# Patient Record
Sex: FEMALE | Race: WHITE | NOT HISPANIC OR LATINO | Employment: OTHER | URBAN - METROPOLITAN AREA
[De-identification: names, ages, dates, MRNs, and addresses within clinical notes are randomized per-mention and may not be internally consistent; named-entity substitution may affect disease eponyms.]

---

## 2017-01-21 ENCOUNTER — COMMUNICATION - HEALTHEAST (OUTPATIENT)
Dept: FAMILY MEDICINE | Facility: CLINIC | Age: 82
End: 2017-01-21

## 2017-01-21 DIAGNOSIS — M10.9 GOUT: ICD-10-CM

## 2017-07-01 ENCOUNTER — COMMUNICATION - HEALTHEAST (OUTPATIENT)
Dept: FAMILY MEDICINE | Facility: CLINIC | Age: 82
End: 2017-07-01

## 2017-07-01 DIAGNOSIS — R56.9 CONVULSIONS (H): ICD-10-CM

## 2017-09-06 ENCOUNTER — COMMUNICATION - HEALTHEAST (OUTPATIENT)
Dept: FAMILY MEDICINE | Facility: CLINIC | Age: 82
End: 2017-09-06

## 2017-09-06 DIAGNOSIS — I10 ESSENTIAL HYPERTENSION: ICD-10-CM

## 2017-10-01 ENCOUNTER — COMMUNICATION - HEALTHEAST (OUTPATIENT)
Dept: FAMILY MEDICINE | Facility: CLINIC | Age: 82
End: 2017-10-01

## 2017-10-01 DIAGNOSIS — R56.9 CONVULSIONS (H): ICD-10-CM

## 2017-10-16 ENCOUNTER — COMMUNICATION - HEALTHEAST (OUTPATIENT)
Dept: FAMILY MEDICINE | Facility: CLINIC | Age: 82
End: 2017-10-16

## 2017-10-16 DIAGNOSIS — M10.9 GOUT: ICD-10-CM

## 2017-10-24 ENCOUNTER — HOSPITAL ENCOUNTER (OUTPATIENT)
Dept: LAB | Age: 82
Setting detail: SPECIMEN
Discharge: HOME | End: 2017-10-24

## 2017-10-24 ENCOUNTER — OFFICE VISIT - HEALTHEAST (OUTPATIENT)
Dept: FAMILY MEDICINE | Facility: CLINIC | Age: 82
End: 2017-10-24

## 2017-10-24 DIAGNOSIS — D47.2 MONOCLONAL PARAPROTEINEMIA: ICD-10-CM

## 2017-10-24 DIAGNOSIS — M81.0 OSTEOPOROSIS: ICD-10-CM

## 2017-10-24 DIAGNOSIS — I10 ESSENTIAL HYPERTENSION: ICD-10-CM

## 2017-10-24 DIAGNOSIS — Z00.00 HEALTH CARE MAINTENANCE: ICD-10-CM

## 2017-10-24 DIAGNOSIS — D64.9 ANEMIA: ICD-10-CM

## 2017-10-24 DIAGNOSIS — E55.9 VITAMIN D DEFICIENCY: ICD-10-CM

## 2017-10-24 DIAGNOSIS — R56.9 CONVULSIONS (H): ICD-10-CM

## 2017-10-26 LAB
ALBUMIN PERCENT: 65.1 % (ref 51–67)
ALBUMIN SERPL ELPH-MCNC: 4.4 G/DL (ref 3.2–4.7)
ALPHA 1 PERCENT: 3 % (ref 2–4)
ALPHA 2 PERCENT: 11.9 % (ref 5–13)
ALPHA1 GLOB SERPL ELPH-MCNC: 0.2 G/DL (ref 0.1–0.3)
ALPHA2 GLOB SERPL ELPH-MCNC: 0.8 G/DL (ref 0.4–0.9)
B-GLOBULIN SERPL ELPH-MCNC: 0.6 G/DL (ref 0.7–1.2)
BETA PERCENT: 9.5 % (ref 10–17)
GAMMA GLOB SERPL ELPH-MCNC: 0.7 G/DL (ref 0.6–1.4)
GAMMA GLOBULIN PERCENT: 10.5 % (ref 9–20)
PATH ICD:: ABNORMAL
PROT PATTERN SERPL ELPH-IMP: ABNORMAL
PROT SERPL-MCNC: 6.7 G/DL (ref 6–8)
REVIEWING PATHOLOGIST: ABNORMAL

## 2017-10-27 ENCOUNTER — COMMUNICATION - HEALTHEAST (OUTPATIENT)
Dept: FAMILY MEDICINE | Facility: CLINIC | Age: 82
End: 2017-10-27

## 2017-12-13 ENCOUNTER — COMMUNICATION - HEALTHEAST (OUTPATIENT)
Dept: FAMILY MEDICINE | Facility: CLINIC | Age: 82
End: 2017-12-13

## 2017-12-13 DIAGNOSIS — I10 HTN (HYPERTENSION): ICD-10-CM

## 2017-12-29 ENCOUNTER — COMMUNICATION - HEALTHEAST (OUTPATIENT)
Dept: FAMILY MEDICINE | Facility: CLINIC | Age: 82
End: 2017-12-29

## 2017-12-29 DIAGNOSIS — R56.9 CONVULSIONS (H): ICD-10-CM

## 2018-01-14 ENCOUNTER — COMMUNICATION - HEALTHEAST (OUTPATIENT)
Dept: FAMILY MEDICINE | Facility: CLINIC | Age: 83
End: 2018-01-14

## 2018-01-14 DIAGNOSIS — M10.9 GOUT: ICD-10-CM

## 2018-03-09 ENCOUNTER — COMMUNICATION - HEALTHEAST (OUTPATIENT)
Dept: FAMILY MEDICINE | Facility: CLINIC | Age: 83
End: 2018-03-09

## 2018-04-15 ENCOUNTER — COMMUNICATION - HEALTHEAST (OUTPATIENT)
Dept: FAMILY MEDICINE | Facility: CLINIC | Age: 83
End: 2018-04-15

## 2018-04-15 DIAGNOSIS — M10.9 GOUT: ICD-10-CM

## 2018-06-02 ENCOUNTER — COMMUNICATION - HEALTHEAST (OUTPATIENT)
Dept: FAMILY MEDICINE | Facility: CLINIC | Age: 83
End: 2018-06-02

## 2018-06-02 DIAGNOSIS — I10 ESSENTIAL HYPERTENSION: ICD-10-CM

## 2018-06-26 ENCOUNTER — COMMUNICATION - HEALTHEAST (OUTPATIENT)
Dept: FAMILY MEDICINE | Facility: CLINIC | Age: 83
End: 2018-06-26

## 2018-06-26 DIAGNOSIS — R56.9 CONVULSIONS (H): ICD-10-CM

## 2018-07-13 ENCOUNTER — COMMUNICATION - HEALTHEAST (OUTPATIENT)
Dept: FAMILY MEDICINE | Facility: CLINIC | Age: 83
End: 2018-07-13

## 2018-07-13 DIAGNOSIS — M10.9 GOUT: ICD-10-CM

## 2018-09-07 ENCOUNTER — COMMUNICATION - HEALTHEAST (OUTPATIENT)
Dept: FAMILY MEDICINE | Facility: CLINIC | Age: 83
End: 2018-09-07

## 2018-09-07 DIAGNOSIS — I10 ESSENTIAL HYPERTENSION: ICD-10-CM

## 2018-09-25 ENCOUNTER — COMMUNICATION - HEALTHEAST (OUTPATIENT)
Dept: FAMILY MEDICINE | Facility: CLINIC | Age: 83
End: 2018-09-25

## 2018-09-25 DIAGNOSIS — R56.9 CONVULSIONS (H): ICD-10-CM

## 2018-10-10 ENCOUNTER — COMMUNICATION - HEALTHEAST (OUTPATIENT)
Dept: FAMILY MEDICINE | Facility: CLINIC | Age: 83
End: 2018-10-10

## 2018-10-10 DIAGNOSIS — M10.9 GOUT: ICD-10-CM

## 2018-10-29 ENCOUNTER — OFFICE VISIT - HEALTHEAST (OUTPATIENT)
Dept: FAMILY MEDICINE | Facility: CLINIC | Age: 83
End: 2018-10-29

## 2018-10-29 DIAGNOSIS — I10 ESSENTIAL HYPERTENSION: ICD-10-CM

## 2018-10-29 DIAGNOSIS — R56.9 CONVULSIONS (H): ICD-10-CM

## 2018-10-29 DIAGNOSIS — D64.9 ANEMIA: ICD-10-CM

## 2018-10-29 DIAGNOSIS — E66.9 OBESITY, UNSPECIFIED: ICD-10-CM

## 2018-10-29 DIAGNOSIS — D47.2 MONOCLONAL PARAPROTEINEMIA: ICD-10-CM

## 2018-10-29 DIAGNOSIS — G60.9 HEREDITARY AND IDIOPATHIC PERIPHERAL NEUROPATHY: ICD-10-CM

## 2018-10-29 DIAGNOSIS — Z00.00 HEALTH CARE MAINTENANCE: ICD-10-CM

## 2018-10-29 DIAGNOSIS — M10.9 GOUT: ICD-10-CM

## 2018-10-29 DIAGNOSIS — E55.9 VITAMIN D DEFICIENCY: ICD-10-CM

## 2018-10-29 LAB
ALBUMIN SERPL-MCNC: 4.2 G/DL (ref 3.5–5)
ALP SERPL-CCNC: 90 U/L (ref 45–120)
ALT SERPL W P-5'-P-CCNC: 18 U/L (ref 0–45)
ANION GAP SERPL CALCULATED.3IONS-SCNC: 11 MMOL/L (ref 5–18)
AST SERPL W P-5'-P-CCNC: 30 U/L (ref 0–40)
BILIRUB SERPL-MCNC: 0.3 MG/DL (ref 0–1)
BUN SERPL-MCNC: 12 MG/DL (ref 8–28)
CALCIUM SERPL-MCNC: 9.8 MG/DL (ref 8.5–10.5)
CHLORIDE BLD-SCNC: 106 MMOL/L (ref 98–107)
CHOLEST SERPL-MCNC: 216 MG/DL
CO2 SERPL-SCNC: 24 MMOL/L (ref 22–31)
CREAT SERPL-MCNC: 0.65 MG/DL (ref 0.6–1.1)
FASTING STATUS PATIENT QL REPORTED: NO
GFR SERPL CREATININE-BSD FRML MDRD: >60 ML/MIN/1.73M2
GLUCOSE BLD-MCNC: 94 MG/DL (ref 70–125)
HDLC SERPL-MCNC: 78 MG/DL
LDLC SERPL CALC-MCNC: 115 MG/DL
PHENYTOIN SERPL-MCNC: 8.1 UG/ML (ref 10–20)
POTASSIUM BLD-SCNC: 4.6 MMOL/L (ref 3.5–5)
PROT SERPL-MCNC: 6.6 G/DL (ref 6–8)
SODIUM SERPL-SCNC: 141 MMOL/L (ref 136–145)
TRIGL SERPL-MCNC: 113 MG/DL

## 2018-10-29 ASSESSMENT — MIFFLIN-ST. JEOR: SCORE: 1111.72

## 2018-10-30 LAB — 25(OH)D3 SERPL-MCNC: 34.8 NG/ML (ref 30–80)

## 2018-10-31 ENCOUNTER — COMMUNICATION - HEALTHEAST (OUTPATIENT)
Dept: FAMILY MEDICINE | Facility: CLINIC | Age: 83
End: 2018-10-31

## 2018-11-02 ENCOUNTER — COMMUNICATION - HEALTHEAST (OUTPATIENT)
Dept: FAMILY MEDICINE | Facility: CLINIC | Age: 83
End: 2018-11-02

## 2018-12-08 ENCOUNTER — COMMUNICATION - HEALTHEAST (OUTPATIENT)
Dept: FAMILY MEDICINE | Facility: CLINIC | Age: 83
End: 2018-12-08

## 2018-12-08 DIAGNOSIS — I10 ESSENTIAL HYPERTENSION: ICD-10-CM

## 2018-12-08 DIAGNOSIS — I10 HTN (HYPERTENSION): ICD-10-CM

## 2018-12-24 ENCOUNTER — COMMUNICATION - HEALTHEAST (OUTPATIENT)
Dept: FAMILY MEDICINE | Facility: CLINIC | Age: 83
End: 2018-12-24

## 2018-12-24 DIAGNOSIS — R56.9 CONVULSIONS (H): ICD-10-CM

## 2019-01-07 ENCOUNTER — COMMUNICATION - HEALTHEAST (OUTPATIENT)
Dept: FAMILY MEDICINE | Facility: CLINIC | Age: 84
End: 2019-01-07

## 2019-01-07 DIAGNOSIS — M10.9 GOUT: ICD-10-CM

## 2019-01-23 ENCOUNTER — COMMUNICATION - HEALTHEAST (OUTPATIENT)
Dept: FAMILY MEDICINE | Facility: CLINIC | Age: 84
End: 2019-01-23

## 2019-01-23 DIAGNOSIS — R56.9 CONVULSIONS (H): ICD-10-CM

## 2019-05-25 ENCOUNTER — COMMUNICATION - HEALTHEAST (OUTPATIENT)
Dept: FAMILY MEDICINE | Facility: CLINIC | Age: 84
End: 2019-05-25

## 2019-05-25 DIAGNOSIS — R56.9 CONVULSIONS (H): ICD-10-CM

## 2019-06-03 ENCOUNTER — COMMUNICATION - HEALTHEAST (OUTPATIENT)
Dept: FAMILY MEDICINE | Facility: CLINIC | Age: 84
End: 2019-06-03

## 2019-06-03 DIAGNOSIS — R56.9 CONVULSIONS (H): ICD-10-CM

## 2019-10-07 ENCOUNTER — COMMUNICATION - HEALTHEAST (OUTPATIENT)
Dept: FAMILY MEDICINE | Facility: CLINIC | Age: 84
End: 2019-10-07

## 2019-10-07 DIAGNOSIS — M10.9 GOUT: ICD-10-CM

## 2019-11-20 ENCOUNTER — OFFICE VISIT - HEALTHEAST (OUTPATIENT)
Dept: FAMILY MEDICINE | Facility: CLINIC | Age: 84
End: 2019-11-20

## 2019-11-20 DIAGNOSIS — G40.909 NONINTRACTABLE EPILEPSY WITHOUT STATUS EPILEPTICUS, UNSPECIFIED EPILEPSY TYPE (H): ICD-10-CM

## 2019-11-20 DIAGNOSIS — R27.0 ATAXIA: ICD-10-CM

## 2019-11-20 DIAGNOSIS — I10 ESSENTIAL HYPERTENSION: ICD-10-CM

## 2019-11-20 DIAGNOSIS — D47.2 MONOCLONAL PARAPROTEINEMIA: ICD-10-CM

## 2019-11-20 DIAGNOSIS — Z23 FLU VACCINE NEED: ICD-10-CM

## 2019-11-20 DIAGNOSIS — G60.9 HEREDITARY AND IDIOPATHIC PERIPHERAL NEUROPATHY: ICD-10-CM

## 2019-11-20 DIAGNOSIS — Z00.00 HEALTH CARE MAINTENANCE: ICD-10-CM

## 2019-11-20 DIAGNOSIS — Z23 NEED FOR PNEUMOCOCCAL VACCINE: ICD-10-CM

## 2019-11-20 DIAGNOSIS — M10.9 GOUT, UNSPECIFIED CAUSE, UNSPECIFIED CHRONICITY, UNSPECIFIED SITE: ICD-10-CM

## 2019-11-20 LAB
ALBUMIN SERPL-MCNC: 4.1 G/DL (ref 3.5–5)
ALP SERPL-CCNC: 81 U/L (ref 45–120)
ALT SERPL W P-5'-P-CCNC: 15 U/L (ref 0–45)
ANION GAP SERPL CALCULATED.3IONS-SCNC: 13 MMOL/L (ref 5–18)
AST SERPL W P-5'-P-CCNC: 24 U/L (ref 0–40)
BILIRUB SERPL-MCNC: 0.4 MG/DL (ref 0–1)
BUN SERPL-MCNC: 9 MG/DL (ref 8–28)
CALCIUM SERPL-MCNC: 9.7 MG/DL (ref 8.5–10.5)
CHLORIDE BLD-SCNC: 105 MMOL/L (ref 98–107)
CHOLEST SERPL-MCNC: 222 MG/DL
CO2 SERPL-SCNC: 22 MMOL/L (ref 22–31)
CREAT SERPL-MCNC: 0.54 MG/DL (ref 0.6–1.1)
FASTING STATUS PATIENT QL REPORTED: YES
GFR SERPL CREATININE-BSD FRML MDRD: >60 ML/MIN/1.73M2
GLUCOSE BLD-MCNC: 96 MG/DL (ref 70–125)
HBA1C MFR BLD: 4.9 % (ref 3.5–6)
HDLC SERPL-MCNC: 80 MG/DL
LDLC SERPL CALC-MCNC: 121 MG/DL
PHENYTOIN SERPL-MCNC: 7.3 UG/ML (ref 10–20)
POTASSIUM BLD-SCNC: 4.3 MMOL/L (ref 3.5–5)
PROT SERPL-MCNC: 6.5 G/DL (ref 6–8)
SODIUM SERPL-SCNC: 140 MMOL/L (ref 136–145)
TRIGL SERPL-MCNC: 106 MG/DL
URATE SERPL-MCNC: 4.6 MG/DL (ref 2–7.5)

## 2019-11-20 ASSESSMENT — ANXIETY QUESTIONNAIRES
1. FEELING NERVOUS, ANXIOUS, OR ON EDGE: NOT AT ALL
2. NOT BEING ABLE TO STOP OR CONTROL WORRYING: NOT AT ALL

## 2019-11-20 ASSESSMENT — MIFFLIN-ST. JEOR: SCORE: 1018.16

## 2019-11-21 LAB
ALBUMIN PERCENT: 63.2 % (ref 51–67)
ALBUMIN SERPL ELPH-MCNC: 4.2 G/DL (ref 3.2–4.7)
ALPHA 1 PERCENT: 2.9 % (ref 2–4)
ALPHA 2 PERCENT: 13.4 % (ref 5–13)
ALPHA1 GLOB SERPL ELPH-MCNC: 0.2 G/DL (ref 0.1–0.3)
ALPHA2 GLOB SERPL ELPH-MCNC: 0.9 G/DL (ref 0.4–0.9)
B-GLOBULIN SERPL ELPH-MCNC: 0.7 G/DL (ref 0.7–1.2)
BETA PERCENT: 10.2 % (ref 10–17)
GAMMA GLOB SERPL ELPH-MCNC: 0.7 G/DL (ref 0.6–1.4)
GAMMA GLOBULIN PERCENT: 10.3 % (ref 9–20)
PATH ICD:: ABNORMAL
PATH ICD:: NORMAL
PROT PATTERN SERPL ELPH-IMP: ABNORMAL
PROT PATTERN SERPL IFE-IMP: NORMAL
PROT SERPL-MCNC: 6.6 G/DL (ref 6–8)
REVIEWING PATHOLOGIST: ABNORMAL
REVIEWING PATHOLOGIST: NORMAL

## 2019-11-22 LAB
KAPPA LC FREE SER-MCNC: 1.59 MG/DL (ref 0.33–1.94)
KAPPA LC FREE/LAMBDA FREE SER NEPH: 1.03 {RATIO} (ref 0.26–1.65)
LAMBDA LC FREE SERPL-MCNC: 1.54 MG/DL (ref 0.57–2.63)

## 2019-11-24 ENCOUNTER — COMMUNICATION - HEALTHEAST (OUTPATIENT)
Dept: FAMILY MEDICINE | Facility: CLINIC | Age: 84
End: 2019-11-24

## 2019-11-25 ENCOUNTER — COMMUNICATION - HEALTHEAST (OUTPATIENT)
Dept: FAMILY MEDICINE | Facility: CLINIC | Age: 84
End: 2019-11-25

## 2019-11-30 ENCOUNTER — COMMUNICATION - HEALTHEAST (OUTPATIENT)
Dept: FAMILY MEDICINE | Facility: CLINIC | Age: 84
End: 2019-11-30

## 2019-11-30 DIAGNOSIS — I10 HTN (HYPERTENSION): ICD-10-CM

## 2019-11-30 DIAGNOSIS — R56.9 CONVULSIONS (H): ICD-10-CM

## 2019-12-02 ENCOUNTER — COMMUNICATION - HEALTHEAST (OUTPATIENT)
Dept: FAMILY MEDICINE | Facility: CLINIC | Age: 84
End: 2019-12-02

## 2019-12-02 DIAGNOSIS — I10 ESSENTIAL HYPERTENSION: ICD-10-CM

## 2019-12-04 ENCOUNTER — COMMUNICATION - HEALTHEAST (OUTPATIENT)
Dept: FAMILY MEDICINE | Facility: CLINIC | Age: 84
End: 2019-12-04

## 2019-12-04 ENCOUNTER — HOSPITAL ENCOUNTER (OUTPATIENT)
Dept: CT IMAGING | Facility: HOSPITAL | Age: 84
Discharge: HOME | End: 2019-12-04
Attending: FAMILY MEDICINE

## 2019-12-04 DIAGNOSIS — R27.0 ATAXIA: ICD-10-CM

## 2020-01-02 ENCOUNTER — COMMUNICATION - HEALTHEAST (OUTPATIENT)
Dept: FAMILY MEDICINE | Facility: CLINIC | Age: 85
End: 2020-01-02

## 2020-01-02 DIAGNOSIS — M10.9 GOUT: ICD-10-CM

## 2020-11-27 ENCOUNTER — COMMUNICATION - HEALTHEAST (OUTPATIENT)
Dept: FAMILY MEDICINE | Facility: CLINIC | Age: 85
End: 2020-11-27

## 2020-11-27 DIAGNOSIS — R56.9 CONVULSIONS (H): ICD-10-CM

## 2020-11-27 DIAGNOSIS — I10 ESSENTIAL HYPERTENSION: ICD-10-CM

## 2020-11-27 DIAGNOSIS — I10 HTN (HYPERTENSION): ICD-10-CM

## 2020-11-27 DIAGNOSIS — M10.9 GOUT: ICD-10-CM

## 2020-12-14 ENCOUNTER — OFFICE VISIT - HEALTHEAST (OUTPATIENT)
Dept: FAMILY MEDICINE | Facility: CLINIC | Age: 85
End: 2020-12-14

## 2020-12-14 DIAGNOSIS — R56.9 CONVULSIONS (H): ICD-10-CM

## 2020-12-14 RX ORDER — PHENYTOIN SODIUM 100 MG/1
100 CAPSULE, EXTENDED RELEASE ORAL 3 TIMES DAILY
Qty: 270 CAPSULE | Refills: 3 | Status: SHIPPED | OUTPATIENT
Start: 2020-12-14 | End: 2021-12-22

## 2021-02-24 ENCOUNTER — COMMUNICATION - HEALTHEAST (OUTPATIENT)
Dept: FAMILY MEDICINE | Facility: CLINIC | Age: 86
End: 2021-02-24

## 2021-02-24 DIAGNOSIS — I10 HTN (HYPERTENSION): ICD-10-CM

## 2021-02-24 DIAGNOSIS — I10 ESSENTIAL HYPERTENSION: ICD-10-CM

## 2021-03-17 ENCOUNTER — COMMUNICATION - HEALTHEAST (OUTPATIENT)
Dept: FAMILY MEDICINE | Facility: CLINIC | Age: 86
End: 2021-03-17

## 2021-03-17 DIAGNOSIS — M10.9 GOUT: ICD-10-CM

## 2021-03-18 RX ORDER — ALLOPURINOL 100 MG/1
TABLET ORAL
Qty: 90 TABLET | Refills: 0 | Status: SHIPPED | OUTPATIENT
Start: 2021-03-18 | End: 2021-09-24

## 2021-04-27 ENCOUNTER — OFFICE VISIT - HEALTHEAST (OUTPATIENT)
Dept: FAMILY MEDICINE | Facility: CLINIC | Age: 86
End: 2021-04-27

## 2021-04-27 DIAGNOSIS — I10 ESSENTIAL HYPERTENSION: ICD-10-CM

## 2021-04-27 DIAGNOSIS — G40.909 NONINTRACTABLE EPILEPSY WITHOUT STATUS EPILEPTICUS, UNSPECIFIED EPILEPSY TYPE (H): ICD-10-CM

## 2021-05-26 ENCOUNTER — RECORDS - HEALTHEAST (OUTPATIENT)
Dept: ADMINISTRATIVE | Facility: OTHER | Age: 86
End: 2021-05-26

## 2021-05-26 DIAGNOSIS — I10 HTN (HYPERTENSION): ICD-10-CM

## 2021-05-26 RX ORDER — LOSARTAN POTASSIUM 50 MG/1
TABLET ORAL
Qty: 30 TABLET | Refills: 0 | Status: SHIPPED | OUTPATIENT
Start: 2021-05-26 | End: 2021-09-24

## 2021-05-28 ENCOUNTER — RECORDS - HEALTHEAST (OUTPATIENT)
Dept: ADMINISTRATIVE | Facility: OTHER | Age: 86
End: 2021-05-28

## 2021-05-28 DIAGNOSIS — I10 ESSENTIAL HYPERTENSION: ICD-10-CM

## 2021-05-28 RX ORDER — CARVEDILOL 6.25 MG/1
TABLET ORAL
Qty: 180 TABLET | Refills: 0 | Status: SHIPPED | OUTPATIENT
Start: 2021-05-28 | End: 2021-09-24

## 2021-05-29 NOTE — TELEPHONE ENCOUNTER
RN cannot approve Refill Request    RN can NOT refill this medication PCP messaged that patient is overdue for Labs. Last office visit: 10/24/2017 Monroe Brito MD Last Physical: 10/29/2018 Last MTM visit: Visit date not found Last visit same specialty: 10/24/2017 Monroe Brito MD.  Next visit within 3 mo: Visit date not found  Next physical within 3 mo: Visit date not found      Oc Mendoza, [facility] Triage/Med Refill 5/25/2019    Requested Prescriptions   Pending Prescriptions Disp Refills     phenytoin (DILANTIN) 100 MG ER capsule [Pharmacy Med Name: Phenytoin Sodium Extended Oral Capsule 100 MG] 90 capsule 2     Sig: TAKE ONE CAPSULE BY MOUTH THREE TIMES DAILY.       Phenytoin Refill Protocol Failed - 5/25/2019 10:13 AM        Failed - CBC w/o diff (Hemogram 2) in last year      WBC   Date Value Ref Range Status   10/24/2017 5.5 4.0 - 11.0 thou/uL Final     RBC   Date Value Ref Range Status   10/24/2017 3.98 3.80 - 5.40 mill/uL Final     Hemoglobin   Date Value Ref Range Status   10/24/2017 13.6 12.0 - 16.0 g/dL Final     Hematocrit   Date Value Ref Range Status   10/24/2017 40.0 35.0 - 47.0 % Final     MCV   Date Value Ref Range Status   10/24/2017 101 (H) 80 - 100 fL Final     MCH   Date Value Ref Range Status   10/24/2017 34.1 (H) 27.0 - 34.0 pg Final     MCHC   Date Value Ref Range Status   10/24/2017 33.9 32.0 - 36.0 g/dL Final     RDW   Date Value Ref Range Status   10/24/2017 11.3 11.0 - 14.5 % Final     Platelets   Date Value Ref Range Status   10/24/2017 215 140 - 440 thou/uL Final     MPV   Date Value Ref Range Status   10/24/2017 7.7 7.0 - 10.0 fL Final                Failed - Free phenytoin level in last 12 months     No results found for: PHENYTOIFREE          Failed - Folate level in last 12 months     No results found for: FOLATE          Passed - LFT or AST or ALT in last 12 months     Albumin   Date Value Ref Range Status   10/29/2018 4.2 3.5 - 5.0 g/dL Final      Bilirubin, Total   Date Value Ref Range Status   10/29/2018 0.3 0.0 - 1.0 mg/dL Final     Alkaline Phosphatase   Date Value Ref Range Status   10/29/2018 90 45 - 120 U/L Final     AST   Date Value Ref Range Status   10/29/2018 30 0 - 40 U/L Final     ALT   Date Value Ref Range Status   10/29/2018 18 0 - 45 U/L Final     Protein, Total   Date Value Ref Range Status   10/29/2018 6.6 6.0 - 8.0 g/dL Final                Passed - PCP or prescribing provider visit in past 12 months       Last office visit with prescriber/PCP: 10/24/2017 Monroe Brito MD OR same dept: Visit date not found OR same specialty: 10/24/2017 Monroe Brito MD  Last physical: 10/29/2018 Last MTM visit: Visit date not found   Next visit within 3 mo: Visit date not found  Next physical within 3 mo: Visit date not found  Prescriber OR PCP: Monroe Brito MD  Last diagnosis associated with med order: 1. Convulsions (H)  - phenytoin (DILANTIN) 100 MG ER capsule [Pharmacy Med Name: Phenytoin Sodium Extended Oral Capsule 100 MG]; TAKE ONE CAPSULE BY MOUTH THREE TIMES DAILY.  Dispense: 90 capsule; Refill: 2    If protocol passes may refill for 12 months if within 3 months of last provider visit (or a total of 15 months).

## 2021-05-30 ENCOUNTER — RECORDS - HEALTHEAST (OUTPATIENT)
Dept: ADMINISTRATIVE | Facility: CLINIC | Age: 86
End: 2021-05-30

## 2021-05-31 ENCOUNTER — RECORDS - HEALTHEAST (OUTPATIENT)
Dept: ADMINISTRATIVE | Facility: CLINIC | Age: 86
End: 2021-05-31

## 2021-05-31 VITALS — WEIGHT: 168.5 LBS

## 2021-06-01 ENCOUNTER — RECORDS - HEALTHEAST (OUTPATIENT)
Dept: ADMINISTRATIVE | Facility: CLINIC | Age: 86
End: 2021-06-01

## 2021-06-02 VITALS — WEIGHT: 165.2 LBS | BODY MASS INDEX: 31.19 KG/M2 | HEIGHT: 61 IN

## 2021-06-02 NOTE — TELEPHONE ENCOUNTER
Refill Approved    Rx renewed per Medication Renewal Policy. Medication was last renewed on 1/9/2019         Jose Watts, [facility] Triage/Med Refill 10/8/2019     Requested Prescriptions   Pending Prescriptions Disp Refills     allopurinol (ZYLOPRIM) 100 MG tablet [Pharmacy Med Name: Allopurinol Oral Tablet 100 MG] 90 tablet 1     Sig: TAKE ONE TABLET BY MOUTH ONE TIME DAILY.       Allopurinol/Febuxostat Refill Protocol  Passed - 10/7/2019  9:07 AM        Passed - LFT or AST or ALT in last 12 months     Albumin   Date Value Ref Range Status   10/29/2018 4.2 3.5 - 5.0 g/dL Final     Bilirubin, Total   Date Value Ref Range Status   10/29/2018 0.3 0.0 - 1.0 mg/dL Final     Alkaline Phosphatase   Date Value Ref Range Status   10/29/2018 90 45 - 120 U/L Final     AST   Date Value Ref Range Status   10/29/2018 30 0 - 40 U/L Final     ALT   Date Value Ref Range Status   10/29/2018 18 0 - 45 U/L Final     Protein, Total   Date Value Ref Range Status   10/29/2018 6.6 6.0 - 8.0 g/dL Final                Passed - Visit with PCP or prescribing provider visit in past 12 months     Last office visit with prescriber/PCP: 10/24/2017 Monroe Brito MD OR same dept: Visit date not found OR same specialty: 10/24/2017 Monroe Brito MD  Last physical: 10/29/2018 Last MTM visit: Visit date not found   Next visit within 3 mo: Visit date not found  Next physical within 3 mo: Visit date not found  Prescriber OR PCP: Monroe Brito MD  Last diagnosis associated with med order: 1. Gout  - allopurinol (ZYLOPRIM) 100 MG tablet [Pharmacy Med Name: Allopurinol Oral Tablet 100 MG]; TAKE ONE TABLET BY MOUTH ONE TIME DAILY   Dispense: 90 tablet; Refill: 1    If protocol passes may refill for 12 months if within 3 months of last provider visit (or a total of 15 months).

## 2021-06-03 VITALS
RESPIRATION RATE: 20 BRPM | HEART RATE: 59 BPM | SYSTOLIC BLOOD PRESSURE: 130 MMHG | HEIGHT: 60 IN | DIASTOLIC BLOOD PRESSURE: 80 MMHG | BODY MASS INDEX: 28.9 KG/M2 | OXYGEN SATURATION: 99 % | TEMPERATURE: 97.7 F | WEIGHT: 147.2 LBS

## 2021-06-03 NOTE — PATIENT INSTRUCTIONS - HE
Fasting labs    Non-contrast CT of the head and refer to PT for eval    High dose flu vaccine    Pneumovax 23    Living will

## 2021-06-03 NOTE — PROGRESS NOTES
Assessment and Plan:       1. Ataxia  Unsteady gait requiring use of a walker at home  - CT Head Without Contrast; Future  - Ambulatory referral to Adult PT- External    2. Flu vaccine need    - Influenza High Dose, Seasonal 65+ yrs    3. Need for pneumococcal vaccine    - Pneumococcal polysaccharide vaccine 23-valent 1 yo or older, subq/IM    4. Hypertension                         The diagnosis was confirmed.  The condition is stable/controlled on LOSARTAN and no side effects  have been noted.  The appropriate follow up labs  ( CMP )have been ordered  (OR ARE UP TO DATE) and medication refills ordered if requested.    - Comprehensive Metabolic Panel    5. Gout, unspecified cause, unspecified chronicity, unspecified site                         The diagnosis was confirmed.  The condition is stable/controlled (NO RECURRENT GOUT ATTACKS)  on ALLOPURINOL and no side effects  have been noted.  The appropriate follow up labs  ( CMP, URIC ACID )have been ordered  (OR ARE UP TO DATE) and medication refills ordered if requested.    - Uric Acid    6. Nonintractable epilepsy without status epilepticus, unspecified epilepsy type (H)  STABLE ON DILANTIN  - Phenytoin (Dilantin )    7. Peripheral Neuropathy  Making ambulation more difficult.    8. Health care maintenance    - Lipid Cascade FASTING  - Glycosylated Hemoglobin A1c    9. Monoclonal Gammopathy Of Undetermined Significance    - Electrophoresis, Protein, Serum  - Immunoglobulin Free Light Chains, Serum  - Immunofixation Electrophoresis, Serum    PLAN:   Fasting labs    Non-contrast CT of the head and refer to PT for eval    High dose flu vaccine    Pneumovax 23    Living will recommend    The patient's current medical problems were reviewed.      The following health maintenance schedule was reviewed with the patient and provided in printed form in the after visit summary:   Health Maintenance   Topic Date Due     ADVANCE CARE PLANNING  11/15/1949     DXA SCAN   11/15/1996     ZOSTER VACCINES (2 of 3) 09/24/2009     INFLUENZA VACCINE RULE BASED (1) 08/01/2019     TD 18+ HE  09/15/2019     FALL RISK ASSESSMENT  10/29/2019     MEDICARE ANNUAL WELLNESS VISIT  10/29/2019     PNEUMOCOCCAL IMMUNIZATION 65+ LOW/MEDIUM RISK  Completed        Subjective:   Chief Complaint: Peggy De Anda is an 88 y.o. female here for an Annual Wellness visit.   HPI:   Has neuropathy and poor feeling in the feet.   Feels wobbly on her feet.   Not lightheaded or spinning.     Review of Systems:   Please see above.  The rest of the review of systems are negative for all systems.    Patient Care Team:  Monroe Brito MD as PCP - General (Family Medicine)  Monroe Brito MD as Assigned PCP     Patient Active Problem List   Diagnosis     Epilepsy And Recurrent Seizures     Nonrenal Secondary Hyperparathyroidism     Vitamin D Deficiency     Monoclonal Gammopathy Of Undetermined Significance     Anemia     Joint Pain, Localized In The Hip     Peripheral Neuropathy     Hypertension     Osteoporosis     Fatigue     Obesity     Convulsive Disorder     Ataxia     Dermatitis     Gout     Past Medical History:   Diagnosis Date     Gout 10/31/2015    Provider note on 7/21/2014 4. History of gout, stable on allopurinol.       Past Surgical History:   Procedure Laterality Date     APPENDECTOMY      AGE 13     CATARACT EXTRACTION, BILATERAL       CHOLECYSTECTOMY       NEUROPLASTY / TRANSPOSITION MEDIAN NERVE AT CARPAL TUNNEL BILATERAL       TONSILLECTOMY        No family history on file.   Social History     Socioeconomic History     Marital status:      Spouse name: Not on file     Number of children: Not on file     Years of education: Not on file     Highest education level: Not on file   Occupational History     Not on file   Social Needs     Financial resource strain: Not on file     Food insecurity:     Worry: Not on file     Inability: Not on file     Transportation needs:      Medical: Not on file     Non-medical: Not on file   Tobacco Use     Smoking status: Never Smoker     Smokeless tobacco: Never Used   Substance and Sexual Activity     Alcohol use: Not on file     Drug use: Not on file     Sexual activity: Not on file   Lifestyle     Physical activity:     Days per week: Not on file     Minutes per session: Not on file     Stress: Not on file   Relationships     Social connections:     Talks on phone: Not on file     Gets together: Not on file     Attends Sabianism service: Not on file     Active member of club or organization: Not on file     Attends meetings of clubs or organizations: Not on file     Relationship status: Not on file     Intimate partner violence:     Fear of current or ex partner: Not on file     Emotionally abused: Not on file     Physically abused: Not on file     Forced sexual activity: Not on file   Other Topics Concern     Not on file   Social History Narrative     Not on file      Current Outpatient Medications   Medication Sig Dispense Refill     allopurinol (ZYLOPRIM) 100 MG tablet TAKE ONE TABLET BY MOUTH ONE TIME DAILY. 90 tablet 0     aspirin 81 MG EC tablet Take 81 mg by mouth daily.       calcium carbonate (OS-OBED) 600 mg calcium (1,500 mg) tablet Take 600 mg by mouth.       carvedilol (COREG) 6.25 MG tablet TAKE ONE TABLET BY MOUTH TWICE DAILY 180 tablet 3     cholecalciferol, vitamin D3, 2,000 unit Tab Take by mouth.       losartan (COZAAR) 50 MG tablet TAKE 1 TABLET (50 MG) BY MOUTH ONCE DAILY. 90 tablet 3     MULTIVITAMIN ORAL Take by mouth.       phenytoin (DILANTIN) 100 MG ER capsule Take 1 capsule (100 mg total) by mouth 3 (three) times a day. 270 capsule 1     No current facility-administered medications for this visit.         12 system review:  Wobbly on feet, has to hang onto things or use a walker      Objective:   Vital Signs:   Visit Vitals  /80 (Patient Site: Left Arm, Patient Position: Sitting, Cuff Size: Adult Regular)   Pulse  "(!) 59   Temp 97.7  F (36.5  C) (Oral)   Resp 20   Ht 5' 0.25\" (1.53 m)   Wt 147 lb 3.2 oz (66.8 kg)   SpO2 99%   BMI 28.51 kg/m         VisionScreening:  No exam data present     PHYSICAL EXAM    GEN: FEMALE, ALERT, ORIENTED TIMES THREE, NAD  HEENT:  TM'S NL                  THROAT CL                  PERRL  NECK: SUPPLE, WITHOUT ADENOPATHY, THYROMEGALY, OR CAROTID BRUIT  LUNGS: CTA  COR: RRR WITHOUT MURMUR  BREASTS: not examined  ABD: SOFT, POSITIVE BOWEL SOUNDS, NONTX, WITHOUT MASS OR HSM  EXT:  NO CYANOSIS, CLUBBING OR EDEMA  SKIN:  WITHOUT ATYPICAL APPEARING LESIONS.  GAIT: IS TENTATIVE AND UNSTEADY WITH FREQUENT REACHING FOR SUPPORT.    UNABLE TO DO HEEL/TOE WALK OR ROMBERG.  FOOT EXAM:  DIMINISHED MONOFILAMENT SENSATION    Assessment Results 11/20/2019   Activities of Daily Living 1 - Full function   Instrumental Activities of Daily Living 1 - Full function   Mini Cog Total Score 5   Some recent data might be hidden   FALLS:  none  FALL RISK:   Has poor balance  LIVING WILL:  Does not have    A Mini-Cog score of 0-2 suggests the possibility of dementia, score of 3-5 suggests no dementia    Identified Health Risks:     "

## 2021-06-03 NOTE — TELEPHONE ENCOUNTER
RN cannot approve Refill Request    RN can NOT refill this medication Protocol failed and NO refill given. Last office visit: 10/24/2017 Monroe Brito MD Last Physical: 11/20/2019 Last MTM visit: Visit date not found Last visit same specialty: 10/24/2017 Monroe Brito MD.  Next visit within 3 mo: Visit date not found  Next physical within 3 mo: Visit date not found      Bre Zhang, [facility] Triage/Med Refill 11/30/2019    Requested Prescriptions   Pending Prescriptions Disp Refills     phenytoin (DILANTIN) 100 MG ER capsule [Pharmacy Med Name: Phenytoin Sodium Extended Oral Capsule 100 MG] 270 capsule 0     Sig: Take 1 capsule (100 mg total) by mouth 3 (three) times a day.       Phenytoin Refill Protocol Failed - 11/30/2019  4:09 PM        Failed - CBC w/o diff (Hemogram 2) in last year      WBC   Date Value Ref Range Status   10/24/2017 5.5 4.0 - 11.0 thou/uL Final     RBC   Date Value Ref Range Status   10/24/2017 3.98 3.80 - 5.40 mill/uL Final     Hemoglobin   Date Value Ref Range Status   10/24/2017 13.6 12.0 - 16.0 g/dL Final     Hematocrit   Date Value Ref Range Status   10/24/2017 40.0 35.0 - 47.0 % Final     MCV   Date Value Ref Range Status   10/24/2017 101 (H) 80 - 100 fL Final     MCH   Date Value Ref Range Status   10/24/2017 34.1 (H) 27.0 - 34.0 pg Final     MCHC   Date Value Ref Range Status   10/24/2017 33.9 32.0 - 36.0 g/dL Final     RDW   Date Value Ref Range Status   10/24/2017 11.3 11.0 - 14.5 % Final     Platelets   Date Value Ref Range Status   10/24/2017 215 140 - 440 thou/uL Final     MPV   Date Value Ref Range Status   10/24/2017 7.7 7.0 - 10.0 fL Final                Failed - Free phenytoin level in last 12 months     No results found for: PHENYTOIFREE          Failed - Folate level in last 12 months     No results found for: FOLATE          Passed - LFT or AST or ALT in last 12 months     Albumin   Date Value Ref Range Status   11/20/2019 4.1 3.5 - 5.0 g/dL  Final     Bilirubin, Total   Date Value Ref Range Status   11/20/2019 0.4 0.0 - 1.0 mg/dL Final     Alkaline Phosphatase   Date Value Ref Range Status   11/20/2019 81 45 - 120 U/L Final     AST   Date Value Ref Range Status   11/20/2019 24 0 - 40 U/L Final     ALT   Date Value Ref Range Status   11/20/2019 15 0 - 45 U/L Final     Protein, Total   Date Value Ref Range Status   11/20/2019 6.5 6.0 - 8.0 g/dL Final   11/20/2019 6.6 6.0 - 8.0 g/dL Final                Passed - PCP or prescribing provider visit in past 12 months       Last office visit with prescriber/PCP: 10/24/2017 Monroe Brito MD OR same dept: Visit date not found OR same specialty: 10/24/2017 Monroe Brito MD  Last physical: 11/20/2019 Last MTM visit: Visit date not found   Next visit within 3 mo: Visit date not found  Next physical within 3 mo: Visit date not found  Prescriber OR PCP: Monroe Brito MD  Last diagnosis associated with med order: 1. Convulsions (H)  - phenytoin (DILANTIN) 100 MG ER capsule [Pharmacy Med Name: Phenytoin Sodium Extended Oral Capsule 100 MG]; Take 1 capsule (100 mg total) by mouth 3 (three) times a day.  Dispense: 270 capsule; Refill: 0    2. HTN (hypertension)  - losartan (COZAAR) 50 MG tablet [Pharmacy Med Name: Losartan Potassium Oral Tablet 50 MG]; TAKE 1 TABLET (50 MG) BY MOUTH ONCE DAILY.  Dispense: 90 tablet; Refill: 2    If protocol passes may refill for 12 months if within 3 months of last provider visit (or a total of 15 months).             losartan (COZAAR) 50 MG tablet [Pharmacy Med Name: Losartan Potassium Oral Tablet 50 MG] 90 tablet 2     Sig: TAKE 1 TABLET (50 MG) BY MOUTH ONCE DAILY.       Angiotensin Receptor Blocker Protocol Passed - 11/30/2019  4:09 PM        Passed - PCP or prescribing provider visit in past 12 months       Last office visit with prescriber/PCP: 10/24/2017 Monroe Brito MD OR same dept: Visit date not found OR same specialty: 10/24/2017  Monroe Brito MD  Last physical: 11/20/2019 Last MTM visit: Visit date not found   Next visit within 3 mo: Visit date not found  Next physical within 3 mo: Visit date not found  Prescriber OR PCP: Monroe Brito MD  Last diagnosis associated with med order: 1. Convulsions (H)  - phenytoin (DILANTIN) 100 MG ER capsule [Pharmacy Med Name: Phenytoin Sodium Extended Oral Capsule 100 MG]; Take 1 capsule (100 mg total) by mouth 3 (three) times a day.  Dispense: 270 capsule; Refill: 0    2. HTN (hypertension)  - losartan (COZAAR) 50 MG tablet [Pharmacy Med Name: Losartan Potassium Oral Tablet 50 MG]; TAKE 1 TABLET (50 MG) BY MOUTH ONCE DAILY.  Dispense: 90 tablet; Refill: 2    If protocol passes may refill for 12 months if within 3 months of last provider visit (or a total of 15 months).             Passed - Serum potassium within the past 12 months     Lab Results   Component Value Date    Potassium 4.3 11/20/2019             Passed - Blood pressure filed in past 12 months     BP Readings from Last 1 Encounters:   11/20/19 130/80             Passed - Serum creatinine within the past 12 months     Creatinine   Date Value Ref Range Status   11/20/2019 0.54 (L) 0.60 - 1.10 mg/dL Final

## 2021-06-03 NOTE — TELEPHONE ENCOUNTER
----- Message from Monroe Brito MD sent at 11/24/2019  9:13 AM CST -----  Silvestre Woods:  Your phenytoin level (Dilantin) is just slightly low but similar to in the past.  The remaining labs are NORMAL.

## 2021-06-03 NOTE — TELEPHONE ENCOUNTER
Refill Approved    Rx renewed per Medication Renewal Policy. Medication was last renewed on 12/9/18.    Mary Dave, [facility] Connection Triage/Med Refill 12/2/2019     Requested Prescriptions   Pending Prescriptions Disp Refills     carvedilol (COREG) 6.25 MG tablet [Pharmacy Med Name: Carvedilol Oral Tablet 6.25 MG] 180 tablet 2     Sig: TAKE ONE TABLET BY MOUTH TWICE DAILY       Beta-Blockers Refill Protocol Passed - 12/2/2019 10:11 AM        Passed - PCP or prescribing provider visit in past 12 months or next 3 months     Last office visit with prescriber/PCP: 10/24/2017 Monroe Brito MD OR same dept: Visit date not found OR same specialty: 10/24/2017 Monroe Brito MD  Last physical: 11/20/2019 Last MTM visit: Visit date not found   Next visit within 3 mo: Visit date not found  Next physical within 3 mo: Visit date not found  Prescriber OR PCP: Monroe Brito MD  Last diagnosis associated with med order: 1. Essential hypertension  - carvedilol (COREG) 6.25 MG tablet [Pharmacy Med Name: Carvedilol Oral Tablet 6.25 MG]; TAKE ONE TABLET BY MOUTH TWICE DAILY  Dispense: 180 tablet; Refill: 2    If protocol passes may refill for 12 months if within 3 months of last provider visit (or a total of 15 months).             Passed - Blood pressure filed in past 12 months     BP Readings from Last 1 Encounters:   11/20/19 130/80

## 2021-06-04 NOTE — TELEPHONE ENCOUNTER
Refill Approved    Rx renewed per Medication Renewal Policy. Medication was last renewed on 10/8/19.    Mary Dave, [facility] Connection Triage/Med Refill 1/3/2020     Requested Prescriptions   Pending Prescriptions Disp Refills     allopurinol (ZYLOPRIM) 100 MG tablet [Pharmacy Med Name: Allopurinol Oral Tablet 100 MG] 90 tablet 0     Sig: TAKE ONE TABLET BY MOUTH ONE TIME DAILY.       Allopurinol/Febuxostat Refill Protocol  Passed - 1/2/2020  5:25 PM        Passed - LFT or AST or ALT in last 12 months     Albumin   Date Value Ref Range Status   11/20/2019 4.1 3.5 - 5.0 g/dL Final     Bilirubin, Total   Date Value Ref Range Status   11/20/2019 0.4 0.0 - 1.0 mg/dL Final     Alkaline Phosphatase   Date Value Ref Range Status   11/20/2019 81 45 - 120 U/L Final     AST   Date Value Ref Range Status   11/20/2019 24 0 - 40 U/L Final     ALT   Date Value Ref Range Status   11/20/2019 15 0 - 45 U/L Final     Protein, Total   Date Value Ref Range Status   11/20/2019 6.5 6.0 - 8.0 g/dL Final   11/20/2019 6.6 6.0 - 8.0 g/dL Final                Passed - Visit with PCP or prescribing provider visit in past 12 months     Last office visit with prescriber/PCP: 10/24/2017 Monroe Brito MD OR same dept: Visit date not found OR same specialty: 10/24/2017 Monroe Brito MD  Last physical: 11/20/2019 Last MTM visit: Visit date not found   Next visit within 3 mo: Visit date not found  Next physical within 3 mo: Visit date not found  Prescriber OR PCP: Monroe Brito MD  Last diagnosis associated with med order: 1. Gout  - allopurinol (ZYLOPRIM) 100 MG tablet [Pharmacy Med Name: Allopurinol Oral Tablet 100 MG]; TAKE ONE TABLET BY MOUTH ONE TIME DAILY.  Dispense: 90 tablet; Refill: 0    If protocol passes may refill for 12 months if within 3 months of last provider visit (or a total of 15 months).

## 2021-06-04 NOTE — TELEPHONE ENCOUNTER
----- Message from Monroe Brito MD sent at 12/4/2019  3:20 PM CST -----  Silvestre Woods:  Your CT Scan of the head is NORMAL.  FOLLOW UP with PHYSICAL THERAPY.

## 2021-06-09 ENCOUNTER — RECORDS - HEALTHEAST (OUTPATIENT)
Dept: ADMINISTRATIVE | Facility: CLINIC | Age: 86
End: 2021-06-09

## 2021-06-13 NOTE — PROGRESS NOTES
DIAGNOSIS:  1. Anemia  HM1(CBC and Differential)    HM1 (CBC with Diff)   2. Convulsions  Phenytoin (Dilantin )   3. Monoclonal Gammopathy Of Undetermined Significance  Electrophoresis, Protein, Serum, Cascade   4. Vitamin D deficiency     5. Osteoporosis  Vitamin D, Total (25-Hydroxy)   6. Hypertension  Comprehensive Metabolic Panel   7. Health care maintenance  Influenza High Dose, Seasonal 65+ yrs       PLAN:  Patient Instructions   OSTEOPOROSIS / OSTEOPENIA (LOW BONE MASS)  INSTRUCTIONS:     1.  CALCIUM REQUIREMENT IS 2859-4698 MG DAILY  2. THIS CAN BE ATTAINED THROUGH 4 DAIRY SERVINGS OR AN OTC SUPPLEMENT    3.  VITAMIN D  SHOULD BE TAKEN AT 2000 UNITS DAILY  4.  WEIGHT BEARING EXERCISE LIKE WALKING SHOULD BE DONE DAILY    5.  IF YOU SMOKE, THEN QUIT    YOU MAY WANT TO CONSIDER TAKING A MEDICATION SUCH AS ALENDRONATE (FOSAMAX) TO HELP BUILD UP YOUR BONE STRENGTH.  IF YOU WOULD LIKE TO DISCUSS THIS PLEASE SCHEDULE A FOLLOW UP APPOINTMENT.    Non- Fasting labs    High dose flu vaccine      HPI: medcheck .  No seizures except while in the hospital 10 years ago.       Current Outpatient Prescriptions on File Prior to Visit   Medication Sig Dispense Refill     allopurinol (ZYLOPRIM) 100 MG tablet TAKE ONE TABLET BY MOUTH ONE TIME DAILY  90 tablet 0     aspirin 81 MG EC tablet Take 81 mg by mouth daily.       carvedilol (COREG) 6.25 MG tablet TAKE ONE TABLET BY MOUTH TWICE DAILY 180 tablet 2     cholecalciferol, vitamin D3, 2,000 unit Tab Take by mouth.       losartan (COZAAR) 50 MG tablet TAKE 1 TABLET (50 MG) BY MOUTH ONCE DAILY 90 tablet 3     MULTIVITAMIN ORAL Take by mouth.       phenytoin (DILANTIN) 100 MG ER capsule TAKE 1 CAPSULE BY MOUTH 3 TIMES EACH DAY. 270 capsule 0     No current facility-administered medications on file prior to visit.        Pmh: reviewed  Psh: reviewed  Allergy:  reviewed      EXAM:    /72 (Patient Site: Right Arm, Patient Position: Sitting, Cuff Size: Adult Large)  Pulse (!)  58  Resp 14  Wt 168 lb 8 oz (76.4 kg)  GEN:   ALERT, NAD, ORIENTED TIMES THREE  LUNGS: CTA  COR: RRR WITHOUT MURMUR  EXT: WITHOUT EDEMA OR SWELLING    No results found for this or any previous visit (from the past 168 hour(s)).

## 2021-06-13 NOTE — PATIENT INSTRUCTIONS - HE
Fasting labs when she comes in for her physical.    Schedule a physical with fasting labs after the 1st of the year.

## 2021-06-13 NOTE — PROGRESS NOTES
"Peggy De Anda is a 89 y.o. female who is being evaluated via a billable telephone visit.      The patient has been notified of following:     \"This telephone visit will be conducted via a call between you and your physician/provider. We have found that certain health care needs can be provided without the need for a physical exam.  This service lets us provide the care you need with a short phone conversation.  If a prescription is necessary we can send it directly to your pharmacy.  If lab work is needed we can place an order for that and you can then stop by our lab to have the test done at a later time.    Telephone visits are billed at different rates depending on your insurance coverage. During this emergency period, for some insurers they may be billed the same as an in-person visit.  Please reach out to your insurance provider with any questions.    If during the course of the call the physician/provider feels a telephone visit is not appropriate, you will not be charged for this service.\"    Patient has given verbal consent to a Telephone visit? Yes    What phone number would you like to be contacted at? [phone]    Patient would like to receive their AVS by AVS Preference: Mail a copy.    Additional provider notes: HPI:   occas gets dizzy with standing.      Her only problem is the need for a refill.    Assessment/Plan:  1. Convulsions (H)    - phenytoin (DILANTIN) 100 MG ER capsule; Take 1 capsule (100 mg total) by mouth 3 (three) times a day.  Dispense: 270 capsule; Refill: 3    PLAN:   Fasting labs when she comes in for her physical.    Schedule a physical with fasting labs after the 1st of the year.          Phone call duration:  7 minutes    "

## 2021-06-13 NOTE — TELEPHONE ENCOUNTER
RN cannot approve Refill Request    RN can NOT refill this medication PCP messaged that patient is overdue for Labs. Last office visit: 10/24/2017 Monroe Brito MD Last Physical: 11/20/2019 Last MTM visit: Visit date not found Last visit same specialty: 10/24/2017 Monroe Brito MD.  Next visit within 3 mo: Visit date not found  Next physical within 3 mo: Visit date not found      Nyla Garcia, [facility] Triage/Med Refill 11/28/2020    Requested Prescriptions   Pending Prescriptions Disp Refills     phenytoin (DILANTIN) 100 MG ER capsule [Pharmacy Med Name: Phenytoin Sodium Extended Oral Capsule 100 MG] 270 capsule 0     Sig: TAKE ONE CAPSULE BY MOUTH THREE TIMES DAILY       Phenytoin Refill Protocol Failed - 11/27/2020  2:25 PM        Failed - LFT or AST or ALT in last 12 months     Albumin   Date Value Ref Range Status   11/20/2019 4.1 3.5 - 5.0 g/dL Final     Bilirubin, Total   Date Value Ref Range Status   11/20/2019 0.4 0.0 - 1.0 mg/dL Final     Alkaline Phosphatase   Date Value Ref Range Status   11/20/2019 81 45 - 120 U/L Final     AST   Date Value Ref Range Status   11/20/2019 24 0 - 40 U/L Final     ALT   Date Value Ref Range Status   11/20/2019 15 0 - 45 U/L Final     Protein, Total   Date Value Ref Range Status   11/20/2019 6.5 6.0 - 8.0 g/dL Final   11/20/2019 6.6 6.0 - 8.0 g/dL Final                Failed - CBC w/o diff (Hemogram 2) in last year      WBC   Date Value Ref Range Status   10/24/2017 5.5 4.0 - 11.0 thou/uL Final     RBC   Date Value Ref Range Status   10/24/2017 3.98 3.80 - 5.40 mill/uL Final     Hemoglobin   Date Value Ref Range Status   10/24/2017 13.6 12.0 - 16.0 g/dL Final     Hematocrit   Date Value Ref Range Status   10/24/2017 40.0 35.0 - 47.0 % Final     MCV   Date Value Ref Range Status   10/24/2017 101 (H) 80 - 100 fL Final     MCH   Date Value Ref Range Status   10/24/2017 34.1 (H) 27.0 - 34.0 pg Final     MCHC   Date Value Ref Range Status    10/24/2017 33.9 32.0 - 36.0 g/dL Final     RDW   Date Value Ref Range Status   10/24/2017 11.3 11.0 - 14.5 % Final     Platelets   Date Value Ref Range Status   10/24/2017 215 140 - 440 thou/uL Final     MPV   Date Value Ref Range Status   10/24/2017 7.7 7.0 - 10.0 fL Final                Failed - Free phenytoin level in last 12 months     No results found for: PHENYTOIFREE          Failed - Folate level in last 12 months     No results found for: FOLATE          Failed - PCP or prescribing provider visit in past 12 months       Last office visit with prescriber/PCP: 10/24/2017 Monroe Brito MD OR same dept: Visit date not found OR same specialty: 10/24/2017 oMnroe Brito MD  Last physical: 11/20/2019 Last MTM visit: Visit date not found   Next visit within 3 mo: Visit date not found  Next physical within 3 mo: Visit date not found  Prescriber OR PCP: Monroe Brito MD  Last diagnosis associated with med order: 1. Convulsions (H)  - phenytoin (DILANTIN) 100 MG ER capsule [Pharmacy Med Name: Phenytoin Sodium Extended Oral Capsule 100 MG]; TAKE ONE CAPSULE BY MOUTH THREE TIMES DAILY   Dispense: 270 capsule; Refill: 0    2. HTN (hypertension)  - losartan (COZAAR) 50 MG tablet [Pharmacy Med Name: Losartan Potassium Oral Tablet 50 MG]; TAKE ONE TABLET BY MOUTH ONE TIME DAILY   Dispense: 90 tablet; Refill: 0    3. Essential hypertension  - carvediloL (COREG) 6.25 MG tablet [Pharmacy Med Name: Carvedilol Oral Tablet 6.25 MG]; Take 1 tablet (6.25 mg total) by mouth 2 (two) times a day.  Dispense: 180 tablet; Refill: 0    4. Gout  - allopurinoL (ZYLOPRIM) 100 MG tablet [Pharmacy Med Name: Allopurinol Oral Tablet 100 MG]; Take 1 tablet (100 mg total) by mouth daily.  Dispense: 90 tablet; Refill: 0    If protocol passes may refill for 12 months if within 3 months of last provider visit (or a total of 15 months).                losartan (COZAAR) 50 MG tablet [Pharmacy Med Name: Losartan Potassium  Oral Tablet 50 MG] 90 tablet 0     Sig: TAKE ONE TABLET BY MOUTH ONE TIME DAILY       Angiotensin Receptor Blocker Protocol Failed - 11/27/2020  2:25 PM        Failed - PCP or prescribing provider visit in past 12 months       Last office visit with prescriber/PCP: 10/24/2017 Monroe Brito MD OR same dept: Visit date not found OR same specialty: 10/24/2017 Monroe Brito MD  Last physical: 11/20/2019 Last MTM visit: Visit date not found   Next visit within 3 mo: Visit date not found  Next physical within 3 mo: Visit date not found  Prescriber OR PCP: Monroe Brito MD  Last diagnosis associated with med order: 1. Convulsions (H)  - phenytoin (DILANTIN) 100 MG ER capsule [Pharmacy Med Name: Phenytoin Sodium Extended Oral Capsule 100 MG]; TAKE ONE CAPSULE BY MOUTH THREE TIMES DAILY   Dispense: 270 capsule; Refill: 0    2. HTN (hypertension)  - losartan (COZAAR) 50 MG tablet [Pharmacy Med Name: Losartan Potassium Oral Tablet 50 MG]; TAKE ONE TABLET BY MOUTH ONE TIME DAILY   Dispense: 90 tablet; Refill: 0    3. Essential hypertension  - carvediloL (COREG) 6.25 MG tablet [Pharmacy Med Name: Carvedilol Oral Tablet 6.25 MG]; Take 1 tablet (6.25 mg total) by mouth 2 (two) times a day.  Dispense: 180 tablet; Refill: 0    4. Gout  - allopurinoL (ZYLOPRIM) 100 MG tablet [Pharmacy Med Name: Allopurinol Oral Tablet 100 MG]; Take 1 tablet (100 mg total) by mouth daily.  Dispense: 90 tablet; Refill: 0    If protocol passes may refill for 12 months if within 3 months of last provider visit (or a total of 15 months).             Failed - Serum potassium within the past 12 months     No results found for: LN-POTASSIUM          Failed - Blood pressure filed in past 12 months     BP Readings from Last 1 Encounters:   11/20/19 130/80             Failed - Serum creatinine within the past 12 months     Creatinine   Date Value Ref Range Status   11/20/2019 0.54 (L) 0.60 - 1.10 mg/dL Final                 carvediloL (COREG) 6.25 MG tablet [Pharmacy Med Name: Carvedilol Oral Tablet 6.25 MG] 180 tablet 0     Sig: Take 1 tablet (6.25 mg total) by mouth 2 (two) times a day.       Beta-Blockers Refill Protocol Failed - 11/27/2020  2:25 PM        Failed - PCP or prescribing provider visit in past 12 months or next 3 months     Last office visit with prescriber/PCP: 10/24/2017 Monroe Brito MD OR same dept: Visit date not found OR same specialty: 10/24/2017 Monroe Brito MD  Last physical: 11/20/2019 Last MTM visit: Visit date not found   Next visit within 3 mo: Visit date not found  Next physical within 3 mo: Visit date not found  Prescriber OR PCP: Monroe Brito MD  Last diagnosis associated with med order: 1. Convulsions (H)  - phenytoin (DILANTIN) 100 MG ER capsule [Pharmacy Med Name: Phenytoin Sodium Extended Oral Capsule 100 MG]; TAKE ONE CAPSULE BY MOUTH THREE TIMES DAILY   Dispense: 270 capsule; Refill: 0    2. HTN (hypertension)  - losartan (COZAAR) 50 MG tablet [Pharmacy Med Name: Losartan Potassium Oral Tablet 50 MG]; TAKE ONE TABLET BY MOUTH ONE TIME DAILY   Dispense: 90 tablet; Refill: 0    3. Essential hypertension  - carvediloL (COREG) 6.25 MG tablet [Pharmacy Med Name: Carvedilol Oral Tablet 6.25 MG]; Take 1 tablet (6.25 mg total) by mouth 2 (two) times a day.  Dispense: 180 tablet; Refill: 0    4. Gout  - allopurinoL (ZYLOPRIM) 100 MG tablet [Pharmacy Med Name: Allopurinol Oral Tablet 100 MG]; Take 1 tablet (100 mg total) by mouth daily.  Dispense: 90 tablet; Refill: 0    If protocol passes may refill for 12 months if within 3 months of last provider visit (or a total of 15 months).             Failed - Blood pressure filed in past 12 months     BP Readings from Last 1 Encounters:   11/20/19 130/80                allopurinoL (ZYLOPRIM) 100 MG tablet [Pharmacy Med Name: Allopurinol Oral Tablet 100 MG] 90 tablet 0     Sig: Take 1 tablet (100 mg total) by mouth daily.        Allopurinol/Febuxostat Refill Protocol  Failed - 11/27/2020  2:25 PM        Failed - LFT or AST or ALT in last 12 months     Albumin   Date Value Ref Range Status   11/20/2019 4.1 3.5 - 5.0 g/dL Final     Bilirubin, Total   Date Value Ref Range Status   11/20/2019 0.4 0.0 - 1.0 mg/dL Final     Alkaline Phosphatase   Date Value Ref Range Status   11/20/2019 81 45 - 120 U/L Final     AST   Date Value Ref Range Status   11/20/2019 24 0 - 40 U/L Final     ALT   Date Value Ref Range Status   11/20/2019 15 0 - 45 U/L Final     Protein, Total   Date Value Ref Range Status   11/20/2019 6.5 6.0 - 8.0 g/dL Final   11/20/2019 6.6 6.0 - 8.0 g/dL Final                Failed - Visit with PCP or prescribing provider visit in past 12 months     Last office visit with prescriber/PCP: 10/24/2017 Monroe Brito MD OR same dept: Visit date not found OR same specialty: 10/24/2017 Monroe Brito MD  Last physical: 11/20/2019 Last MTM visit: Visit date not found   Next visit within 3 mo: Visit date not found  Next physical within 3 mo: Visit date not found  Prescriber OR PCP: Monroe Brito MD  Last diagnosis associated with med order: 1. Convulsions (H)  - phenytoin (DILANTIN) 100 MG ER capsule [Pharmacy Med Name: Phenytoin Sodium Extended Oral Capsule 100 MG]; TAKE ONE CAPSULE BY MOUTH THREE TIMES DAILY   Dispense: 270 capsule; Refill: 0    2. HTN (hypertension)  - losartan (COZAAR) 50 MG tablet [Pharmacy Med Name: Losartan Potassium Oral Tablet 50 MG]; TAKE ONE TABLET BY MOUTH ONE TIME DAILY   Dispense: 90 tablet; Refill: 0    3. Essential hypertension  - carvediloL (COREG) 6.25 MG tablet [Pharmacy Med Name: Carvedilol Oral Tablet 6.25 MG]; Take 1 tablet (6.25 mg total) by mouth 2 (two) times a day.  Dispense: 180 tablet; Refill: 0    4. Gout  - allopurinoL (ZYLOPRIM) 100 MG tablet [Pharmacy Med Name: Allopurinol Oral Tablet 100 MG]; Take 1 tablet (100 mg total) by mouth daily.  Dispense: 90 tablet; Refill:  0    If protocol passes may refill for 12 months if within 3 months of last provider visit (or a total of 15 months).

## 2021-06-13 NOTE — TELEPHONE ENCOUNTER
Who is calling:  Patient   Reason for Call:  Wants provider to know she can't make an appointment right now due to not being very mobile and for concerns regarding covid  Date of last appointment with primary care:   Okay to leave a detailed message: No    Patient is concerned that she won't get refills on her medication.     Please call and advise.

## 2021-06-13 NOTE — TELEPHONE ENCOUNTER
A medcheck with fasting labs is due before further refill.  Please schedule an appt within the next month.

## 2021-06-15 NOTE — TELEPHONE ENCOUNTER
First Attempt: I spoke with patient, she is aware she needs to come in for an appt but states she cannot come in right now as she says her legs do not work very well and she needs to use a walker and just cannot do it with the snow and cold right now. She would like to wait until springtime for this and would like a call back sometime closer to spring to schedule this appt.

## 2021-06-15 NOTE — TELEPHONE ENCOUNTER
RN cannot approve Refill Request    RN can NOT refill this medication Protocol failed and NO refill given. Last office visit: 10/24/2017 Monroe Brito MD Last Physical: 11/20/2019 Last MTM visit: Visit date not found Last visit same specialty: 10/24/2017 Monroe Brito MD.  Next visit within 3 mo: Visit date not found  Next physical within 3 mo: Visit date not found      Monroe Whelan, [facility] Triage/Med Refill 2/24/2021    Requested Prescriptions   Pending Prescriptions Disp Refills     losartan (COZAAR) 50 MG tablet [Pharmacy Med Name: Losartan Potassium Oral Tablet 50 MG] 90 tablet 0     Sig: TAKE ONE TABLET BY MOUTH ONE TIME DAILY       Angiotensin Receptor Blocker Protocol Failed - 2/24/2021  9:18 AM        Failed - Serum potassium within the past 12 months     No results found for: LN-POTASSIUM          Failed - Blood pressure filed in past 12 months     BP Readings from Last 1 Encounters:   11/20/19 130/80             Failed - Serum creatinine within the past 12 months     Creatinine   Date Value Ref Range Status   11/20/2019 0.54 (L) 0.60 - 1.10 mg/dL Final             Passed - PCP or prescribing provider visit in past 12 months       Last office visit with prescriber/PCP: 10/24/2017 Monroe Brito MD OR same dept: Visit date not found OR same specialty: 10/24/2017 Monroe Brito MD  Last physical: 11/20/2019 Last MTM visit: Visit date not found   Next visit within 3 mo: Visit date not found  Next physical within 3 mo: Visit date not found  Prescriber OR PCP: Monroe Brito MD  Last diagnosis associated with med order: 1. HTN (hypertension)  - losartan (COZAAR) 50 MG tablet [Pharmacy Med Name: Losartan Potassium Oral Tablet 50 MG]; TAKE ONE TABLET BY MOUTH ONE TIME DAILY   Dispense: 90 tablet; Refill: 0    2. Essential hypertension  - carvediloL (COREG) 6.25 MG tablet [Pharmacy Med Name: Carvedilol Oral Tablet 6.25 MG]; TAKE ONE TABLET BY MOUTH TWICE DAILY    Dispense: 180 tablet; Refill: 0    If protocol passes may refill for 12 months if within 3 months of last provider visit (or a total of 15 months).                carvediloL (COREG) 6.25 MG tablet [Pharmacy Med Name: Carvedilol Oral Tablet 6.25 MG] 180 tablet 0     Sig: TAKE ONE TABLET BY MOUTH TWICE DAILY       Beta-Blockers Refill Protocol Failed - 2/24/2021  9:18 AM        Failed - Blood pressure filed in past 12 months     BP Readings from Last 1 Encounters:   11/20/19 130/80             Passed - PCP or prescribing provider visit in past 12 months or next 3 months     Last office visit with prescriber/PCP: 10/24/2017 Monroe Brito MD OR same dept: Visit date not found OR same specialty: 10/24/2017 Monroe Brito MD  Last physical: 11/20/2019 Last MTM visit: Visit date not found   Next visit within 3 mo: Visit date not found  Next physical within 3 mo: Visit date not found  Prescriber OR PCP: Monroe Brito MD  Last diagnosis associated with med order: 1. HTN (hypertension)  - losartan (COZAAR) 50 MG tablet [Pharmacy Med Name: Losartan Potassium Oral Tablet 50 MG]; TAKE ONE TABLET BY MOUTH ONE TIME DAILY   Dispense: 90 tablet; Refill: 0    2. Essential hypertension  - carvediloL (COREG) 6.25 MG tablet [Pharmacy Med Name: Carvedilol Oral Tablet 6.25 MG]; TAKE ONE TABLET BY MOUTH TWICE DAILY   Dispense: 180 tablet; Refill: 0    If protocol passes may refill for 12 months if within 3 months of last provider visit (or a total of 15 months).

## 2021-06-16 NOTE — TELEPHONE ENCOUNTER
Second Attempt: I spoke with patient, she still states there is no way she can make it into the clinic as she is having trouble walking.    Dr Brito, would this be OK for a virtual visit?

## 2021-06-16 NOTE — TELEPHONE ENCOUNTER
I spoke with Peggy and she did not want to schedule at this time.  Peggy said that she is using a walker and would like to wait till he legs get stronger

## 2021-06-16 NOTE — TELEPHONE ENCOUNTER
RN cannot approve Refill Request    RN can NOT refill this medication Protocol failed and NO refill given. Last office visit: 10/24/2017 Monroe Brito MD Last Physical: 11/20/2019 Last MTM visit: Visit date not found Last visit same specialty: 10/24/2017 Monroe Brito MD.  Next visit within 3 mo: Visit date not found  Next physical within 3 mo: Visit date not found      Monroe Whelan, [facility] Triage/Med Refill 3/18/2021    Requested Prescriptions   Pending Prescriptions Disp Refills     allopurinoL (ZYLOPRIM) 100 MG tablet [Pharmacy Med Name: Allopurinol Oral Tablet 100 MG] 90 tablet 0     Sig: TAKE ONE TABLET BY MOUTH ONE TIME DAILY       Allopurinol/Febuxostat Refill Protocol  Failed - 3/17/2021 12:07 PM        Failed - LFT or AST or ALT in last 12 months     Albumin   Date Value Ref Range Status   11/20/2019 4.1 3.5 - 5.0 g/dL Final     Bilirubin, Total   Date Value Ref Range Status   11/20/2019 0.4 0.0 - 1.0 mg/dL Final     Alkaline Phosphatase   Date Value Ref Range Status   11/20/2019 81 45 - 120 U/L Final     AST   Date Value Ref Range Status   11/20/2019 24 0 - 40 U/L Final     ALT   Date Value Ref Range Status   11/20/2019 15 0 - 45 U/L Final     Protein, Total   Date Value Ref Range Status   11/20/2019 6.5 6.0 - 8.0 g/dL Final   11/20/2019 6.6 6.0 - 8.0 g/dL Final                Passed - Visit with PCP or prescribing provider visit in past 12 months     Last office visit with prescriber/PCP: 10/24/2017 Monroe Brito MD OR same dept: Visit date not found OR same specialty: 10/24/2017 Monroe Brito MD  Last physical: 11/20/2019 Last MTM visit: Visit date not found   Next visit within 3 mo: Visit date not found  Next physical within 3 mo: Visit date not found  Prescriber OR PCP: Monroe Brito MD  Last diagnosis associated with med order: 1. Gout  - allopurinoL (ZYLOPRIM) 100 MG tablet [Pharmacy Med Name: Allopurinol Oral Tablet 100 MG]; TAKE ONE TABLET  BY MOUTH ONE TIME DAILY   Dispense: 90 tablet; Refill: 0    If protocol passes may refill for 12 months if within 3 months of last provider visit (or a total of 15 months).

## 2021-06-17 NOTE — PROGRESS NOTES
Peggy De Anda is a 89 y.o. female who is being evaluated via a billable telephone visit.      What phone number would you like to be contacted at? [phone]  How would you like to obtain your AVS? AVS Preference: Mail a copy.  Encounter Diagnoses   Name Primary?     Hypertension Yes     Nonintractable epilepsy without status epilepticus, unspecified epilepsy type (H)      PLAN   Needs fasting labs       Subjective   Peggy De Anda is 89 y.o. and presents today for the following health issues   HPI   Calling for a [employer]  Pt continues to live alone at home and uses a walker within the home.  Can get dizzy with her walker.  Doesn't go outside the house.  Has two children who check on her.  Eating ok.  No cough.  Bowel and bladder working ok.   Otherwise feels well.        Objective       Vitals:  No vitals were obtained today due to virtual visit.    Physical Exam  Fluid speech with normal thought processes and judgement.  Normal rate.           Phone call duration: 11  minutes

## 2021-06-17 NOTE — TELEPHONE ENCOUNTER
Telephone Encounter by Karla Hill LPN at 12/7/2020  8:26 AM     Author: Karla Hill LPN Service: -- Author Type: Licensed Nurse    Filed: 12/7/2020  8:31 AM Encounter Date: 11/27/2020 Status: Signed    : Karla Hill LPN (Licensed Nurse)       Patient Returning Call  Reason for call:  Patient returning call  Information relayed to patient:  Monroe Brito MD         4:56 PM  Note     A medcheck with fasting labs is due before further refill.  Please schedule an appt within the next month.      Patient has additional questions:  Yes  If YES, what are your questions/concerns:  Patient states she is not able to come for office visit at this movement . Patient stated that she is barely walking with walker at home . Patient is requesting to refill phenytoin for three months . She will schedule appointment when she gets better. For questions please reach out patient .   Okay to leave a detailed message?: No

## 2021-06-19 NOTE — LETTER
Letter by Monroe Brito MD at      Author: Monroe Brito MD Service: -- Author Type: --    Filed:  Encounter Date: 11/24/2019 Status: Signed         Peggy De Anda  [address] [facility] [address]             November 24, 2019         Dear Ms. Bairdkamini,    Below are the results from your recent visit:    Resulted Orders   Lipid [place] FASTING   Result Value Ref Range    Cholesterol 222 (H) <=199 mg/dL    Triglycerides 106 <=149 mg/dL    HDL Cholesterol 80 >=50 mg/dL    LDL Calculated 121 <=129 mg/dL    Patient Fasting > 8hrs? Yes    Comprehensive Metabolic Panel   Result Value Ref Range    Sodium 140 136 - 145 mmol/L    Potassium 4.3 3.5 - 5.0 mmol/L    Chloride 105 98 - 107 mmol/L    CO2 22 22 - 31 mmol/L    Anion Gap, Calculation 13 5 - 18 mmol/L    Glucose 96 70 - 125 mg/dL    BUN 9 8 - 28 mg/dL    Creatinine 0.54 (L) 0.60 - 1.10 mg/dL    GFR MDRD Af Amer >60 >60 mL/min/1.73m2    GFR MDRD Non Af Amer >60 >60 mL/min/1.73m2    Bilirubin, Total 0.4 0.0 - 1.0 mg/dL    Calcium 9.7 8.5 - 10.5 mg/dL    Protein, Total 6.5 6.0 - 8.0 g/dL    Albumin 4.1 3.5 - 5.0 g/dL    Alkaline Phosphatase 81 45 - 120 U/L    AST 24 0 - 40 U/L    ALT 15 0 - 45 U/L    Narrative    Fasting Glucose reference range is 70-99 mg/dL per  American Diabetes Association (ADA) guidelines.   Glycosylated Hemoglobin A1c   Result Value Ref Range    Hemoglobin A1c 4.9 3.5 - 6.0 %   Uric Acid   Result Value Ref Range    Uric Acid 4.6 2.0 - 7.5 mg/dL   Phenytoin (Dilantin )   Result Value Ref Range    Phenytoin 7.3 (L) 10.0 - 20.0 ug/mL   Electrophoresis, Protein, Serum   Result Value Ref Range    Albumin % 63.2 51.0 - 67.0 %    Albumin  4.2 3.2 - 4.7 g/dL    Alpha 1 % 2.9 2.0 - 4.0 %    Alpha 1 0.2 0.1 - 0.3 g/dL    Alpha 2 % 13.4 (H) 5.0 - 13.0 %    Alpha 2 0.9 0.4 - 0.9 g/dL    % Beta 10.2 10.0 - 17.0 %    Beta 0.7 0.7 - 1.2 g/dL    Gamma Globulin % 10.3 9.0 - 20.0 %    Gamma Globulin 0.7 0.6 - 1.4 g/dL    ELP Comment  Unremarkable protein electrophoresis.       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  The remaining labs are NORMAL.    Please call with questions or contact us using [employer].    Sincerely,        Electronically signed by Monroe Brito MD

## 2021-06-19 NOTE — LETTER
Letter by Monroe Brito MD at      Author: Monroe Brito MD Service: -- Author Type: --    Filed:  Encounter Date: 12/4/2019 Status: Signed         Peggy De Anda  [id number] [facility] [id number]             December 4, 2019         Dear Ms. Adilson,    Below are the results from your recent visit:    Resulted Orders   CT Head Without Contrast    Narrative    EXAM: CT HEAD WO CONTRAST  LOCATION: [facility]  DATE/TIME: 12/4/2019 9:36 AM    INDICATION: Ataxia, stroke suspected.  COMPARISON: 2/12/2008 brain MRI  TECHNIQUE: Routine without IV contrast. Multiplanar reformats. Dose reduction techniques were used.    FINDINGS:  INTRACRANIAL CONTENTS: No intracranial hemorrhage, extraaxial collection, or mass effect. No CT evidence of acute infarct. Mild presumed chronic small vessel ischemic changes. Moderate generalized volume loss. Moderate mesial temporal atrophy. No   hydrocephalus.     VISUALIZED ORBITS/SINUSES/MASTOIDS: Prior bilateral cataract surgery. Visualized portions of the orbits are otherwise unremarkable. No paranasal sinus mucosal disease. No middle ear or mastoid effusion.    BONES/SOFT TISSUES: No acute abnormality.      Impression    1.  No CT evidence for acute intracranial process.  2.  Brain atrophy and presumed chronic microvascular ischemic changes as above.       Hi Peggy:  Your CT Scan of the head is NORMAL.  FOLLOW UP with PHYSICAL THERAPY.    Please call with questions or contact us using [employer].    Sincerely,        Electronically signed by Monroe Brito MD

## 2021-06-21 NOTE — PROGRESS NOTES
Assessment:      Annual Wellness Visit    Encounter Diagnoses   Name Primary?     Anemia Yes     Convulsions (H)      Gout      Hypertension      Monoclonal Gammopathy Of Undetermined Significance      Obesity, unspecified      Vitamin D deficiency      Peripheral Neuropathy      Health care maintenance          Plan:      Patient Instructions   Non-fasting labs    High dose flu vaccine    GET RID OF THROW RIGS    WEAR A SEATBELT IN THE CAR    Info given for Care Directives    Get a nurse BP check in one week making sure that you have taken losartan and carvedilol about 2 hours earlier.       Subjective:      Peggy De Anda is a 86 y.o. female who presents for a Subsequent Annual Wellness Visit.    Pt did not take her carvedilol this morning.    Healthy Habits:   Regular Exercise: walks around the house  Sunscreen Use: No  Healthy Diet: Yes  Dental Visits Regularly: no  Seat Belt: No  Sexually active: N/A  Hemoccults: N/A  Flex Sig: N/A  Colonoscopy: N/A  Lipid Profile: Yes  Glucose Screen: Yes  Prevention of Osteoporosis: Yes  Last Dexa: declined  Guns at Home:  No      Immunization History   Administered Date(s) Administered     Influenza high dose, seasonal 09/28/2012, 10/19/2015, 10/20/2016, 10/24/2017     Influenza, inj, historic,unspecified 11/17/2008, 10/20/2010     Influenza, seasonal,quad inj 6-35 mos 09/15/2009, 10/18/2011, 09/19/2013     Pneumo Conj 13-V (2010&after) 10/19/2015     Pneumo Polysac 23-V 07/30/2009     Td,adult,historic,unspecified 09/15/2009     ZOSTER, LIVE 07/30/2009     Immunization status: up to date and documented.    Current Outpatient Prescriptions   Medication Sig Dispense Refill     allopurinol (ZYLOPRIM) 100 MG tablet Take 1 tablet (100 mg total) by mouth daily. 90 tablet 0     aspirin 81 MG EC tablet Take 81 mg by mouth daily.       calcium carbonate (OS-OBED) 600 mg calcium (1,500 mg) tablet Take 600 mg by mouth.       carvedilol (COREG) 6.25 MG tablet TAKE ONE TABLET BY MOUTH  TWICE DAILY  180 tablet 0     cholecalciferol, vitamin D3, 2,000 unit Tab Take by mouth.       losartan (COZAAR) 50 MG tablet TAKE 1 TABLET (50 MG) BY MOUTH ONCE DAILY. 90 tablet 3     MULTIVITAMIN ORAL Take by mouth.       phenytoin (DILANTIN) 100 MG ER capsule TAKE ONE CAPSULE BY MOUTH THREE TIMES DAILY  270 capsule 0     No current facility-administered medications for this visit.      Past Medical History:   Diagnosis Date     Gout 10/31/2015    Provider note on 7/21/2014 4. History of gout, stable on allopurinol.      Past Surgical History:   Procedure Laterality Date     APPENDECTOMY      AGE 13     CATARACT EXTRACTION, BILATERAL       CHOLECYSTECTOMY       NEUROPLASTY / TRANSPOSITION MEDIAN NERVE AT CARPAL TUNNEL BILATERAL       TONSILLECTOMY       Levofloxacin; Oxycodone; and Quinolones  No family history on file.  Social History     Social History     Marital status:      Spouse name: N/A     Number of children: N/A     Years of education: N/A     Occupational History     Not on file.     Social History Main Topics     Smoking status: Never Smoker     Smokeless tobacco: Never Used     Alcohol use Not on file     Drug use: Not on file     Sexual activity: Not on file     Other Topics Concern     Not on file     Social History Narrative       Current Diet:  well balanced diet  Amount Consumed Per Day  na    Exercise Type: walking  Exercise Frequency: limited excercise in the house    Mood Disorder and Cognitive Impairment Screenings  Anxiety Screening Tool:  GAD2       Anxiety Screening Tool Score:  0  Depression Screening Tool:  PHQ2    Depression Screening Tool Score:  0  Cognitive Impairment and Additional Screening:  No difficulty. MINI COG 4/5    Functional Ability/Level of Safety  Fall Risk Factors:  antihypertensive use  Home Safety Risk Factors: loose rugs and no grab bars, bathroom    Advanced Directive:  The patient does not have an Advanced Directive.    Care Team:  Dr Brito, JUAN  "    Review of Systems  A 12 point comprehensive review of systems was negative except as noted.          Objective:     Vitals:    10/29/18 1009 10/29/18 1032   BP: 177/87 165/77   Pulse: 64    Temp: (!) 96.1  F (35.6  C)    TempSrc: Oral    Weight: 165 lb 3.2 oz (74.9 kg)    Height: 5' 1\" (1.549 m)      Body mass index is 31.21 kg/(m^2).            GEN: FEMALE, ALERT, ORIENTED TIMES THREE, NAD  HEENT:  TM'S NL                  THROAT CL                  PERRL  NECK: SUPPLE, WITHOUT ADENOPATHY, THYROMEGALY, OR CAROTID BRUIT  LUNGS: CTA  COR: RRR WITHOUT MURMUR  BREASTS: THE NIPPLES ARE OUTGOING                                     NO SKIN CHANGES, MASS, OR AXILLARY NODES  ABD: SOFT, POSITIVE BOWEL SOUNDS, NONTX, WITHOUT MASS OR HSM  EXT:  NO CYANOSIS, CLUBBING OR EDEMA  SKIN:  WITHOUT ATYPICAL APPEARING LESIONS.      Vitamin D, Total (25-Hydroxy)   Date Value Ref Range Status   10/24/2017 38.2 30.0 - 80.0 ng/mL Final      Lab Results   Component Value Date    WBC 5.5 10/24/2017    HGB 13.6 10/24/2017    HCT 40.0 10/24/2017     (H) 10/24/2017     10/24/2017     Results for orders placed or performed in visit on 10/24/17   Comprehensive Metabolic Panel   Result Value Ref Range    Sodium 140 136 - 145 mmol/L    Potassium 4.3 3.5 - 5.0 mmol/L    Chloride 106 98 - 107 mmol/L    CO2 23 22 - 31 mmol/L    Anion Gap, Calculation 11 5 - 18 mmol/L    Glucose 95 70 - 125 mg/dL    BUN 12 8 - 28 mg/dL    Creatinine 0.63 0.60 - 1.10 mg/dL    GFR MDRD Af Amer >60 >60 mL/min/1.73m2    GFR MDRD Non Af Amer >60 >60 mL/min/1.73m2    Bilirubin, Total 0.4 0.0 - 1.0 mg/dL    Calcium 9.7 8.5 - 10.5 mg/dL    Protein, Total 6.9 6.0 - 8.0 g/dL    Albumin 4.0 3.5 - 5.0 g/dL    Alkaline Phosphatase 95 45 - 120 U/L    AST 27 0 - 40 U/L    ALT 15 0 - 45 U/L                      "

## 2021-06-22 NOTE — TELEPHONE ENCOUNTER
Refill Approved    Rx renewed per Medication Renewal Policy. Medication was last renewed on 10/10/18.    Zohra Velazquez, [facility] Triage/Med Refill 1/9/2019     Requested Prescriptions   Pending Prescriptions Disp Refills     allopurinol (ZYLOPRIM) 100 MG tablet [Pharmacy Med Name: Allopurinol Oral Tablet 100 MG] 90 tablet 0     Sig: TAKE ONE TABLET BY MOUTH ONE TIME DAILY    Allopurinol/Febuxostat Refill Protocol  Passed - 1/7/2019  7:21 PM       Passed - LFT or AST or ALT in last 12 months    Albumin   Date Value Ref Range Status   10/29/2018 4.2 3.5 - 5.0 g/dL Final     Bilirubin, Total   Date Value Ref Range Status   10/29/2018 0.3 0.0 - 1.0 mg/dL Final     Alkaline Phosphatase   Date Value Ref Range Status   10/29/2018 90 45 - 120 U/L Final     AST   Date Value Ref Range Status   10/29/2018 30 0 - 40 U/L Final     ALT   Date Value Ref Range Status   10/29/2018 18 0 - 45 U/L Final     Protein, Total   Date Value Ref Range Status   10/29/2018 6.6 6.0 - 8.0 g/dL Final               Passed - Visit with PCP or prescribing provider visit in past 12 months    Last office visit with prescriber/PCP: 10/24/2017 Monroe Brito MD OR same dept: Visit date not found OR same specialty: 10/24/2017 Monroe Brito MD  Last physical: 10/29/2018 Last MTM visit: Visit date not found   Next visit within 3 mo: Visit date not found  Next physical within 3 mo: Visit date not found  Prescriber OR PCP: Monroe Brito MD  Last diagnosis associated with med order: 1. Gout  - allopurinol (ZYLOPRIM) 100 MG tablet [Pharmacy Med Name: Allopurinol Oral Tablet 100 MG]; TAKE ONE TABLET BY MOUTH ONE TIME DAILY   Dispense: 90 tablet; Refill: 0    If protocol passes may refill for 12 months if within 3 months of last provider visit (or a total of 15 months).

## 2021-06-23 ENCOUNTER — COMMUNICATION - HEALTHEAST (OUTPATIENT)
Dept: FAMILY MEDICINE | Facility: CLINIC | Age: 86
End: 2021-06-23

## 2021-06-23 DIAGNOSIS — M10.9 GOUT: ICD-10-CM

## 2021-06-23 DIAGNOSIS — I10 HTN (HYPERTENSION): ICD-10-CM

## 2021-06-23 RX ORDER — LOSARTAN POTASSIUM 50 MG/1
50 TABLET ORAL DAILY
Qty: 90 TABLET | Refills: 0 | Status: ON HOLD | OUTPATIENT
Start: 2021-06-23 | End: 2021-08-11

## 2021-06-23 NOTE — TELEPHONE ENCOUNTER
RN cannot approve Refill Request    RN can NOT refill this medication overdue for office visits and/or labs.    Simon Bloom, [facility] Triage/Med Refill 1/24/2019    Requested Prescriptions   Pending Prescriptions Disp Refills     phenytoin (DILANTIN) 100 MG ER capsule [Pharmacy Med Name: Phenytoin Sodium Extended Oral Capsule 100 MG] 90 capsule 0     Sig: TAKE ONE CAPSULE BY MOUTH THREE TIMES DAILY.    Phenytoin Refill Protocol Failed - 1/23/2019  9:05 AM       Failed - CBC w/o diff (Hemogram 2) in last year     WBC   Date Value Ref Range Status   10/24/2017 5.5 4.0 - 11.0 thou/uL Final     RBC   Date Value Ref Range Status   10/24/2017 3.98 3.80 - 5.40 mill/uL Final     Hemoglobin   Date Value Ref Range Status   10/24/2017 13.6 12.0 - 16.0 g/dL Final     Hematocrit   Date Value Ref Range Status   10/24/2017 40.0 35.0 - 47.0 % Final     MCV   Date Value Ref Range Status   10/24/2017 101 (H) 80 - 100 fL Final     MCH   Date Value Ref Range Status   10/24/2017 34.1 (H) 27.0 - 34.0 pg Final     MCHC   Date Value Ref Range Status   10/24/2017 33.9 32.0 - 36.0 g/dL Final     RDW   Date Value Ref Range Status   10/24/2017 11.3 11.0 - 14.5 % Final     Platelets   Date Value Ref Range Status   10/24/2017 215 140 - 440 thou/uL Final     MPV   Date Value Ref Range Status   10/24/2017 7.7 7.0 - 10.0 fL Final               Failed - Free phenytoin level in last 12 months    No results found for: PHENYTOIFREE         Failed - Folate level in last 12 months    No results found for: FOLATE         Passed - LFT or AST or ALT in last 12 months    Albumin   Date Value Ref Range Status   10/29/2018 4.2 3.5 - 5.0 g/dL Final     Bilirubin, Total   Date Value Ref Range Status   10/29/2018 0.3 0.0 - 1.0 mg/dL Final     Alkaline Phosphatase   Date Value Ref Range Status   10/29/2018 90 45 - 120 U/L Final     AST   Date Value Ref Range Status   10/29/2018 30 0 - 40 U/L Final     ALT   Date Value Ref Range Status   10/29/2018 18  0 - 45 U/L Final     Protein, Total   Date Value Ref Range Status   10/29/2018 6.6 6.0 - 8.0 g/dL Final               Passed - PCP or prescribing provider visit in past 12 months      Last office visit with prescriber/PCP: 10/24/2017 Monroe Brito MD OR same dept: Visit date not found OR same specialty: 10/24/2017 Monroe Brito MD  Last physical: 10/29/2018 Last MTM visit: Visit date not found   Next visit within 3 mo: Visit date not found  Next physical within 3 mo: Visit date not found  Prescriber OR PCP: Monroe Brito MD  Last diagnosis associated with med order: 1. Convulsions (H)  - phenytoin (DILANTIN) 100 MG ER capsule [Pharmacy Med Name: Phenytoin Sodium Extended Oral Capsule 100 MG]; TAKE ONE CAPSULE BY MOUTH THREE TIMES DAILY  Dispense: 90 capsule; Refill: 0    If protocol passes may refill for 12 months if within 3 months of last provider visit (or a total of 15 months).

## 2021-06-24 ENCOUNTER — AMBULATORY - HEALTHEAST (OUTPATIENT)
Dept: FAMILY MEDICINE | Facility: CLINIC | Age: 86
End: 2021-06-24

## 2021-06-24 DIAGNOSIS — M10.9 GOUT: ICD-10-CM

## 2021-06-24 RX ORDER — ALLOPURINOL 100 MG/1
100 TABLET ORAL DAILY
Qty: 90 TABLET | Refills: 0 | Status: ON HOLD | OUTPATIENT
Start: 2021-06-24 | End: 2021-08-11

## 2021-06-25 NOTE — TELEPHONE ENCOUNTER
Needs office visit. Last OV was 2019. Has only had virtual visits since then. Can schedule AWV if pt will agree

## 2021-06-25 NOTE — TELEPHONE ENCOUNTER
RN cannot approve Refill Request    RN can NOT refill this medication PCP messaged that patient is overdue for BP. Last office visit: 10/24/2017 Monroe Brito MD Last Physical: 11/20/2019 Last MTM visit: Visit date not found Last visit same specialty: 10/24/2017 Monroe Brito MD.  Next visit within 3 mo: Visit date not found  Next physical within 3 mo: Visit date not found      Lorena Zuniga, [facility] Triage/Med Refill 5/28/2021    Requested Prescriptions   Pending Prescriptions Disp Refills     carvediloL (COREG) 6.25 MG tablet [Pharmacy Med Name: Carvedilol Oral Tablet 6.25 MG] 180 tablet 0     Sig: TAKE ONE TABLET BY MOUTH TWICE DAILY       Beta-Blockers Refill Protocol Failed - 5/28/2021  9:05 AM        Failed - Blood pressure filed in past 12 months     BP Readings from Last 1 Encounters:   11/20/19 130/80             Passed - PCP or prescribing provider visit in past 12 months or next 3 months     Last office visit with prescriber/PCP: 10/24/2017 Monroe Brito MD OR same dept: Visit date not found OR same specialty: 10/24/2017 Monroe Brito MD  Last physical: 11/20/2019 Last MTM visit: Visit date not found   Next visit within 3 mo: Visit date not found  Next physical within 3 mo: Visit date not found  Prescriber OR PCP: Monroe Brito MD  Last diagnosis associated with med order: 1. Essential hypertension  - carvediloL (COREG) 6.25 MG tablet [Pharmacy Med Name: Carvedilol Oral Tablet 6.25 MG]; TAKE ONE TABLET BY MOUTH TWICE DAILY   Dispense: 180 tablet; Refill: 0    If protocol passes may refill for 12 months if within 3 months of last provider visit (or a total of 15 months).

## 2021-06-25 NOTE — TELEPHONE ENCOUNTER
RN cannot approve Refill Request    RN can NOT refill this medication Protocol failed and NO refill given. Last office visit: 10/24/2017 Monroe Brito MD Last Physical: 11/20/2019 Last MTM visit: Visit date not found Last visit same specialty: 10/24/2017 Monroe Brito MD.  Next visit within 3 mo: Visit date not found  Next physical within 3 mo: Visit date not found      Monroe Whelan, [facility] Connection Triage/Med Refill 5/26/2021    Requested Prescriptions   Pending Prescriptions Disp Refills     losartan (COZAAR) 50 MG tablet [Pharmacy Med Name: Losartan Potassium Oral Tablet 50 MG] 90 tablet 0     Sig: TAKE ONE TABLET BY MOUTH ONE TIME DAILY       Angiotensin Receptor Blocker Protocol Failed - 5/25/2021  9:03 AM        Failed - Serum potassium within the past 12 months     No results found for: LN-POTASSIUM          Failed - Blood pressure filed in past 12 months     BP Readings from Last 1 Encounters:   11/20/19 130/80             Failed - Serum creatinine within the past 12 months     Creatinine   Date Value Ref Range Status   11/20/2019 0.54 (L) 0.60 - 1.10 mg/dL Final             Passed - PCP or prescribing provider visit in past 12 months       Last office visit with prescriber/PCP: 10/24/2017 Monroe Brito MD OR same dept: Visit date not found OR same specialty: 10/24/2017 Monroe Brito MD  Last physical: 11/20/2019 Last MTM visit: Visit date not found   Next visit within 3 mo: Visit date not found  Next physical within 3 mo: Visit date not found  Prescriber OR PCP: Monroe Brito MD  Last diagnosis associated with med order: 1. HTN (hypertension)  - losartan (COZAAR) 50 MG tablet [Pharmacy Med Name: Losartan Potassium Oral Tablet 50 MG]; TAKE ONE TABLET BY MOUTH ONE TIME DAILY   Dispense: 90 tablet; Refill: 0    If protocol passes may refill for 12 months if within 3 months of last provider visit (or a total of 15 months).

## 2021-06-25 NOTE — TELEPHONE ENCOUNTER
First Attempt: I spoke with patient she states understanding and says it is very hard for her to get in here. She will work on it as it is very hard for her to walk and needs a lot of help and has to have her daughter and son in law's help. She says she will work on getting an appt sometime she hopes it will be sometime this summer. She states her and Dr Brito have discussed all of this.

## 2021-06-26 NOTE — TELEPHONE ENCOUNTER
Incoming call from patient following up on refill requests. Communicated to patient that Dr. Brito sent it yesterday. Pt asked if it was 90 days supply. It looks like the allopurinol is 30 days. I also relayed the message that she needs lab. Patient got upset and stated that the reason she called and asked for 90 days is because she cannot come into the clinic right now since she cant walk and her children can't bring her in because they have a full schedule too. Communicated to patient I will send the message to the provider's team.    Please advise. Thanks!

## 2021-06-26 NOTE — TELEPHONE ENCOUNTER
Reason for Call:  Medication refills    Do you use a [facility]?  Name of the pharmacy and phone number for the current request: [id number] [[place]], [place] - [address] [place] [address] B    Name of the medication requested:   - losartan (COZAAR) 50 MG tablet   - allopurinoL (ZYLOPRIM) 100 MG tablet    Other request: Patient called stating she is aware she is due for an office visit/AWV but she cant walk right now. She's slowly working on walking but hoping by Fall she can walk fully. Patient is wondering if provider can do a 90 days supply until this fall until she can walk again to come in. Communicated to patient I will have to send a message to the provider's team. Patient would like a call back to let her know.    Can we leave a detailed message on this number? Yes    Phone number patient can be reached at: Home number on file [phone] (home)    Best Time: Any time    Call taken on 6/23/2021 at 10:44 AM by Erich Field

## 2021-08-05 ENCOUNTER — HOSPITAL ENCOUNTER (INPATIENT)
Facility: HOSPITAL | Age: 86
LOS: 6 days | Discharge: NURSING, REHAB OR LONG-TERM CARE | DRG: 543 | End: 2021-08-11
Attending: EMERGENCY MEDICINE | Admitting: FAMILY MEDICINE
Payer: MEDICARE

## 2021-08-05 ENCOUNTER — APPOINTMENT (OUTPATIENT)
Dept: CT IMAGING | Facility: HOSPITAL | Age: 86
DRG: 543 | End: 2021-08-05
Attending: EMERGENCY MEDICINE
Payer: MEDICARE

## 2021-08-05 DIAGNOSIS — R27.9 LACK OF COORDINATION: ICD-10-CM

## 2021-08-05 DIAGNOSIS — M25.552 HIP PAIN, LEFT: Primary | ICD-10-CM

## 2021-08-05 DIAGNOSIS — I10 ESSENTIAL HYPERTENSION: ICD-10-CM

## 2021-08-05 DIAGNOSIS — G47.00 INSOMNIA, UNSPECIFIED TYPE: ICD-10-CM

## 2021-08-05 DIAGNOSIS — K59.00 CONSTIPATION, UNSPECIFIED CONSTIPATION TYPE: ICD-10-CM

## 2021-08-05 DIAGNOSIS — M62.81 MUSCLE WEAKNESS (GENERALIZED): ICD-10-CM

## 2021-08-05 DIAGNOSIS — N39.0 URINARY TRACT INFECTION WITHOUT HEMATURIA, SITE UNSPECIFIED: ICD-10-CM

## 2021-08-05 LAB
ALBUMIN SERPL-MCNC: 3.3 G/DL (ref 3.5–5)
ALBUMIN UR-MCNC: 50 MG/DL
ALP SERPL-CCNC: 138 U/L (ref 45–120)
ALT SERPL W P-5'-P-CCNC: 14 U/L (ref 0–45)
ANION GAP SERPL CALCULATED.3IONS-SCNC: 8 MMOL/L (ref 5–18)
APPEARANCE UR: CLEAR
AST SERPL W P-5'-P-CCNC: 38 U/L (ref 0–40)
ATRIAL RATE - MUSE: 61 BPM
BACTERIA #/AREA URNS HPF: ABNORMAL /HPF
BASOPHILS # BLD AUTO: 0 10E3/UL (ref 0–0.2)
BASOPHILS NFR BLD AUTO: 0 %
BILIRUB SERPL-MCNC: 0.8 MG/DL (ref 0–1)
BILIRUB UR QL STRIP: NEGATIVE
BUN SERPL-MCNC: 8 MG/DL (ref 8–28)
C REACTIVE PROTEIN LHE: 8.8 MG/DL (ref 0–0.8)
CALCIUM SERPL-MCNC: 9.2 MG/DL (ref 8.5–10.5)
CHLORIDE BLD-SCNC: 98 MMOL/L (ref 98–107)
CO2 SERPL-SCNC: 25 MMOL/L (ref 22–31)
COLOR UR AUTO: ABNORMAL
CREAT SERPL-MCNC: 0.52 MG/DL (ref 0.6–1.1)
CREAT SERPL-MCNC: 0.54 MG/DL (ref 0.6–1.1)
DIASTOLIC BLOOD PRESSURE - MUSE: 88 MMHG
EOSINOPHIL # BLD AUTO: 0 10E3/UL (ref 0–0.7)
EOSINOPHIL NFR BLD AUTO: 0 %
ERYTHROCYTE [DISTWIDTH] IN BLOOD BY AUTOMATED COUNT: 11.9 % (ref 10–15)
ERYTHROCYTE [SEDIMENTATION RATE] IN BLOOD BY WESTERGREN METHOD: 60 MM/HR (ref 0–20)
GFR SERPL CREATININE-BSD FRML MDRD: 84 ML/MIN/1.73M2
GFR SERPL CREATININE-BSD FRML MDRD: 85 ML/MIN/1.73M2
GLUCOSE BLD-MCNC: 89 MG/DL (ref 70–125)
GLUCOSE UR STRIP-MCNC: NEGATIVE MG/DL
HCT VFR BLD AUTO: 35.2 % (ref 35–47)
HGB BLD-MCNC: 11.8 G/DL (ref 11.7–15.7)
HGB UR QL STRIP: ABNORMAL
HOLD SPECIMEN: NORMAL
IMM GRANULOCYTES # BLD: 0.1 10E3/UL
IMM GRANULOCYTES NFR BLD: 1 %
INTERPRETATION ECG - MUSE: NORMAL
KETONES UR STRIP-MCNC: 10 MG/DL
LEUKOCYTE ESTERASE UR QL STRIP: ABNORMAL
LIPASE SERPL-CCNC: 17 U/L (ref 0–52)
LYMPHOCYTES # BLD AUTO: 1.3 10E3/UL (ref 0.8–5.3)
LYMPHOCYTES NFR BLD AUTO: 19 %
MAGNESIUM SERPL-MCNC: 1.3 MG/DL (ref 1.8–2.6)
MAGNESIUM SERPL-MCNC: 1.6 MG/DL (ref 1.8–2.6)
MCH RBC QN AUTO: 33 PG (ref 26.5–33)
MCHC RBC AUTO-ENTMCNC: 33.5 G/DL (ref 31.5–36.5)
MCV RBC AUTO: 98 FL (ref 78–100)
MONOCYTES # BLD AUTO: 0.7 10E3/UL (ref 0–1.3)
MONOCYTES NFR BLD AUTO: 10 %
NEUTROPHILS # BLD AUTO: 4.8 10E3/UL (ref 1.6–8.3)
NEUTROPHILS NFR BLD AUTO: 70 %
NITRATE UR QL: NEGATIVE
NRBC # BLD AUTO: 0 10E3/UL
NRBC BLD AUTO-RTO: 0 /100
P AXIS - MUSE: 35 DEGREES
PH UR STRIP: 6 [PH] (ref 5–7)
PLATELET # BLD AUTO: 269 10E3/UL (ref 150–450)
POTASSIUM BLD-SCNC: 3.2 MMOL/L (ref 3.5–5)
POTASSIUM BLD-SCNC: 3.3 MMOL/L (ref 3.5–5)
POTASSIUM BLD-SCNC: 3.3 MMOL/L (ref 3.5–5)
PR INTERVAL - MUSE: 212 MS
PROT SERPL-MCNC: 6.5 G/DL (ref 6–8)
QRS DURATION - MUSE: 126 MS
QT - MUSE: 440 MS
QTC - MUSE: 442 MS
R AXIS - MUSE: -48 DEGREES
RBC # BLD AUTO: 3.58 10E6/UL (ref 3.8–5.2)
RBC URINE: 1 /HPF
SARS-COV-2 RNA RESP QL NAA+PROBE: NEGATIVE
SODIUM SERPL-SCNC: 131 MMOL/L (ref 136–145)
SP GR UR STRIP: 1.01 (ref 1–1.03)
SYSTOLIC BLOOD PRESSURE - MUSE: 175 MMHG
T AXIS - MUSE: 111 DEGREES
TROPONIN I SERPL-MCNC: 0.02 NG/ML (ref 0–0.29)
UROBILINOGEN UR STRIP-MCNC: <2 MG/DL
VENTRICULAR RATE- MUSE: 61 BPM
WBC # BLD AUTO: 6.8 10E3/UL (ref 4–11)
WBC URINE: 4 /HPF

## 2021-08-05 PROCEDURE — 83735 ASSAY OF MAGNESIUM: CPT | Performed by: FAMILY MEDICINE

## 2021-08-05 PROCEDURE — 250N000011 HC RX IP 250 OP 636: Performed by: FAMILY MEDICINE

## 2021-08-05 PROCEDURE — 250N000013 HC RX MED GY IP 250 OP 250 PS 637: Performed by: INTERNAL MEDICINE

## 2021-08-05 PROCEDURE — 36415 COLL VENOUS BLD VENIPUNCTURE: CPT | Performed by: FAMILY MEDICINE

## 2021-08-05 PROCEDURE — C9803 HOPD COVID-19 SPEC COLLECT: HCPCS

## 2021-08-05 PROCEDURE — 120N000001 HC R&B MED SURG/OB

## 2021-08-05 PROCEDURE — 82040 ASSAY OF SERUM ALBUMIN: CPT | Performed by: EMERGENCY MEDICINE

## 2021-08-05 PROCEDURE — 83690 ASSAY OF LIPASE: CPT | Performed by: EMERGENCY MEDICINE

## 2021-08-05 PROCEDURE — 83735 ASSAY OF MAGNESIUM: CPT | Performed by: EMERGENCY MEDICINE

## 2021-08-05 PROCEDURE — 87635 SARS-COV-2 COVID-19 AMP PRB: CPT | Performed by: EMERGENCY MEDICINE

## 2021-08-05 PROCEDURE — 82565 ASSAY OF CREATININE: CPT | Performed by: FAMILY MEDICINE

## 2021-08-05 PROCEDURE — 250N000011 HC RX IP 250 OP 636: Performed by: EMERGENCY MEDICINE

## 2021-08-05 PROCEDURE — 86141 C-REACTIVE PROTEIN HS: CPT | Performed by: EMERGENCY MEDICINE

## 2021-08-05 PROCEDURE — 87086 URINE CULTURE/COLONY COUNT: CPT | Performed by: EMERGENCY MEDICINE

## 2021-08-05 PROCEDURE — 96366 THER/PROPH/DIAG IV INF ADDON: CPT

## 2021-08-05 PROCEDURE — 99285 EMERGENCY DEPT VISIT HI MDM: CPT | Mod: 25

## 2021-08-05 PROCEDURE — 84132 ASSAY OF SERUM POTASSIUM: CPT | Performed by: FAMILY MEDICINE

## 2021-08-05 PROCEDURE — 258N000003 HC RX IP 258 OP 636: Performed by: EMERGENCY MEDICINE

## 2021-08-05 PROCEDURE — 96365 THER/PROPH/DIAG IV INF INIT: CPT | Mod: 59

## 2021-08-05 PROCEDURE — 99223 1ST HOSP IP/OBS HIGH 75: CPT | Performed by: FAMILY MEDICINE

## 2021-08-05 PROCEDURE — 258N000003 HC RX IP 258 OP 636: Performed by: FAMILY MEDICINE

## 2021-08-05 PROCEDURE — 84484 ASSAY OF TROPONIN QUANT: CPT | Performed by: EMERGENCY MEDICINE

## 2021-08-05 PROCEDURE — 96361 HYDRATE IV INFUSION ADD-ON: CPT

## 2021-08-05 PROCEDURE — 250N000013 HC RX MED GY IP 250 OP 250 PS 637: Performed by: FAMILY MEDICINE

## 2021-08-05 PROCEDURE — 85004 AUTOMATED DIFF WBC COUNT: CPT | Performed by: EMERGENCY MEDICINE

## 2021-08-05 PROCEDURE — 74177 CT ABD & PELVIS W/CONTRAST: CPT

## 2021-08-05 PROCEDURE — 36415 COLL VENOUS BLD VENIPUNCTURE: CPT | Performed by: EMERGENCY MEDICINE

## 2021-08-05 PROCEDURE — 93005 ELECTROCARDIOGRAM TRACING: CPT | Performed by: EMERGENCY MEDICINE

## 2021-08-05 PROCEDURE — 85652 RBC SED RATE AUTOMATED: CPT | Performed by: EMERGENCY MEDICINE

## 2021-08-05 PROCEDURE — 81001 URINALYSIS AUTO W/SCOPE: CPT | Performed by: EMERGENCY MEDICINE

## 2021-08-05 RX ORDER — PHENYTOIN SODIUM 100 MG/1
100 CAPSULE, EXTENDED RELEASE ORAL 3 TIMES DAILY
Status: DISCONTINUED | OUTPATIENT
Start: 2021-08-05 | End: 2021-08-11 | Stop reason: HOSPADM

## 2021-08-05 RX ORDER — POLYETHYLENE GLYCOL 3350 17 G/17G
17 POWDER, FOR SOLUTION ORAL DAILY
Status: DISCONTINUED | OUTPATIENT
Start: 2021-08-06 | End: 2021-08-11 | Stop reason: HOSPADM

## 2021-08-05 RX ORDER — ALLOPURINOL 100 MG/1
100 TABLET ORAL DAILY
Status: DISCONTINUED | OUTPATIENT
Start: 2021-08-06 | End: 2021-08-11 | Stop reason: HOSPADM

## 2021-08-05 RX ORDER — SODIUM CHLORIDE 9 MG/ML
INJECTION, SOLUTION INTRAVENOUS CONTINUOUS
Status: DISCONTINUED | OUTPATIENT
Start: 2021-08-05 | End: 2021-08-05

## 2021-08-05 RX ORDER — IOPAMIDOL 755 MG/ML
100 INJECTION, SOLUTION INTRAVASCULAR ONCE
Status: COMPLETED | OUTPATIENT
Start: 2021-08-05 | End: 2021-08-05

## 2021-08-05 RX ORDER — MAGNESIUM SULFATE HEPTAHYDRATE 40 MG/ML
2 INJECTION, SOLUTION INTRAVENOUS ONCE
Status: COMPLETED | OUTPATIENT
Start: 2021-08-05 | End: 2021-08-05

## 2021-08-05 RX ORDER — BISACODYL 10 MG
10 SUPPOSITORY, RECTAL RECTAL DAILY PRN
Status: DISCONTINUED | OUTPATIENT
Start: 2021-08-05 | End: 2021-08-11 | Stop reason: HOSPADM

## 2021-08-05 RX ORDER — HYDRALAZINE HYDROCHLORIDE 20 MG/ML
5 INJECTION INTRAMUSCULAR; INTRAVENOUS EVERY 6 HOURS PRN
Status: DISCONTINUED | OUTPATIENT
Start: 2021-08-05 | End: 2021-08-11 | Stop reason: HOSPADM

## 2021-08-05 RX ORDER — AMOXICILLIN 250 MG
2 CAPSULE ORAL 2 TIMES DAILY
Status: DISCONTINUED | OUTPATIENT
Start: 2021-08-05 | End: 2021-08-07

## 2021-08-05 RX ORDER — ASPIRIN 81 MG/1
81 TABLET ORAL DAILY
Status: DISCONTINUED | OUTPATIENT
Start: 2021-08-06 | End: 2021-08-11 | Stop reason: HOSPADM

## 2021-08-05 RX ORDER — ACETAMINOPHEN 325 MG/1
975 TABLET ORAL EVERY 8 HOURS
Status: DISCONTINUED | OUTPATIENT
Start: 2021-08-05 | End: 2021-08-11 | Stop reason: HOSPADM

## 2021-08-05 RX ORDER — SODIUM CHLORIDE 9 MG/ML
INJECTION, SOLUTION INTRAVENOUS CONTINUOUS
Status: ACTIVE | OUTPATIENT
Start: 2021-08-05 | End: 2021-08-06

## 2021-08-05 RX ORDER — POTASSIUM CHLORIDE 1500 MG/1
20 TABLET, EXTENDED RELEASE ORAL ONCE
Status: COMPLETED | OUTPATIENT
Start: 2021-08-05 | End: 2021-08-05

## 2021-08-05 RX ORDER — MAGNESIUM SULFATE HEPTAHYDRATE 500 MG/ML
2 INJECTION, SOLUTION INTRAMUSCULAR; INTRAVENOUS ONCE
Status: DISCONTINUED | OUTPATIENT
Start: 2021-08-05 | End: 2021-08-05

## 2021-08-05 RX ORDER — CARVEDILOL 3.12 MG/1
6.25 TABLET ORAL 2 TIMES DAILY WITH MEALS
Status: DISCONTINUED | OUTPATIENT
Start: 2021-08-05 | End: 2021-08-11 | Stop reason: HOSPADM

## 2021-08-05 RX ORDER — MULTIVITAMIN,THERAPEUTIC
1 TABLET ORAL DAILY
Status: DISCONTINUED | OUTPATIENT
Start: 2021-08-06 | End: 2021-08-11 | Stop reason: HOSPADM

## 2021-08-05 RX ORDER — LIDOCAINE 40 MG/G
CREAM TOPICAL
Status: DISCONTINUED | OUTPATIENT
Start: 2021-08-05 | End: 2021-08-11 | Stop reason: HOSPADM

## 2021-08-05 RX ORDER — SENNOSIDES 8.6 MG
650 CAPSULE ORAL EVERY 8 HOURS PRN
Status: ON HOLD | COMMUNITY
End: 2021-08-11

## 2021-08-05 RX ORDER — LOSARTAN POTASSIUM 50 MG/1
50 TABLET ORAL DAILY
Status: DISCONTINUED | OUTPATIENT
Start: 2021-08-06 | End: 2021-08-11 | Stop reason: HOSPADM

## 2021-08-05 RX ADMIN — MAGNESIUM SULFATE HEPTAHYDRATE 2 G: 40 INJECTION, SOLUTION INTRAVENOUS at 11:14

## 2021-08-05 RX ADMIN — BISACODYL SUPPOSITORY 10 MG: 10 SUPPOSITORY RECTAL at 17:56

## 2021-08-05 RX ADMIN — SODIUM PHOSPHATE, DIBASIC AND SODIUM PHOSPHATE, MONOBASIC 1 ENEMA: 7; 19 ENEMA RECTAL at 21:08

## 2021-08-05 RX ADMIN — IOPAMIDOL 100 ML: 755 INJECTION, SOLUTION INTRAVENOUS at 10:32

## 2021-08-05 RX ADMIN — ACETAMINOPHEN 975 MG: 325 TABLET ORAL at 17:54

## 2021-08-05 RX ADMIN — SODIUM CHLORIDE: 9 INJECTION, SOLUTION INTRAVENOUS at 10:48

## 2021-08-05 RX ADMIN — CARVEDILOL 6.25 MG: 3.12 TABLET, FILM COATED ORAL at 18:59

## 2021-08-05 RX ADMIN — EXTENDED PHENYTOIN SODIUM 100 MG: 100 CAPSULE ORAL at 20:30

## 2021-08-05 RX ADMIN — MAGNESIUM HYDROXIDE 30 ML: 400 SUSPENSION ORAL at 17:55

## 2021-08-05 RX ADMIN — SODIUM CHLORIDE 500 ML: 9 INJECTION, SOLUTION INTRAVENOUS at 09:47

## 2021-08-05 RX ADMIN — ENOXAPARIN SODIUM 40 MG: 40 INJECTION SUBCUTANEOUS at 21:47

## 2021-08-05 RX ADMIN — DOCUSATE SODIUM 50 MG AND SENNOSIDES 8.6 MG 2 TABLET: 8.6; 5 TABLET, FILM COATED ORAL at 20:30

## 2021-08-05 RX ADMIN — POTASSIUM CHLORIDE 20 MEQ: 20 TABLET, EXTENDED RELEASE ORAL at 20:30

## 2021-08-05 RX ADMIN — SODIUM CHLORIDE: 9 INJECTION, SOLUTION INTRAVENOUS at 17:56

## 2021-08-05 ASSESSMENT — ENCOUNTER SYMPTOMS
WEAKNESS: 1
VOMITING: 0
CHEST TIGHTNESS: 0
COLOR CHANGE: 0
SORE THROAT: 0
DIARRHEA: 0
FEVER: 0
BLOOD IN STOOL: 0
SHORTNESS OF BREATH: 0
DIFFICULTY URINATING: 1
PALPITATIONS: 0
COUGH: 0
CHILLS: 0
EYE PAIN: 0
HEMATURIA: 0
DYSURIA: 0
CONSTIPATION: 1
ABDOMINAL PAIN: 0
SEIZURES: 0
NAUSEA: 0

## 2021-08-05 ASSESSMENT — MIFFLIN-ST. JEOR: SCORE: 965.66

## 2021-08-05 ASSESSMENT — ACTIVITIES OF DAILY LIVING (ADL)
WERE_AUXILIARY_AIDS_OFFERED?: NO
WALKING_OR_CLIMBING_STAIRS: TRANSFERRING DIFFICULTY, DEPENDENT
DEPENDENT_IADLS:: LAUNDRY;SHOPPING;TRANSPORTATION
WEAR_GLASSES_OR_BLIND: NO
PATIENT_/_FAMILY_COMMUNICATION_STYLE: SPOKEN LANGUAGE (ENGLISH OR BILINGUAL)

## 2021-08-05 NOTE — PHARMACY-ADMISSION MEDICATION HISTORY
Pharmacy Note - Admission Medication History     ______________________________________________________________________    Prior To Admission (PTA) med list completed and updated in EMR.       PTA Med List   Medication Sig Last Dose     acetaminophen (TYLENOL 8 HOUR ARTHRITIS PAIN) 650 MG CR tablet Take 650 mg by mouth every 8 hours as needed for mild pain or fever 8/4/2021     allopurinoL (ZYLOPRIM) 100 MG tablet [ALLOPURINOL (ZYLOPRIM) 100 MG TABLET] TAKE ONE TABLET BY MOUTH ONE TIME DAILY  8/5/2021 at am     aspirin 81 MG EC tablet [ASPIRIN 81 MG EC TABLET] Take 81 mg by mouth daily. 8/5/2021 at am     carvediloL (COREG) 6.25 MG tablet [CARVEDILOL (COREG) 6.25 MG TABLET] TAKE ONE TABLET BY MOUTH TWICE DAILY  8/5/2021 at am     losartan (COZAAR) 50 MG tablet [LOSARTAN (COZAAR) 50 MG TABLET] TAKE ONE TABLET BY MOUTH ONE TIME DAILY  8/5/2021 at am     Multiple Vitamins-Minerals (CENTRUM SILVER 50+WOMEN PO) Take 1 tablet by mouth daily 8/5/2021 at am     phenytoin (DILANTIN) 100 MG ER capsule [PHENYTOIN (DILANTIN) 100 MG ER CAPSULE] Take 1 capsule (100 mg total) by mouth 3 (three) times a day. 8/5/2021 at am       Information source(s): Patient, Family member and CareEverywhere/[facility]  Method of interview communication: in-person    Summary of Changes to PTA Med List  New: acetaminophen   Discontinued: cholecalciferol  Changed: none    Patient was asked about OTC/herbal products specifically.  PTA med list reflects this.    In the past week, patient estimated taking medication this percent of the time:  greater than 90%.    Allergies were reviewed, assessed, and updated with the patient.      Patient does not use any multi-dose medications prior to admission.    The information provided in this note is only as accurate as the sources available at the time of the update(s).    Thank you for the opportunity to participate in the care of this patient.    Nancy Smiley RPH  8/5/2021 11:44 AM

## 2021-08-05 NOTE — ED TRIAGE NOTES
The patient has a complaint of right hip pain. The patient denies falling or trauma. The patient states her pain started Monday of this week. The patient states her hip pain has gotten progressively worse.

## 2021-08-05 NOTE — ED NOTES
Bed: JNED-08  Expected date: 8/5/21  Expected time: 8:03 AM  Means of arrival: Ambulance  Comments:  Timur palencia pain

## 2021-08-05 NOTE — CONSULTS
Care Management Initial Consult    General Information  Assessment completed with: Patient, Children,  (patient and her daughter Ernestine)  Type of CM/SW Visit: Initial Assessment    Primary Care Provider verified and updated as needed: Yes   Readmission within the last 30 days: no previous admission in last 30 days      Reason for Consult: care coordination/care conference  Advance Care Planning:            Communication Assessment  Patient's communication style: spoken language (English or Bilingual)             Cognitive  Cognitive/Neuro/Behavioral: WDL                      Living Environment:   People in home:       Current living Arrangements:        Able to return to prior arrangements:         Family/Social Support:  Care provided by:    Provides care for:       Children          Description of Support System:           Current Resources:   Patient receiving home care services: No     Community Resources: None  Equipment currently used at home: walker, rolling  Supplies currently used at home: Incontinence Supplies    Employment/Financial:  Employment Status:          Financial Concerns:             Lifestyle & Psychosocial Needs:  Social Determinants of Health     Tobacco Use:      Smoking Tobacco Use:      Smokeless Tobacco Use:    Alcohol Use:      Frequency of Alcohol Consumption:      Average Number of Drinks:      Frequency of Binge Drinking:    Financial Resource Strain:      Difficulty of Paying Living Expenses:    Food Insecurity:      Worried About Running Out of Food in the Last Year:      Ran Out of Food in the Last Year:    Transportation Needs:      Lack of Transportation (Medical):      Lack of Transportation (Non-Medical):    Physical Activity:      Days of Exercise per Week:      Minutes of Exercise per Session:    Stress:      Feeling of Stress :    Social Connections:      Frequency of Communication with Friends and Family:      Frequency of Social Gatherings with Friends and Family:       Attends Confucianist Services:      Active Member of Clubs or Organizations:      Attends Club or Organization Meetings:      Marital Status:    Intimate Partner Violence:      Fear of Current or Ex-Partner:      Emotionally Abused:      Physically Abused:      Sexually Abused:    Depression: Not at risk     PHQ-2 Score: 0   Housing Stability:      Unable to Pay for Housing in the Last Year:      Number of Places Lived in the Last Year:      Unstable Housing in the Last Year:        Functional Status:  Prior to admission patient needed assistance:   Dependent ADLs:: Ambulation-walker  Dependent IADLs:: Laundry, Shopping, Transportation  Assesssment of Functional Status: Not at baseline with ADL Functioning    Mental Health Status:          Chemical Dependency Status:                Values/Beliefs:  Spiritual, Cultural Beliefs, Confucianist Practices, Values that affect care:                 Additional Information:  AIDET completed. Writer met with Pt and Pts daughter Ernestine: [phone].  Pt lives in a [facility]. Her daughter and son-in-law help with shopping, some cleaning and meals. Otherwise patient has been independent with her cares, meds, meals. She uses a walker. The last few days she has had increasing pain in her left hip, let side and back. She is having more difficulty walking. Prior to that she used her walker in case she got dizzy.     Discussed discharge planning; TCU vs HC depending on progression of care.Family will transport. Informed CM to follow.     Ramonita Marroquin RN, CM, BUDDY

## 2021-08-05 NOTE — H&P
"ADMISSION HISTORY & PHYSICAL        ADMIT DATE: 8/5/2021      FACILITY: [facility]      PCP: Monroe Brito, [phone]     ASSESSMENT AND PLAN:  Patient is a 89 year old female with history significant for Anemia, Ataxia, Epilepsy (H), Essential hypertension, Gout, Monoclonal gammopathy of undetermined significance, Osteoporosis, Peripheral neuropathy, and Secondary hyperparathyroidism (H) comes in for weakness in b/l legs and left hip/lower back/buttock pain.     Active Problems:    Hypertension    Muscle weakness (generalized)    Left hip/lower back/flank/buttock pain  -CT abdomen showed chronic appearing bilateral sacral insufficiency fractures and showed \"Moderate inferior endplate compression deformity of L2\"  -patient has peripheral neuropathy in both feet at baseline and there was no change in that. Patient denies any \"saddle anesthesia.\" However, the patient has not had a BM in 5 days and in ED, there is concern for new onset urinary retention. Concern for cauda Equina syndrome?   -ortho consult for bilateral sacral insufficiency fractures  -neurosurgery consult for compression deformity of L2 and concern for cauda Equina syndrome?   -pain control  -bed rest for now  -PT/OT eval after cleared by neurosurgery and ortho  -scheduled tylenol. PRN oxy-->monitor closely for sedation, AMS, and respiratory depression--->hold for now because of patient's allergy hx.       Constipation  -on physical exam, tender at LLQ on palpation. Abdomen distended.  -CT abdomen showed: \"No acute bowel inflammation or findings to suggest mechanical bowel obstruction.\"  -start miralax daily, pericolace 2 tabs BID, MOM PRN, dulcolax suppository  -monitor I/Os      Hypokalemia  -RN managed K protocol    Hypomagnesemia  -RN managed Mag protocol    Elevated CRP  -CRP non specific  -will monitor daily for now      Hyponatremia  -Na level is 131  -IV fluids  -monitor with daily BMP for now      UTI?  -UA " suspicious for possible UTI  -urine culture pending      Gout  -c/w home allopurinol    HTN  -BP elevated  -c/w home losartan and home coreg with hold parameters      Hx of epilepsy with recurrent seizures  -c/w home dilantin            FEN/GI: low salt diet   DVT proph: lovenox  Code status: No Order          Disposition:  -Anticipated Length of Stay in midnights and medical necessity (including a midnight in the Emergency Department after triage if applicable): 2-3 days  -Discharge barriers: ortho consult, neurosurgery consult, PT/OT eval after clearance to move, discharge planning, pain control       CHIEF COMPLAINT:  Chief Complaint   Patient presents with     Hip Pain        HISTORY OF PRESENTING ILLNESS:  Patient is a 89 year old female with history significant for Anemia, Ataxia, Epilepsy (H), Essential hypertension, Gout, Monoclonal gammopathy of undetermined significance, Osteoporosis, Peripheral neuropathy, and Secondary hyperparathyroidism (H) comes in for weakness in b/l legs and left hip/lower back/buttock pain.     Patient reports 1 week of weakness in b/l legs but especially her left leg where she is now unable to walker without using her walker, which is atypical for her. Also, reports 4 days of left hip pain, left lower back pain, and left buttock pain. Her left hip pain radiates down her left leg. She states she is unable to lift her left leg at all due to combination of weakness and pain. Tylenol only gives her mild relief of the pain. Patient also complains of constipation for the last 5 days which is unusual for her. Patient denies any urinary retention but daughter at bedside is concerned about urinary retention.     PMH:  Past Medical History:   Diagnosis Date     Anemia      Ataxia      Epilepsy (H)      Essential hypertension      Gout      Monoclonal gammopathy of undetermined significance      Osteoporosis      Peripheral neuropathy      Secondary hyperparathyroidism (H)     non-renal        PSH:  Past Surgical History:   Procedure Laterality Date     APPENDECTOMY      AGE 13     CATARACT EXTRACTION, BILATERAL       CHOLECYSTECTOMY       IR LUMBAR EPIDURAL STEROID INJECTION  10/5/2007     IR MISCELLANEOUS PROCEDURE  10/30/2007     NEUROPLASTY / TRANSPOSITION MEDIAN NERVE AT CARPAL TUNNEL BILATERAL       TONSILLECTOMY          ALLERGIES:  Allergies   Allergen Reactions     Levofloxacin Unknown     Oxycodone Unknown     Quinolones Rash       MEDICATIONS:  Reviewed.  Current Facility-Administered Medications   Medication     sodium chloride 0.9% infusion     Current Outpatient Medications   Medication     acetaminophen (TYLENOL 8 HOUR ARTHRITIS PAIN) 650 MG CR tablet     allopurinoL (ZYLOPRIM) 100 MG tablet     aspirin 81 MG EC tablet     carvediloL (COREG) 6.25 MG tablet     losartan (COZAAR) 50 MG tablet     Multiple Vitamins-Minerals (CENTRUM SILVER 50+WOMEN PO)     phenytoin (DILANTIN) 100 MG ER capsule     allopurinoL (ZYLOPRIM) 100 MG tablet     losartan (COZAAR) 50 MG tablet        SOCIAL HISTORY:  Social History     Socioeconomic History     Marital status:      Spouse name: Not on file     Number of children: Not on file     Years of education: Not on file     Highest education level: Not on file   Occupational History     Not on file   Tobacco Use     Smoking status: Never Smoker   Substance and Sexual Activity     Alcohol use: Not on file     Drug use: Not on file     Sexual activity: Not on file   Other Topics Concern     Not on file   Social History Narrative     Not on file     Social Determinants of Health     Financial Resource Strain:      Difficulty of Paying Living Expenses:    Food Insecurity:      Worried About Running Out of Food in the Last Year:      Ran Out of Food in the Last Year:    Transportation Needs:      Lack of Transportation (Medical):      Lack of Transportation (Non-Medical):    Physical Activity:      Days of Exercise per Week:      Minutes of Exercise per  Session:    Stress:      Feeling of Stress :    Social Connections:      Frequency of Communication with Friends and Family:      Frequency of Social Gatherings with Friends and Family:      Attends [employer] Services:      Active Member of Clubs or Organizations:      Attends Club or Organization Meetings:      Marital Status:    Intimate Partner Violence:      Fear of Current or Ex-Partner:      Emotionally Abused:      Physically Abused:      Sexually Abused:        FAMILY HISTORY:  History reviewed. No pertinent family history.    ROS:  Review of Systems   Constitutional: Negative for chills and fever.   HENT: Negative for congestion, ear pain and sore throat.    Eyes: Negative for pain and visual disturbance.   Respiratory: Negative for cough, chest tightness and shortness of breath.    Cardiovascular: Negative for chest pain and palpitations.   Gastrointestinal: Positive for constipation. Negative for abdominal pain, blood in stool, diarrhea, nausea and vomiting.   Genitourinary: Positive for difficulty urinating. Negative for dysuria and hematuria.   Musculoskeletal:        Left hip/buttock/lower back pain   Skin: Negative for color change and rash.   Neurological: Positive for weakness. Negative for seizures and syncope.   All other systems reviewed and are negative.         PHYSICAL EXAM:  Patient Vitals for the past 24 hrs:   BP Temp Temp src Pulse Resp SpO2   08/05/21 1620 -- -- -- 62 24 97 %   08/05/21 1610 -- -- -- 61 23 99 %   08/05/21 1600 (!) 167/77 -- -- 61 15 96 %   08/05/21 1550 -- -- -- 61 23 97 %   08/05/21 1325 -- -- -- 63 17 98 %   08/05/21 1320 -- -- -- 65 22 98 %   08/05/21 1315 (!) 176/80 -- -- 62 14 96 %   08/05/21 1310 -- -- -- 61 11 95 %   08/05/21 1305 -- -- -- 62 20 97 %   08/05/21 1300 -- -- -- 64 (!) 31 97 %   08/05/21 1255 -- -- -- 62 23 95 %   08/05/21 1250 -- -- -- 63 21 --   08/05/21 1245 -- -- -- 61 17 --   08/05/21 1240 -- -- -- 64 23 --   08/05/21 1235 -- -- -- 64 24 --    08/05/21 1230 -- -- -- 62 26 --   08/05/21 1225 -- -- -- 61 26 --   08/05/21 1220 -- -- -- 60 18 --   08/05/21 1215 -- -- -- 60 20 --   08/05/21 1210 -- -- -- 61 24 --   08/05/21 1205 -- -- -- 63 26 --   08/05/21 1200 -- -- -- 66 28 --   08/05/21 0816 -- 98.3  F (36.8  C) Oral -- -- 98 %   08/05/21 0810 (!) 212/90 -- -- 61 18 98 %        Intake/Output Summary (Last 24 hours) at 8/5/2021 1547  Last data filed at 8/5/2021 1123  Gross per 24 hour   Intake 1000 ml   Output --   Net 1000 ml     GENRL: Alert and oriented X 3. Not in acute distress. Satting at 97% on room air.   HEENT: NC/AT      Neck- supple      Sclera- anicteric      Mucous membrane- moist and pink  CHEST: Clear to auscultation bilaterally  HEART: S1S2 regular. No murmurs, rubs or gallops  ABDMN: Tender at LLQ on palpation. distended. No guarding or rigidity.  EXTRM:  No pedal edema.   NEURO: Strength and sensation intact in BLEs  INTGM: please see nursing assessment for full skin assessment  PULSES: 2+ and symmetric all extremities  PSYCH: normal affect, normal speech       DIAGNOSTIC DATA:  Recent Results (from the past 24 hour(s))   Extra Blue Top Tube    Collection Time: 08/05/21  8:35 AM   Result Value Ref Range    Hold Specimen JIC    Extra Red Top Tube    Collection Time: 08/05/21  8:35 AM   Result Value Ref Range    Hold Specimen JIC    Extra Green Top (Lithium Heparin) Tube    Collection Time: 08/05/21  8:35 AM   Result Value Ref Range    Hold Specimen JIC    Extra Purple Top Tube    Collection Time: 08/05/21  8:35 AM   Result Value Ref Range    Hold Specimen JIC    Comprehensive metabolic panel    Collection Time: 08/05/21  8:35 AM   Result Value Ref Range    Sodium 131 (L) 136 - 145 mmol/L    Potassium 3.3 (L) 3.5 - 5.0 mmol/L    Chloride 98 98 - 107 mmol/L    Carbon Dioxide (CO2) 25 22 - 31 mmol/L    Anion Gap 8 5 - 18 mmol/L    Urea Nitrogen 8 8 - 28 mg/dL    Creatinine 0.54 (L) 0.60 - 1.10 mg/dL    Calcium 9.2 8.5 - 10.5 mg/dL    Glucose  89 70 - 125 mg/dL    Alkaline Phosphatase 138 (H) 45 - 120 U/L    AST 38 0 - 40 U/L    ALT 14 0 - 45 U/L    Protein Total 6.5 6.0 - 8.0 g/dL    Albumin 3.3 (L) 3.5 - 5.0 g/dL    Bilirubin Total 0.8 0.0 - 1.0 mg/dL    GFR Estimate 84 >60 mL/min/1.73m2   Lipase    Collection Time: 08/05/21  8:35 AM   Result Value Ref Range    Lipase 17 0 - 52 U/L   Troponin I    Collection Time: 08/05/21  8:35 AM   Result Value Ref Range    Troponin I 0.02 0.00 - 0.29 ng/mL   CRP inflammation    Collection Time: 08/05/21  8:35 AM   Result Value Ref Range    CRP 8.8 (H) 0.0-<0.8 mg/dL   Magnesium    Collection Time: 08/05/21  8:35 AM   Result Value Ref Range    Magnesium 1.3 (L) 1.8 - 2.6 mg/dL   CBC with platelets and differential    Collection Time: 08/05/21  8:35 AM   Result Value Ref Range    WBC Count 6.8 4.0 - 11.0 10e3/uL    RBC Count 3.58 (L) 3.80 - 5.20 10e6/uL    Hemoglobin 11.8 11.7 - 15.7 g/dL    Hematocrit 35.2 35.0 - 47.0 %    MCV 98 78 - 100 fL    MCH 33.0 26.5 - 33.0 pg    MCHC 33.5 31.5 - 36.5 g/dL    RDW 11.9 10.0 - 15.0 %    Platelet Count 269 150 - 450 10e3/uL    % Neutrophils 70 %    % Lymphocytes 19 %    % Monocytes 10 %    % Eosinophils 0 %    % Basophils 0 %    % Immature Granulocytes 1 %    NRBCs per 100 WBC 0 <1 /100    Absolute Neutrophils 4.8 1.6 - 8.3 10e3/uL    Absolute Lymphocytes 1.3 0.8 - 5.3 10e3/uL    Absolute Monocytes 0.7 0.0 - 1.3 10e3/uL    Absolute Eosinophils 0.0 0.0 - 0.7 10e3/uL    Absolute Basophils 0.0 0.0 - 0.2 10e3/uL    Absolute Immature Granulocytes 0.1 (H) <=0.0 10e3/uL    Absolute NRBCs 0.0 10e3/uL   ECG 12-LEAD WITH MUSE (LHE)    Collection Time: 08/05/21  9:26 AM   Result Value Ref Range    Systolic Blood Pressure 175 mmHg    Diastolic Blood Pressure 88 mmHg    Ventricular Rate 61 BPM    Atrial Rate 61 BPM    KS Interval 212 ms    QRS Duration 126 ms     ms    QTc 442 ms    P Axis 35 degrees    R AXIS -48 degrees    T Axis 111 degrees    Interpretation ECG       Sinus rhythm  with 1st degree A-V block  Left axis deviation  Left ventricular hypertrophy with QRS widening and repolarization abnormality  Cannot rule out Septal infarct , age undetermined  Abnormal ECG  When compared with ECG of 24-DEC-2007 13:51,  Significant changes have occurred  Confirmed by SEE ED PROVIDER NOTE FOR, ECG INTERPRETATION ([id number]),  MARILUZ ALFARO ([id number]) on 8/5/2021 9:45:38 AM     Erythrocyte sedimentation rate auto    Collection Time: 08/05/21  9:38 AM   Result Value Ref Range    Erythrocyte Sedimentation Rate 60 (H) 0 - 20 mm/hr   UA with Microscopic reflex to Culture    Collection Time: 08/05/21 10:06 AM    Specimen: Urine, Clean Catch   Result Value Ref Range    Color Urine Light Yellow Colorless, Straw, Light Yellow, Yellow    Appearance Urine Clear Clear    Glucose Urine Negative Negative mg/dL    Bilirubin Urine Negative Negative    Ketones Urine 10  (A) Negative mg/dL    Specific Gravity Urine 1.009 1.001 - 1.030    Blood Urine 0.03 mg/dL (A) Negative    pH Urine 6.0 5.0 - 7.0    Protein Albumin Urine 50  (A) Negative mg/dL    Urobilinogen Urine <2.0 <2.0 mg/dL    Nitrite Urine Negative Negative    Leukocyte Esterase Urine 25 Evelyn/uL (A) Negative    Bacteria Urine Many (A) None Seen /HPF    RBC Urine 1 <=2 /HPF    WBC Urine 4 <=5 /HPF   SARS-COV2 (COVID-19) Virus RT-PCR    Collection Time: 08/05/21  1:30 PM    Specimen: Nasopharyngeal; Swab   Result Value Ref Range    SARS CoV2 PCR Negative Negative      Results for orders placed or performed during the hospital encounter of 08/05/21   CT Abdomen Pelvis w Contrast    Impression    IMPRESSION:     1.  No acute bowel inflammation or findings to suggest mechanical bowel obstruction.  2.  Bilateral adrenal gland thickening consistent with hyperplasia.  3.  Generalized demineralization of the bones with chronic appearing bilateral sacral insufficiency fractures.      All lab studies reviewed personally    Radiology Results: imaging impression  reviewed      Total time is 70 minutes with greater than 50% of time spent in chart review, coordination of care with ED provider/patient's PCP/provider from outside facility and consultants, and discussing plan of care with patient and his/her family.     Wolf Vaughn MD.   [facility]   [phone]   Pager [phone]

## 2021-08-05 NOTE — ED PROVIDER NOTES
EMERGENCY DEPARTMENT ENCOUNTER      NAME: Peggy De Anda  AGE: 89 year old female  YOB: 1931  MRN: [id number]  EVALUATION DATE & TIME: 8/5/2021  8:07 AM    PCP: Monroe Brito    ED PROVIDER: Antonio Carroll M.D.        IMPRESSION:  Lower extremity weakness  Constipation  Sacral insufficiency fracture  Electrolyte deficiencies sodium, potassium, magnesium  Elevated inflammatory markers sed rate and CRP  Accelerated hypertension      MEDICAL DECISION MAKING:  Patient brought in by medics from home.  She reported progressive weakness of the lower extremities and disability.  He also reported some left sided back and abdominal pain.  She has not had a bowel movement in 4 days.    On exam her blood pressure is elevated.  There is no fever.  She is comfortable at rest.  Cardiopulmonary exam is normal.  She has some tenderness of the left lower quadrant of the abdomen.  Her bladder felt full and a random bladder scan was performed showing 480 cc however this was not post void.  He has some chronic edema of the lower extremities.  Both lower extremities are weak.  She has arthritis and weakness of the left upper extremity as well.    An IV was established and she was given pain medication    She was placed on cardiac monitor which showed normal sinus rhythm    EKG showed sinus rhythm without evidence of ischemia or arrhythmia    Sed rate is elevated at 60 and CRP is elevated at 8.  Consider PMR    Urine does not appear infected    Magnesium is low at 1.3 and was given 2 g of IV    Chemistry shows low sodium and potassium.    CT of the abdomen did not show any acute inflammation however there appears to be chronic sacral insufficiency fractures.    Patient is too weak to return home.  She will need inpatient admission to correct her electrolyte deficiencies and address her constipation.  She may need physical therapy              =================================================================  CHIEF  COMPLAINT:  Chief Complaint   Patient presents with     Hip Pain         HPI  Peggy De Anda is a 89 year old female with a history of monoclonal gammopathy of undetermined significance, gout, joint pain, osteoporosis, fatigue, peripheral neuropathy, and epilepsy who presents to the ED via EMS for evaluation of weakness and hip pain.     Patient reports 1 week of weakness in bilateral legs, where she is now unable to walk without using her walker, which is atypical for her. Also reports 4 days of left hip pain, left flank pain, left lower back pain, and left buttock pain, noting that flank pain is worse with walking and deep inspiration. Left hip pain occasionally radiates down left leg. However, she is unable to lift her left leg at all due to the combination of weakness and pain. She took tylenol earlier with slight relief, although states that the pain is not her main concern and she presented today for evaluation of the weakness. Denies recent falls, injury, or trauma.     Also reports that she has been severely constipated for the past 4-5 days, which is also atypical for her. Otherwise denies fever, chills, cough, nausea, vomiting, diarrhea, or any other complaints at this time.      I, Clair Turner am serving as a scribe to document services personally performed by Dr. Antonio Carroll MD, based on my observation and the provider's statements to me. I, Dr. Antonio Carroll MD attest that Clair Turner is acting in a scribe capacity, has observed my performance of the services and has documented them in accordance with my direction.      REVIEW OF SYSTEMS   Constitutional: Denies fever, chills, unintentional weight loss or fatigue   Eyes: Denies visual changes or discharge    HENT: Denies sore throat, ear pain or neck pain  Respiratory: Denies cough or shortness of breath    Cardiovascular: Denies chest pain, palpitations or leg swelling  GI: Denies abdominal pain, nausea, vomiting, or dark, bloody stools. Reports  constipation.  : Denies hematuria, dysuria. Reports left flank pain.  Musculoskeletal: Reports left hip pain (occasionally radiates down leg), left lower back pain, left buttock pain, and gait difficulty (secondary to leg weakness and hip pain).  Skin: Denies rash or wound  Neurologic: Denies current headache, focal weakness, or sensory changes. Reports weakness (bilateral legs).  Lymphatic: Denies swollen glands    Psychiatric: Denies depression, suicidal ideation or homicidal ideation.    Remainder of systems reviewed, unless noted in HPI all others negative.      PAST MEDICAL HISTORY:  History reviewed. No pertinent past medical history.    PAST SURGICAL HISTORY:  Past Surgical History:   Procedure Laterality Date     APPENDECTOMY      AGE 13     CATARACT EXTRACTION, BILATERAL       CHOLECYSTECTOMY       IR LUMBAR EPIDURAL STEROID INJECTION  10/5/2007     IR MISCELLANEOUS PROCEDURE  10/30/2007     NEUROPLASTY / TRANSPOSITION MEDIAN NERVE AT CARPAL TUNNEL BILATERAL       TONSILLECTOMY           CURRENT MEDICATIONS:    allopurinoL (ZYLOPRIM) 100 MG tablet  allopurinoL (ZYLOPRIM) 100 MG tablet  aspirin 81 MG EC tablet  carvediloL (COREG) 6.25 MG tablet  cholecalciferol, vitamin D3, 2,000 unit Tab  losartan (COZAAR) 50 MG tablet  losartan (COZAAR) 50 MG tablet  MULTIVITAMIN ORAL  phenytoin (DILANTIN) 100 MG ER capsule        ALLERGIES:  Allergies   Allergen Reactions     Levofloxacin Unknown     Oxycodone Unknown     Quinolones Rash       FAMILY HISTORY:  History reviewed. No pertinent family history.    SOCIAL HISTORY:   Social History     Socioeconomic History     Marital status:      Spouse name: Not on file     Number of children: Not on file     Years of education: Not on file     Highest education level: Not on file   Occupational History     Not on file   Tobacco Use     Smoking status: Not on file   Substance and Sexual Activity     Alcohol use: Not on file     Drug use: Not on file     Sexual  activity: Not on file   Other Topics Concern     Not on file   Social History Narrative     Not on file     Social Determinants of Health     Financial Resource Strain:      Difficulty of Paying Living Expenses:    Food Insecurity:      Worried About Running Out of Food in the Last Year:      Ran Out of Food in the Last Year:    Transportation Needs:      Lack of Transportation (Medical):      Lack of Transportation (Non-Medical):    Physical Activity:      Days of Exercise per Week:      Minutes of Exercise per Session:    Stress:      Feeling of Stress :    Social Connections:      Frequency of Communication with Friends and Family:      Frequency of Social Gatherings with Friends and Family:      Attends Taoism Services:      Active Member of Clubs or Organizations:      Attends Club or Organization Meetings:      Marital Status:    Intimate Partner Violence:      Fear of Current or Ex-Partner:      Emotionally Abused:      Physically Abused:      Sexually Abused:        PHYSICAL EXAM:    BP (!) 212/90   Pulse 61   Temp 98.3  F (36.8  C) (Oral)   Resp 18   SpO2 98%     Constitutional: Alert and responsive  Head: Normocephalic, atraumatic.  ENT: Mucous membranes moist. Posterior oropharynx appears normal.  Eyes:Conjunctiva normal, no discharge  Respiratory: Respirations even, unlabored. Lungs clear to ascultation bilaterally, in no acute respiratory distress.  Cardiovascular: Regular rate and rhythm.+2 radial pulses, equal bilaterally. No peripheral edema.   GI: Abdomen is mildly distended with tenderness in left lower quadrant  : No CVA tenderness.    Musculoskeletal: Left upper extremity weakness secondary to pain.  Bilateral lower extremity weakness and discomfort with range of motion, bilateral lower extremity edema  Integument: Warm, dry. No rash. No bruising or petechiae.  Lymphatic: No cervical lymphadenopathy  Neurologic: Alert & oriented x 3. Normal speech.  Weak lower extremities  Psychiatric:  Normal mood and affect. Normal judgement.    ED COURSE:    8:56 AM I met with the patient for the initial interview and physical examination. Discussed plan for treatment and workup in the ED. PPE: Provider wore gloves and surgical mask.   11:38 AM I spoke with Dr. Vaughn, hospitalist who agrees to admit the patient.     LAB:  All pertinent labs reviewed and interpreted.  Results for orders placed or performed during the hospital encounter of 08/05/21   CT Abdomen Pelvis w Contrast    Impression    IMPRESSION:     1.  No acute bowel inflammation or findings to suggest mechanical bowel obstruction.  2.  Bilateral adrenal gland thickening consistent with hyperplasia.  3.  Generalized demineralization of the bones with chronic appearing bilateral sacral insufficiency fractures.   Extra Blue Top Tube   Result Value Ref Range    Hold Specimen JIC    Extra Red Top Tube   Result Value Ref Range    Hold Specimen JIC    Extra Green Top (Lithium Heparin) Tube   Result Value Ref Range    Hold Specimen JIC    Extra Purple Top Tube   Result Value Ref Range    Hold Specimen JIC    Comprehensive metabolic panel   Result Value Ref Range    Sodium 131 (L) 136 - 145 mmol/L    Potassium 3.3 (L) 3.5 - 5.0 mmol/L    Chloride 98 98 - 107 mmol/L    Carbon Dioxide (CO2) 25 22 - 31 mmol/L    Anion Gap 8 5 - 18 mmol/L    Urea Nitrogen 8 8 - 28 mg/dL    Creatinine 0.54 (L) 0.60 - 1.10 mg/dL    Calcium 9.2 8.5 - 10.5 mg/dL    Glucose 89 70 - 125 mg/dL    Alkaline Phosphatase 138 (H) 45 - 120 U/L    AST 38 0 - 40 U/L    ALT 14 0 - 45 U/L    Protein Total 6.5 6.0 - 8.0 g/dL    Albumin 3.3 (L) 3.5 - 5.0 g/dL    Bilirubin Total 0.8 0.0 - 1.0 mg/dL    GFR Estimate 84 >60 mL/min/1.73m2   Result Value Ref Range    Lipase 17 0 - 52 U/L   Result Value Ref Range    Troponin I 0.02 0.00 - 0.29 ng/mL   Erythrocyte sedimentation rate auto   Result Value Ref Range    Erythrocyte Sedimentation Rate 60 (H) 0 - 20 mm/hr   CRP inflammation   Result Value Ref  Range    CRP 8.8 (H) 0.0-<0.8 mg/dL   UA with Microscopic reflex to Culture    Specimen: Urine, Clean Catch   Result Value Ref Range    Color Urine Light Yellow Colorless, Straw, Light Yellow, Yellow    Appearance Urine Clear Clear    Glucose Urine Negative Negative mg/dL    Bilirubin Urine Negative Negative    Ketones Urine 10  (A) Negative mg/dL    Specific Gravity Urine 1.009 1.001 - 1.030    Blood Urine 0.03 mg/dL (A) Negative    pH Urine 6.0 5.0 - 7.0    Protein Albumin Urine 50  (A) Negative mg/dL    Urobilinogen Urine <2.0 <2.0 mg/dL    Nitrite Urine Negative Negative    Leukocyte Esterase Urine 25 Evelyn/uL (A) Negative    Bacteria Urine Many (A) None Seen /HPF    RBC Urine 1 <=2 /HPF    WBC Urine 4 <=5 /HPF   Result Value Ref Range    Magnesium 1.3 (L) 1.8 - 2.6 mg/dL   CBC with platelets and differential   Result Value Ref Range    WBC Count 6.8 4.0 - 11.0 10e3/uL    RBC Count 3.58 (L) 3.80 - 5.20 10e6/uL    Hemoglobin 11.8 11.7 - 15.7 g/dL    Hematocrit 35.2 35.0 - 47.0 %    MCV 98 78 - 100 fL    MCH 33.0 26.5 - 33.0 pg    MCHC 33.5 31.5 - 36.5 g/dL    RDW 11.9 10.0 - 15.0 %    Platelet Count 269 150 - 450 10e3/uL    % Neutrophils 70 %    % Lymphocytes 19 %    % Monocytes 10 %    % Eosinophils 0 %    % Basophils 0 %    % Immature Granulocytes 1 %    NRBCs per 100 WBC 0 <1 /100    Absolute Neutrophils 4.8 1.6 - 8.3 10e3/uL    Absolute Lymphocytes 1.3 0.8 - 5.3 10e3/uL    Absolute Monocytes 0.7 0.0 - 1.3 10e3/uL    Absolute Eosinophils 0.0 0.0 - 0.7 10e3/uL    Absolute Basophils 0.0 0.0 - 0.2 10e3/uL    Absolute Immature Granulocytes 0.1 (H) <=0.0 10e3/uL    Absolute NRBCs 0.0 10e3/uL   ECG 12-LEAD WITH MUSE (LHE)   Result Value Ref Range    Systolic Blood Pressure 175 mmHg    Diastolic Blood Pressure 88 mmHg    Ventricular Rate 61 BPM    Atrial Rate 61 BPM    ND Interval 212 ms    QRS Duration 126 ms     ms    QTc 442 ms    P Axis 35 degrees    R AXIS -48 degrees    T Axis 111 degrees    Interpretation  ECG       Sinus rhythm with 1st degree A-V block  Left axis deviation  Left ventricular hypertrophy with QRS widening and repolarization abnormality  Cannot rule out Septal infarct , age undetermined  Abnormal ECG  When compared with ECG of 24-DEC-2007 13:51,  Significant changes have occurred  Confirmed by SEE ED PROVIDER NOTE FOR, ECG INTERPRETATION ([id number]),  MARILUZ ALFARO ([id number]) on 8/5/2021 9:45:38 AM         RADIOLOGY:  Reviewed all pertinent imaging. Please see official radiology report.  CT Abdomen Pelvis w Contrast   Final Result   IMPRESSION:       1.  No acute bowel inflammation or findings to suggest mechanical bowel obstruction.   2.  Bilateral adrenal gland thickening consistent with hyperplasia.   3.  Generalized demineralization of the bones with chronic appearing bilateral sacral insufficiency fractures.           EKG:    Performed at: 0926a    Impression: sinus rhythm with 1st degree AV block, left axis deviation, left ventricular hypertrophy with QRS widening and repolarization abnormality, cannot rule out septal infarct age undetermined. Significant changes on comparison.     Rate: 61 bpm  Rhythm: sinus rhythm with 1st degree AV block  Axis: -48  HI Interval: 212  QRS Interval: 126  QTc Interval: 442  Comparison: 24-Dec-2007    I have independently reviewed and interpreted the EKG(s) documented above.      MEDICATIONS GIVEN IN THE EMERGENCY:  Medications   0.9% sodium chloride BOLUS (0 mLs Intravenous Stopped 8/5/21 1123)     Followed by   sodium chloride 0.9% infusion ( Intravenous Stopped 8/5/21 1113)   iopamidol (ISOVUE-370) solution 100 mL (100 mLs Intravenous Given 8/5/21 1032)   magnesium sulfate 2 g in water intermittent infusion (2 g Intravenous New Bag 8/5/21 1114)           NEW PRESCRIPTIONS STARTED AT TODAY'S ER VISIT:  New Prescriptions    No medications on file          FINAL DIAGNOSIS:    ICD-10-CM    1. Muscle weakness (generalized)  M62.81             At the conclusion of  the encounter I discussed the results of all of the tests and the disposition. The questions were answered. The patient or family acknowledged understanding and was agreeable with the care plan.       I, Clair Turner, am serving as a scribe to document services personally performed by Dr. Antonio Carroll based on my observation and the provider's statements to me. I, Antonio Carroll MD attest that Clair Turner is acting in a scribe capacity, has observed my performance of the services and has documented them in accordance with my direction.    Antonio Carroll M.D.  Emergency Medicine  [facility] EMERGENCY DEPARTMENT  [address] [place] [id number]  [phone]  Dept: [phone]  8/5/2021       Antonio Carroll MD  08/10/21 [id number]

## 2021-08-05 NOTE — ED NOTES
[facility] ED Handoff Report    ED Chief Complaint:  chief complaint    ED Diagnosis:  (M62.81) Muscle weakness (generalized)  Comment: none  Plan: none    (I10) Essential hypertension  Comment: none  Plan: none       PMH:    Past Medical History:   Diagnosis Date     Anemia      Ataxia      Epilepsy (H)      Essential hypertension      Gout      Monoclonal gammopathy of undetermined significance      Osteoporosis      Peripheral neuropathy      Secondary hyperparathyroidism (H)     non-renal        Code Status:  No Order     Falls Risk: Yes    Current Living Situation/Residence: home    Elimination Status:  Continent.  Difficult to get up to go to bathroom now because of weakness    Activity Level:  Usually independent.  Assistance due to weakness    Patients Preferred Language:  English     Needed: No    Infection:  [unfilled]     Vital Signs:  BP (!) 167/77   Pulse 62   Temp 98.3  F (36.8  C) (Oral)   Resp 24   SpO2 97%      Cardiac Rhythm: NSR    Pain Score: 0/10 with no movement, 10/10 with movement    Is the Patient Confused:  No    Last Food or Drink: today    Focused Assessment: left hip and abdominal pain    Tests Performed:  radiology    Abnormal Results:    Abnormal Labs Reviewed   COMPREHENSIVE METABOLIC PANEL - Abnormal; Notable for the following components:       Result Value    Sodium 131 (*)     Potassium 3.3 (*)     Creatinine 0.54 (*)     Alkaline Phosphatase 138 (*)     Albumin 3.3 (*)     All other components within normal limits   ERYTHROCYTE SEDIMENTATION RATE AUTO - Abnormal; Notable for the following components:    Erythrocyte Sedimentation Rate 60 (*)     All other components within normal limits   CRP INFLAMMATION - Abnormal; Notable for the following components:    CRP 8.8 (*)     All other components within normal limits   ROUTINE UA WITH MICROSCOPIC REFLEX TO CULTURE - Abnormal; Notable for the following components:    Ketones Urine 10  (*)     Blood Urine 0.03  mg/dL (*)     Protein Albumin Urine 50  (*)     Leukocyte Esterase Urine 25 Evelyn/uL (*)     Bacteria Urine Many (*)     All other components within normal limits    Narrative:     Urine Culture not indicated   MAGNESIUM - Abnormal; Notable for the following components:    Magnesium 1.3 (*)     All other components within normal limits   CBC WITH PLATELETS AND DIFFERENTIAL - Abnormal; Notable for the following components:    RBC Count 3.58 (*)     Absolute Immature Granulocytes 0.1 (*)     All other components within normal limits       CT Abdomen Pelvis w Contrast   Final Result   IMPRESSION:       1.  No acute bowel inflammation or findings to suggest mechanical bowel obstruction.   2.  Bilateral adrenal gland thickening consistent with hyperplasia.   3.  Generalized demineralization of the bones with chronic appearing bilateral sacral insufficiency fractures.           Treatments Provided:  medications    Family Dynamics/Concerns: no    Family Updated On Visitor Policy: Yes    Plan of Care Communicated to Family: Yes    Who Was Updated about Plan of Care: daughter    Belongings Checklist Done and Signed by Patient: Yes    covid swab completed    ED Medications:    Medications   0.9% sodium chloride BOLUS (0 mLs Intravenous Stopped 8/5/21 1123)     Followed by   sodium chloride 0.9% infusion ( Intravenous Stopped 8/5/21 1113)   iopamidol (ISOVUE-370) solution 100 mL (100 mLs Intravenous Given 8/5/21 1032)   magnesium sulfate 2 g in water intermittent infusion (0 g Intravenous Stopped 8/5/21 1633)        Additional Information: none    @ME2@ 8/5/2021 4:34 PM

## 2021-08-06 ENCOUNTER — APPOINTMENT (OUTPATIENT)
Dept: MRI IMAGING | Facility: HOSPITAL | Age: 86
DRG: 543 | End: 2021-08-06
Attending: PHYSICIAN ASSISTANT
Payer: MEDICARE

## 2021-08-06 PROBLEM — M25.552 HIP PAIN, LEFT: Status: ACTIVE | Noted: 2021-08-06

## 2021-08-06 LAB
ALBUMIN SERPL-MCNC: 2.7 G/DL (ref 3.5–5)
ALP SERPL-CCNC: 121 U/L (ref 45–120)
ALT SERPL W P-5'-P-CCNC: 16 U/L (ref 0–45)
ANION GAP SERPL CALCULATED.3IONS-SCNC: 10 MMOL/L (ref 5–18)
AST SERPL W P-5'-P-CCNC: 33 U/L (ref 0–40)
BILIRUB SERPL-MCNC: 0.4 MG/DL (ref 0–1)
BUN SERPL-MCNC: 7 MG/DL (ref 8–28)
C REACTIVE PROTEIN LHE: 6.2 MG/DL (ref 0–0.8)
CALCIUM SERPL-MCNC: 8.4 MG/DL (ref 8.5–10.5)
CHLORIDE BLD-SCNC: 106 MMOL/L (ref 98–107)
CO2 SERPL-SCNC: 22 MMOL/L (ref 22–31)
CREAT SERPL-MCNC: 0.49 MG/DL (ref 0.6–1.1)
ERYTHROCYTE [DISTWIDTH] IN BLOOD BY AUTOMATED COUNT: 12.2 % (ref 10–15)
GFR SERPL CREATININE-BSD FRML MDRD: 87 ML/MIN/1.73M2
GLUCOSE BLD-MCNC: 80 MG/DL (ref 70–125)
HCT VFR BLD AUTO: 33.7 % (ref 35–47)
HGB BLD-MCNC: 11.2 G/DL (ref 11.7–15.7)
MAGNESIUM SERPL-MCNC: 1.5 MG/DL (ref 1.8–2.6)
MCH RBC QN AUTO: 33.7 PG (ref 26.5–33)
MCHC RBC AUTO-ENTMCNC: 33.2 G/DL (ref 31.5–36.5)
MCV RBC AUTO: 102 FL (ref 78–100)
PLATELET # BLD AUTO: 267 10E3/UL (ref 150–450)
POTASSIUM BLD-SCNC: 3.7 MMOL/L (ref 3.5–5)
POTASSIUM BLD-SCNC: 3.7 MMOL/L (ref 3.5–5)
PROT SERPL-MCNC: 5.7 G/DL (ref 6–8)
RBC # BLD AUTO: 3.32 10E6/UL (ref 3.8–5.2)
SODIUM SERPL-SCNC: 138 MMOL/L (ref 136–145)
WBC # BLD AUTO: 6.6 10E3/UL (ref 4–11)

## 2021-08-06 PROCEDURE — 250N000013 HC RX MED GY IP 250 OP 250 PS 637: Performed by: FAMILY MEDICINE

## 2021-08-06 PROCEDURE — 86141 C-REACTIVE PROTEIN HS: CPT | Performed by: FAMILY MEDICINE

## 2021-08-06 PROCEDURE — 72148 MRI LUMBAR SPINE W/O DYE: CPT

## 2021-08-06 PROCEDURE — 250N000011 HC RX IP 250 OP 636: Performed by: FAMILY MEDICINE

## 2021-08-06 PROCEDURE — 36415 COLL VENOUS BLD VENIPUNCTURE: CPT | Performed by: FAMILY MEDICINE

## 2021-08-06 PROCEDURE — 83735 ASSAY OF MAGNESIUM: CPT | Performed by: INTERNAL MEDICINE

## 2021-08-06 PROCEDURE — 85027 COMPLETE CBC AUTOMATED: CPT | Performed by: FAMILY MEDICINE

## 2021-08-06 PROCEDURE — 72146 MRI CHEST SPINE W/O DYE: CPT

## 2021-08-06 PROCEDURE — 99233 SBSQ HOSP IP/OBS HIGH 50: CPT | Performed by: FAMILY MEDICINE

## 2021-08-06 PROCEDURE — 99221 1ST HOSP IP/OBS SF/LOW 40: CPT | Performed by: PHYSICIAN ASSISTANT

## 2021-08-06 PROCEDURE — 250N000013 HC RX MED GY IP 250 OP 250 PS 637: Performed by: STUDENT IN AN ORGANIZED HEALTH CARE EDUCATION/TRAINING PROGRAM

## 2021-08-06 PROCEDURE — 80053 COMPREHEN METABOLIC PANEL: CPT | Performed by: FAMILY MEDICINE

## 2021-08-06 PROCEDURE — 120N000001 HC R&B MED SURG/OB

## 2021-08-06 RX ORDER — CEFTRIAXONE 1 G/1
1 INJECTION, POWDER, FOR SOLUTION INTRAMUSCULAR; INTRAVENOUS EVERY 24 HOURS
Status: DISCONTINUED | OUTPATIENT
Start: 2021-08-06 | End: 2021-08-10

## 2021-08-06 RX ORDER — MAGNESIUM OXIDE 400 MG/1
400 TABLET ORAL 2 TIMES DAILY
Status: COMPLETED | OUTPATIENT
Start: 2021-08-06 | End: 2021-08-07

## 2021-08-06 RX ADMIN — CARVEDILOL 6.25 MG: 3.12 TABLET, FILM COATED ORAL at 08:37

## 2021-08-06 RX ADMIN — ACETAMINOPHEN 975 MG: 325 TABLET ORAL at 01:23

## 2021-08-06 RX ADMIN — ALLOPURINOL 100 MG: 100 TABLET ORAL at 08:37

## 2021-08-06 RX ADMIN — THERA TABS 1 TABLET: TAB at 08:37

## 2021-08-06 RX ADMIN — LOSARTAN POTASSIUM 50 MG: 50 TABLET, FILM COATED ORAL at 08:37

## 2021-08-06 RX ADMIN — ACETAMINOPHEN 975 MG: 325 TABLET ORAL at 17:20

## 2021-08-06 RX ADMIN — EXTENDED PHENYTOIN SODIUM 100 MG: 100 CAPSULE ORAL at 08:38

## 2021-08-06 RX ADMIN — POLYETHYLENE GLYCOL 3350 17 G: 17 POWDER, FOR SOLUTION ORAL at 08:37

## 2021-08-06 RX ADMIN — CEFTRIAXONE SODIUM 1 G: 1 INJECTION, POWDER, FOR SOLUTION INTRAMUSCULAR; INTRAVENOUS at 17:23

## 2021-08-06 RX ADMIN — CARVEDILOL 6.25 MG: 3.12 TABLET, FILM COATED ORAL at 17:21

## 2021-08-06 RX ADMIN — EXTENDED PHENYTOIN SODIUM 100 MG: 100 CAPSULE ORAL at 21:10

## 2021-08-06 RX ADMIN — ACETAMINOPHEN 975 MG: 325 TABLET ORAL at 10:34

## 2021-08-06 RX ADMIN — MAGNESIUM OXIDE TAB 400 MG (241.3 MG ELEMENTAL MG) 400 MG: 400 (241.3 MG) TAB at 21:09

## 2021-08-06 RX ADMIN — ENOXAPARIN SODIUM 40 MG: 40 INJECTION SUBCUTANEOUS at 21:09

## 2021-08-06 RX ADMIN — EXTENDED PHENYTOIN SODIUM 100 MG: 100 CAPSULE ORAL at 13:17

## 2021-08-06 RX ADMIN — MAGNESIUM HYDROXIDE 30 ML: 400 SUSPENSION ORAL at 08:37

## 2021-08-06 RX ADMIN — MAGNESIUM OXIDE TAB 400 MG (241.3 MG ELEMENTAL MG) 400 MG: 400 (241.3 MG) TAB at 08:37

## 2021-08-06 RX ADMIN — ASPIRIN 81 MG: 81 TABLET, COATED ORAL at 08:37

## 2021-08-06 RX ADMIN — DOCUSATE SODIUM 50 MG AND SENNOSIDES 8.6 MG 2 TABLET: 8.6; 5 TABLET, FILM COATED ORAL at 08:37

## 2021-08-06 RX ADMIN — DOCUSATE SODIUM 50 MG AND SENNOSIDES 8.6 MG 2 TABLET: 8.6; 5 TABLET, FILM COATED ORAL at 21:09

## 2021-08-06 ASSESSMENT — MIFFLIN-ST. JEOR: SCORE: 968.84

## 2021-08-06 NOTE — UTILIZATION REVIEW
Inpatient appropriate  Admission Status; Secondary Review Determination     Under the authority of the Utilization Management Committee, the utilization review process indicated a secondary review on the above patient. The review outcome is based on review of the medical records, discussions with staff, and applying clinical experience noted on the date of the review.     (x) Inpatient Status Appropriate - This patient's medical care is consistent with medical management for inpatient care and reasonable inpatient medical practice.     RATIONALE FOR DETERMINATION   89 years old female evaluated in the ED on August 5, 2021 for progressive right hip pain of 4 days duration without trauma.  CT scan abdomen and pelvis for abdominal distention showed no acute bowel inflammation or findings to suggest mechanical bowel obstruction, moderate inferior endplate compression deformity of L2, demineralization of the sacrum with bilateral chronic appearing insufficiency fractures.  Patient was admitted to the hospital for further evaluation.  Neurosurgery service consulted and recommended MRI.  MRI of the thoracic and lumbar spine showed stable mild to moderate inferior endplate chronic compression fracture of L2, 6 mm retrolisthesis of the inferior endplate of L2 into the spinal canal causing mild to moderate spinal canal narrowing at L2-L3.  Laboratory work-up remarkable for sodium 131, potassium 3.3, magnesium 1.3 requiring replacement.  There is also evidence of urinary retention requiring Gamez catheter placement and UA is abnormal with gram-negative bacilli infection requiring antibiotic treatment.  At the time of admission with the information available to the attending physician more than 2 nights Hospital complex care was anticipated, based on patient risk of adverse outcome if treated as outpatient and complex care required. Inpatient admission is appropriate based on the Medicare guidelines.     This document was  produced using voice recognition software     The information on this document is developed by the utilization review team in order for the business office to ensure compliance. This only denotes the appropriateness of proper admission status and does not reflect the quality of care rendered.   The definitions of Inpatient Status and Observation Status used in making the determination above are those provided in the CMS Coverage Manual, Chapter 1 and Chapter 6, section 70.4.     Sincerely,     Leon Little MD  [facility]  Utilization Review Physician Advisor  Pager: [phone]

## 2021-08-06 NOTE — CONSULTS
[facility]    Neurosurgery Consultation     Date of Admission:  8/5/2021  Date of Consult (When I saw the patient): 08/06/21    Assessment & Plan   Peggy De Anda is a 89 year old female who was admitted on 8/5/2021. I was asked to see the patient for L2 fracture and urinary retention with UTI     Principal Problem:  L2 fracture back pain   Plan:   MRI thoracic and lumbar spine  Further recommendations upon completion of imaging  Possible LSO brace indicated   Recommendations for further evaluation of her left shoulder pain   UTI treatment per hospitalist   Gamez management per hospitalist   Patient states that she does not want surgical intervention nor would she want to wear a brace. Will provide further recommendations after image review. Otherwise treatment will be pain management and will sign off     Addendum:   MRI reviewed with chronic T2, T4 and L2 fractures without high grade spinal stenosis   Patient still does not want surgical intervention for bracing thus will sign off- re-consult if indicated or new onset of weakness.     I have discussed the following assessment and plan with Dr. Guillory  who is in agreement with initial plan and will follow up with further consultation recommendations.    Rosario Orr PA-C  [facility] Neurosurgery  O: [phone]        Code Status    Full Code    Reason for Consult   Reason for consult: back pain urinary retention     Primary Care Physician    Monroe Brito    Chief Complaint   Back pain     History is obtained from the patient    History of Present Illness   Peggy De Anda is a 89 year old right handed female who presents with worsening complaint of low thoracic and upper lumbar pain. She states that her pain has been progressive over the past few months and has limited her ability to ambulate. She has been ambulating with a walker for over a year stating that she has neuropathy and feels off balance at times. She has two  falls of which she states was a 'big fall' back in November and earlier this year. She denies any more recent injury or trauma. She states that her pain is a intermittent sharp stabbing pain that begins in her left lower thoracic spine and will radiate anteriorly to her flank and into her abdomen on the left. She has intermittent low back pain that will radiate into her buttocks. She denies any radicular lower extremity pain, numbness, tingling, or weakness. She states that if she is laying still she has no pain and only notes pain with ambulation or rolling in bed/ laying on left side. She also has complaint of left shoulder pain and feels that her shoulder is bigger than it usually is. She is has limited range of motion of her left shoulder secondary to pain. She denies any urinary or bowel incontinence. She states that since being in the hospital she was told she has urinary retention but still has the urge to urinate and feels that she urinates frequently but drinks a lot of water. She denies saddle anesthesia.     Past Medical History   I have reviewed this patient's medical history and updated it with pertinent information if needed.   Past Medical History:   Diagnosis Date     Anemia      Ataxia      Epilepsy (H)      Essential hypertension      Gout      Monoclonal gammopathy of undetermined significance      Osteoporosis      Peripheral neuropathy      Secondary hyperparathyroidism (H)     non-renal       Past Surgical History   I have reviewed this patient's surgical history and updated it with pertinent information if needed.  Past Surgical History:   Procedure Laterality Date     APPENDECTOMY      AGE 13     CATARACT EXTRACTION, BILATERAL       CHOLECYSTECTOMY       IR LUMBAR EPIDURAL STEROID INJECTION  10/5/2007     IR MISCELLANEOUS PROCEDURE  10/30/2007     NEUROPLASTY / TRANSPOSITION MEDIAN NERVE AT CARPAL TUNNEL BILATERAL       TONSILLECTOMY         Prior to Admission Medications   Prior to Admission  Medications   Prescriptions Last Dose Informant Patient Reported? Taking?   Multiple Vitamins-Minerals (CENTRUM SILVER 50+WOMEN PO) 8/5/2021 at am  Yes Yes   Sig: Take 1 tablet by mouth daily   acetaminophen (TYLENOL 8 HOUR ARTHRITIS PAIN) 650 MG CR tablet 8/4/2021  Yes Yes   Sig: Take 650 mg by mouth every 8 hours as needed for mild pain or fever   allopurinoL (ZYLOPRIM) 100 MG tablet 8/5/2021 at am  No Yes   Sig: [ALLOPURINOL (ZYLOPRIM) 100 MG TABLET] TAKE ONE TABLET BY MOUTH ONE TIME DAILY    allopurinoL (ZYLOPRIM) 100 MG tablet   No No   Sig: [ALLOPURINOL (ZYLOPRIM) 100 MG TABLET] Take 1 tablet (100 mg total) by mouth daily.   aspirin 81 MG EC tablet 8/5/2021 at am  Yes Yes   Sig: [ASPIRIN 81 MG EC TABLET] Take 81 mg by mouth daily.   carvediloL (COREG) 6.25 MG tablet 8/5/2021 at am  No Yes   Sig: [CARVEDILOL (COREG) 6.25 MG TABLET] TAKE ONE TABLET BY MOUTH TWICE DAILY    losartan (COZAAR) 50 MG tablet 8/5/2021 at am  No Yes   Sig: [LOSARTAN (COZAAR) 50 MG TABLET] TAKE ONE TABLET BY MOUTH ONE TIME DAILY    losartan (COZAAR) 50 MG tablet   No No   Sig: [LOSARTAN (COZAAR) 50 MG TABLET] Take 1 tablet (50 mg total) by mouth daily.   phenytoin (DILANTIN) 100 MG ER capsule 8/5/2021 at am  No Yes   Sig: [PHENYTOIN (DILANTIN) 100 MG ER CAPSULE] Take 1 capsule (100 mg total) by mouth 3 (three) times a day.      Facility-Administered Medications: None     Allergies   Allergies   Allergen Reactions     Oxycodone Unknown     Quinolones Rash     Levofloxacin Rash       Social History   I have reviewed this patient's social history and updated it with pertinent information if needed. Peggy De Anda  reports that she has never smoked. She does not have any smokeless tobacco history on file.    Family History   I have reviewed this patient's family history and updated it with pertinent information if needed.   History reviewed. No pertinent family history.    Review of Systems    Negative for exception of HPI       Physical  "Exam   Temp: 98.2  F (36.8  C) Temp src: Oral BP: (!) 162/72 Pulse: 72   Resp: 18 SpO2: 95 % O2 Device: None (Room air)    Vital Signs with Ranges  Temp:  [97.8  F (36.6  C)-98.4  F (36.9  C)] 98.2  F (36.8  C)  Pulse:  [61-72] 72  Resp:  [15-24] 18  BP: (133-189)/(62-85) 162/72  SpO2:  [94 %-99 %] 95 %  133 lbs 0 oz     , Blood pressure (!) 162/72, pulse 72, temperature 98.2  F (36.8  C), temperature source Oral, resp. rate 18, height 1.549 m (5' 1\"), weight 60.3 kg (133 lb), SpO2 95 %.  133 lbs 0 oz  HEENT:  Normocephalic, atraumatic.  PERRLA.  EOM s intact.   Neck:  Supple, non-tender, without lymphadenopathy.  Heart:  No peripheral edema  Lungs:  No SOB  Abdomen:  Soft, non-tender, non-distended.  Normal bowel sounds.  Skin:  Warm and dry, good capillary refill.  Extremities:  Good radial and dorsalis pedis pulses bilaterally, no edema, cyanosis or clubbing.    NEUROLOGICAL EXAMINATION:   Mental status:  Alert and Oriented x 3, speech is fluent.  Cranial nerves:  II-XII intact.   Motor:  Strength is 5/5 throughout the upper and lower extremities  Deltoids:  Right: 5/5   Left:  4/5 Limited ROM of left shoulder secondary to pain   Biceps:  Right: 5/5   Left:  5/5  Triceps: Right: 5/5   Left:  5/5                       Wrist Extensors: Right: 5/5   Left:  5/5        Wrist Flexors:Right: 5/5   Left:  5/5             Hand : Right: 5/5   Left:  5/5         Hip Flexor: Right: 5/5   Left:  5/5                 Hip Adductor:Right: 5/5   Left:  5/5               Hip Abductor: Right: 5/5   Left:  5/5              Gastroc Soleus:Right: 5/5   Left:  5/5        Tib/Ant:Right: 5/5   Left:  5/5                        EHL:Right: 5/5   Left:  5/5                     Sensation:  Intact throughout - feels garcia tug   Reflexes:  Negative Babinski.  Negative Clonus.    Coordination:  Smooth finger to nose testing.   Negative pronator drift.    Gait:  NT secondary to pain     Pain to palpation of left shoulder with painful passive " and active motion   Pain to palpation of lower thoracic spine and low back with marked muscle spasms.     Data     CBC RESULTS:   Recent Labs   Lab Test 08/06/21  0304   WBC 6.6   RBC 3.32*   HGB 11.2*   HCT 33.7*   *   MCH 33.7*   MCHC 33.2   RDW 12.2        Basic Metabolic Panel:  Lab Results   Component Value Date     08/06/2021      Lab Results   Component Value Date    POTASSIUM 3.7 08/06/2021    POTASSIUM 3.7 08/06/2021     Lab Results   Component Value Date    CHLORIDE 106 08/06/2021     Lab Results   Component Value Date    OBED 8.4 08/06/2021     Lab Results   Component Value Date    CO2 22 08/06/2021     Lab Results   Component Value Date    BUN 7 08/06/2021     Lab Results   Component Value Date    CR 0.49 08/06/2021     Lab Results   Component Value Date    GLC 80 08/06/2021     INR:  No results found for: INR    Images:   Personally reviewed CT abdomen pelvis with noted L2 inferior endplate compression fracture with cement in L4 and L5 no high grade spinal stenosis noted     Rosario Orr PA-C  [facility] Neurosurgery  O: [phone]

## 2021-08-06 NOTE — PROGRESS NOTES
"[facility] PROGRESS NOTE      ADMIT DATE: 8/5/2021     FACILITY: [facility]    PCP: Monroe Brito, [phone]    ASSESSMENT AND PLAN:   Patient is a 89 year old female with history significant for Anemia, Ataxia, Epilepsy (H), Essential hypertension, Gout, Monoclonal gammopathy of undetermined significance, Osteoporosis, Peripheral neuropathy, and Secondary hyperparathyroidism (H) comes in for weakness in b/l legs and left hip/lower back/buttock pain.     Active Problems:    Hypertension    Muscle weakness (generalized)          Left hip/lower back/flank/buttock pain  -CT abdomen showed chronic appearing bilateral sacral insufficiency fractures and showed \"Moderate inferior endplate compression deformity of L2\"  -patient has peripheral neuropathy in both feet at baseline and there was no change in that. Patient denies any \"saddle anesthesia.\" However, the patient has not had a BM in 5 days and in ED, there is concern for new onset urinary retention. Concern for cauda Equina syndrome?   -neurosurgery consult for compression deformity of L2 and concern for cauda Equina syndrome? and bilateral sacral insufficiency fractures  -bed rest for now  -PT/OT eval after cleared by neurosurgery  -scheduled tylenol. avoid narcotics because of patient's allergy hx. Patient does not remember allergy to narcotics though. Pain is not improving. Order pain team consult.         Constipation  -on physical exam on admission, tender at LLQ on palpation. Abdomen distended.  -CT abdomen showed: \"No acute bowel inflammation or findings to suggest mechanical bowel obstruction.\"  -c/w miralax daily, pericolace 2 tabs BID, MOM PRN, dulcolax suppository  -monitor I/Os        Hypokalemia  -RN managed K protocol     Hypomagnesemia  -RN managed Mag protocol     Elevated CRP  -CRP non specific  -will monitor daily for now        Hyponatremia  -Na level is 131  -IV fluids  -monitor with daily BMP for now        UTI?  -UA " "suspicious for possible UTI  -urine culture pending but shows growth of >100,00 gram negative bacilli  -start IV rocephin        Gout  -c/w home allopurinol     HTN  -BP elevated  -c/w home losartan and home coreg with hold parameters  -c/w IV hydralazine PRN        Hx of epilepsy with recurrent seizures  -c/w home dilantin            FEN/GI: low salt diet  DVT proph: lovenox  Code status: Full Code      Discharge barriers:  -neurosurgery consult pending, pain control, PT/OT eval when cleared by neurosurgery  -ADOD: 2-3 days       SUBJECTIVE:    Patient states her left sided hip/lower back/buttock pain has not improved since admission even with tylenol. When asked about her oxycodone allergy, patient cannot remember her reaction when she took oxycodone. Patient states she is still constipated and still has not had a BM yet today. Patient denies any other complaints at this time.     ROS:  12 Points review of systems reviewed and is negative except for what has already been mentioned above    OBJECTIVE:  Patient Vitals for the past 24 hrs:   BP Temp Temp src Pulse Resp SpO2 Height Weight   08/06/21 0356 133/62 98.4  F (36.9  C) Oral 71 16 95 % -- --   08/05/21 2357 138/65 98.2  F (36.8  C) Oral 68 18 96 % -- --   08/05/21 2100 133/64 97.8  F (36.6  C) Oral 63 20 95 % -- --   08/05/21 1859 (!) 143/69 -- -- 71 -- -- -- --   08/05/21 1732 (!) 189/85 97.9  F (36.6  C) Oral 65 24 97 % 1.549 m (5' 1\") 60.3 kg (133 lb)   08/05/21 1620 -- -- -- 62 24 97 % -- --   08/05/21 1610 -- -- -- 61 23 99 % -- --   08/05/21 1600 (!) 167/77 -- -- 61 15 96 % -- --   08/05/21 1550 -- -- -- 61 23 97 % -- --   08/05/21 1325 -- -- -- 63 17 98 % -- --   08/05/21 1320 -- -- -- 65 22 98 % -- --   08/05/21 1315 (!) 176/80 -- -- 62 14 96 % -- --   08/05/21 1310 -- -- -- 61 11 95 % -- --   08/05/21 1305 -- -- -- 62 20 97 % -- --   08/05/21 1300 -- -- -- 64 (!) 31 97 % -- --   08/05/21 1255 -- -- -- 62 23 95 % -- --   08/05/21 1250 -- -- -- 63 21 " -- -- --   08/05/21 1245 -- -- -- 61 17 -- -- --   08/05/21 1240 -- -- -- 64 23 -- -- --   08/05/21 1235 -- -- -- 64 24 -- -- --   08/05/21 1230 -- -- -- 62 26 -- -- --   08/05/21 1225 -- -- -- 61 26 -- -- --   08/05/21 1220 -- -- -- 60 18 -- -- --   08/05/21 1215 -- -- -- 60 20 -- -- --   08/05/21 1210 -- -- -- 61 24 -- -- --   08/05/21 1205 -- -- -- 63 26 -- -- --   08/05/21 1200 -- -- -- 66 28 -- -- --   08/05/21 0816 -- 98.3  F (36.8  C) Oral -- -- 98 % -- --   08/05/21 0810 (!) 212/90 -- -- 61 18 98 % -- --          Intake/Output Summary (Last 24 hours) at 8/6/2021 0725  Last data filed at 8/6/2021 0700  Gross per 24 hour   Intake 2087 ml   Output 1200 ml   Net 887 ml     GENRL: Alert and oriented X 3. Not in acute distress. Satting at 95% on room air.   HEENT: NC/AT                 Neck- supple                 Sclera- anicteric                 Mucous membrane- moist and pink  CHEST: Clear to auscultation bilaterally  HEART: S1S2 regular. No murmurs, rubs or gallops  ABDMN:  No guarding or rigidity.  EXTRM:  No pedal edema.   NEURO: no involuntary movements   INTGM: please see nursing assessment for full skin assessment  PULSES: 2+ and symmetric all extremities  PSYCH: normal affect, normal speech     DIAGNOSTIC DATA:          Recent Results (from the past 24 hour(s))   Extra Blue Top Tube    Collection Time: 08/05/21  8:35 AM   Result Value Ref Range    Hold Specimen [facility]    Extra Red Top Tube    Collection Time: 08/05/21  8:35 AM   Result Value Ref Range    Hold Specimen [facility]    Extra Green Top (Lithium Heparin) Tube    Collection Time: 08/05/21  8:35 AM   Result Value Ref Range    Hold Specimen [facility]    Extra Purple Top Tube    Collection Time: 08/05/21  8:35 AM   Result Value Ref Range    Hold Specimen [facility]    Comprehensive metabolic panel    Collection Time: 08/05/21  8:35 AM   Result Value Ref Range    Sodium 131 (L) 136 - 145 mmol/L    Potassium 3.3 (L) 3.5 - 5.0 mmol/L    Chloride 98 98 - 107 mmol/L     Carbon Dioxide (CO2) 25 22 - 31 mmol/L    Anion Gap 8 5 - 18 mmol/L    Urea Nitrogen 8 8 - 28 mg/dL    Creatinine 0.54 (L) 0.60 - 1.10 mg/dL    Calcium 9.2 8.5 - 10.5 mg/dL    Glucose 89 70 - 125 mg/dL    Alkaline Phosphatase 138 (H) 45 - 120 U/L    AST 38 0 - 40 U/L    ALT 14 0 - 45 U/L    Protein Total 6.5 6.0 - 8.0 g/dL    Albumin 3.3 (L) 3.5 - 5.0 g/dL    Bilirubin Total 0.8 0.0 - 1.0 mg/dL    GFR Estimate 84 >60 mL/min/1.73m2   Lipase    Collection Time: 08/05/21  8:35 AM   Result Value Ref Range    Lipase 17 0 - 52 U/L   Troponin I    Collection Time: 08/05/21  8:35 AM   Result Value Ref Range    Troponin I 0.02 0.00 - 0.29 ng/mL   CRP inflammation    Collection Time: 08/05/21  8:35 AM   Result Value Ref Range    CRP 8.8 (H) 0.0-<0.8 mg/dL   Magnesium    Collection Time: 08/05/21  8:35 AM   Result Value Ref Range    Magnesium 1.3 (L) 1.8 - 2.6 mg/dL   CBC with platelets and differential    Collection Time: 08/05/21  8:35 AM   Result Value Ref Range    WBC Count 6.8 4.0 - 11.0 10e3/uL    RBC Count 3.58 (L) 3.80 - 5.20 10e6/uL    Hemoglobin 11.8 11.7 - 15.7 g/dL    Hematocrit 35.2 35.0 - 47.0 %    MCV 98 78 - 100 fL    MCH 33.0 26.5 - 33.0 pg    MCHC 33.5 31.5 - 36.5 g/dL    RDW 11.9 10.0 - 15.0 %    Platelet Count 269 150 - 450 10e3/uL    % Neutrophils 70 %    % Lymphocytes 19 %    % Monocytes 10 %    % Eosinophils 0 %    % Basophils 0 %    % Immature Granulocytes 1 %    NRBCs per 100 WBC 0 <1 /100    Absolute Neutrophils 4.8 1.6 - 8.3 10e3/uL    Absolute Lymphocytes 1.3 0.8 - 5.3 10e3/uL    Absolute Monocytes 0.7 0.0 - 1.3 10e3/uL    Absolute Eosinophils 0.0 0.0 - 0.7 10e3/uL    Absolute Basophils 0.0 0.0 - 0.2 10e3/uL    Absolute Immature Granulocytes 0.1 (H) <=0.0 10e3/uL    Absolute NRBCs 0.0 10e3/uL   ECG 12-LEAD WITH MUSE (LHE)    Collection Time: 08/05/21  9:26 AM   Result Value Ref Range    Systolic Blood Pressure 175 mmHg    Diastolic Blood Pressure 88 mmHg    Ventricular Rate 61 BPM    Atrial Rate 61  BPM    NH Interval 212 ms    QRS Duration 126 ms     ms    QTc 442 ms    P Axis 35 degrees    R AXIS -48 degrees    T Axis 111 degrees    Interpretation ECG       Sinus rhythm with 1st degree A-V block  Left axis deviation  Left ventricular hypertrophy with QRS widening and repolarization abnormality  Cannot rule out Septal infarct , age undetermined  Abnormal ECG  When compared with ECG of 24-DEC-2007 13:51,  Significant changes have occurred  Confirmed by SEE ED PROVIDER NOTE FOR, ECG INTERPRETATION ([id number]),  MARILUZ ALFARO ([id number]) on 8/5/2021 9:45:38 AM     Erythrocyte sedimentation rate auto    Collection Time: 08/05/21  9:38 AM   Result Value Ref Range    Erythrocyte Sedimentation Rate 60 (H) 0 - 20 mm/hr   UA with Microscopic reflex to Culture    Collection Time: 08/05/21 10:06 AM    Specimen: Urine, Clean Catch   Result Value Ref Range    Color Urine Light Yellow Colorless, Straw, Light Yellow, Yellow    Appearance Urine Clear Clear    Glucose Urine Negative Negative mg/dL    Bilirubin Urine Negative Negative    Ketones Urine 10  (A) Negative mg/dL    Specific Gravity Urine 1.009 1.001 - 1.030    Blood Urine 0.03 mg/dL (A) Negative    pH Urine 6.0 5.0 - 7.0    Protein Albumin Urine 50  (A) Negative mg/dL    Urobilinogen Urine <2.0 <2.0 mg/dL    Nitrite Urine Negative Negative    Leukocyte Esterase Urine 25 Evelyn/uL (A) Negative    Bacteria Urine Many (A) None Seen /HPF    RBC Urine 1 <=2 /HPF    WBC Urine 4 <=5 /HPF   SARS-COV2 (COVID-19) Virus RT-PCR    Collection Time: 08/05/21  1:30 PM    Specimen: Nasopharyngeal; Swab   Result Value Ref Range    SARS CoV2 PCR Negative Negative   Creatinine    Collection Time: 08/05/21  6:32 PM   Result Value Ref Range    Creatinine 0.52 (L) 0.60 - 1.10 mg/dL    GFR Estimate 85 >60 mL/min/1.73m2   Potassium    Collection Time: 08/05/21  6:32 PM   Result Value Ref Range    Potassium 3.2 (L) 3.5 - 5.0 mmol/L   Magnesium    Collection Time: 08/05/21  6:32 PM    Result Value Ref Range    Magnesium 1.6 (L) 1.8 - 2.6 mg/dL   Potassium    Collection Time: 08/05/21 10:36 PM   Result Value Ref Range    Potassium 3.3 (L) 3.5 - 5.0 mmol/L   Comprehensive metabolic panel    Collection Time: 08/06/21  3:04 AM   Result Value Ref Range    Sodium 138 136 - 145 mmol/L    Potassium 3.7 3.5 - 5.0 mmol/L    Chloride 106 98 - 107 mmol/L    Carbon Dioxide (CO2) 22 22 - 31 mmol/L    Anion Gap 10 5 - 18 mmol/L    Urea Nitrogen 7 (L) 8 - 28 mg/dL    Creatinine 0.49 (L) 0.60 - 1.10 mg/dL    Calcium 8.4 (L) 8.5 - 10.5 mg/dL    Glucose 80 70 - 125 mg/dL    Alkaline Phosphatase 121 (H) 45 - 120 U/L    AST 33 0 - 40 U/L    ALT 16 0 - 45 U/L    Protein Total 5.7 (L) 6.0 - 8.0 g/dL    Albumin 2.7 (L) 3.5 - 5.0 g/dL    Bilirubin Total 0.4 0.0 - 1.0 mg/dL    GFR Estimate 87 >60 mL/min/1.73m2   CBC with platelets    Collection Time: 08/06/21  3:04 AM   Result Value Ref Range    WBC Count 6.6 4.0 - 11.0 10e3/uL    RBC Count 3.32 (L) 3.80 - 5.20 10e6/uL    Hemoglobin 11.2 (L) 11.7 - 15.7 g/dL    Hematocrit 33.7 (L) 35.0 - 47.0 %     (H) 78 - 100 fL    MCH 33.7 (H) 26.5 - 33.0 pg    MCHC 33.2 31.5 - 36.5 g/dL    RDW 12.2 10.0 - 15.0 %    Platelet Count 267 150 - 450 10e3/uL   Magnesium    Collection Time: 08/06/21  3:04 AM   Result Value Ref Range    Magnesium 1.5 (L) 1.8 - 2.6 mg/dL   Potassium    Collection Time: 08/06/21  3:04 AM   Result Value Ref Range    Potassium 3.7 3.5 - 5.0 mmol/L   CRP inflammation    Collection Time: 08/06/21  3:04 AM   Result Value Ref Range    CRP 6.2 (H) 0.0-<0.8 mg/dL        Results for orders placed or performed during the hospital encounter of 08/05/21   CT Abdomen Pelvis w Contrast    Impression    IMPRESSION:     1.  No acute bowel inflammation or findings to suggest mechanical bowel obstruction.  2.  Bilateral adrenal gland thickening consistent with hyperplasia.  3.  Generalized demineralization of the bones with chronic appearing bilateral sacral  insufficiency fractures.          All recent labs reviewed personally  Radiology report reviewed.   Radiology Results: imaging impression reviewed      The total time spent in preparing this progress note is about 70 minutes, >50% time spent in care co-ordination that includes reviewing labs, images, discussing the plan of care with patient/family, consultants, and .      Wolf Vaughn MD.   [facility] Medicine Service   [phone]   Pager [phone]

## 2021-08-06 NOTE — PLAN OF CARE
Problem: Adult Inpatient Plan of Care  Goal: Absence of Hospital-Acquired Illness or Injury  Intervention: Prevent Skin Injury  Recent Flowsheet Documentation  Taken 8/6/2021 0503 by Amarilys Tavares, RN  Body Position: turned  Taken 8/6/2021 0015 by Amarilys Tavares, RN  Body Position: turned  Taken 8/5/2021 2000 by Amarilys Tavares, RN  Body Position: turned     Problem: Constipation  Goal: Effective Bowel Elimination  Outcome: No Change

## 2021-08-06 NOTE — PROGRESS NOTES
Care Management Follow Up    Length of Stay (days): 1    Expected Discharge Date: 08/08/2021     Concerns to be Addressed:  Neuro consult; Ortho consult; Medical management     Patient plan of care discussed at interdisciplinary rounds: Yes    Anticipated Discharge Disposition:  TBD      Anticipated Discharge Services:  TBD  Anticipated Discharge DME:  TBD    Patient/family educated on Medicare website which has current facility and service quality ratings:    Education Provided on the Discharge Plan:    Patient/Family in Agreement with the Plan:      Referrals Placed by CM/SW:    Private pay costs discussed: Not applicable    Additional Information:  Per notes, at baseline, the patient lives in a condo alone, and she has been getting assistance with some IADLs from her daughter and son-in-law. The initial consult indicated that the patient may need TCU vs. home cares at discharge. There are not yet PT/OT evaluations or recommendations. Please follow up for discharge recommendations. Family to transport. CM to follow.    Silvio Carpio RN

## 2021-08-06 NOTE — PLAN OF CARE
Problem: Adult Inpatient Plan of Care  Goal: Plan of Care Review  Outcome: Improving  Goal: Patient-Specific Goal (Individualized)  Outcome: Improving  Goal: Absence of Hospital-Acquired Illness or Injury  Outcome: Improving  Intervention: Identify and Manage Fall Risk  Recent Flowsheet Documentation  Taken 8/6/2021 0800 by Coretta Kitchen RN  Safety Promotion/Fall Prevention: bed alarm on  Intervention: Prevent Skin Injury  Recent Flowsheet Documentation  Taken 8/6/2021 0800 by Coretta Kitchen RN  Body Position: turned  Goal: Optimal Comfort and Wellbeing  Outcome: Improving  Goal: Readiness for Transition of Care  Outcome: Improving   Pt is alert and oriented x4. Pt is med complaint. Pt c/o back pain and received pain med. Pt had high Bp this morning and received schedule Bp med. Continue to monitor pt.

## 2021-08-07 ENCOUNTER — APPOINTMENT (OUTPATIENT)
Dept: OCCUPATIONAL THERAPY | Facility: HOSPITAL | Age: 86
DRG: 543 | End: 2021-08-07
Attending: FAMILY MEDICINE
Payer: MEDICARE

## 2021-08-07 ENCOUNTER — APPOINTMENT (OUTPATIENT)
Dept: PHYSICAL THERAPY | Facility: HOSPITAL | Age: 86
DRG: 543 | End: 2021-08-07
Attending: FAMILY MEDICINE
Payer: MEDICARE

## 2021-08-07 LAB
ALBUMIN SERPL-MCNC: 3 G/DL (ref 3.5–5)
ALP SERPL-CCNC: 160 U/L (ref 45–120)
ALT SERPL W P-5'-P-CCNC: 16 U/L (ref 0–45)
ANION GAP SERPL CALCULATED.3IONS-SCNC: 10 MMOL/L (ref 5–18)
AST SERPL W P-5'-P-CCNC: 29 U/L (ref 0–40)
ATRIAL RATE - MUSE: 66 BPM
BACTERIA UR CULT: ABNORMAL
BILIRUB SERPL-MCNC: 0.4 MG/DL (ref 0–1)
BUN SERPL-MCNC: 6 MG/DL (ref 8–28)
C REACTIVE PROTEIN LHE: 4.1 MG/DL (ref 0–0.8)
CALCIUM SERPL-MCNC: 8.9 MG/DL (ref 8.5–10.5)
CHLORIDE BLD-SCNC: 99 MMOL/L (ref 98–107)
CO2 SERPL-SCNC: 26 MMOL/L (ref 22–31)
CREAT SERPL-MCNC: 0.58 MG/DL (ref 0.6–1.1)
DIASTOLIC BLOOD PRESSURE - MUSE: NORMAL MMHG
ERYTHROCYTE [DISTWIDTH] IN BLOOD BY AUTOMATED COUNT: 12.2 % (ref 10–15)
GFR SERPL CREATININE-BSD FRML MDRD: 82 ML/MIN/1.73M2
GLUCOSE BLD-MCNC: 215 MG/DL (ref 70–125)
HCT VFR BLD AUTO: 39.4 % (ref 35–47)
HGB BLD-MCNC: 13.1 G/DL (ref 11.7–15.7)
INTERPRETATION ECG - MUSE: NORMAL
MCH RBC QN AUTO: 33.2 PG (ref 26.5–33)
MCHC RBC AUTO-ENTMCNC: 33.2 G/DL (ref 31.5–36.5)
MCV RBC AUTO: 100 FL (ref 78–100)
P AXIS - MUSE: 38 DEGREES
PLATELET # BLD AUTO: 332 10E3/UL (ref 150–450)
POTASSIUM BLD-SCNC: 3.4 MMOL/L (ref 3.5–5)
POTASSIUM BLD-SCNC: 4.6 MMOL/L (ref 3.5–5)
PR INTERVAL - MUSE: 214 MS
PROT SERPL-MCNC: 6.2 G/DL (ref 6–8)
QRS DURATION - MUSE: 110 MS
QT - MUSE: 416 MS
QTC - MUSE: 436 MS
R AXIS - MUSE: -56 DEGREES
RBC # BLD AUTO: 3.95 10E6/UL (ref 3.8–5.2)
SODIUM SERPL-SCNC: 135 MMOL/L (ref 136–145)
SYSTOLIC BLOOD PRESSURE - MUSE: NORMAL MMHG
T AXIS - MUSE: -53 DEGREES
TROPONIN I SERPL-MCNC: 0.05 NG/ML (ref 0–0.29)
VENTRICULAR RATE- MUSE: 66 BPM
WBC # BLD AUTO: 7.6 10E3/UL (ref 4–11)

## 2021-08-07 PROCEDURE — 97535 SELF CARE MNGMENT TRAINING: CPT | Mod: GO | Performed by: OCCUPATIONAL THERAPIST

## 2021-08-07 PROCEDURE — 99233 SBSQ HOSP IP/OBS HIGH 50: CPT | Performed by: FAMILY MEDICINE

## 2021-08-07 PROCEDURE — 99222 1ST HOSP IP/OBS MODERATE 55: CPT | Performed by: CLINICAL NURSE SPECIALIST

## 2021-08-07 PROCEDURE — 82565 ASSAY OF CREATININE: CPT | Performed by: FAMILY MEDICINE

## 2021-08-07 PROCEDURE — 80053 COMPREHEN METABOLIC PANEL: CPT | Performed by: FAMILY MEDICINE

## 2021-08-07 PROCEDURE — 97162 PT EVAL MOD COMPLEX 30 MIN: CPT | Mod: GP

## 2021-08-07 PROCEDURE — 97110 THERAPEUTIC EXERCISES: CPT | Mod: GP

## 2021-08-07 PROCEDURE — 250N000011 HC RX IP 250 OP 636: Performed by: FAMILY MEDICINE

## 2021-08-07 PROCEDURE — 93010 ELECTROCARDIOGRAM REPORT: CPT | Mod: HIP | Performed by: INTERNAL MEDICINE

## 2021-08-07 PROCEDURE — 250N000013 HC RX MED GY IP 250 OP 250 PS 637: Performed by: FAMILY MEDICINE

## 2021-08-07 PROCEDURE — 93005 ELECTROCARDIOGRAM TRACING: CPT

## 2021-08-07 PROCEDURE — 84484 ASSAY OF TROPONIN QUANT: CPT | Performed by: FAMILY MEDICINE

## 2021-08-07 PROCEDURE — 250N000013 HC RX MED GY IP 250 OP 250 PS 637: Performed by: STUDENT IN AN ORGANIZED HEALTH CARE EDUCATION/TRAINING PROGRAM

## 2021-08-07 PROCEDURE — 36415 COLL VENOUS BLD VENIPUNCTURE: CPT | Performed by: FAMILY MEDICINE

## 2021-08-07 PROCEDURE — 97530 THERAPEUTIC ACTIVITIES: CPT | Mod: GP

## 2021-08-07 PROCEDURE — 86141 C-REACTIVE PROTEIN HS: CPT | Performed by: FAMILY MEDICINE

## 2021-08-07 PROCEDURE — 97166 OT EVAL MOD COMPLEX 45 MIN: CPT | Mod: GO | Performed by: OCCUPATIONAL THERAPIST

## 2021-08-07 PROCEDURE — 85027 COMPLETE CBC AUTOMATED: CPT | Performed by: FAMILY MEDICINE

## 2021-08-07 PROCEDURE — 84132 ASSAY OF SERUM POTASSIUM: CPT | Performed by: FAMILY MEDICINE

## 2021-08-07 PROCEDURE — 120N000001 HC R&B MED SURG/OB

## 2021-08-07 RX ORDER — LIDOCAINE 50 MG/G
OINTMENT TOPICAL 4 TIMES DAILY
Status: DISCONTINUED | OUTPATIENT
Start: 2021-08-07 | End: 2021-08-11 | Stop reason: HOSPADM

## 2021-08-07 RX ORDER — AMLODIPINE BESYLATE 2.5 MG/1
2.5 TABLET ORAL DAILY
Status: DISCONTINUED | OUTPATIENT
Start: 2021-08-07 | End: 2021-08-08

## 2021-08-07 RX ORDER — POTASSIUM CHLORIDE 1500 MG/1
20 TABLET, EXTENDED RELEASE ORAL ONCE
Status: COMPLETED | OUTPATIENT
Start: 2021-08-07 | End: 2021-08-07

## 2021-08-07 RX ORDER — SENNOSIDES 8.6 MG
2 TABLET ORAL 2 TIMES DAILY
Status: DISCONTINUED | OUTPATIENT
Start: 2021-08-07 | End: 2021-08-11 | Stop reason: HOSPADM

## 2021-08-07 RX ORDER — POLYETHYLENE GLYCOL 3350 17 G/17G
17 POWDER, FOR SOLUTION ORAL DAILY
Status: DISCONTINUED | OUTPATIENT
Start: 2021-08-07 | End: 2021-08-07

## 2021-08-07 RX ADMIN — CEFTRIAXONE SODIUM 1 G: 1 INJECTION, POWDER, FOR SOLUTION INTRAMUSCULAR; INTRAVENOUS at 16:54

## 2021-08-07 RX ADMIN — LOSARTAN POTASSIUM 50 MG: 50 TABLET, FILM COATED ORAL at 08:05

## 2021-08-07 RX ADMIN — ACETAMINOPHEN 975 MG: 325 TABLET ORAL at 10:02

## 2021-08-07 RX ADMIN — EXTENDED PHENYTOIN SODIUM 100 MG: 100 CAPSULE ORAL at 20:10

## 2021-08-07 RX ADMIN — AMLODIPINE BESYLATE 2.5 MG: 2.5 TABLET ORAL at 18:03

## 2021-08-07 RX ADMIN — POTASSIUM CHLORIDE 20 MEQ: 1500 TABLET, EXTENDED RELEASE ORAL at 12:47

## 2021-08-07 RX ADMIN — CARVEDILOL 6.25 MG: 3.12 TABLET, FILM COATED ORAL at 08:05

## 2021-08-07 RX ADMIN — ACETAMINOPHEN 975 MG: 325 TABLET ORAL at 02:30

## 2021-08-07 RX ADMIN — HYDRALAZINE HYDROCHLORIDE 5 MG: 20 INJECTION INTRAMUSCULAR; INTRAVENOUS at 06:34

## 2021-08-07 RX ADMIN — DOCUSATE SODIUM 50 MG AND SENNOSIDES 8.6 MG 2 TABLET: 8.6; 5 TABLET, FILM COATED ORAL at 08:04

## 2021-08-07 RX ADMIN — CARVEDILOL 6.25 MG: 3.12 TABLET, FILM COATED ORAL at 17:05

## 2021-08-07 RX ADMIN — ALLOPURINOL 100 MG: 100 TABLET ORAL at 08:05

## 2021-08-07 RX ADMIN — EXTENDED PHENYTOIN SODIUM 100 MG: 100 CAPSULE ORAL at 08:06

## 2021-08-07 RX ADMIN — EXTENDED PHENYTOIN SODIUM 100 MG: 100 CAPSULE ORAL at 14:00

## 2021-08-07 RX ADMIN — ACETAMINOPHEN 975 MG: 325 TABLET ORAL at 17:05

## 2021-08-07 RX ADMIN — MAGNESIUM OXIDE TAB 400 MG (241.3 MG ELEMENTAL MG) 400 MG: 400 (241.3 MG) TAB at 20:10

## 2021-08-07 RX ADMIN — ASPIRIN 81 MG: 81 TABLET, COATED ORAL at 08:04

## 2021-08-07 RX ADMIN — ENOXAPARIN SODIUM 40 MG: 40 INJECTION SUBCUTANEOUS at 20:10

## 2021-08-07 RX ADMIN — MAGNESIUM OXIDE TAB 400 MG (241.3 MG ELEMENTAL MG) 400 MG: 400 (241.3 MG) TAB at 08:05

## 2021-08-07 RX ADMIN — THERA TABS 1 TABLET: TAB at 08:05

## 2021-08-07 RX ADMIN — POLYETHYLENE GLYCOL 3350 17 G: 17 POWDER, FOR SOLUTION ORAL at 08:04

## 2021-08-07 ASSESSMENT — ACTIVITIES OF DAILY LIVING (ADL): PREVIOUS_RESPONSIBILITIES: MEAL PREP;HOUSEKEEPING;LAUNDRY;SHOPPING;YARDWORK

## 2021-08-07 NOTE — PLAN OF CARE
Problem: Constipation  Goal: Effective Bowel Elimination  Outcome: Improving     Problem: Pain Acute  Goal: Acceptable Pain Control and Functional Ability  Outcome: Improving     Pt alert  and oriented times 4. Pt denied pain at the beginning of the shift.  She said her pain is only when she moves around but no pain while just laying in bed. Later she was grimacing while turning and during cares. Offered pain medication but declined. Pt had two large loose stool this shift. Cleaned and repositioned. Pt on potassium and mg protocol. Recheck lab in am. Pt's tele rhythm has been NSR with 1st degree AV block this shift.

## 2021-08-07 NOTE — PLAN OF CARE
Problem: Adult Inpatient Plan of Care  Goal: Plan of Care Review  8/7/2021 1836 by Leann Dunlap, RN  Outcome: Improving  8/7/2021 1434 by Leann Dunlap, RN  Outcome: Improving   Pt pain well managed. Pt up to chair for meals. Pt awaiting for LSO brace to arrived. Gamez removed at 1800, voiding trial ordered.

## 2021-08-07 NOTE — PLAN OF CARE
Problem: Pain Acute  Goal: Acceptable Pain Control and Functional Ability  Intervention: Develop Pain Management Plan  Recent Flowsheet Documentation  Taken 8/7/2021 0025 by Kiara Pickett RN  Pain Management Interventions:    emotional support    medication (see MAR)   Pt denies pain at rest. Pain only with movement. Able to shift position in bed. Lying mostly on the right side because pain is on the left side. Declining repositioning at night. Log rolled when changing her x1 otherwise stayed on the right side lying position.Wants minimal disturbance. Cares clustered. Scheduled tylenol given for pain. Pt complaining about disruptions to her sleep. Explained to patient about cares and also called charge nurse to speak to patient. Pt had to be cleaned up due to incontinence and also had elevated BP, prn hydralazine given for elevated BP.

## 2021-08-07 NOTE — PLAN OF CARE
Problem: Adult Inpatient Plan of Care  Goal: Plan of Care Review  Outcome: Improving   Pt states pain well managed with scheduled tylenol. Working with PT/OT which recommend TCU at this time. Pt fearful of ambulation. Pt did get up to commode and had Xlarge BM. Appetite fair. Pt offers no other complaints. Gamez patent and draining. Adequate UOP. Falls program in place. Will continue with current interventions.

## 2021-08-07 NOTE — PROGRESS NOTES
08/07/21 1440   Quick Adds   Type of Visit Initial PT Evaluation   Living Environment   People in home alone   Current Living Arrangements house   Home Accessibility stairs to enter home   Number of Stairs, Main Entrance 1   Transportation Anticipated family or friend will provide   Living Environment Comments one level home   Self-Care   Equipment Currently Used at Home walker, rolling   Activity/Exercise/Self-Care Comment independent ADL's; family assists with shopping, driving   Disability/Function   Hearing Difficulty or Deaf no   Concentrating, Remembering or Making Decisions Difficulty no   Difficulty Communicating no   Difficulty Eating/Swallowing no   Walking or Climbing Stairs Difficulty yes   Walking or Climbing Stairs ambulation difficulty, requires equipment   Dressing/Bathing Difficulty no   Toileting issues no   Doing Errands Independently Difficulty (such as shopping) yes   Fall history within last six months no   Number of times patient has fallen within last six months   (pt reports two falls last November)   General Information   Onset of Illness/Injury or Date of Surgery 08/05/21   Referring Physician Dr. Vaughn   Pertinent History of Current Problem (include personal factors and/or comorbidities that impact the POC) L lowback pain/hip pain worsening few days ago, B sacral insufficiency fxs, chronic T2, T4, L2 fxs   Cognition   Orientation Status (Cognition) oriented x 4   Affect/Mental Status (Cognition) WFL   Follows Commands (Cognition) WFL   Range of Motion (ROM)   ROM Quick Adds ROM WFL   Strength   Manual Muscle Testing Quick Adds   (decreased active LLE hip and knee & L shld strength)   Bed Mobility   Bed Mobility supine-sit;sit-supine   Supine-Sit [place] (Bed Mobility) moderate assist (50% patient effort);verbal cues   Sit-Supine [place] (Bed Mobility) moderate assist (50% patient effort);verbal cues   Impairments Contributing to Impaired Bed Mobility pain;decreased strength    Assistive Device (Bed Mobility) bed rails;draw sheet   Comment (Bed Mobility) cuing for log roll technique   Transfers   Transfer Safety Comments pt declined standing trial due to pain L side/low back in sitting   Clinical Impression   Criteria for Skilled Therapeutic Intervention yes, treatment indicated   PT Diagnosis (PT) impaired functional mobility   Influenced by the following impairments pain, weakness   Functional limitations due to impairments bed mobility, transfers, gait, stairs   Clinical Presentation Stable/Uncomplicated   Clinical Presentation Rationale Pt presents as medically diagnosed.   Clinical Decision Making (Complexity) moderate complexity   Therapy Frequency (PT) Daily   Predicted Duration of Therapy Intervention (days/wks) 7 days   Planned Therapy Interventions (PT) balance training;bed mobility training;gait training;stair training;strengthening;transfer training   Anticipated Equipment Needs at Discharge (PT) walker, rolling;wheelchair   Risk & Benefits of therapy have been explained evaluation/treatment results reviewed;patient   PT Discharge Planning    PT Discharge Recommendation (DC Rec) Transitional Care Facility   PT Rationale for DC Rec needs assist for mobility   Total Evaluation Time   Total Evaluation Time (Minutes) 10

## 2021-08-07 NOTE — CONSULTS
[facility] ACUTE PAIN SERVICE    ([facility], [facility], [facility])   Consult Note    Date of Admission:  8/5/2021  Date of Consult: 08/07/21    Physician requesting consult: Wolf Vaughn MD   Reason for consult: left leg/buttock pain that is not improving. Patient has unknown allergic reaction to oxycodone.     Assessment/Plan:     Peggy De Anda is a 89 year old female who was admitted on 8/5/2021.   Pain Service is asked to see the patient for left leg/buttock pain that is not improving.  Patient has unknown allergic reaction to oxycodone. Admitted for evaluation of bilateral leg and left hip/lower back/buttock pain. CT imaging showed bilateral insufficiency fractures of sacral spine and moderate inferior endplate compression deformity at L2.   No BM for 5 days, urinary retention with UTI.    MRI and Ortho Consulted:  Chronic T2, T4 and L2 fractures without high grade spinal stenosis, does not want surgery or brace.      Patient's medical history: Anemia, Ataxia, Epilepsy on dilantin, Essential hypertension, Gout, Monoclonal gammopathy of undetermined significance, Osteoporosis, Peripheral neuropathy, and Secondary hyperparathyroidism.     Patient identifies that pain is under much better control.  She has been able to rest.  She has not been out of bed so will see how well she tolerates increased activity.         PLAN:   1) Pain is consistent with acute pain associated with compression fractures of thoracic, lumbar and sacral spine.     2)Multimodal Medication Therapy  Topical: lidocaine cream qid   NSAID'S: avoid  Muscle Relaxants: none for now  Adjuvants: extra strength tylenol 1000 mg tid   Antidepressants/anxiolytics: nond  Opioids: consider low dose hydrocodone/APAP 1/2 to 1 tablet 5/325 mg dose  or tramadol 25 mg tid prn   IV Pain medication: none  3)Non-medication interventions  Pharmacy consult- appreciate recommendations   Acupuncture consult- as available Mon and  Thursday  Integrative consult - called referral to [phone]   4)Constipation Prophylaxis:  Senna 2 tablets bid and miralax daily    5) Follow up   -Opioid prescriber has been none  -Discharge Recommendations - We recommend prescribing the following at the time of discharge:tylenol, Voltaren and lidocaine cream if helpful         History of Present Illness (HPI):       Peggy De Anda is a 89 year old old female with acute lower back and left side pain.  The pain is reported to be acute with no history of chronic pain.      [place]  pulled from system on 08/07/21. No opioid prescriptions over this past year  Medical History  Patient Active Problem List    Diagnosis Date Noted     Hip pain, left 08/06/2021     Priority: Medium     Muscle weakness (generalized) 08/05/2021     Priority: Medium     Anemia      Priority: Medium     Created by [employer] Annotation: Oct 18 2007 10:STEFANY Brito, [place]: Normal   colonscopy on 10-4-07.         Epilepsy And Recurrent Seizures      Priority: Medium     Created by Conversion  Replacement Utility updated for latest IMO load         Vitamin D Deficiency      Priority: Medium     Created by Conversion  Replacement Utility updated for latest IMO load         Peripheral Neuropathy      Priority: Medium     Created by Conversion  Replacement Utility updated for latest IMO load         Hypertension      Priority: Medium     Created by Conversion  Replacement Utility updated for latest IMO load         Osteoporosis      Priority: Medium     Created by Conversion  Replacement Utility updated for latest IMO load         Convulsive Disorder      Priority: Medium     Created by Conversion  Replacement Utility updated for latest IMO load         Gout 10/31/2015     Priority: Medium     Provider note on 7/21/2014  4. History of gout, stable on allopurinol.         Monoclonal Gammopathy Of Undetermined Significance      Priority: Medium     Created by [employer]  Annotation: Oct 18 2007 11:26Taylor Negrete: Followed by   Dr. Alvarez.         Dermatitis      Priority: Medium     Created by Conversion         Nonrenal Secondary Hyperparathyroidism      Priority: Medium     Created by Conversion         Joint Pain, Localized In The Hip      Priority: Medium     Created by Conversion         Fatigue      Priority: Medium     Created by Conversion         Obesity      Priority: Medium     Created by Conversion         Ataxia      Priority: Medium     Created by Conversion            Surgical History  She  has a past surgical history that includes IR Lumbar Epidural Steroid Injection (10/5/2007); IR Miscellaneous Procedure (10/30/2007); Neuroplasty / Transposition Median Nerve At Carpal Tunnel Bilateral; Cataract Extraction, Bilateral; Tonsillectomy; appendectomy; and Cholecystectomy.     Past Surgical History:   Procedure Laterality Date     APPENDECTOMY      AGE 13     CATARACT EXTRACTION, BILATERAL       CHOLECYSTECTOMY       IR LUMBAR EPIDURAL STEROID INJECTION  10/5/2007     IR MISCELLANEOUS PROCEDURE  10/30/2007     NEUROPLASTY / TRANSPOSITION MEDIAN NERVE AT CARPAL TUNNEL BILATERAL       TONSILLECTOMY         Allergies  Allergies   Allergen Reactions     Oxycodone Unknown     Quinolones Rash     Levofloxacin Rash       Prior to Admission Medications   Medications Prior to Admission   Medication Sig Dispense Refill Last Dose     acetaminophen (TYLENOL 8 HOUR ARTHRITIS PAIN) 650 MG CR tablet Take 650 mg by mouth every 8 hours as needed for mild pain or fever   8/4/2021     allopurinoL (ZYLOPRIM) 100 MG tablet [ALLOPURINOL (ZYLOPRIM) 100 MG TABLET] TAKE ONE TABLET BY MOUTH ONE TIME DAILY  90 tablet 0 8/5/2021 at am     aspirin 81 MG EC tablet [ASPIRIN 81 MG EC TABLET] Take 81 mg by mouth daily.   8/5/2021 at am     carvediloL (COREG) 6.25 MG tablet [CARVEDILOL (COREG) 6.25 MG TABLET] TAKE ONE TABLET BY MOUTH TWICE DAILY  180 tablet 0 8/5/2021 at am     losartan (COZAAR)  "50 MG tablet [LOSARTAN (COZAAR) 50 MG TABLET] TAKE ONE TABLET BY MOUTH ONE TIME DAILY  30 tablet 0 8/5/2021 at am     Multiple Vitamins-Minerals (CENTRUM SILVER 50+WOMEN PO) Take 1 tablet by mouth daily   8/5/2021 at am     phenytoin (DILANTIN) 100 MG ER capsule [PHENYTOIN (DILANTIN) 100 MG ER CAPSULE] Take 1 capsule (100 mg total) by mouth 3 (three) times a day. 270 capsule 3 8/5/2021 at am     allopurinoL (ZYLOPRIM) 100 MG tablet [ALLOPURINOL (ZYLOPRIM) 100 MG TABLET] Take 1 tablet (100 mg total) by mouth daily. 90 tablet 0      losartan (COZAAR) 50 MG tablet [LOSARTAN (COZAAR) 50 MG TABLET] Take 1 tablet (50 mg total) by mouth daily. 90 tablet 0        Social History  Reviewed, and she  reports that she has never smoked. She does not have any smokeless tobacco history on file.  Social History     Tobacco Use     Smoking status: Never Smoker   Substance Use Topics     Alcohol use: Not on file       Family History  Reviewed, and family history is not on file.    Review of Systems  Complete ROS reviewed, unless noted, all other systems reviewed and found to be negative.        Objective:     Physical Exam:  /65 (BP Location: Right arm)   Pulse 65   Temp 97.8  F (36.6  C) (Oral)   Resp 18   Ht 1.549 m (5' 1\")   Wt 60.6 kg (133 lb 11.2 oz)   SpO2 98%   BMI 25.26 kg/m    Weight:   Weight change: 0.318 kg (11.2 oz)  Body mass index is 25.26 kg/m .      General Appearance:  Alert, pleasant rests in flattened bed   Head:  Normocephalic, without obvious abnormality, atraumatic   Eyes:  PERRL, conjunctiva/corneas clear, EOM's intact   ENT/Throat: Lips, mucosa, and tongue normal; teeth and gums normal   Lymph/Neck: Supple, symmetrical, trachea midline, no adenopathy, thyroid: not enlarged, symmetric, no carotid bruit or JVD   Lungs:   respirations unlabored   Chest Wall:  No tenderness or deformity   Cardiovascular/Heart:  Regular rate and rhythm  Edema: none   Abdomen:   Soft, non-tender   Musculoskeletal: " Spine and extremities normal, decreased range of motion   Skin: Skin color, texture, turgor normal, no rashes or lesions   Neurologic: Reflexes intact, cooridanated movement   Alert and oriented X 3       Psych: Affect is pleasant            Imaging Reviewed:   MR Lumbar Spine w/o Contrast    Result Date: 8/6/2021  EXAM: MR LUMBAR SPINE W/O CONTRAST LOCATION: [facility] DATE/TIME: 8/6/2021 2:39 PM INDICATION: Low back pain. L2 compression fracture. COMPARISON: Correlation with abdomen pelvis CT 08/05/2021, lumbar spine MRI 10/29/2007 TECHNIQUE: Routine Lumbar Spine MRI without IV contrast. FINDINGS: Nomenclature is based on 5 lumbar type vertebral bodies. The conus ends at mid L2. Mild dextrocurvature of the thoracolumbar junction and levocurvature of the lumbar spine. Mild to moderate chronic compression fracture of the inferior endplate of L2 with  associated 6 mm retropulsion of the posterior inferior endplate of L2 into the spinal canal. 4 mm retrolisthesis of L3 on L4. Mild to moderate chronic compression fracture of L4 and L5 with associated vertebroplasty changes. Bilateral sacral insufficiency fractures with associated moderate marrow edema, left greater than right. Moderate symmetric atrophy of the posterior paraspinal musculature. Small bilateral cortical renal cysts. T12-L1: Normal intervertebral disc height. Loss of the normal T2 signal within the disc. Small left paracentral disc protrusion. Mild bilateral facet arthropathy. No spinal canal narrowing. No neuroforaminal narrowing. L1-L2: Normal intervertebral disc height. Loss of the normal T2 signal within the disc. There is a broad-based disc bulge with superimposed superiorly directed right paracentral subligamentous disc extrusion, increased since the prior exam. Moderate bilateral facet arthropathy. No spinal canal narrowing. No neuroforaminal narrowing. L2-L3: Mild intervertebral disc height loss. Loss of the normal T2  signal within the disc. Circumferential disc osteophyte complex. Moderate bilateral facet arthropathy. Mild narrowing of the lateral recesses. Mild spinal canal narrowing. No right neuroforaminal narrowing. Mild to moderate left neuroforaminal narrowing. L3-L4: Moderate disc height loss. Loss of the normal T2 signal within the disc. Circumferential disc osteophyte complex. Moderate bilateral facet arthropathy. No spinal canal narrowing. Mild right neuroforaminal narrowing. No left neuroforaminal narrowing. L4-L5: Severe intervertebral disc height loss. Circumferential disc osteophyte complex. Moderate bilateral facet arthropathy. Moderate narrowing of the left lateral recess and mild narrowing of the right lateral recess. No spinal canal narrowing. Mild to  moderate right neuroforaminal narrowing. Mild to moderate left neuroforaminal narrowing. L5-S1: Normal intervertebral disc height. Loss of the normal T2 signal within the disc. There is a broad-based disc bulge. Moderate to severe bilateral facet arthropathy. No spinal canal narrowing. Mild right neuroforaminal narrowing. Severe left neuroforaminal narrowing.     IMPRESSION: 1.  Bilateral acute/subacute sacral insufficiency fractures. 2.  Mild to moderate chronic compression fracture of the inferior endplate of L2 with associated 6 mm retropulsion of the posterior inferior endplate of L2 into the spinal canal. 3.  Mild to moderate chronic compression fracture of L4 and L5 with associated vertebroplasty changes. 4.  Mild spinal canal narrowing at L2-L3. 5.  Moderate narrowing of the left lateral recess and mild narrowing of the right lateral recess at L4-L5. 6.  Severe left and mild right neuroforaminal narrowing at L5-S1. 7.  Mild to moderate bilateral neuroforaminal narrowing at L4-L5. 8.  Mild to moderate left neuroforaminal narrowing at L2-L3.    MR Thoracic Spine w/o Contrast    Result Date: 8/6/2021  EXAM: MR THORACIC SPINE W/O CONTRAST LOCATION: [facility] DATE/TIME: 8/6/2021 2:40 PM INDICATION: L2 fracture COMPARISON: Correlation with abdomen pelvis CT 08/05/2021, lumbar spine MRI 10/20 first 2007 TECHNIQUE: Routine Thoracic Spine MRI without IV contrast. FINDINGS: Mild dextrocurvature midthoracic spine. Mild chronic superior endplate compression fractures of T2 and T4. The rest of vertebral body heights of the thoracic spine are maintained. Stable mild to moderate inferior endplate chronic compression fracture of L2. 6 mm retrolisthesis of the inferior endplate of L2 into the spinal canal causing mild to moderate spinal canal narrowing at L2-L3. Nonpathologic marrow signal. Small foci of STIR hyperintensity within from T9 to T11 vertebral bodies, nonspecific. Small central disc protrusion at T1-T2. No high-grade spinal canal narrowing in the thoracic spine. Small broad-based disc bulge with superimposed central disc protrusion at C6-C7 causing mild spinal canal narrowing. Small superiorly directed central disc extrusion at L1-L2. No abnormal cord signal. No high-grade right-sided neuroforaminal narrowing. No high-grade left-sided neuroforaminal. Moderate symmetric atrophy of the posterior paraspinal soft tissues. Normal diameter of the descending aorta.     IMPRESSION: 1.  Mild chronic superior endplate compression fractures of T2 and T4. 2.  Stable mild to moderate inferior endplate chronic compression fracture of L2. 6 mm retrolisthesis of the inferior endplate of L2 into the spinal canal causing mild to moderate spinal canal narrowing at L2-L3. 3.  No MRI evidence of recent osseous or ligamentous injury. 4.  Mild spinal canal narrowing at C6-C7. 5.  No high-grade spinal canal narrowing in the thoracic spine. No high-grade neuroforaminal narrowing.    CT Abdomen Pelvis w Contrast    Result Date: 8/5/2021  EXAM: CT ABDOMEN PELVIS W CONTRAST LOCATION: [facility] DATE/TIME: 8/5/2021 10:31 AM INDICATION: Abdominal  distension COMPARISON: CT of the abdomen and pelvis 12/20/2007 TECHNIQUE: CT scan of the abdomen and pelvis was performed following injection of IV contrast. Multiplanar reformats were obtained. Dose reduction techniques were used. CONTRAST: 100 mL Isovue-370 FINDINGS: LOWER CHEST: Limited bands of subpleural atelectasis are present in the posterior costophrenic sulci. Mitral annular calcifications. No basilar pleural fluid. HEPATOBILIARY: Prior cholecystectomy. Physiologic enlargement of the common bile duct without intrahepatic ductal dilation. 5 mm hypoattenuating lesion in the left lobe anteriorly (series 4, image 88) is most likely a small cyst or hemangioma and was present in 2007, not increased. No new liver lesions. Smooth liver border. PANCREAS: Normal. SPLEEN: Normal. ADRENAL GLANDS: Mild bilateral adrenal gland thickening. KIDNEYS/BLADDER: Kidneys are normal in size. There is localized thinning of the left upper pole cortex anteriorly. No nephrolithiasis. Prominent bilateral extrarenal pelves but normal caliber ureters. Mild trabeculation of the anterior urinary bladder. BOWEL: Stomach is nondistended. Normal caliber small bowel. Gas and stool in the nondilated colon. No dilated bowel or bowel wall thickening. No inflammatory stranding in the mesentery. Normal appendix. LYMPH NODES: Normal. VASCULATURE: Moderate fairly diffuse aortoiliac atheromatous calcifications. 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 PELVIC ORGANS: Uterus is normal in size. No pelvic mass or free fluid. Phleboliths in the low pelvis. MUSCULOSKELETAL: Radiopaque cement present within L4 and L5. Advanced lower lumbar facet arthropathy. Moderate inferior endplate compression deformity of L2. No spondylolisthesis. Demineralization of the sacrum with bilateral chronic appearing insufficiency fractures. No SI joint diastases. Mild right and moderate  left hip osteoarthrosis.     IMPRESSION: 1.  No acute bowel inflammation or findings to suggest mechanical bowel obstruction. 2.  Bilateral adrenal gland thickening consistent with hyperplasia. 3.  Generalized demineralization of the bones with chronic appearing bilateral sacral insufficiency fractures.      Labs Reviewed Personally By Myself  Results for orders placed or performed during the hospital encounter of 08/05/21   CT Abdomen Pelvis w Contrast     Status: None    Narrative    EXAM: CT ABDOMEN PELVIS W CONTRAST  LOCATION: [facility]  DATE/TIME: 8/5/2021 10:31 AM    INDICATION: Abdominal distension  COMPARISON: CT of the abdomen and pelvis 12/20/2007  TECHNIQUE: CT scan of the abdomen and pelvis was performed following injection of IV contrast. Multiplanar reformats were obtained. Dose reduction techniques were used.  CONTRAST: 100 mL Isovue-370    FINDINGS:   LOWER CHEST: Limited bands of subpleural atelectasis are present in the posterior costophrenic sulci. Mitral annular calcifications. No basilar pleural fluid.    HEPATOBILIARY: Prior cholecystectomy. Physiologic enlargement of the common bile duct without intrahepatic ductal dilation. 5 mm hypoattenuating lesion in the left lobe anteriorly (series 4, image 88) is most likely a small cyst or hemangioma and was   present in 2007, not increased. No new liver lesions. Smooth liver border.    PANCREAS: Normal.    SPLEEN: Normal.    ADRENAL GLANDS: Mild bilateral adrenal gland thickening.    KIDNEYS/BLADDER: Kidneys are normal in size. There is localized thinning of the left upper pole cortex anteriorly. No nephrolithiasis. Prominent bilateral extrarenal pelves but normal caliber ureters. Mild trabeculation of the anterior urinary bladder.    BOWEL: Stomach is nondistended. Normal caliber small bowel. Gas and stool in the nondilated colon. No dilated bowel or bowel wall thickening. No inflammatory stranding in the mesentery. Normal  appendix.    LYMPH NODES: Normal.    VASCULATURE: Moderate fairly diffuse aortoiliac atheromatous calcifications. 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    PELVIC ORGANS: Uterus is normal in size. No pelvic mass or free fluid. Phleboliths in the low pelvis.    MUSCULOSKELETAL: Radiopaque cement present within L4 and L5. Advanced lower lumbar facet arthropathy. Moderate inferior endplate compression deformity of L2. No spondylolisthesis. Demineralization of the sacrum with bilateral chronic appearing   insufficiency fractures. No SI joint diastases. Mild right and moderate left hip osteoarthrosis.      Impression    IMPRESSION:     1.  No acute bowel inflammation or findings to suggest mechanical bowel obstruction.  2.  Bilateral adrenal gland thickening consistent with hyperplasia.  3.  Generalized demineralization of the bones with chronic appearing bilateral sacral insufficiency fractures.   MR Thoracic Spine w/o Contrast     Status: None    Narrative    EXAM: MR THORACIC SPINE W/O CONTRAST  LOCATION: [facility]  DATE/TIME: 8/6/2021 2:40 PM    INDICATION: L2 fracture  COMPARISON: Correlation with abdomen pelvis CT 08/05/2021, lumbar spine MRI 10/20 first 2007  TECHNIQUE: Routine Thoracic Spine MRI without IV contrast.    FINDINGS: Mild dextrocurvature midthoracic spine. Mild chronic superior endplate compression fractures of T2 and T4. The rest of vertebral body heights of the thoracic spine are maintained. Stable mild to moderate inferior endplate chronic compression   fracture of L2. 6 mm retrolisthesis of the inferior endplate of L2 into the spinal canal causing mild to moderate spinal canal narrowing at L2-L3.    Nonpathologic marrow signal. Small foci of STIR hyperintensity within from T9 to T11 vertebral bodies, nonspecific. Small central disc protrusion at T1-T2. No high-grade spinal canal  narrowing in the thoracic spine. Small broad-based disc bulge with   superimposed central disc protrusion at C6-C7 causing mild spinal canal narrowing. Small superiorly directed central disc extrusion at L1-L2. No abnormal cord signal. No high-grade right-sided neuroforaminal narrowing. No high-grade left-sided   neuroforaminal.    Moderate symmetric atrophy of the posterior paraspinal soft tissues. Normal diameter of the descending aorta.      Impression    IMPRESSION:  1.  Mild chronic superior endplate compression fractures of T2 and T4.   2.  Stable mild to moderate inferior endplate chronic compression fracture of L2. 6 mm retrolisthesis of the inferior endplate of L2 into the spinal canal causing mild to moderate spinal canal narrowing at L2-L3.  3.  No MRI evidence of recent osseous or ligamentous injury.  4.  Mild spinal canal narrowing at C6-C7.  5.  No high-grade spinal canal narrowing in the thoracic spine. No high-grade neuroforaminal narrowing.   MR Lumbar Spine w/o Contrast     Status: None    Narrative    EXAM: MR LUMBAR SPINE W/O CONTRAST  LOCATION: [facility]  DATE/TIME: 8/6/2021 2:39 PM    INDICATION: Low back pain. L2 compression fracture.  COMPARISON: Correlation with abdomen pelvis CT 08/05/2021, lumbar spine MRI 10/29/2007  TECHNIQUE: Routine Lumbar Spine MRI without IV contrast.    FINDINGS:   Nomenclature is based on 5 lumbar type vertebral bodies. The conus ends at mid L2. Mild dextrocurvature of the thoracolumbar junction and levocurvature of the lumbar spine. Mild to moderate chronic compression fracture of the inferior endplate of L2 with   associated 6 mm retropulsion of the posterior inferior endplate of L2 into the spinal canal. 4 mm retrolisthesis of L3 on L4. Mild to moderate chronic compression fracture of L4 and L5 with associated vertebroplasty changes. Bilateral sacral   insufficiency fractures with associated moderate marrow edema, left greater than  right.    Moderate symmetric atrophy of the posterior paraspinal musculature. Small bilateral cortical renal cysts.    T12-L1: Normal intervertebral disc height. Loss of the normal T2 signal within the disc. Small left paracentral disc protrusion. Mild bilateral facet arthropathy. No spinal canal narrowing. No neuroforaminal narrowing.     L1-L2: Normal intervertebral disc height. Loss of the normal T2 signal within the disc. There is a broad-based disc bulge with superimposed superiorly directed right paracentral subligamentous disc extrusion, increased since the prior exam. Moderate   bilateral facet arthropathy. No spinal canal narrowing. No neuroforaminal narrowing.    L2-L3: Mild intervertebral disc height loss. Loss of the normal T2 signal within the disc. Circumferential disc osteophyte complex. Moderate bilateral facet arthropathy. Mild narrowing of the lateral recesses. Mild spinal canal narrowing. No right   neuroforaminal narrowing. Mild to moderate left neuroforaminal narrowing.    L3-L4: Moderate disc height loss. Loss of the normal T2 signal within the disc. Circumferential disc osteophyte complex. Moderate bilateral facet arthropathy. No spinal canal narrowing. Mild right neuroforaminal narrowing. No left neuroforaminal   narrowing.    L4-L5: Severe intervertebral disc height loss. Circumferential disc osteophyte complex. Moderate bilateral facet arthropathy. Moderate narrowing of the left lateral recess and mild narrowing of the right lateral recess. No spinal canal narrowing. Mild to   moderate right neuroforaminal narrowing. Mild to moderate left neuroforaminal narrowing.    L5-S1: Normal intervertebral disc height. Loss of the normal T2 signal within the disc. There is a broad-based disc bulge. Moderate to severe bilateral facet arthropathy. No spinal canal narrowing. Mild right neuroforaminal narrowing. Severe left   neuroforaminal narrowing.      Impression    IMPRESSION:  1.  Bilateral  acute/subacute sacral insufficiency fractures.  2.  Mild to moderate chronic compression fracture of the inferior endplate of L2 with associated 6 mm retropulsion of the posterior inferior endplate of L2 into the spinal canal.   3.  Mild to moderate chronic compression fracture of L4 and L5 with associated vertebroplasty changes.   4.  Mild spinal canal narrowing at L2-L3.  5.  Moderate narrowing of the left lateral recess and mild narrowing of the right lateral recess at L4-L5.  6.  Severe left and mild right neuroforaminal narrowing at L5-S1.  7.  Mild to moderate bilateral neuroforaminal narrowing at L4-L5.  8.  Mild to moderate left neuroforaminal narrowing at L2-L3.   Extra Blue Top Tube     Status: None   Result Value Ref Range    Hold Specimen JIC    Extra Red Top Tube     Status: None   Result Value Ref Range    Hold Specimen JIC    Extra Green Top (Lithium Heparin) Tube     Status: None   Result Value Ref Range    Hold Specimen JIC    Extra Purple Top Tube     Status: None   Result Value Ref Range    Hold Specimen JIC    Comprehensive metabolic panel     Status: Abnormal   Result Value Ref Range    Sodium 131 (L) 136 - 145 mmol/L    Potassium 3.3 (L) 3.5 - 5.0 mmol/L    Chloride 98 98 - 107 mmol/L    Carbon Dioxide (CO2) 25 22 - 31 mmol/L    Anion Gap 8 5 - 18 mmol/L    Urea Nitrogen 8 8 - 28 mg/dL    Creatinine 0.54 (L) 0.60 - 1.10 mg/dL    Calcium 9.2 8.5 - 10.5 mg/dL    Glucose 89 70 - 125 mg/dL    Alkaline Phosphatase 138 (H) 45 - 120 U/L    AST 38 0 - 40 U/L    ALT 14 0 - 45 U/L    Protein Total 6.5 6.0 - 8.0 g/dL    Albumin 3.3 (L) 3.5 - 5.0 g/dL    Bilirubin Total 0.8 0.0 - 1.0 mg/dL    GFR Estimate 84 >60 mL/min/1.73m2   Lipase     Status: Normal   Result Value Ref Range    Lipase 17 0 - 52 U/L   Troponin I     Status: Normal   Result Value Ref Range    Troponin I 0.02 0.00 - 0.29 ng/mL   Erythrocyte sedimentation rate auto     Status: Abnormal   Result Value Ref Range    Erythrocyte Sedimentation  Rate 60 (H) 0 - 20 mm/hr   CRP inflammation     Status: Abnormal   Result Value Ref Range    CRP 8.8 (H) 0.0-<0.8 mg/dL   UA with Microscopic reflex to Culture     Status: Abnormal    Specimen: Urine, Clean Catch   Result Value Ref Range    Color Urine Light Yellow Colorless, Straw, Light Yellow, Yellow    Appearance Urine Clear Clear    Glucose Urine Negative Negative mg/dL    Bilirubin Urine Negative Negative    Ketones Urine 10  (A) Negative mg/dL    Specific Gravity Urine 1.009 1.001 - 1.030    Blood Urine 0.03 mg/dL (A) Negative    pH Urine 6.0 5.0 - 7.0    Protein Albumin Urine 50  (A) Negative mg/dL    Urobilinogen Urine <2.0 <2.0 mg/dL    Nitrite Urine Negative Negative    Leukocyte Esterase Urine 25 Evelyn/uL (A) Negative    Bacteria Urine Many (A) None Seen /HPF    RBC Urine 1 <=2 /HPF    WBC Urine 4 <=5 /HPF    Narrative    Urine Culture not indicated   Magnesium     Status: Abnormal   Result Value Ref Range    Magnesium 1.3 (L) 1.8 - 2.6 mg/dL   CBC with platelets and differential     Status: Abnormal   Result Value Ref Range    WBC Count 6.8 4.0 - 11.0 10e3/uL    RBC Count 3.58 (L) 3.80 - 5.20 10e6/uL    Hemoglobin 11.8 11.7 - 15.7 g/dL    Hematocrit 35.2 35.0 - 47.0 %    MCV 98 78 - 100 fL    MCH 33.0 26.5 - 33.0 pg    MCHC 33.5 31.5 - 36.5 g/dL    RDW 11.9 10.0 - 15.0 %    Platelet Count 269 150 - 450 10e3/uL    % Neutrophils 70 %    % Lymphocytes 19 %    % Monocytes 10 %    % Eosinophils 0 %    % Basophils 0 %    % Immature Granulocytes 1 %    NRBCs per 100 WBC 0 <1 /100    Absolute Neutrophils 4.8 1.6 - 8.3 10e3/uL    Absolute Lymphocytes 1.3 0.8 - 5.3 10e3/uL    Absolute Monocytes 0.7 0.0 - 1.3 10e3/uL    Absolute Eosinophils 0.0 0.0 - 0.7 10e3/uL    Absolute Basophils 0.0 0.0 - 0.2 10e3/uL    Absolute Immature Granulocytes 0.1 (H) <=0.0 10e3/uL    Absolute NRBCs 0.0 10e3/uL   SARS-COV2 (COVID-19) Virus RT-PCR     Status: Normal    Specimen: Nasopharyngeal; Swab   Result Value Ref Range    SARS CoV2  PCR Negative Negative    Narrative    Testing was performed using the brennan  SARS-CoV-2 & Influenza A/B Assay on the brennan  Daniela  System.  This test should be ordered for the detection of SARS-COV-2 in individuals who meet SARS-CoV-2 clinical and/or epidemiological criteria. Test performance is unknown in asymptomatic patients.  This test is for in vitro diagnostic use under the FDA EUA for laboratories certified under CLIA to perform moderate and/or high complexity testing. This test has not been FDA cleared or approved.  A negative test does not rule out the presence of PCR inhibitors in the specimen or target RNA in concentration below the limit of detection for the assay. The possibility of a false negative should be considered if the patient's recent exposure or clinical presentation suggests COVID-19.  [facility] are certified under the Clinical Laboratory Improvement Amendments of 1988 (CLIA-88) as qualified to perform moderate and/or high complexity laboratory testing.   Creatinine     Status: Abnormal   Result Value Ref Range    Creatinine 0.52 (L) 0.60 - 1.10 mg/dL    GFR Estimate 85 >60 mL/min/1.73m2   Potassium     Status: Abnormal   Result Value Ref Range    Potassium 3.2 (L) 3.5 - 5.0 mmol/L   Magnesium     Status: Abnormal   Result Value Ref Range    Magnesium 1.6 (L) 1.8 - 2.6 mg/dL   Potassium     Status: Abnormal   Result Value Ref Range    Potassium 3.3 (L) 3.5 - 5.0 mmol/L   Comprehensive metabolic panel     Status: Abnormal   Result Value Ref Range    Sodium 138 136 - 145 mmol/L    Potassium 3.7 3.5 - 5.0 mmol/L    Chloride 106 98 - 107 mmol/L    Carbon Dioxide (CO2) 22 22 - 31 mmol/L    Anion Gap 10 5 - 18 mmol/L    Urea Nitrogen 7 (L) 8 - 28 mg/dL    Creatinine 0.49 (L) 0.60 - 1.10 mg/dL    Calcium 8.4 (L) 8.5 - 10.5 mg/dL    Glucose 80 70 - 125 mg/dL    Alkaline Phosphatase 121 (H) 45 - 120 U/L    AST 33 0 - 40 U/L    ALT 16 0 - 45 U/L    Protein Total 5.7 (L) 6.0 - 8.0  g/dL    Albumin 2.7 (L) 3.5 - 5.0 g/dL    Bilirubin Total 0.4 0.0 - 1.0 mg/dL    GFR Estimate 87 >60 mL/min/1.73m2   CBC with platelets     Status: Abnormal   Result Value Ref Range    WBC Count 6.6 4.0 - 11.0 10e3/uL    RBC Count 3.32 (L) 3.80 - 5.20 10e6/uL    Hemoglobin 11.2 (L) 11.7 - 15.7 g/dL    Hematocrit 33.7 (L) 35.0 - 47.0 %     (H) 78 - 100 fL    MCH 33.7 (H) 26.5 - 33.0 pg    MCHC 33.2 31.5 - 36.5 g/dL    RDW 12.2 10.0 - 15.0 %    Platelet Count 267 150 - 450 10e3/uL   Magnesium     Status: Abnormal   Result Value Ref Range    Magnesium 1.5 (L) 1.8 - 2.6 mg/dL   Potassium     Status: Normal   Result Value Ref Range    Potassium 3.7 3.5 - 5.0 mmol/L   CRP inflammation     Status: Abnormal   Result Value Ref Range    CRP 6.2 (H) 0.0-<0.8 mg/dL   Comprehensive metabolic panel     Status: Abnormal   Result Value Ref Range    Sodium 135 (L) 136 - 145 mmol/L    Potassium 3.4 (L) 3.5 - 5.0 mmol/L    Chloride 99 98 - 107 mmol/L    Carbon Dioxide (CO2) 26 22 - 31 mmol/L    Anion Gap 10 5 - 18 mmol/L    Urea Nitrogen 6 (L) 8 - 28 mg/dL    Creatinine 0.58 (L) 0.60 - 1.10 mg/dL    Calcium 8.9 8.5 - 10.5 mg/dL    Glucose 215 (H) 70 - 125 mg/dL    Alkaline Phosphatase 160 (H) 45 - 120 U/L    AST 29 0 - 40 U/L    ALT 16 0 - 45 U/L    Protein Total 6.2 6.0 - 8.0 g/dL    Albumin 3.0 (L) 3.5 - 5.0 g/dL    Bilirubin Total 0.4 0.0 - 1.0 mg/dL    GFR Estimate 82 >60 mL/min/1.73m2   CBC with platelets     Status: Abnormal   Result Value Ref Range    WBC Count 7.6 4.0 - 11.0 10e3/uL    RBC Count 3.95 3.80 - 5.20 10e6/uL    Hemoglobin 13.1 11.7 - 15.7 g/dL    Hematocrit 39.4 35.0 - 47.0 %     78 - 100 fL    MCH 33.2 (H) 26.5 - 33.0 pg    MCHC 33.2 31.5 - 36.5 g/dL    RDW 12.2 10.0 - 15.0 %    Platelet Count 332 150 - 450 10e3/uL   CRP inflammation     Status: Abnormal   Result Value Ref Range    CRP 4.1 (H) 0.0-<0.8 mg/dL   ECG 12-LEAD WITH MUSE (LHE)     Status: None   Result Value Ref Range    Systolic Blood  Pressure 175 mmHg    Diastolic Blood Pressure 88 mmHg    Ventricular Rate 61 BPM    Atrial Rate 61 BPM    HI Interval 212 ms    QRS Duration 126 ms     ms    QTc 442 ms    P Axis 35 degrees    R AXIS -48 degrees    T Axis 111 degrees    Interpretation ECG       Sinus rhythm with 1st degree A-V block  Left axis deviation  Left ventricular hypertrophy with QRS widening and repolarization abnormality  Cannot rule out Septal infarct , age undetermined  Abnormal ECG  When compared with ECG of 24-DEC-2007 13:51,  Significant changes have occurred  Confirmed by SEE ED PROVIDER NOTE FOR, ECG INTERPRETATION ([id number]),  MARILUZ ALFARO ([id number]) on 8/5/2021 9:45:38 AM     ECG 12-LEAD WITH MUSE (LHE)     Status: None (Preliminary result)   Result Value Ref Range    Systolic Blood Pressure  mmHg    Diastolic Blood Pressure  mmHg    Ventricular Rate 66 BPM    Atrial Rate 66 BPM    HI Interval 214 ms    QRS Duration 110 ms     ms    QTc 436 ms    P Axis 38 degrees    R AXIS -56 degrees    T Axis -53 degrees    Interpretation ECG       Sinus rhythm with 1st degree A-V block  Incomplete right bundle branch block  Left anterior fascicular block  Minimal voltage criteria for LVH, may be normal variant  Septal infarct (cited on or before 05-AUG-2021)  Abnormal ECG  When compared with ECG of 05-AUG-2021 09:26,  Questionable change in initial forces of Septal leads  Nonspecific T wave abnormality now evident in Inferior leads     Social Work/ Care Management IP Consult     Status: None ()    Ramonita Shaffer RN     8/5/2021  1:09 PM  Care Management Initial Consult    General Information  Assessment completed with: Patient, Children,  (patient and her   daughter Ernestine)  Type of CM/SW Visit: Initial Assessment    Primary Care Provider verified and updated as needed: Yes   Readmission within the last 30 days: no previous admission in   last 30 days      Reason for Consult: care coordination/care  conference  Advance Care Planning:            Communication Assessment  Patient's communication style: spoken language (English or   Bilingual)             Cognitive  Cognitive/Neuro/Behavioral: WDL                      Living Environment:   People in home:       Current living Arrangements:        Able to return to prior arrangements:         Family/Social Support:  Care provided by:    Provides care for:       Children          Description of Support System:           Current Resources:   Patient receiving home care services: No     Community Resources: None  Equipment currently used at home: walker, rolling  Supplies currently used at home: Incontinence Supplies    Employment/Financial:  Employment Status:          Financial Concerns:             Lifestyle & Psychosocial Needs:  Social Determinants of Health     Tobacco Use:      Smoking Tobacco Use:      Smokeless Tobacco Use:    Alcohol Use:      Frequency of Alcohol Consumption:      Average Number of Drinks:      Frequency of Binge Drinking:    Financial Resource Strain:      Difficulty of Paying Living Expenses:    Food Insecurity:      Worried About Running Out of Food in the Last Year:      Ran Out of Food in the Last Year:    Transportation Needs:      Lack of Transportation (Medical):      Lack of Transportation (Non-Medical):    Physical Activity:      Days of Exercise per Week:      Minutes of Exercise per Session:    Stress:      Feeling of Stress :    Social Connections:      Frequency of Communication with Friends and Family:      Frequency of Social Gatherings with Friends and Family:      Attends Muslim Services:      Active Member of Clubs or Organizations:      Attends Club or Organization Meetings:      Marital Status:    Intimate Partner Violence:      Fear of Current or Ex-Partner:      Emotionally Abused:      Physically Abused:      Sexually Abused:    Depression: Not at risk     PHQ-2 Score: 0   Housing Stability:      Unable to Pay for  Housing in the Last Year:      Number of Places Lived in the Last Year:      Unstable Housing in the Last Year:        Functional Status:  Prior to admission patient needed assistance:   Dependent ADLs:: Ambulation-walker  Dependent IADLs:: Laundry, Shopping, Transportation  Assesssment of Functional Status: Not at baseline with ADL   Functioning    Mental Health Status:          Chemical Dependency Status:                Values/Beliefs:  Spiritual, Cultural Beliefs, Gnosticism Practices, Values that   affect care:                 Additional Information:  AIDET completed. Writer met with Pt and Pts daughter Ernestine:   [phone].  Pt lives in a condo. Her daughter and son-in-law   help with shopping, some cleaning and meals. Otherwise patient   has been independent with her cares, meds, meals. She uses a   walker. The last few days she has had increasing pain in her left   hip, let side and back. She is having more difficulty walking.   Prior to that she used her walker in case she got dizzy.     Discussed discharge planning; TCU vs HC depending on progression   of care.Family will transport. Informed CM to follow.     Ramonita Marroquin RN, CM, [facility]           Neurosurgery IP Consult: Patient to be seen: Routine within 24 hrs; compression deformity of L2 and concern for cauda Equina syndrome?; Consultant may enter orders: Yes; Requesting provider? Hospitalist (if different from attending physician)     Status: None ()    Narrative    Rosario Orr PA-C     8/6/2021  4:05 PM  [facility]    Neurosurgery Consultation     Date of Admission:  8/5/2021  Date of Consult (When I saw the patient): 08/06/21    Assessment & Plan   Peggy De Anda is a 89 year old female who was admitted on   8/5/2021. I was asked to see the patient for L2 fracture and   urinary retention with UTI     Principal Problem:  L2 fracture back pain   Plan:   MRI thoracic and lumbar spine  Further recommendations upon  completion of imaging  Possible LSO brace indicated   Recommendations for further evaluation of her left shoulder pain   UTI treatment per hospitalist   Gamez management per hospitalist   Patient states that she does not want surgical intervention nor   would she want to wear a brace. Will provide further   recommendations after image review. Otherwise treatment will be   pain management and will sign off     Addendum:   MRI reviewed with chronic T2, T4 and L2 fractures without high   grade spinal stenosis   Patient still does not want surgical intervention for bracing   thus will sign off- re-consult if indicated or new onset of   weakness.     I have discussed the following assessment and plan with Dr. Guillory  who is in agreement with initial plan and will follow up   with further consultation recommendations.    Rosario Orr PA-C  [facility] Neurosurgery  O: [phone]        Code Status    Full Code    Reason for Consult   Reason for consult: back pain urinary retention     Primary Care Physician    Monroe Brito    Chief Complaint   Back pain     History is obtained from the patient    History of Present Illness   ePggy De Anda is a 89 year old right handed female who presents   with worsening complaint of low thoracic and upper lumbar pain.   She states that her pain has been progressive over the past few   months and has limited her ability to ambulate. She has been   ambulating with a walker for over a year stating that she has   neuropathy and feels off balance at times. She has two falls of   which she states was a 'big fall' back in November and earlier   this year. She denies any more recent injury or trauma. She   states that her pain is a intermittent sharp stabbing pain that   begins in her left lower thoracic spine and will radiate   anteriorly to her flank and into her abdomen on the left. She has   intermittent low back pain that will radiate into her buttocks.   She denies any  radicular lower extremity pain, numbness,   tingling, or weakness. She states that if she is laying still she   has no pain and only notes pain with ambulation or rolling in   bed/ laying on left side. She also has complaint of left shoulder   pain and feels that her shoulder is bigger than it usually is.   She is has limited range of motion of her left shoulder secondary   to pain. She denies any urinary or bowel incontinence. She states   that since being in the hospital she was told she has urinary   retention but still has the urge to urinate and feels that she   urinates frequently but drinks a lot of water. She denies saddle   anesthesia.     Past Medical History   I have reviewed this patient's medical history and updated it   with pertinent information if needed.   Past Medical History:   Diagnosis Date     Anemia      Ataxia      Epilepsy (H)      Essential hypertension      Gout      Monoclonal gammopathy of undetermined significance      Osteoporosis      Peripheral neuropathy      Secondary hyperparathyroidism (H)     non-renal       Past Surgical History   I have reviewed this patient's surgical history and updated it   with pertinent information if needed.  Past Surgical History:   Procedure Laterality Date     APPENDECTOMY      AGE 13     CATARACT EXTRACTION, BILATERAL       CHOLECYSTECTOMY       IR LUMBAR EPIDURAL STEROID INJECTION  10/5/2007     IR MISCELLANEOUS PROCEDURE  10/30/2007     NEUROPLASTY / TRANSPOSITION MEDIAN NERVE AT CARPAL TUNNEL   BILATERAL       TONSILLECTOMY         Prior to Admission Medications   Prior to Admission Medications   Prescriptions Last Dose Informant Patient Reported? Taking?   Multiple Vitamins-Minerals (CENTRUM SILVER 50+WOMEN PO) 8/5/2021   at am  Yes Yes   Sig: Take 1 tablet by mouth daily   acetaminophen (TYLENOL 8 HOUR ARTHRITIS PAIN) 650 MG CR tablet   8/4/2021  Yes Yes   Sig: Take 650 mg by mouth every 8 hours as needed for mild pain   or fever   allopurinoL  (ZYLOPRIM) 100 MG tablet 8/5/2021 at am  No Yes   Sig: [ALLOPURINOL (ZYLOPRIM) 100 MG TABLET] TAKE ONE TABLET BY   MOUTH ONE TIME DAILY    allopurinoL (ZYLOPRIM) 100 MG tablet   No No   Sig: [ALLOPURINOL (ZYLOPRIM) 100 MG TABLET] Take 1 tablet (100 mg   total) by mouth daily.   aspirin 81 MG EC tablet 8/5/2021 at am  Yes Yes   Sig: [ASPIRIN 81 MG EC TABLET] Take 81 mg by mouth daily.   carvediloL (COREG) 6.25 MG tablet 8/5/2021 at am  No Yes   Sig: [CARVEDILOL (COREG) 6.25 MG TABLET] TAKE ONE TABLET BY MOUTH   TWICE DAILY    losartan (COZAAR) 50 MG tablet 8/5/2021 at am  No Yes   Sig: [LOSARTAN (COZAAR) 50 MG TABLET] TAKE ONE TABLET BY MOUTH   ONE TIME DAILY    losartan (COZAAR) 50 MG tablet   No No   Sig: [LOSARTAN (COZAAR) 50 MG TABLET] Take 1 tablet (50 mg total)   by mouth daily.   phenytoin (DILANTIN) 100 MG ER capsule 8/5/2021 at am  No Yes   Sig: [PHENYTOIN (DILANTIN) 100 MG ER CAPSULE] Take 1 capsule (100   mg total) by mouth 3 (three) times a day.      Facility-Administered Medications: None     Allergies   Allergies   Allergen Reactions     Oxycodone Unknown     Quinolones Rash     Levofloxacin Rash       Social History   I have reviewed this patient's social history and updated it with   pertinent information if needed. Peggy De Anda  reports that   she has never smoked. She does not have any smokeless tobacco   history on file.    Family History   I have reviewed this patient's family history and updated it with   pertinent information if needed.   History reviewed. No pertinent family history.    Review of Systems    Negative for exception of HPI       Physical Exam   Temp: 98.2  F (36.8  C) Temp src: Oral BP: (!) 162/72 Pulse: 72     Resp: 18 SpO2: 95 % O2 Device: None (Room air)    Vital Signs with Ranges  Temp:  [97.8  F (36.6  C)-98.4  F (36.9  C)] 98.2  F (36.8  C)  Pulse:  [61-72] 72  Resp:  [15-24] 18  BP: (133-189)/(62-85) 162/72  SpO2:  [94 %-99 %] 95 %  133 lbs 0 oz     , Blood pressure (!)  "162/72, pulse 72, temperature 98.2  F (36.8    C), temperature source Oral, resp. rate 18, height 1.549 m (5'   1\"), weight 60.3 kg (133 lb), SpO2 95 %.  133 lbs 0 oz  HEENT:  Normocephalic, atraumatic.  PERRLA.  EOM s intact.   Neck:  Supple, non-tender, without lymphadenopathy.  Heart:  No peripheral edema  Lungs:  No SOB  Abdomen:  Soft, non-tender, non-distended.  Normal bowel sounds.  Skin:  Warm and dry, good capillary refill.  Extremities:  Good radial and dorsalis pedis pulses bilaterally,   no edema, cyanosis or clubbing.    NEUROLOGICAL EXAMINATION:   Mental status:  Alert and Oriented x 3, speech is fluent.  Cranial nerves:  II-XII intact.   Motor:  Strength is 5/5 throughout the upper and lower   extremities  Deltoids:  Right: 5/5   Left:  4/5 Limited ROM of left shoulder   secondary to pain   Biceps:  Right: 5/5   Left:  5/5  Triceps: Right: 5/5   Left:  5/5                       Wrist Extensors: Right: 5/5   Left:  5/5        Wrist Flexors:Right: 5/5   Left:  5/5             Hand : Right: 5/5   Left:  5/5         Hip Flexor: Right: 5/5   Left:  5/5                 Hip Adductor:Right: 5/5   Left:  5/5               Hip Abductor: Right: 5/5   Left:  5/5              Gastroc Soleus:Right: 5/5   Left:  5/5        Tib/Ant:Right: 5/5   Left:  5/5                        EHL:Right: 5/5   Left:  5/5                     Sensation:  Intact throughout - feels gacria tug   Reflexes:  Negative Babinski.  Negative Clonus.    Coordination:  Smooth finger to nose testing.   Negative pronator   drift.    Gait:  NT secondary to pain     Pain to palpation of left shoulder with painful passive and   active motion   Pain to palpation of lower thoracic spine and low back with   marked muscle spasms.     Data     CBC RESULTS:   Recent Labs   Lab Test 08/06/21  0304   WBC 6.6   RBC 3.32*   HGB 11.2*   HCT 33.7*   *   MCH 33.7*   MCHC 33.2   RDW 12.2        Basic Metabolic Panel:  Lab Results   Component Value " Date     08/06/2021      Lab Results   Component Value Date    POTASSIUM 3.7 08/06/2021    POTASSIUM 3.7 08/06/2021     Lab Results   Component Value Date    CHLORIDE 106 08/06/2021     Lab Results   Component Value Date    OBED 8.4 08/06/2021     Lab Results   Component Value Date    CO2 22 08/06/2021     Lab Results   Component Value Date    BUN 7 08/06/2021     Lab Results   Component Value Date    CR 0.49 08/06/2021     Lab Results   Component Value Date    GLC 80 08/06/2021     INR:  No results found for: INR    Images:   Personally reviewed CT abdomen pelvis with noted L2 inferior   endplate compression fracture with cement in L4 and L5 no high   grade spinal stenosis noted     Rosario Orr PA-C  [facility]  O: [phone]     Urine Culture     Status: Abnormal    Specimen: Urine, Clean Catch   Result Value Ref Range    Culture >100,000 CFU/mL Escherichia coli (A)        Susceptibility    Escherichia coli - MARIJA     Ampicillin <=4 Susceptible ug/mL     Cefazolin 2 Susceptible ug/mL     Cefepime <=1 Susceptible ug/mL     Ceftriaxone <=1 Susceptible ug/mL     Ciprofloxacin <=0.25 Susceptible ug/mL     Gentamicin 4 Susceptible ug/mL     Levofloxacin <=0.5 Susceptible ug/mL     Meropenem <=0.5 Susceptible ug/mL     Tetracycline >8 Resistant ug/mL     Tobramycin <=2 Susceptible ug/mL     Nitrofurantoin <=16 Susceptible ug/mL     Trimethoprim/Sulfa <=0.5 Susceptible ug/mL   Asymptomatic COVID-19 Virus (Coronavirus) by PCR Nasopharyngeal     Status: Normal    Specimen: Nasopharyngeal; Swab    Narrative    The following orders were created for panel order Asymptomatic COVID-19 Virus (Coronavirus) by PCR Nasopharyngeal.  Procedure                               Abnormality         Status                     ---------                               -----------         ------                     SARS-COV2 (COVID-19) Vir...[[id number]]  Normal              Final result                 Please view  results for these tests on the individual orders.   Floyd Draw     Status: None    Narrative    The following orders were created for panel order Floyd Draw.  Procedure                               Abnormality         Status                     ---------                               -----------         ------                     Extra Blue Top Tube[[id number]]                              Final result               Extra Red Top Tube[[id number]]                               Final result               Extra Green Top (Lithium...[[id number]]                      Final result               Extra Purple Top Tube[[id number]]                            Final result                 Please view results for these tests on the individual orders.   CBC with platelets differential     Status: Abnormal    Narrative    The following orders were created for panel order CBC with platelets differential.  Procedure                               Abnormality         Status                     ---------                               -----------         ------                     CBC with platelets and d...[[id number]]  Abnormal            Final result                 Please view results for these tests on the individual orders.          Total time spent 54 minutes with greater than 50% in consultation, education and coordination of care.     Also discussed with RN    Thank you for this consultation.      Ethel BISHOP, CNS-BC, DNP  Acute Care Pain Management Program  [facility] ([facility], [place], [place])   With questions call [phone]  Preference if for [facility] Mar[facility] Hennessy  Click HERE to page Svetlana

## 2021-08-07 NOTE — PROGRESS NOTES
"[facility] [place] PROGRESS NOTE      ADMIT DATE: 8/5/2021     FACILITY: [facility]    PCP: Monroe Brito, [phone]    ASSESSMENT AND PLAN:   Patient is a 89 year old female with history significant for Anemia, Ataxia, Epilepsy (H), Essential hypertension, Gout, Monoclonal gammopathy of undetermined significance, Osteoporosis, Peripheral neuropathy, and Secondary hyperparathyroidism (H) comes in for weakness in b/l legs and left hip/lower back/buttock pain.     Principal Problem:    Hip pain, left  Active Problems:    Hypertension    Muscle weakness (generalized)          Left hip/lower back/flank/buttock pain  -CT abdomen showed chronic appearing bilateral sacral insufficiency fractures and showed \"Moderate inferior endplate compression deformity of L2\"  -patient has peripheral neuropathy in both feet at baseline and there was no change in that. Patient denies any \"saddle anesthesia.\" However, the patient has not had a BM in 5 days and in ED, there is concern for new onset urinary retention. Concern for cauda Equina syndrome?   -neurosurgery consulted-->not concerned for cauda equina. NS ordered MRI lumbar and thoracic spines which showed  chronic T2, T4 and L2 fractures without high grade spinal stenosis. When NS discussed LSO brace placement with patient, patient refused at that time. NS signed off.   -hospitalist today 8/7 discussed LSO brace placement with patient, patient is interested in brace placement.   -re-consulted NS for brace placement, consulted orthotics for brace  -PT/OT eval   -c/w scheduled tylenol. avoid narcotics because of patient's allergy hx. Patient does not remember allergy to narcotics though.         Constipation  -on physical exam on admission, tender at LLQ on palpation. Abdomen distended.  -CT abdomen showed: \"No acute bowel inflammation or findings to suggest mechanical bowel obstruction.\" patient was started on bowel regimen and patient had BM today 8/7  -c/w miralax " daily, pericolace 2 tabs BID, MOM PRN, dulcolax suppository  -monitor I/Os        Hypokalemia  -RN managed K protocol     Hypomagnesemia  -RN managed Mag protocol     Elevated CRP  -CRP non specific  -will monitor daily for now        Hyponatremia  -Na level is 135  -monitor with daily BMP for now        UTI?  -UA suspicious for possible UTI  -urine culture pending but shows growth of >100,000 E. Coli that is resistant to tetracyclines only   -c/w IV rocephin        Gout  -c/w home allopurinol     HTN  -BP elevated even though pain is better today   -c/w home losartan and home coreg with hold parameters  -start norvasc 2.5 mg PO every day for now  -c/w IV hydralazine PRN        Hx of epilepsy with recurrent seizures  -c/w home dilantin      Tele changes  -patient has inverted T waves on tele. Patient denies any shortness of breath, chest pain, nausea, and dizziness.   -ordered ekg      FEN/GI: low salt diet  DVT proph: lovenox  Code status: Full Code       Updated daughter Ernestine on current plan.       Discharge barriers:  -brace placement, neurosurgery re-consulted, elevated BP, discharge planning (alexandr Ernestine unsure if patient will be on board with going to TCU)   -ADOD: 2-3 days       SUBJECTIVE:    Patient states her left sided hip/lower back/buttock pain has improved today compared to yesterday. When brace placement is discussed, patient shows interest in brace placement and seeing neurosurgery again to discuss brace placement. Patient denies any other complaints at this time.     ROS:  12 Points review of systems reviewed and is negative except for what has already been mentioned above    OBJECTIVE:  Patient Vitals for the past 24 hrs:   BP Temp Temp src Pulse Resp SpO2 Weight   08/07/21 0646 (!) 164/62 -- -- 70 -- -- --   08/07/21 0634 (!) 207/81 -- -- -- -- -- --   08/07/21 0434 (!) 183/72 97.8  F (36.6  C) Oral 63 18 96 % --   08/07/21 0009 (!) 154/70 98.5  F (36.9  C) Oral 67 16 94 % --   08/06/21 2017  (!) 149/69 98.4  F (36.9  C) Oral 76 18 94 % --   08/06/21 1721 (!) 159/72 -- -- 70 -- -- 60.6 kg (133 lb 11.2 oz)   08/06/21 1609 (!) 156/70 98  F (36.7  C) Oral 66 19 95 % --   08/06/21 1536 (!) 182/73 98.4  F (36.9  C) Oral 66 20 91 % --   08/06/21 1300 (!) 162/72 98.2  F (36.8  C) Oral 72 18 95 % --   08/06/21 0833 (!) 173/80 98.2  F (36.8  C) Oral 70 17 94 % --          Intake/Output Summary (Last 24 hours) at 8/6/2021 0725  Last data filed at 8/6/2021 0700  Gross per 24 hour   Intake 2087 ml   Output 1200 ml   Net 887 ml     GENRL: Not in acute distress. Satting at 98% on room air.   HEENT: NC/AT                 Neck- supple                 Sclera- anicteric                 Mucous membrane- moist and pink  CHEST: Clear to auscultation bilaterally  HEART: S1S2 regular. No murmurs, rubs or gallops  ABDMN:  No guarding or rigidity.  EXTRM:  No pedal edema.   NEURO: no involuntary movements   INTGM: please see nursing assessment for full skin assessment  PULSES: 2+ and symmetric all extremities  PSYCH: normal affect, normal speech     DIAGNOSTIC DATA:          No results found for this or any previous visit (from the past 24 hour(s)).     Results for orders placed or performed during the hospital encounter of 08/05/21   CT Abdomen Pelvis w Contrast    Impression    IMPRESSION:     1.  No acute bowel inflammation or findings to suggest mechanical bowel obstruction.  2.  Bilateral adrenal gland thickening consistent with hyperplasia.  3.  Generalized demineralization of the bones with chronic appearing bilateral sacral insufficiency fractures.          All recent labs reviewed personally  Radiology report reviewed.   Radiology Results: imaging impression reviewed      The total time spent in preparing this progress note is about 70 minutes, >50% time spent in care co-ordination that includes reviewing labs, images, discussing the plan of care with patient/family, consultants, and .      Wolf  MD Johana.   [facility] Medicine Service   [phone]   Pager [phone]

## 2021-08-07 NOTE — PROGRESS NOTES
08/07/21 1130   Quick Adds   Type of Visit Initial Occupational Therapy Evaluation   Living Environment   People in home alone   Current Living Arrangements house   Home Accessibility stairs to enter home   Number of Stairs, Main Entrance 1   Stair Railings, Main Entrance none;other (see comments)  (Step up into doorframe)   Living Environment Comments One level townhouse, Walk in shower with shower chair, no grab bars, small bathroom with sink close to toilet, regular sized toilet   Self-Care   Usual Activity Tolerance excellent   Current Activity Tolerance poor   Equipment Currently Used at Home walker, rolling  (FWW all the time)   Activity/Exercise/Self-Care Comment Pt reports her dghtr and JIMBO are close and could help out if needed, willing to stay with them during recovery   Instrumental Activities of Daily Living (IADL)   Previous Responsibilities meal prep;housekeeping;laundry;shopping;yardwork   Disability/Function   Fall history within last six months yes   Number of times patient has fallen within last six months 2   General Information   Onset of Illness/Injury or Date of Surgery 08/05/21   Patient/Family Therapy Goal Statement (OT) Pt wants to go back home, willing to move in with dghtr and JIMBO, agreeable to TCU but still hesitant   Existing Precautions/Restrictions spinal  (Pt declining TLSO)   Left Upper Extremity (Weight-bearing Status) full weight-bearing (FWB)   Right Upper Extremity (Weight-bearing Status) full weight-bearing (FWB)   Left Lower Extremity (Weight-bearing Status) full weight-bearing (FWB)   Right Lower Extremity (Weight-bearing Status) full weight-bearing (FWB)   Cognitive Status Examination   Orientation Status orientation to person, place and time   Affect/Mental Status (Cognitive) WFL   Follows Commands WFL   Visual Perception   Visual Impairment/Limitations WNL   Pain Assessment   Patient Currently in Pain Yes, see Vital Sign flowsheet   Range of Motion Comprehensive  "  General Range of Motion upper extremity range of motion deficits identified   Comment, General Range of Motion Limted ROM in B SH (L>R, Pt reports L SH \"feels bigger than normal\" but reports no inflammation)   General Upper Extremity Assessment (Range of Motion)   Upper Extremity: Range of Motion LUE ROM WFL   Comment: Upper Extremity ROM RUE ROM not WFL   Strength Comprehensive (MMT)   General Manual Muscle Testing (MMT) Assessment no strength deficits identified   Coordination   Upper Extremity Coordination No deficits were identified   Bed Mobility   Bed Mobility rolling right;supine-sit;sit-supine;scooting/bridging   Rolling Right [place] (Bed Mobility) minimum assist (75% patient effort);verbal cues;nonverbal cues (demo/gesture)   Scooting/Bridging [place] (Bed Mobility) minimum assist (75% patient effort);verbal cues  (Increased time/effort)   Supine-Sit [place] (Bed Mobility) minimum assist (75% patient effort);verbal cues;nonverbal cues (demo/gesture)   Sit-Supine [place] (Bed Mobility) moderate assist (50% patient effort);verbal cues;nonverbal cues (demo/gesture)   Assistive Device (Bed Mobility) bed rails;draw sheet   Comment (Bed Mobility) Step by step commands for bed mobility, Pt demonstrated understanding with repetition   Transfers   Transfers sit-stand transfer   Transfer Comments Pt hesistant, required encouragement and stressed importance of movement   Sit-Stand Transfer   Sit-Stand [place] (Transfers) moderate assist (50% patient effort);verbal cues   Assistive Device (Sit-Stand Transfers) walker, front-wheeled   Clinical Impression   Criteria for Skilled Therapeutic Interventions Met (OT) skilled treatment is necessary   OT Diagnosis Decrease in ability to participate in ADL/IADLs, decreased functional mobility, increased weakness, decreased activity tolerance   OT Problem List-Impairments impacting ADL problems related to;strength;pain;mobility;activity tolerance " impaired   Identified Performance Deficits Decrease in ability to participate in ADL/IADLs, decreased functional mobility, increased weakness, decreased activity tolerance   Planned Therapy Interventions (OT) ADL retraining;IADL retraining;bed mobility training;transfer training   Clinical Decision Making Complexity (OT) moderate complexity   Therapy Frequency (OT) Daily   Predicted Duration of Therapy 7 days   Anticipated Equipment Needs Upon Discharge (OT) bathing equipment;dressing equipment;raised toilet seat;reacher;shower chair;toileting equipment   Risk & Benefits of therapy have been explained evaluation/treatment results reviewed;care plan/treatment goals reviewed   OT Discharge Planning    OT Discharge Recommendation (DC Rec) Transitional Care Facility   OT Rationale for DC Rec Pt demonstrating significant decrease in functional mobility, [facility] home or [facility] given low activity tolerance d/t pain and fatigue   Total Evaluation Time (Minutes)   Total Evaluation Time (Minutes) 10       GENTRY Kaye/NIGEL on 8/7/2021

## 2021-08-07 NOTE — PROVIDER NOTIFICATION
Pt having inverted T waves and has been flipping back and forth between upright and flat. Pt denies any SOB, chest pain or discomfort. Call out to Dr. Vaughn to see if EKG appropriate. Awaiting response.     Addendum: At 1435 Dr. Vaughn seen pt  at bedside. EKG done. Awaiting any new orders. Pt offers no complaints.

## 2021-08-08 ENCOUNTER — APPOINTMENT (OUTPATIENT)
Dept: OCCUPATIONAL THERAPY | Facility: HOSPITAL | Age: 86
DRG: 543 | End: 2021-08-08
Payer: MEDICARE

## 2021-08-08 LAB
ALBUMIN SERPL-MCNC: 3.2 G/DL (ref 3.5–5)
ALP SERPL-CCNC: 193 U/L (ref 45–120)
ALT SERPL W P-5'-P-CCNC: 17 U/L (ref 0–45)
ANION GAP SERPL CALCULATED.3IONS-SCNC: 14 MMOL/L (ref 5–18)
AST SERPL W P-5'-P-CCNC: 35 U/L (ref 0–40)
BILIRUB SERPL-MCNC: 0.3 MG/DL (ref 0–1)
BUN SERPL-MCNC: 7 MG/DL (ref 8–28)
C REACTIVE PROTEIN LHE: 3.2 MG/DL (ref 0–0.8)
CALCIUM SERPL-MCNC: 9.4 MG/DL (ref 8.5–10.5)
CHLORIDE BLD-SCNC: 98 MMOL/L (ref 98–107)
CO2 SERPL-SCNC: 20 MMOL/L (ref 22–31)
CREAT SERPL-MCNC: 0.6 MG/DL (ref 0.6–1.1)
CREAT SERPL-MCNC: 0.72 MG/DL (ref 0.6–1.1)
ERYTHROCYTE [DISTWIDTH] IN BLOOD BY AUTOMATED COUNT: 12 % (ref 10–15)
GFR SERPL CREATININE-BSD FRML MDRD: 74 ML/MIN/1.73M2
GFR SERPL CREATININE-BSD FRML MDRD: 81 ML/MIN/1.73M2
GLUCOSE BLD-MCNC: 204 MG/DL (ref 70–125)
HCT VFR BLD AUTO: 43.2 % (ref 35–47)
HGB BLD-MCNC: 14.4 G/DL (ref 11.7–15.7)
MAGNESIUM SERPL-MCNC: 1.4 MG/DL (ref 1.8–2.6)
MCH RBC QN AUTO: 33.4 PG (ref 26.5–33)
MCHC RBC AUTO-ENTMCNC: 33.3 G/DL (ref 31.5–36.5)
MCV RBC AUTO: 100 FL (ref 78–100)
PLATELET # BLD AUTO: 357 10E3/UL (ref 150–450)
POTASSIUM BLD-SCNC: 4.2 MMOL/L (ref 3.5–5)
PROT SERPL-MCNC: 7 G/DL (ref 6–8)
RBC # BLD AUTO: 4.31 10E6/UL (ref 3.8–5.2)
SODIUM SERPL-SCNC: 132 MMOL/L (ref 136–145)
WBC # BLD AUTO: 6.9 10E3/UL (ref 4–11)

## 2021-08-08 PROCEDURE — 86141 C-REACTIVE PROTEIN HS: CPT | Performed by: FAMILY MEDICINE

## 2021-08-08 PROCEDURE — 83735 ASSAY OF MAGNESIUM: CPT | Performed by: FAMILY MEDICINE

## 2021-08-08 PROCEDURE — 250N000013 HC RX MED GY IP 250 OP 250 PS 637: Performed by: FAMILY MEDICINE

## 2021-08-08 PROCEDURE — 97535 SELF CARE MNGMENT TRAINING: CPT | Mod: GO

## 2021-08-08 PROCEDURE — 250N000013 HC RX MED GY IP 250 OP 250 PS 637: Performed by: CLINICAL NURSE SPECIALIST

## 2021-08-08 PROCEDURE — 250N000011 HC RX IP 250 OP 636: Performed by: FAMILY MEDICINE

## 2021-08-08 PROCEDURE — 85027 COMPLETE CBC AUTOMATED: CPT | Performed by: FAMILY MEDICINE

## 2021-08-08 PROCEDURE — 99233 SBSQ HOSP IP/OBS HIGH 50: CPT | Performed by: FAMILY MEDICINE

## 2021-08-08 PROCEDURE — 82040 ASSAY OF SERUM ALBUMIN: CPT | Performed by: FAMILY MEDICINE

## 2021-08-08 PROCEDURE — 36415 COLL VENOUS BLD VENIPUNCTURE: CPT | Performed by: FAMILY MEDICINE

## 2021-08-08 PROCEDURE — 120N000001 HC R&B MED SURG/OB

## 2021-08-08 PROCEDURE — 250N000009 HC RX 250: Performed by: CLINICAL NURSE SPECIALIST

## 2021-08-08 PROCEDURE — 99222 1ST HOSP IP/OBS MODERATE 55: CPT | Performed by: PODIATRIST

## 2021-08-08 RX ORDER — MAGNESIUM OXIDE 400 MG/1
400 TABLET ORAL 2 TIMES DAILY
Status: COMPLETED | OUTPATIENT
Start: 2021-08-08 | End: 2021-08-09

## 2021-08-08 RX ORDER — AMLODIPINE BESYLATE 5 MG/1
5 TABLET ORAL DAILY
Status: DISCONTINUED | OUTPATIENT
Start: 2021-08-09 | End: 2021-08-10

## 2021-08-08 RX ADMIN — AMLODIPINE BESYLATE 2.5 MG: 2.5 TABLET ORAL at 08:03

## 2021-08-08 RX ADMIN — DICLOFENAC 4 G: 10 GEL TOPICAL at 20:09

## 2021-08-08 RX ADMIN — STANDARDIZED SENNA CONCENTRATE 2 TABLET: 8.6 TABLET ORAL at 07:41

## 2021-08-08 RX ADMIN — CARVEDILOL 6.25 MG: 3.12 TABLET, FILM COATED ORAL at 17:33

## 2021-08-08 RX ADMIN — MAGNESIUM OXIDE TAB 400 MG (241.3 MG ELEMENTAL MG) 400 MG: 400 (241.3 MG) TAB at 12:15

## 2021-08-08 RX ADMIN — MAGNESIUM HYDROXIDE 30 ML: 400 SUSPENSION ORAL at 07:44

## 2021-08-08 RX ADMIN — POLYETHYLENE GLYCOL 3350 17 G: 17 POWDER, FOR SOLUTION ORAL at 07:44

## 2021-08-08 RX ADMIN — CARVEDILOL 6.25 MG: 3.12 TABLET, FILM COATED ORAL at 07:41

## 2021-08-08 RX ADMIN — HYDRALAZINE HYDROCHLORIDE 5 MG: 20 INJECTION INTRAMUSCULAR; INTRAVENOUS at 04:43

## 2021-08-08 RX ADMIN — EXTENDED PHENYTOIN SODIUM 100 MG: 100 CAPSULE ORAL at 13:29

## 2021-08-08 RX ADMIN — LIDOCAINE: 50 OINTMENT TOPICAL at 20:09

## 2021-08-08 RX ADMIN — ACETAMINOPHEN 975 MG: 325 TABLET ORAL at 23:27

## 2021-08-08 RX ADMIN — LIDOCAINE: 50 OINTMENT TOPICAL at 12:19

## 2021-08-08 RX ADMIN — ASPIRIN 81 MG: 81 TABLET, COATED ORAL at 07:41

## 2021-08-08 RX ADMIN — THERA TABS 1 TABLET: TAB at 07:43

## 2021-08-08 RX ADMIN — LIDOCAINE: 50 OINTMENT TOPICAL at 17:41

## 2021-08-08 RX ADMIN — EXTENDED PHENYTOIN SODIUM 100 MG: 100 CAPSULE ORAL at 07:43

## 2021-08-08 RX ADMIN — DICLOFENAC 4 G: 10 GEL TOPICAL at 12:19

## 2021-08-08 RX ADMIN — ACETAMINOPHEN 975 MG: 325 TABLET ORAL at 17:34

## 2021-08-08 RX ADMIN — MAGNESIUM OXIDE TAB 400 MG (241.3 MG ELEMENTAL MG) 400 MG: 400 (241.3 MG) TAB at 20:09

## 2021-08-08 RX ADMIN — EXTENDED PHENYTOIN SODIUM 100 MG: 100 CAPSULE ORAL at 20:10

## 2021-08-08 RX ADMIN — ALLOPURINOL 100 MG: 100 TABLET ORAL at 07:43

## 2021-08-08 RX ADMIN — LOSARTAN POTASSIUM 50 MG: 50 TABLET, FILM COATED ORAL at 07:42

## 2021-08-08 RX ADMIN — ENOXAPARIN SODIUM 40 MG: 40 INJECTION SUBCUTANEOUS at 20:09

## 2021-08-08 RX ADMIN — CEFTRIAXONE SODIUM 1 G: 1 INJECTION, POWDER, FOR SOLUTION INTRAMUSCULAR; INTRAVENOUS at 17:33

## 2021-08-08 RX ADMIN — ACETAMINOPHEN 975 MG: 325 TABLET ORAL at 07:42

## 2021-08-08 ASSESSMENT — MIFFLIN-ST. JEOR: SCORE: 983.81

## 2021-08-08 NOTE — PROGRESS NOTES
Care Management Follow Up    Length of Stay (days): 3    Expected Discharge Date: 08/10/2021     Concerns to be Addressed:       Patient plan of care discussed at interdisciplinary rounds: Yes    Anticipated Discharge Disposition:  Skilled nursing     Anticipated Discharge Services:  TCU  Anticipated Discharge DME:      Patient/family educated on Medicare website which has current facility and service quality ratings:  yes  Education Provided on the Discharge Plan:  yes  Patient/Family in Agreement with the Plan: yes    Referrals Placed by CM/SW:  [facility]  Private pay costs discussed: transportation costs TCU costs    Additional Information:  SW met with pt, her sister and brother in law in pt room. Pt family agreeable to discuss discharge planning and TCU. SW provided family with medicare/gov list and family provided referral choices. Referrals sent. Family willing to transport, or [facility] transport pending pt ability to transfer into vehicle. Pt agreeable to plan.      ROBBIE Lancaster

## 2021-08-08 NOTE — PROGRESS NOTES
"[facility] [place] PROGRESS NOTE      ADMIT DATE: 8/5/2021     FACILITY: [facility]    PCP: Monroe Brito, [phone]    ASSESSMENT AND PLAN:   Patient is a 89 year old female with history significant for Anemia, Ataxia, Epilepsy (H), Essential hypertension, Gout, Monoclonal gammopathy of undetermined significance, Osteoporosis, Peripheral neuropathy, and Secondary hyperparathyroidism (H) comes in for weakness in b/l legs and left hip/lower back/buttock pain.     Principal Problem:    Hip pain, left  Active Problems:    Hypertension    Muscle weakness (generalized)          Left hip/lower back/flank/buttock pain  -CT abdomen showed chronic appearing bilateral sacral insufficiency fractures and showed \"Moderate inferior endplate compression deformity of L2\"  -patient has peripheral neuropathy in both feet at baseline and there was no change in that. Patient denies any \"saddle anesthesia.\" However, the patient has not had a BM in 5 days and in ED, there is concern for new onset urinary retention. Concern for cauda Equina syndrome?   -neurosurgery consulted-->not concerned for cauda equina. NS ordered MRI lumbar and thoracic spines which showed  chronic T2, T4 and L2 fractures without high grade spinal stenosis. When NS discussed LSO brace placement with patient, patient refused at that time. NS signed off.   -hospitalist on 8/7 discussed LSO brace placement with patient, patient is interested in brace placement.   -re-consulted NS for brace placement, consulted orthotics for brace-->consults pending  -PT/OT eval--->recommeding TCU  -c/w scheduled tylenol. avoid narcotics because of patient's allergy hx. Patient does not remember allergy to narcotics though. -->pain team was consulted but patient refused norco and tramadol-->will c/w scheduled tylenol and diclofenac gel and lidocaine ointment started by pain team        Constipation  -on physical exam on admission, tender at LLQ on palpation. Abdomen " "distended.  -CT abdomen showed: \"No acute bowel inflammation or findings to suggest mechanical bowel obstruction.\" -patient was started on bowel regimen and patient started having BMs on 8/7.   -c/w miralax daily, pericolace 2 tabs BID, MOM PRN, dulcolax suppository  -monitor I/Os        Hypokalemia  -RN managed K protocol     Hypomagnesemia  -RN managed Mag protocol     Elevated CRP  -CRP non specific-->Trending down  -will monitor daily for now        Hyponatremia  -Na level is 132  -monitor with daily BMP for now        UTI?  -UA suspicious for possible UTI  -urine culture pending but shows growth of >100,000 E. Coli that is resistant to tetracyclines only   -c/w IV rocephin        Gout  -c/w home allopurinol     HTN  -BP elevated even though pain is better today   -c/w home losartan and home coreg with hold parameters  -increase norvasc from 2.5 mg to 5 mg PO every day for now  -c/w IV hydralazine PRN        Hx of epilepsy with recurrent seizures  -c/w home dilantin      Tele changes  -patient has inverted T waves on tele on 8/7. Patient denies any shortness of breath, chest pain, nausea, and dizziness.   -ordered ekg--EKG unremarkable and troponin negative       Discharge planning  -plan for TCU. Referrals sent.     Elevated blood sugar  -obtain HgbA1c      Foot callus  -has foot callus that is causing significant pain for patient with walking  -patient sees her podiatrist (Dr. Samina Lopez) in the OP setting for this foot callus and Dr. Lopez shaves it if needed  -podiatry consult      FEN/GI: low salt diet  DVT proph: lovenox  Code status: Full Code       Updated daughter Ernestine on current plan.       Discharge barriers:  -brace placement, neurosurgery re-consulted, elevated BP, discharge planning to TCU  -ADOD: 2-3 days       SUBJECTIVE:    Patient states her left sided hip/lower back/buttock pain is improving. She agreed she will try to use the commode instead of being straight cathed. She feels she is " "urinating \"fine\" now. She had a BM this am. She denies any other complaints at this time.     ROS:  12 Points review of systems reviewed and is negative except for what has already been mentioned above    OBJECTIVE:  Patient Vitals for the past 24 hrs:   BP Temp Temp src Pulse Resp SpO2   08/08/21 0730 (!) 163/69 98  F (36.7  C) Oral 78 18 96 %   08/08/21 0515 (!) 175/83 -- -- -- -- --   08/08/21 0442 (!) 196/90 97.7  F (36.5  C) Oral 71 17 96 %   08/07/21 2334 (!) 177/81 97.6  F (36.4  C) Oral 70 18 96 %   08/07/21 1934 (!) 167/71 98.6  F (37  C) Oral 56 18 99 %   08/07/21 1705 -- -- -- 74 -- --   08/07/21 1546 (!) 149/66 97.9  F (36.6  C) Oral 60 18 99 %   08/07/21 1140 128/65 97.8  F (36.6  C) Oral 65 18 98 %   08/07/21 0814 (!) 162/76 98.2  F (36.8  C) Oral 72 18 96 %          Intake/Output Summary (Last 24 hours) at 8/6/2021 0725  Last data filed at 8/6/2021 0700  Gross per 24 hour   Intake 2087 ml   Output 1200 ml   Net 887 ml     GENRL: Not in acute distress. Satting at 96% on room air.   HEENT: NC/AT                 Neck- supple                 Sclera- anicteric                 Mucous membrane- moist and pink  CHEST: Clear to auscultation bilaterally  HEART: S1S2 regular. No murmurs, rubs or gallops  ABDMN:  No guarding or rigidity.  EXTRM:  No pedal edema.   NEURO: no involuntary movements   INTGM: please see nursing assessment for full skin assessment  PULSES: 2+ and symmetric all extremities  PSYCH: normal affect, normal speech     DIAGNOSTIC DATA:          Recent Results (from the past 24 hour(s))   Comprehensive metabolic panel    Collection Time: 08/07/21 10:16 AM   Result Value Ref Range    Sodium 135 (L) 136 - 145 mmol/L    Potassium 3.4 (L) 3.5 - 5.0 mmol/L    Chloride 99 98 - 107 mmol/L    Carbon Dioxide (CO2) 26 22 - 31 mmol/L    Anion Gap 10 5 - 18 mmol/L    Urea Nitrogen 6 (L) 8 - 28 mg/dL    Creatinine 0.58 (L) 0.60 - 1.10 mg/dL    Calcium 8.9 8.5 - 10.5 mg/dL    Glucose 215 (H) 70 - 125 mg/dL "    Alkaline Phosphatase 160 (H) 45 - 120 U/L    AST 29 0 - 40 U/L    ALT 16 0 - 45 U/L    Protein Total 6.2 6.0 - 8.0 g/dL    Albumin 3.0 (L) 3.5 - 5.0 g/dL    Bilirubin Total 0.4 0.0 - 1.0 mg/dL    GFR Estimate 82 >60 mL/min/1.73m2   CBC with platelets    Collection Time: 08/07/21 10:16 AM   Result Value Ref Range    WBC Count 7.6 4.0 - 11.0 10e3/uL    RBC Count 3.95 3.80 - 5.20 10e6/uL    Hemoglobin 13.1 11.7 - 15.7 g/dL    Hematocrit 39.4 35.0 - 47.0 %     78 - 100 fL    MCH 33.2 (H) 26.5 - 33.0 pg    MCHC 33.2 31.5 - 36.5 g/dL    RDW 12.2 10.0 - 15.0 %    Platelet Count 332 150 - 450 10e3/uL   CRP inflammation    Collection Time: 08/07/21 10:16 AM   Result Value Ref Range    CRP 4.1 (H) 0.0-<0.8 mg/dL   ECG 12-LEAD WITH MUSE (LHE)    Collection Time: 08/07/21  2:24 PM   Result Value Ref Range    Systolic Blood Pressure  mmHg    Diastolic Blood Pressure  mmHg    Ventricular Rate 66 BPM    Atrial Rate 66 BPM    VT Interval 214 ms    QRS Duration 110 ms     ms    QTc 436 ms    P Axis 38 degrees    R AXIS -56 degrees    T Axis -53 degrees    Interpretation ECG       Sinus rhythm with 1st degree A-V block  Incomplete right bundle branch block  Left anterior fascicular block  Minimal voltage criteria for LVH, may be normal variant  Septal infarct (cited on or before 05-AUG-2021)  Abnormal ECG  When compared with ECG of 05-AUG-2021 09:26,  Questionable change in initial forces of Septal leads  Nonspecific T wave abnormality now evident in Inferior leads  Confirmed by TEMO CASTRO MD LOC:[facility] ([id number]) on 8/7/2021 5:34:01 PM     Potassium    Collection Time: 08/07/21  3:27 PM   Result Value Ref Range    Potassium 4.6 3.5 - 5.0 mmol/L   Troponin I    Collection Time: 08/07/21  3:27 PM   Result Value Ref Range    Troponin I 0.05 0.00 - 0.29 ng/mL   Creatinine    Collection Time: 08/07/21  3:27 PM   Result Value Ref Range    Creatinine 0.60 0.60 - 1.10 mg/dL    GFR Estimate 81 >60 mL/min/1.73m2        Results for  orders placed or performed during the hospital encounter of 08/05/21   CT Abdomen Pelvis w Contrast    Impression    IMPRESSION:     1.  No acute bowel inflammation or findings to suggest mechanical bowel obstruction.  2.  Bilateral adrenal gland thickening consistent with hyperplasia.  3.  Generalized demineralization of the bones with chronic appearing bilateral sacral insufficiency fractures.          All recent labs reviewed personally  Radiology report reviewed.   Radiology Results: imaging impression reviewed      The total time spent in preparing this progress note is about 35 minutes, >50% time spent in care co-ordination that includes reviewing labs, images, discussing the plan of care with patient/family, consultants, and .      Wolf Vaughn MD.   [facility] Medicine Service   [phone]   Pager [phone]

## 2021-08-08 NOTE — PLAN OF CARE
Problem: Constipation  Goal: Effective Bowel Elimination  Outcome: No Change     Problem: Pain Acute  Goal: Acceptable Pain Control and Functional Ability  Outcome: No Change  Intervention: Develop Pain Management Plan  Recent Flowsheet Documentation  Taken 8/8/2021 0000 by Yue Yi RN  Pain Management Interventions: declines  Intervention: Prevent or Manage Pain  Recent Flowsheet Documentation  Taken 8/8/2021 0000 by Yue Yi RN  Bowel Elimination Promotion:    adequate fluid intake promoted    ambulation promoted     Pt reports ongoing pain to left hip.  States her pain is comfortable at rest but worsened with any sort of repositioning.  Pt declines prn medication and declined scheduled tylenol instead opting to sleep.  Gamez removed yesterday evening but pt unable to completely empty bladder, having to be straight cathed x1 with 350ccs out.  Prn hydralazine given for elevated BP with recheck 175/83.

## 2021-08-08 NOTE — PROGRESS NOTES
Will not see today:  PAIN MANAGEMENT SERVICE CHART CHECK    This patient's chart has been reviewed by the Pain Service. No change in current pain management plan.  Patient refusing scheduled tylenol, continues with urinary retention issues.  Will be getting fitted for a brace.  Pain Service with no additional recommendations at this time.  We will sign off.  Please re consult or text page if would like patient to be seen.  Thank you!    Ethel BISHOP, CNS-BC, DNP  Acute Care Pain Management Program  [facility] ([facility], [place], [place])   With questions call [phone]  Preference if for [facility] - [place]  Click HERE to page Svetlana

## 2021-08-08 NOTE — PLAN OF CARE
Needs encouragement to be active and participate in self cares. Did brush her own teeth and feed herself. assist for all other cares. voiding well on commode. Assist of 1 for transfers. Falls and pressure ulcer prevention plans in place. Dariela Smalls RN    Problem: Risk for Delirium  Goal: Optimal Coping  Outcome: Improving     Problem: Adult Inpatient Plan of Care  Goal: Optimal Comfort and Wellbeing  Outcome: Improving

## 2021-08-09 ENCOUNTER — APPOINTMENT (OUTPATIENT)
Dept: PHYSICAL THERAPY | Facility: HOSPITAL | Age: 86
DRG: 543 | End: 2021-08-09
Payer: MEDICARE

## 2021-08-09 ENCOUNTER — APPOINTMENT (OUTPATIENT)
Dept: OCCUPATIONAL THERAPY | Facility: HOSPITAL | Age: 86
DRG: 543 | End: 2021-08-09
Payer: MEDICARE

## 2021-08-09 LAB
ALBUMIN SERPL-MCNC: 2.8 G/DL (ref 3.5–5)
ALP SERPL-CCNC: 193 U/L (ref 45–120)
ALT SERPL W P-5'-P-CCNC: 16 U/L (ref 0–45)
ANION GAP SERPL CALCULATED.3IONS-SCNC: 2 MMOL/L (ref 5–18)
AST SERPL W P-5'-P-CCNC: 29 U/L (ref 0–40)
BILIRUB SERPL-MCNC: 0.3 MG/DL (ref 0–1)
BUN SERPL-MCNC: 9 MG/DL (ref 8–28)
C REACTIVE PROTEIN LHE: 2 MG/DL (ref 0–0.8)
CALCIUM SERPL-MCNC: 9 MG/DL (ref 8.5–10.5)
CHLORIDE BLD-SCNC: 100 MMOL/L (ref 98–107)
CO2 SERPL-SCNC: 31 MMOL/L (ref 22–31)
CREAT SERPL-MCNC: 0.57 MG/DL (ref 0.6–1.1)
ERYTHROCYTE [DISTWIDTH] IN BLOOD BY AUTOMATED COUNT: 12.2 % (ref 10–15)
GFR SERPL CREATININE-BSD FRML MDRD: 82 ML/MIN/1.73M2
GLUCOSE BLD-MCNC: 118 MG/DL (ref 70–125)
HBA1C MFR BLD: 4.9 %
HCT VFR BLD AUTO: 38.9 % (ref 35–47)
HGB BLD-MCNC: 12.9 G/DL (ref 11.7–15.7)
MCH RBC QN AUTO: 33.6 PG (ref 26.5–33)
MCHC RBC AUTO-ENTMCNC: 33.2 G/DL (ref 31.5–36.5)
MCV RBC AUTO: 101 FL (ref 78–100)
PLATELET # BLD AUTO: 348 10E3/UL (ref 150–450)
POTASSIUM BLD-SCNC: 4.1 MMOL/L (ref 3.5–5)
PROT SERPL-MCNC: 6.1 G/DL (ref 6–8)
RBC # BLD AUTO: 3.84 10E6/UL (ref 3.8–5.2)
SODIUM SERPL-SCNC: 133 MMOL/L (ref 136–145)
WBC # BLD AUTO: 5.7 10E3/UL (ref 4–11)

## 2021-08-09 PROCEDURE — 120N000001 HC R&B MED SURG/OB

## 2021-08-09 PROCEDURE — 250N000011 HC RX IP 250 OP 636: Performed by: FAMILY MEDICINE

## 2021-08-09 PROCEDURE — 250N000013 HC RX MED GY IP 250 OP 250 PS 637: Performed by: FAMILY MEDICINE

## 2021-08-09 PROCEDURE — 85027 COMPLETE CBC AUTOMATED: CPT | Performed by: FAMILY MEDICINE

## 2021-08-09 PROCEDURE — 99233 SBSQ HOSP IP/OBS HIGH 50: CPT | Performed by: FAMILY MEDICINE

## 2021-08-09 PROCEDURE — 97110 THERAPEUTIC EXERCISES: CPT | Mod: GO

## 2021-08-09 PROCEDURE — 97535 SELF CARE MNGMENT TRAINING: CPT | Mod: GO

## 2021-08-09 PROCEDURE — 82040 ASSAY OF SERUM ALBUMIN: CPT | Performed by: FAMILY MEDICINE

## 2021-08-09 PROCEDURE — 36415 COLL VENOUS BLD VENIPUNCTURE: CPT | Performed by: FAMILY MEDICINE

## 2021-08-09 PROCEDURE — 83036 HEMOGLOBIN GLYCOSYLATED A1C: CPT | Performed by: FAMILY MEDICINE

## 2021-08-09 PROCEDURE — 97116 GAIT TRAINING THERAPY: CPT | Mod: GP

## 2021-08-09 PROCEDURE — 97530 THERAPEUTIC ACTIVITIES: CPT | Mod: GP

## 2021-08-09 PROCEDURE — 86141 C-REACTIVE PROTEIN HS: CPT | Performed by: FAMILY MEDICINE

## 2021-08-09 RX ADMIN — ALLOPURINOL 100 MG: 100 TABLET ORAL at 14:30

## 2021-08-09 RX ADMIN — LOSARTAN POTASSIUM 50 MG: 50 TABLET, FILM COATED ORAL at 14:34

## 2021-08-09 RX ADMIN — CEFTRIAXONE SODIUM 1 G: 1 INJECTION, POWDER, FOR SOLUTION INTRAMUSCULAR; INTRAVENOUS at 17:26

## 2021-08-09 RX ADMIN — DICLOFENAC 4 G: 10 GEL TOPICAL at 20:36

## 2021-08-09 RX ADMIN — ASPIRIN 81 MG: 81 TABLET, COATED ORAL at 10:00

## 2021-08-09 RX ADMIN — CARVEDILOL 6.25 MG: 3.12 TABLET, FILM COATED ORAL at 17:39

## 2021-08-09 RX ADMIN — MAGNESIUM OXIDE TAB 400 MG (241.3 MG ELEMENTAL MG) 400 MG: 400 (241.3 MG) TAB at 10:00

## 2021-08-09 RX ADMIN — LIDOCAINE: 50 OINTMENT TOPICAL at 17:39

## 2021-08-09 RX ADMIN — LIDOCAINE: 50 OINTMENT TOPICAL at 20:36

## 2021-08-09 RX ADMIN — EXTENDED PHENYTOIN SODIUM 100 MG: 100 CAPSULE ORAL at 20:32

## 2021-08-09 RX ADMIN — THERA TABS 1 TABLET: TAB at 10:00

## 2021-08-09 RX ADMIN — MAGNESIUM OXIDE TAB 400 MG (241.3 MG ELEMENTAL MG) 400 MG: 400 (241.3 MG) TAB at 20:33

## 2021-08-09 RX ADMIN — DICLOFENAC 4 G: 10 GEL TOPICAL at 14:29

## 2021-08-09 RX ADMIN — AMLODIPINE BESYLATE 5 MG: 5 TABLET ORAL at 14:36

## 2021-08-09 RX ADMIN — EXTENDED PHENYTOIN SODIUM 100 MG: 100 CAPSULE ORAL at 14:29

## 2021-08-09 RX ADMIN — ACETAMINOPHEN 975 MG: 325 TABLET ORAL at 10:00

## 2021-08-09 RX ADMIN — EXTENDED PHENYTOIN SODIUM 100 MG: 100 CAPSULE ORAL at 14:33

## 2021-08-09 RX ADMIN — LIDOCAINE: 50 OINTMENT TOPICAL at 14:29

## 2021-08-09 RX ADMIN — DICLOFENAC 4 G: 10 GEL TOPICAL at 10:00

## 2021-08-09 RX ADMIN — ACETAMINOPHEN 975 MG: 325 TABLET ORAL at 15:54

## 2021-08-09 RX ADMIN — CARVEDILOL 6.25 MG: 3.12 TABLET, FILM COATED ORAL at 14:37

## 2021-08-09 RX ADMIN — ENOXAPARIN SODIUM 40 MG: 40 INJECTION SUBCUTANEOUS at 20:36

## 2021-08-09 RX ADMIN — LIDOCAINE: 50 OINTMENT TOPICAL at 10:00

## 2021-08-09 NOTE — PLAN OF CARE
Problem: Adult Inpatient Plan of Care  Goal: Plan of Care Review  Outcome: Improving   All cares are done as per plan pt tolerated well.  Problem: Risk for Delirium  Goal: Optimal Coping  Outcome: Improving   Alert and oriented no s/s of delirium noted.  Problem: Constipation  Goal: Effective Bowel Elimination  Outcome: Improving   Pt had medium loose stool.

## 2021-08-09 NOTE — PROGRESS NOTES
"[facility] PROGRESS NOTE      ADMIT DATE: 8/5/2021     FACILITY: [facility]    PCP: Monroe Brito, [phone]    ASSESSMENT AND PLAN:   Patient is a 89 year old female with history significant for Anemia, Ataxia, Epilepsy (H), Essential hypertension, Gout, Monoclonal gammopathy of undetermined significance, Osteoporosis, Peripheral neuropathy, and Secondary hyperparathyroidism (H) comes in for weakness in b/l legs and left hip/lower back/buttock pain.     Principal Problem:    Hip pain, left  Active Problems:    Hypertension    Muscle weakness (generalized)          Left hip/lower back/flank/buttock pain  -CT abdomen showed chronic appearing bilateral sacral insufficiency fractures and showed \"Moderate inferior endplate compression deformity of L2\"  -patient has peripheral neuropathy in both feet at baseline and there was no change in that. Patient denies any \"saddle anesthesia.\" However, the patient has not had a BM in 5 days and in ED, there is concern for new onset urinary retention. Concern for cauda Equina syndrome?   -neurosurgery consulted-->not concerned for cauda equina. NS ordered MRI lumbar and thoracic spines which showed  chronic T2, T4 and L2 fractures without high grade spinal stenosis. When NS discussed LSO brace placement with patient, patient refused at that time. NS signed off.   -hospitalist on 8/7 discussed LSO brace placement with patient, patient is interested in brace placement.   -re-consulted NS for brace placement, consulted orthotics for brace-->consults pending  -PT/OT eval--->recommeding TCU  -c/w scheduled tylenol. avoid narcotics because of patient's allergy hx. Patient does not remember allergy to narcotics though. -->pain team was consulted but patient refused norco and tramadol-->will c/w scheduled tylenol and diclofenac gel and lidocaine ointment started by pain team        Constipation  -on physical exam on admission, tender at LLQ on palpation. Abdomen " "distended.  -CT abdomen showed: \"No acute bowel inflammation or findings to suggest mechanical bowel obstruction.\" -patient was started on bowel regimen and patient started having BMs on 8/7.   -c/w miralax daily, pericolace 2 tabs BID, MOM PRN, dulcolax suppository  -monitor I/Os        Hypokalemia  -RN managed K protocol     Hypomagnesemia  -RN managed Mag protocol     Elevated CRP  -CRP non specific-->Trending down  -will monitor daily for now        Hyponatremia  -Na level is 132  -monitor with daily BMP for now        UTI?  -UA suspicious for possible UTI  -urine culture pending but shows growth of >100,000 E. Coli that is resistant to tetracyclines only   -c/w IV rocephin        Gout  -c/w home allopurinol     HTN  -BP improved today   -c/w home losartan and home coreg with hold parameters  -c/w norvasc 5 mg PO every day for now  -c/w IV hydralazine PRN        Hx of epilepsy with recurrent seizures  -c/w home dilantin      Tele changes  -patient has inverted T waves on tele on 8/7. Patient denies any shortness of breath, chest pain, nausea, and dizziness.   -ordered ekg--EKG unremarkable and troponin negative       Discharge planning  -plan for TCU. Referrals sent. Patient to go to [facility]"" at [place].    Elevated blood sugar  -obtain HgbA1c-->HgbA1c 4.9 on 8/9/2021 which is WNL      Foot callus  -has foot callus that is causing significant pain for patient with walking  -patient sees her podiatrist (Dr. Samina Lopez) in the OP setting for this foot callus and Dr. Lopez shaves it if needed  -podiatry consulted-->\"The callus was sharply debrided with a #10 blade down to healthy epidermis.  No bleeding noted.\"      FEN/GI: low salt diet  DVT proph: lovenox  Code status: Full Code       Updated daughter Ernestine on current plan.       Discharge barriers:  -brace placement, neurosurgery re-consulted, discharge planning to TCU  -ADOD: 2-3 days       SUBJECTIVE:    Patient states her left sided hip/lower back/buttock pain " "is improving. She is urinating at baseline. She is having loose stools right now; however, the bedside nurse stated the patient had a \"massive amount\" of stool softeners the last 2 days. Patient is ambulating with walker now in room.     ROS:  12 Points review of systems reviewed and is negative except for what has already been mentioned above    OBJECTIVE:  Patient Vitals for the past 24 hrs:   BP Temp Temp src Pulse Resp SpO2 Weight   08/09/21 0352 (!) 156/74 97.5  F (36.4  C) Oral 65 16 96 % --   08/08/21 2300 139/65 97.6  F (36.4  C) Oral 66 17 96 % --   08/08/21 2227 -- -- -- -- -- -- 62.1 kg (137 lb)   08/08/21 1545 137/78 98  F (36.7  C) Oral 66 19 95 % --   08/08/21 1137 110/61 98.2  F (36.8  C) -- 65 18 95 % --   08/08/21 0845 (!) 146/68 -- -- 81 18 -- --          Intake/Output Summary (Last 24 hours) at 8/6/2021 0725  Last data filed at 8/6/2021 0700  Gross per 24 hour   Intake 2087 ml   Output 1200 ml   Net 887 ml     GENRL: Not in acute distress. Satting at 98% on room air.   HEENT: NC/AT                 Neck- supple                 Sclera- anicteric                 Mucous membrane- moist and pink  CHEST: Clear to auscultation bilaterally  HEART: S1S2 regular. No murmurs, rubs or gallops  ABDMN:  non-tender on palpation of abdomen. No guarding or rigidity. Bowel sounds present.   EXTRM:  No pedal edema.   NEURO: no involuntary movements   INTGM: please see nursing assessment for full skin assessment  PULSES: 2+ and symmetric all extremities  PSYCH: normal affect, normal speech     DIAGNOSTIC DATA:          Recent Results (from the past 24 hour(s))   Comprehensive metabolic panel    Collection Time: 08/08/21  9:16 AM   Result Value Ref Range    Sodium 132 (L) 136 - 145 mmol/L    Potassium 4.2 3.5 - 5.0 mmol/L    Chloride 98 98 - 107 mmol/L    Carbon Dioxide (CO2) 20 (L) 22 - 31 mmol/L    Anion Gap 14 5 - 18 mmol/L    Urea Nitrogen 7 (L) 8 - 28 mg/dL    Creatinine 0.72 0.60 - 1.10 mg/dL    Calcium 9.4 8.5 " - 10.5 mg/dL    Glucose 204 (H) 70 - 125 mg/dL    Alkaline Phosphatase 193 (H) 45 - 120 U/L    AST 35 0 - 40 U/L    ALT 17 0 - 45 U/L    Protein Total 7.0 6.0 - 8.0 g/dL    Albumin 3.2 (L) 3.5 - 5.0 g/dL    Bilirubin Total 0.3 0.0 - 1.0 mg/dL    GFR Estimate 74 >60 mL/min/1.73m2   CBC with platelets    Collection Time: 08/08/21  9:16 AM   Result Value Ref Range    WBC Count 6.9 4.0 - 11.0 10e3/uL    RBC Count 4.31 3.80 - 5.20 10e6/uL    Hemoglobin 14.4 11.7 - 15.7 g/dL    Hematocrit 43.2 35.0 - 47.0 %     78 - 100 fL    MCH 33.4 (H) 26.5 - 33.0 pg    MCHC 33.3 31.5 - 36.5 g/dL    RDW 12.0 10.0 - 15.0 %    Platelet Count 357 150 - 450 10e3/uL   Magnesium    Collection Time: 08/08/21  9:16 AM   Result Value Ref Range    Magnesium 1.4 (L) 1.8 - 2.6 mg/dL   CRP inflammation    Collection Time: 08/08/21  9:16 AM   Result Value Ref Range    CRP 3.2 (H) 0.0-<0.8 mg/dL        Results for orders placed or performed during the hospital encounter of 08/05/21   CT Abdomen Pelvis w Contrast    Impression    IMPRESSION:     1.  No acute bowel inflammation or findings to suggest mechanical bowel obstruction.  2.  Bilateral adrenal gland thickening consistent with hyperplasia.  3.  Generalized demineralization of the bones with chronic appearing bilateral sacral insufficiency fractures.          All recent labs reviewed personally  Radiology report reviewed.   Radiology Results: imaging impression reviewed      The total time spent in preparing this progress note is about 35 minutes, >50% time spent in care co-ordination that includes reviewing labs, images, discussing the plan of care with patient/family, consultants, and .      Wolf Vaughn MD.   [facility] Medicine Service   [phone]   Pager [phone]

## 2021-08-09 NOTE — CONSULTS
[facility] Podiatric Surgical Inpatient Consult    ASSESSMENT:    1.  Hallux valgus left foot  2.  Fat pad atrophy left foot  3.  Callus left foot  4.  Left foot pain    RECOMMENDATIONS:    I have explained to Peggy about his current condition involving the left foot.  We discussed the cause of the callus formation and the need for cushioned insoles in shoes or well padded house shoes or slippers.  The callus was sharply debrided with a #10 blade down to healthy epidermis.  No bleeding noted.      I appreciate the consult.      ANDERSON Ashby D.P.M., CALLIE.     --------------------------------------------------------------------------------------------------------------------------------------------------------------------------------------------------------------------------------------    Peggy is a 89 year old female who was admitted with lower extremity weakness.   The patient relates having a painful callus on the bottom of the ball of the left foot.  Dr. Vaughn requested an inpatient  consultation regarding the left foot condition.     REVIEW OF SYSTEMS:  Constitutional, HEENT, cardiovascular, pulmonary, GI, , musculoskeletal, neuro, skin, endocrine and psych systems are negative, except as otherwise noted.     PAST MEDICAL HISTORY:   Past Medical History:   Diagnosis Date     Anemia      Ataxia      Epilepsy (H)      Essential hypertension      Gout      Monoclonal gammopathy of undetermined significance      Osteoporosis      Peripheral neuropathy      Secondary hyperparathyroidism (H)     non-renal        PAST SURGICAL HISTORY:   Past Surgical History:   Procedure Laterality Date     APPENDECTOMY      AGE 13     CATARACT EXTRACTION, BILATERAL       CHOLECYSTECTOMY       IR LUMBAR EPIDURAL STEROID INJECTION  10/5/2007     IR MISCELLANEOUS PROCEDURE  10/30/2007     NEUROPLASTY / TRANSPOSITION MEDIAN NERVE AT CARPAL TUNNEL BILATERAL       TONSILLECTOMY          MEDICATIONS:   Current  Facility-Administered Medications:      acetaminophen (TYLENOL) tablet 975 mg, 975 mg, Oral, Q8H, Wolf Vaughn MD, 975 mg at 08/08/21 1734     allopurinol (ZYLOPRIM) tablet 100 mg, 100 mg, Oral, Daily, Wolf Vaughn MD, 100 mg at 08/08/21 0743     [START ON 8/9/2021] amLODIPine (NORVASC) tablet 5 mg, 5 mg, Oral, Daily, Wolf Vaughn MD     aspirin EC tablet 81 mg, 81 mg, Oral, Daily, Wolf Vaughn MD, 81 mg at 08/08/21 0741     bisacodyl (DULCOLAX) Suppository 10 mg, 10 mg, Rectal, Daily PRN, Wolf Vaughn MD, 10 mg at 08/05/21 1756     carvedilol (COREG) tablet 6.25 mg, 6.25 mg, Oral, BID w/meals, Wolf Vaughn MD, 6.25 mg at 08/08/21 1733     cefTRIAXone (ROCEPHIN) 1 g vial to attach to  mL bag for ADULTS or NS 50 mL bag for PEDS, 1 g, Intravenous, Q24H, Wolf Vaughn MD, 1 g at 08/08/21 1733     diclofenac (VOLTAREN) 1 % topical gel 4 g, 4 g, Topical, TID, Ethel Hennessy APRN CNS, 4 g at 08/08/21 1219     enoxaparin ANTICOAGULANT (LOVENOX) injection 40 mg, 40 mg, Subcutaneous, Q24H, Wolf Vaughn MD, 40 mg at 08/07/21 2010     hydrALAZINE (APRESOLINE) injection 5 mg, 5 mg, Intravenous, Q6H PRN, Wolf Vaughn MD, 5 mg at 08/08/21 0443     lidocaine (LMX4) cream, , Topical, Q1H PRN, Wolf Vaughn MD     lidocaine (XYLOCAINE) 5 % ointment, , Topical, 4x Daily, Ethel Hennessy APRN CNS, Given at 08/08/21 1741     lidocaine 1 % 0.1-1 mL, 0.1-1 mL, Other, Q1H PRN, Wolf Vaughn MD     losartan (COZAAR) tablet 50 mg, 50 mg, Oral, Daily, Wolf Vaughn MD, 50 mg at 08/08/21 0742     magnesium hydroxide (MILK OF MAGNESIA) suspension 30 mL, 30 mL, Oral, Daily, Wolf Vaughn MD, 30 mL at 08/08/21 0744     magnesium oxide (MAG-OX) tablet 400 mg, 400 mg, Oral, BID, Wolf Vaughn MD, 400 mg at 08/08/21 1215     melatonin tablet 1 mg, 1 mg, Oral, At Bedtime PRN, Wolf Vaughn MD     multivitamin, therapeutic (THERA-VIT) tablet 1  tablet, 1 tablet, Oral, Daily, Wolf Vaughn MD, 1 tablet at 08/08/21 0743     phenytoin (DILANTIN) CR capsule 100 mg, 100 mg, Oral, TID, Wolf Vaughn MD, 100 mg at 08/08/21 1329     polyethylene glycol (MIRALAX) Packet 17 g, 17 g, Oral, Daily, Wolf Vaughn MD, 17 g at 08/08/21 0744     sennosides (SENOKOT) tablet 2 tablet, 2 tablet, Oral, BID, Rubrandeeg, Ethel SNELL, APRN CNS, 2 tablet at 08/08/21 0741     sodium chloride (PF) 0.9% PF flush 3 mL, 3 mL, Intracatheter, Q8H, Wolf Vaughn MD, 3 mL at 08/08/21 1734     sodium chloride (PF) 0.9% PF flush 3 mL, 3 mL, Intracatheter, q1 min prn, Wolf Vaughn MD     ALLERGIES:    Allergies   Allergen Reactions     Oxycodone Unknown     Quinolones Rash     Levofloxacin Rash        SOCIAL HISTORY:   Social History     Socioeconomic History     Marital status:      Spouse name: Not on file     Number of children: Not on file     Years of education: Not on file     Highest education level: Not on file   Occupational History     Not on file   Tobacco Use     Smoking status: Never Smoker   Substance and Sexual Activity     Alcohol use: Not on file     Drug use: Not on file     Sexual activity: Not on file   Other Topics Concern     Not on file   Social History Narrative     Not on file     Social Determinants of Health     Financial Resource Strain:      Difficulty of Paying Living Expenses:    Food Insecurity:      Worried About Running Out of Food in the Last Year:      Ran Out of Food in the Last Year:    Transportation Needs:      Lack of Transportation (Medical):      Lack of Transportation (Non-Medical):    Physical Activity:      Days of Exercise per Week:      Minutes of Exercise per Session:    Stress:      Feeling of Stress :    Social Connections:      Frequency of Communication with Friends and Family:      Frequency of Social Gatherings with Friends and Family:      Attends Confucianist Services:      Active Member of Clubs or  "Organizations:      Attends Club or Organization Meetings:      Marital Status:    Intimate Partner Violence:      Fear of Current or Ex-Partner:      Emotionally Abused:      Physically Abused:      Sexually Abused:         FAMILY HISTORY: History reviewed. No pertinent family history.     EXAM:Vitals: /61 (BP Location: Right arm)   Pulse 65   Temp 98.2  F (36.8  C)   Resp 18   Ht 1.549 m (5' 1\")   Wt 60.6 kg (133 lb 11.2 oz)   SpO2 95%   BMI 25.26 kg/m    BMI= Body mass index is 25.26 kg/m .    General appearance: Patient is alert and fully cooperative with history & exam.  No sign of distress is noted during the visit.     Psychiatric: Affect is pleasant & appropriate.  Patient appears motivated to improve health.     Respiratory: Breathing is regular & unlabored while sitting.     HEENT: Hearing is intact to spoken word.  Speech is clear.  No gross evidence of visual impairment that would impact ambulation.     Dermatologic:  Hyperkeratotic skin lesion noted on the plantar aspect of the first metatarsal head on the left.  No surrounding erythema or edema noted.  No subdermal hemorrhage noted.  Noted atrophy of the plantar fat pad to the ball of the left foot.    Vascular: DP & PT pulses are intact & regular bilaterally.  CFT and skin temperature is normal to both lower extremities.     Neurologic: Lower extremity sensation is intact to light touch.  No evidence of weakness or contracture in the lower extremities.  Noted evidence of neuropathy.     Musculoskeletal: Patient is ambulatory without assistive device or brace.  No gross ankle deformity noted.  No foot or ankle joint effusion is noted. Severe bunion deformity noted on the left.            ANDERSON Ashby D.P.M., F.A.C.F.A.S.    "

## 2021-08-09 NOTE — PLAN OF CARE
Pt improving in that she was unable to transfer without 2 assist with frequent commode/bed/chair transfers & was able to transfer with writer with gait belt & walker. Describes pain always in her lt hip & back with the tylenol and pain creams helpful.  Problem: Adult Inpatient Plan of Care  Goal: Plan of Care Review  Outcome: Improving  Goal: Patient-Specific Goal (Individualized)  Outcome: Improving  Goal: Absence of Hospital-Acquired Illness or Injury  Outcome: Improving  Intervention: Identify and Manage Fall Risk  Recent Flowsheet Documentation  Taken 8/9/2021 1230 by Silke Rodriguez RN  Safety Promotion/Fall Prevention:   bed alarm on   nonskid shoes/slippers when out of bed  Taken 8/9/2021 0830 by Silke Rodriguez RN  Safety Promotion/Fall Prevention:   bed alarm on   nonskid shoes/slippers when out of bed  Intervention: Prevent and Manage VTE (Venous Thromboembolism) Risk  Recent Flowsheet Documentation  Taken 8/9/2021 1230 by Silke Rodriguez RN  VTE Prevention/Management: anticoagulant therapy maintained  Taken 8/9/2021 0830 by Silke Rodriguez RN  VTE Prevention/Management: anticoagulant therapy maintained  Goal: Optimal Comfort and Wellbeing  Outcome: Improving  Goal: Readiness for Transition of Care  Outcome: Improving     Problem: Risk for Delirium  Goal: Optimal Coping  Outcome: Improving  Goal: Improved Behavioral Control  Outcome: Improving  Goal: Improved Attention and Thought Clarity  Outcome: Improving  Goal: Improved Sleep  Outcome: Improving     Problem: Constipation  Goal: Effective Bowel Elimination  Outcome: Improving     Problem: Pain Acute  Goal: Acceptable Pain Control and Functional Ability  Outcome: Improving  Intervention: Develop Pain Management Plan  Recent Flowsheet Documentation  Taken 8/9/2021 0830 by Silke Rodriguez RN  Pain Management Interventions:   emotional support   repositioned     Problem: OT General Care Plan  Goal: Transfer  (OT)  Description: Transfer (OT)  Outcome: Improving  Goal: Toilet Transfer/Toileting (OT)  Description: Toilet Transfer/Toileting (OT)  Outcome: Improving     Problem: Patient Goal: Social Work Focus  Goal: 1. Patient Goal: Care Coordination / Social Work  Outcome: Improving  Goal: 2. Patient Goal: Care Coordination / Social Work Focus  Outcome: Improving  Goal: 3. Patient Goal: Care Coordination / Social Work Focus  Outcome: Improving     Problem: Discharge Planning  Goal: Discharge Planning (Adult, OB, Behavioral, Peds)  Outcome: Improving

## 2021-08-09 NOTE — PROGRESS NOTES
Care Management Follow Up    Length of Stay (days): 4    Expected Discharge Date: 08/11/2021     Concerns to be Addressed: discharge planning     Patient plan of care discussed at interdisciplinary rounds: Yes    Anticipated Discharge Disposition: Transitional Care     Anticipated Discharge Services: Transitional Care  Anticipated Discharge DME: None    Patient/family educated on Medicare website which has current facility and service quality ratings: yes  Education Provided on the Discharge Plan:    Patient/Family in Agreement with the Plan: yes    Referrals Placed by CM/SW:  Several TCU referrals  Private pay costs discussed: transportation costs    Additional Information:  Received call from daughterErnestine this morning requested withdrawal of [facility] TCU referral. She requests adding referral to [facility] TCU and states this would be their #1 choice, followed by #2 [facility] and #3 [facility].    Referral sent to [facility] per request.    Received call back from Svetlana at [facility] stating they can accept pt at [facility] to a private room.    Contacted pt's daughter, Ernestine, who accepts TCU placement at [facility].  Discussed transportation options and daughter would like  [employer] w/c transport and is agreeable potential out of pocket expenses.     Update per hospitalist, pt awaiting brace and will likely discharge on Wednesday, 8/11/2021.    Lucy Combs RN

## 2021-08-09 NOTE — PLAN OF CARE
Problem: Pain Acute  Goal: Acceptable Pain Control and Functional Ability  Outcome: No Change     Problem: Adult Inpatient Plan of Care  Goal: Absence of Hospital-Acquired Illness or Injury  Intervention: Identify and Manage Fall Risk  Recent Flowsheet Documentation  Taken 8/8/2021 2327 by Yue Yi, RN  Safety Promotion/Fall Prevention:    activity supervised    assistive device/personal items within reach    bed alarm on    fall prevention program maintained    nonskid shoes/slippers when out of bed    room near nurse's station     Pt AOx4, calls appropriately for assistance.  Up to BSC x1 via assist of 2 with walker, had 1 watery stool and voided.  Pt reports no change to chronic pain - received scheduled tylenol but declined need for further intervention.  Orthotic consult pending.  Tele NSR with 1AVB and BBB.

## 2021-08-10 ENCOUNTER — APPOINTMENT (OUTPATIENT)
Dept: OCCUPATIONAL THERAPY | Facility: HOSPITAL | Age: 86
DRG: 543 | End: 2021-08-10
Payer: MEDICARE

## 2021-08-10 ENCOUNTER — APPOINTMENT (OUTPATIENT)
Dept: PHYSICAL THERAPY | Facility: HOSPITAL | Age: 86
DRG: 543 | End: 2021-08-10
Payer: MEDICARE

## 2021-08-10 LAB
ALBUMIN SERPL-MCNC: 3.1 G/DL (ref 3.5–5)
ALP SERPL-CCNC: 208 U/L (ref 45–120)
ALT SERPL W P-5'-P-CCNC: 18 U/L (ref 0–45)
ANION GAP SERPL CALCULATED.3IONS-SCNC: 10 MMOL/L (ref 5–18)
AST SERPL W P-5'-P-CCNC: 33 U/L (ref 0–40)
BILIRUB SERPL-MCNC: 0.3 MG/DL (ref 0–1)
BUN SERPL-MCNC: 8 MG/DL (ref 8–28)
C REACTIVE PROTEIN LHE: 1.6 MG/DL (ref 0–0.8)
CALCIUM SERPL-MCNC: 9.1 MG/DL (ref 8.5–10.5)
CHLORIDE BLD-SCNC: 102 MMOL/L (ref 98–107)
CO2 SERPL-SCNC: 24 MMOL/L (ref 22–31)
CREAT SERPL-MCNC: 0.59 MG/DL (ref 0.6–1.1)
ERYTHROCYTE [DISTWIDTH] IN BLOOD BY AUTOMATED COUNT: 12.3 % (ref 10–15)
GFR SERPL CREATININE-BSD FRML MDRD: 82 ML/MIN/1.73M2
GLUCOSE BLD-MCNC: 101 MG/DL (ref 70–125)
HCT VFR BLD AUTO: 40.1 % (ref 35–47)
HGB BLD-MCNC: 13.3 G/DL (ref 11.7–15.7)
MAGNESIUM SERPL-MCNC: 1.8 MG/DL (ref 1.8–2.6)
MCH RBC QN AUTO: 33.7 PG (ref 26.5–33)
MCHC RBC AUTO-ENTMCNC: 33.2 G/DL (ref 31.5–36.5)
MCV RBC AUTO: 102 FL (ref 78–100)
PLATELET # BLD AUTO: 399 10E3/UL (ref 150–450)
POTASSIUM BLD-SCNC: 4.1 MMOL/L (ref 3.5–5)
PROT SERPL-MCNC: 6.4 G/DL (ref 6–8)
RBC # BLD AUTO: 3.95 10E6/UL (ref 3.8–5.2)
SODIUM SERPL-SCNC: 136 MMOL/L (ref 136–145)
WBC # BLD AUTO: 7.2 10E3/UL (ref 4–11)

## 2021-08-10 PROCEDURE — 97110 THERAPEUTIC EXERCISES: CPT | Mod: GP

## 2021-08-10 PROCEDURE — 99233 SBSQ HOSP IP/OBS HIGH 50: CPT | Performed by: FAMILY MEDICINE

## 2021-08-10 PROCEDURE — 86141 C-REACTIVE PROTEIN HS: CPT | Performed by: FAMILY MEDICINE

## 2021-08-10 PROCEDURE — 85027 COMPLETE CBC AUTOMATED: CPT | Performed by: FAMILY MEDICINE

## 2021-08-10 PROCEDURE — 80053 COMPREHEN METABOLIC PANEL: CPT | Performed by: FAMILY MEDICINE

## 2021-08-10 PROCEDURE — 250N000013 HC RX MED GY IP 250 OP 250 PS 637: Performed by: FAMILY MEDICINE

## 2021-08-10 PROCEDURE — 120N000001 HC R&B MED SURG/OB

## 2021-08-10 PROCEDURE — 250N000011 HC RX IP 250 OP 636: Performed by: FAMILY MEDICINE

## 2021-08-10 PROCEDURE — 83735 ASSAY OF MAGNESIUM: CPT | Performed by: FAMILY MEDICINE

## 2021-08-10 PROCEDURE — 36415 COLL VENOUS BLD VENIPUNCTURE: CPT | Performed by: FAMILY MEDICINE

## 2021-08-10 PROCEDURE — 97530 THERAPEUTIC ACTIVITIES: CPT | Mod: GP

## 2021-08-10 PROCEDURE — 97535 SELF CARE MNGMENT TRAINING: CPT | Mod: GO

## 2021-08-10 RX ORDER — UBIDECARENONE 75 MG
100 CAPSULE ORAL DAILY
Status: DISCONTINUED | OUTPATIENT
Start: 2021-08-10 | End: 2021-08-11 | Stop reason: HOSPADM

## 2021-08-10 RX ORDER — CEFUROXIME AXETIL 500 MG/1
500 TABLET ORAL EVERY 12 HOURS SCHEDULED
Status: DISCONTINUED | OUTPATIENT
Start: 2021-08-10 | End: 2021-08-11 | Stop reason: HOSPADM

## 2021-08-10 RX ADMIN — ACETAMINOPHEN 975 MG: 325 TABLET ORAL at 08:11

## 2021-08-10 RX ADMIN — AMLODIPINE BESYLATE 5 MG: 5 TABLET ORAL at 09:28

## 2021-08-10 RX ADMIN — EXTENDED PHENYTOIN SODIUM 100 MG: 100 CAPSULE ORAL at 20:35

## 2021-08-10 RX ADMIN — LOSARTAN POTASSIUM 50 MG: 50 TABLET, FILM COATED ORAL at 08:11

## 2021-08-10 RX ADMIN — CARVEDILOL 6.25 MG: 3.12 TABLET, FILM COATED ORAL at 08:12

## 2021-08-10 RX ADMIN — EXTENDED PHENYTOIN SODIUM 100 MG: 100 CAPSULE ORAL at 13:56

## 2021-08-10 RX ADMIN — ASPIRIN 81 MG: 81 TABLET, COATED ORAL at 08:12

## 2021-08-10 RX ADMIN — ACETAMINOPHEN 975 MG: 325 TABLET ORAL at 17:20

## 2021-08-10 RX ADMIN — ENOXAPARIN SODIUM 40 MG: 40 INJECTION SUBCUTANEOUS at 22:28

## 2021-08-10 RX ADMIN — ALLOPURINOL 100 MG: 100 TABLET ORAL at 08:12

## 2021-08-10 RX ADMIN — Medication 100 MCG: at 20:37

## 2021-08-10 RX ADMIN — THERA TABS 1 TABLET: TAB at 08:14

## 2021-08-10 RX ADMIN — CARVEDILOL 6.25 MG: 3.12 TABLET, FILM COATED ORAL at 17:59

## 2021-08-10 RX ADMIN — CEFUROXIME AXETIL 500 MG: 500 TABLET ORAL at 20:36

## 2021-08-10 RX ADMIN — EXTENDED PHENYTOIN SODIUM 100 MG: 100 CAPSULE ORAL at 09:29

## 2021-08-10 ASSESSMENT — MIFFLIN-ST. JEOR: SCORE: 975.19

## 2021-08-10 NOTE — PROGRESS NOTES
"[place] PROGRESS NOTE      ADMIT DATE: 8/5/2021     FACILITY: [facility]    PCP: Monroe Brito, [phone]    ASSESSMENT AND PLAN:   Patient is a 89 year old female with history significant for Anemia, Ataxia, Epilepsy (H), Essential hypertension, Gout, Monoclonal gammopathy of undetermined significance, Osteoporosis, Peripheral neuropathy, and Secondary hyperparathyroidism (H) comes in for weakness in b/l legs and left hip/lower back/buttock pain.     Principal Problem:    Hip pain, left  Active Problems:    Hypertension    Muscle weakness (generalized)          Left hip/lower back/flank/buttock pain  -CT abdomen showed chronic appearing bilateral sacral insufficiency fractures and showed \"Moderate inferior endplate compression deformity of L2\"  -patient has peripheral neuropathy in both feet at baseline and there was no change in that. Patient denies any \"saddle anesthesia.\" However, the patient has not had a BM in 5 days and in ED, there is concern for new onset urinary retention. Concern for cauda Equina syndrome?   -neurosurgery consulted-->not concerned for cauda equina. NS ordered MRI lumbar and thoracic spines which showed  chronic T2, T4 and L2 fractures without high grade spinal stenosis. When NS discussed LSO brace placement with patient, patient refused at that time. NS signed off.   -hospitalist on 8/7 discussed LSO brace placement with patient, patient is interested in brace placement.   -re-consulted NS for brace placement, consulted orthotics for brace-->patient feels she has shown improvement in pain and ambulation and patient did not feel LSO brace was needed.   -PT/OT eval--->recommeding TCU  -c/w scheduled tylenol. avoid narcotics because of patient's allergy hx. Patient does not remember allergy to narcotics though. -->pain team was consulted but patient refused norco and tramadol-->will c/w scheduled tylenol and diclofenac gel and lidocaine ointment started by pain " "team        Constipation  -on physical exam on admission, tender at LLQ on palpation. Abdomen distended.  -CT abdomen showed: \"No acute bowel inflammation or findings to suggest mechanical bowel obstruction.\" -patient was started on bowel regimen and patient started having BMs on 8/7.   -c/w miralax daily, pericolace 2 tabs BID, MOM PRN, dulcolax suppository  -monitor I/Os        Hypokalemia  -RN managed K protocol     Hypomagnesemia  -RN managed Mag protocol     Elevated CRP  -CRP non specific-->Trending down  -will monitor daily for now        Hyponatremia  -Na level is 136 now, WNL  -monitor with daily BMP for now        UTI?  -UA suspicious for possible UTI  -urine culture pending but shows growth of >100,000 E. Coli that is resistant to tetracyclines only   -c/w IV rocephin-->switched to PO ceftin to finish total 7 day course         Gout  -c/w home allopurinol     HTN  -BP improved today but still mildly elevated   -c/w home losartan and home coreg with hold parameters  -increase norvasc to 7.5 mg PO every day for now  -c/w IV hydralazine PRN        Hx of epilepsy with recurrent seizures  -c/w home dilantin      Tele changes  -patient has inverted T waves on tele on 8/7. Patient denies any shortness of breath, chest pain, nausea, and dizziness.   -ordered ekg--EKG unremarkable and troponin negative   -discontinued tele on 8/10      Discharge planning  -plan for TCU. Referrals sent. Patient to go to \[facility]\"" at [place].    Elevated blood sugar  -obtain HgbA1c-->HgbA1c 4.9 on 8/9/2021 which is WNL      Foot callus  -has foot callus that is causing significant pain for patient with walking  -patient sees her podiatrist (Dr. Samina Lopez) in the OP setting for this foot callus and Dr. Lopez shaves it if needed  -podiatry consulted-->\"The callus was sharply debrided with a #10 blade down to healthy epidermis.  No bleeding noted.\"      FEN/GI: low salt diet  DVT proph: lovenox  Code status: Full Code       Updated " daughter Ernestine on current plan.       Discharge barriers:  Tomorrow to TCU      SUBJECTIVE:    Patient states her left sided hip/lower back/buttock pain is improving. She is urinating at baseline. She is still having loose stools from the many laxatives that were given to her. Patient denies any other complaints at this time.      ROS:  12 Points review of systems reviewed and is negative except for what has already been mentioned above    OBJECTIVE:  Patient Vitals for the past 24 hrs:   BP Temp Temp src Pulse Resp SpO2   08/10/21 0500 (!) 140/69 97.9  F (36.6  C) Oral 68 18 96 %   08/09/21 2330 (!) 143/68 97.9  F (36.6  C) Oral 69 18 97 %   08/09/21 2031 117/58 -- -- -- -- --   08/09/21 1946 110/56 98  F (36.7  C) Oral 77 18 96 %   08/09/21 1739 (!) 169/75 -- -- 70 -- --   08/09/21 1541 114/63 97.9  F (36.6  C) Oral 63 18 98 %   08/09/21 1230 117/67 98  F (36.7  C) Oral 74 16 98 %   08/09/21 0830 (!) 143/69 97.8  F (36.6  C) Oral 80 18 98 %          Intake/Output Summary (Last 24 hours) at 8/6/2021 0725  Last data filed at 8/6/2021 0700  Gross per 24 hour   Intake 2087 ml   Output 1200 ml   Net 887 ml     GENRL: Not in acute distress. Satting at 98% on room air.   HEENT: NC/AT                 Neck- supple                 Sclera- anicteric                 Mucous membrane- moist and pink  CHEST: Clear to auscultation bilaterally  HEART: S1S2 regular. No murmurs, rubs or gallops  ABDMN:  non-tender on palpation of abdomen. No guarding or rigidity. Bowel sounds present.   EXTRM:  No pedal edema.   NEURO: no involuntary movements   INTGM: please see nursing assessment for full skin assessment  PULSES: 2+ and symmetric all extremities  PSYCH: normal affect, normal speech     DIAGNOSTIC DATA:          Recent Results (from the past 24 hour(s))   Comprehensive metabolic panel    Collection Time: 08/09/21 10:24 AM   Result Value Ref Range    Sodium 133 (L) 136 - 145 mmol/L    Potassium 4.1 3.5 - 5.0 mmol/L    Chloride 100  98 - 107 mmol/L    Carbon Dioxide (CO2) 31 22 - 31 mmol/L    Anion Gap 2 (L) 5 - 18 mmol/L    Urea Nitrogen 9 8 - 28 mg/dL    Creatinine 0.57 (L) 0.60 - 1.10 mg/dL    Calcium 9.0 8.5 - 10.5 mg/dL    Glucose 118 70 - 125 mg/dL    Alkaline Phosphatase 193 (H) 45 - 120 U/L    AST 29 0 - 40 U/L    ALT 16 0 - 45 U/L    Protein Total 6.1 6.0 - 8.0 g/dL    Albumin 2.8 (L) 3.5 - 5.0 g/dL    Bilirubin Total 0.3 0.0 - 1.0 mg/dL    GFR Estimate 82 >60 mL/min/1.73m2   CBC with platelets    Collection Time: 08/09/21 10:24 AM   Result Value Ref Range    WBC Count 5.7 4.0 - 11.0 10e3/uL    RBC Count 3.84 3.80 - 5.20 10e6/uL    Hemoglobin 12.9 11.7 - 15.7 g/dL    Hematocrit 38.9 35.0 - 47.0 %     (H) 78 - 100 fL    MCH 33.6 (H) 26.5 - 33.0 pg    MCHC 33.2 31.5 - 36.5 g/dL    RDW 12.2 10.0 - 15.0 %    Platelet Count 348 150 - 450 10e3/uL   Hemoglobin A1c    Collection Time: 08/09/21 10:24 AM   Result Value Ref Range    Hemoglobin A1C 4.9 <=5.6 %   CRP inflammation    Collection Time: 08/09/21 10:24 AM   Result Value Ref Range    CRP 2.0 (H) 0.0-<0.8 mg/dL        Results for orders placed or performed during the hospital encounter of 08/05/21   CT Abdomen Pelvis w Contrast    Impression    IMPRESSION:     1.  No acute bowel inflammation or findings to suggest mechanical bowel obstruction.  2.  Bilateral adrenal gland thickening consistent with hyperplasia.  3.  Generalized demineralization of the bones with chronic appearing bilateral sacral insufficiency fractures.          All recent labs reviewed personally  Radiology report reviewed.   Radiology Results: imaging impression reviewed      The total time spent in preparing this progress note is about 35 minutes, >50% time spent in care co-ordination that includes reviewing labs, images, discussing the plan of care with patient/family, consultants, and .      Wolf Vaughn MD.   [facility] Medicine Service   [phone]   Pager [phone]

## 2021-08-10 NOTE — PROGRESS NOTES
Care Management Follow Up    Length of Stay (days): 5    Expected Discharge Date: 08/11/2021     Concerns to be Addressed: discharge planning     Patient plan of care discussed at interdisciplinary rounds: Yes    Anticipated Discharge Disposition: Transitional Care     Anticipated Discharge Services: Transitional Care      Referrals Placed by CM/SW:  Several TCU referrals  Private pay costs discussed: transportation costs    Additional Information:  MARYCHUY spoke with MD, transport, and TCU facility regarding discharge for 8/11. Called Ernestine, no answer. Plan to go to [facility] TCU with 1 PM [facility] transport. CM following.    2:00 PM - Left VM for daughter's phone regarding tentative time of ride for tomorrow.    2:30 PM - Spoke with daughter Ernestine who had some additional question about getting pt clothes and timing for arriving at [facility]. Advised Dtr to call TCU for additional details and to arrive between 1030/1130 to assist pt will preparations before lunch for departure early afternoon. Pending MD clearance.     Per previous note, pt's daughter, Ernestine, accepted TCU placement at [facility] and is agreeable to potential out of pocket expenses for [facility].     SARTHAK Lindsay

## 2021-08-10 NOTE — PLAN OF CARE
"Uneventful shift.  No acute changes.  VS are stable.  Telemetry monitoring has been discontinued.  NSR today.    Reports of left hip pain minimal to none at rest.  With ambulation patient does report a \"grabbing\" pain like sensation which has continued to be mild for her.  She declines the use for scheduled Voltaren and lidocaine ointments.  She is participating in PT/OT today.  Ambulating short distances in room.    Appetite is good.  Having regular bowel movements now.  Bowel medications have been held per patient request.     Anticipated discharge to TCU tomorrow, 8/11.    Latesha Haque RN    "

## 2021-08-10 NOTE — PLAN OF CARE
Problem: Adult Inpatient Plan of Care  Goal: Optimal Comfort and Wellbeing  Outcome: Improving  Goal: Readiness for Transition of Care  Outcome: Improving   Patient denied pain this shift. One medium loose stool noted. SBA with activity. Ortho brace pending. SR with BBB, 1AVB on telemetry.

## 2021-08-10 NOTE — PLAN OF CARE
Problem: Pain Acute  Goal: Acceptable Pain Control and Functional Ability  Outcome: No Change  Intervention: Develop Pain Management Plan  Recent Flowsheet Documentation  Taken 8/10/2021 9632 by Zonia Morales, RN  Pain Management Interventions: declines       VSS, significant hip/leg discomfort with transfer to bedside commode, declined  interventions.

## 2021-08-11 VITALS
BODY MASS INDEX: 25.51 KG/M2 | WEIGHT: 135.1 LBS | OXYGEN SATURATION: 97 % | HEIGHT: 61 IN | RESPIRATION RATE: 18 BRPM | TEMPERATURE: 97.7 F | HEART RATE: 67 BPM | SYSTOLIC BLOOD PRESSURE: 149 MMHG | DIASTOLIC BLOOD PRESSURE: 69 MMHG

## 2021-08-11 LAB
C REACTIVE PROTEIN LHE: 1.2 MG/DL (ref 0–0.8)
CREAT SERPL-MCNC: 0.59 MG/DL (ref 0.6–1.1)
GFR SERPL CREATININE-BSD FRML MDRD: 82 ML/MIN/1.73M2
MAGNESIUM SERPL-MCNC: 1.6 MG/DL (ref 1.8–2.6)
PLATELET # BLD AUTO: 362 10E3/UL (ref 150–450)
POTASSIUM BLD-SCNC: 4 MMOL/L (ref 3.5–5)

## 2021-08-11 PROCEDURE — 83735 ASSAY OF MAGNESIUM: CPT | Performed by: FAMILY MEDICINE

## 2021-08-11 PROCEDURE — 82565 ASSAY OF CREATININE: CPT | Performed by: FAMILY MEDICINE

## 2021-08-11 PROCEDURE — 84132 ASSAY OF SERUM POTASSIUM: CPT | Performed by: FAMILY MEDICINE

## 2021-08-11 PROCEDURE — 85049 AUTOMATED PLATELET COUNT: CPT | Performed by: FAMILY MEDICINE

## 2021-08-11 PROCEDURE — 36415 COLL VENOUS BLD VENIPUNCTURE: CPT | Performed by: FAMILY MEDICINE

## 2021-08-11 PROCEDURE — 250N000013 HC RX MED GY IP 250 OP 250 PS 637: Performed by: FAMILY MEDICINE

## 2021-08-11 PROCEDURE — 99239 HOSP IP/OBS DSCHRG MGMT >30: CPT | Performed by: FAMILY MEDICINE

## 2021-08-11 PROCEDURE — 86141 C-REACTIVE PROTEIN HS: CPT | Performed by: FAMILY MEDICINE

## 2021-08-11 RX ORDER — ACETAMINOPHEN 325 MG/1
975 TABLET ORAL EVERY 8 HOURS
Qty: 90 TABLET | Refills: 0
Start: 2021-08-11 | End: 2023-09-07

## 2021-08-11 RX ORDER — AMLODIPINE BESYLATE 2.5 MG/1
7.5 TABLET ORAL DAILY
Start: 2021-08-11 | End: 2022-03-22

## 2021-08-11 RX ORDER — LIDOCAINE 50 MG/G
OINTMENT TOPICAL 4 TIMES DAILY
Qty: 100 G | Refills: 0
Start: 2021-08-11 | End: 2022-03-22

## 2021-08-11 RX ORDER — SENNOSIDES 8.6 MG
2 TABLET ORAL 2 TIMES DAILY PRN
Qty: 60 TABLET | Refills: 0
Start: 2021-08-11 | End: 2022-03-22

## 2021-08-11 RX ORDER — CEFUROXIME AXETIL 500 MG/1
500 TABLET ORAL EVERY 12 HOURS
Qty: 6 TABLET | Refills: 0
Start: 2021-08-11 | End: 2021-08-14

## 2021-08-11 RX ORDER — POLYETHYLENE GLYCOL 3350 17 G/17G
17 POWDER, FOR SOLUTION ORAL DAILY
Qty: 510 G
Start: 2021-08-11 | End: 2022-03-22

## 2021-08-11 RX ADMIN — THERA TABS 1 TABLET: TAB at 08:44

## 2021-08-11 RX ADMIN — ACETAMINOPHEN 975 MG: 325 TABLET ORAL at 01:55

## 2021-08-11 RX ADMIN — EXTENDED PHENYTOIN SODIUM 100 MG: 100 CAPSULE ORAL at 08:44

## 2021-08-11 RX ADMIN — ACETAMINOPHEN 975 MG: 325 TABLET ORAL at 08:43

## 2021-08-11 RX ADMIN — ASPIRIN 81 MG: 81 TABLET, COATED ORAL at 08:42

## 2021-08-11 RX ADMIN — LOSARTAN POTASSIUM 50 MG: 50 TABLET, FILM COATED ORAL at 08:43

## 2021-08-11 RX ADMIN — ALLOPURINOL 100 MG: 100 TABLET ORAL at 08:42

## 2021-08-11 RX ADMIN — Medication 100 MCG: at 08:44

## 2021-08-11 RX ADMIN — CARVEDILOL 6.25 MG: 3.12 TABLET, FILM COATED ORAL at 08:43

## 2021-08-11 RX ADMIN — CEFUROXIME AXETIL 500 MG: 500 TABLET ORAL at 08:44

## 2021-08-11 RX ADMIN — AMLODIPINE BESYLATE 7.5 MG: 2.5 TABLET ORAL at 08:43

## 2021-08-11 NOTE — PLAN OF CARE
Patient denies pain at rest. Feels mild pain when changing positions. Takes scheduled Tylenol. On Mg and K protocols. Slept well.     Problem: Adult Inpatient Plan of Care  Goal: Optimal Comfort and Wellbeing  Outcome: Improving     Problem: Risk for Delirium  Goal: Improved Sleep  Outcome: Improving     Problem: Pain Acute  Goal: Acceptable Pain Control and Functional Ability  Outcome: Improving     Problem: Adult Inpatient Plan of Care  Goal: Absence of Hospital-Acquired Illness or Injury  Intervention: Identify and Manage Fall Risk  Recent Flowsheet Documentation  Taken 8/11/2021 0100 by Sergey Ramsey RN  Safety Promotion/Fall Prevention: bed alarm on     Problem: Adult Inpatient Plan of Care  Goal: Absence of Hospital-Acquired Illness or Injury  Intervention: Prevent Skin Injury  Recent Flowsheet Documentation  Taken 8/11/2021 0100 by Sergey Ramsey RN  Body Position: position changed independently

## 2021-08-11 NOTE — PLAN OF CARE
Problem: Adult Inpatient Plan of Care  Goal: Optimal Comfort and Wellbeing  Outcome: Improving  Goal: Readiness for Transition of Care  Outcome: Improving   Scheduled tylenol given for headache; effective. Patient denied pain the rest of shift. Patient assist of 1 with activities. No acute changes noted. Estimated discharge tomorrow.

## 2021-08-11 NOTE — DISCHARGE SUMMARY
"[facility] MEDICINE  DISCHARGE SUMMARY     Primary Care Physician: Monroe Brito  Admission Date: 8/5/2021   Discharge Provider: Wolf Vaughn MD Discharge Date: 8/11/2021   Diet:   Active Diet and Nourishment Order   Procedures     2 Gram Sodium Diet     Advance Diet as Tolerated       Code Status: Full Code   Activity: DCACTIVITY: Activity as tolerated        Condition at Discharge: Stable     REASON FOR PRESENTATION(See Admission Note for Details)     Please refer to H&P    PRINCIPAL & ACTIVE DISCHARGE DIAGNOSES     Principal Problem:    Hip pain, left  Active Problems:    Hypertension    Muscle weakness (generalized)      PENDING LABS     Unresulted Labs Ordered in the Past 30 Days of this Admission     No orders found from 7/6/2021 to 8/6/2021.            PROCEDURES ( this hospitalization only)          RECOMMENDATIONS TO OUTPATIENT PROVIDER FOR F/U VISIT     Follow-up Appointments     Follow Up and recommended labs and tests      Follow up with Nursing home physician. Monitor blood pressure and adjust   BP regimen PRN. Monitor magnesium level, potassium level, and sodium level   closely. Monitor for repeat urinary retention. Monitor constipation.   Verify resolution of UTI. Monitor pain control.     Follow up with primary care provider in 2-3 days for hospital follow-up.   Monitor blood pressure and adjust BP regimen PRN. Monitor magnesium level,   potassium level, and sodium level closely. Monitor for repeat urinary   retention. Monitor constipation. Verify resolution of UTI. Monitor pain   control.                  Please refer to discharge orders below and bolded portions below    DISPOSITION     Skilled Nursing Facility    SUMMARY OF HOSPITAL COURSE:      Left hip/lower back/flank/buttock pain  -CT abdomen showed chronic appearing bilateral sacral insufficiency fractures and showed \"Moderate inferior endplate compression deformity of L2\"  -patient has peripheral " "neuropathy in both feet at baseline and there was no change in that. Patient denies any \"saddle anesthesia.\" However, the patient has not had a BM in 5 days and in ED, there is concern for new onset urinary retention. Concern for cauda Equina syndrome?   -neurosurgery consulted-->not concerned for cauda equina. NS ordered MRI lumbar and thoracic spines which showed  chronic T2, T4 and L2 fractures without high grade spinal stenosis. When NS discussed LSO brace placement with patient, patient refused at that time. NS signed off.   -hospitalist on 8/7 discussed LSO brace placement with patient, patient is interested in brace placement.   -re-consulted NS for brace placement, consulted orthotics for brace-->patient feels she has shown improvement in pain and ambulation and patient did not feel LSO brace was needed.   -PT/OT eval--->recommeding TCU  -urinary retention and constipation resolved prior to discharge   -c/w scheduled tylenol. avoid narcotics because of patient's allergy hx. Patient does not remember allergy to narcotics though. -->pain team was consulted but patient refused norco and tramadol-->will c/w scheduled tylenol and diclofenac gel and lidocaine ointment started by pain team which will be continued with on discharge as well  -PCP should monitor for repeat urinary retention in OP setting.   -PCP should monitor pain control and progress at TCU in OP setting.           Constipation  -on physical exam on admission, tender at LLQ on palpation. Abdomen distended.  -CT abdomen showed: \"No acute bowel inflammation or findings to suggest mechanical bowel obstruction.\" -patient was started on bowel regimen and patient started having BMs on 8/7.   -patient will be started on miralax, pericolace, and MOM on discharge  -PCP should monitor constipation in OP setting.          Hypokalemia  -PCP should monitor K level in OP setting.        Hypomagnesemia  -PCP should monitor Mag level in OP setting. " "       Hyponatremia  -PCP should monitor Na level in OP setting.           UTI?  -UA suspicious for possible UTI  -urine culture pending but shows growth of >100,000 E. Coli that is resistant to tetracyclines only   -c/w IV rocephin-->switched to PO ceftin to finish total 7 day course and patient will be discharged with the remainder of this course  -PCP should monitor resolution of UTI in OP setting.           HTN  -BP elevated during this admission. Patient was started on norvasc and BP improved.  Patient will be discharged with norvasc for BP control.  -PCP should monitor BP in OP setting and adjust BP regimen PRN.             Elevated blood sugar  -obtain HgbA1c-->HgbA1c 4.9 on 8/9/2021 which is WNL  -PCP should monitor blood sugars in OP setting.           Foot callus  -has foot callus that is causing significant pain for patient with walking  -patient sees her podiatrist (Dr. Samina Lopez) in the OP setting for this foot callus and Dr. Lopez shaves it if needed  -podiatry consulted-->\"The callus was sharply debrided with a #10 blade down to healthy epidermis.  No bleeding noted.\"  -patient to follow-up with Dr. Samina Lopez for next scheduled visit       Patient was found to be medically stable for discharge on 8/11/2021 to the TCU. Patient will continue with all her home meds except for the medication changes listed above.       Discharge Medications with Med changes:     Current Discharge Medication List      START taking these medications    Details   acetaminophen (TYLENOL) 325 MG tablet Take 3 tablets (975 mg) by mouth every 8 hours  Qty: 90 tablet, Refills: 0    Associated Diagnoses: Hip pain, left      amLODIPine (NORVASC) 2.5 MG tablet Take 3 tablets (7.5 mg) by mouth daily    Associated Diagnoses: Essential hypertension      cefuroxime (CEFTIN) 500 MG tablet Take 1 tablet (500 mg) by mouth every 12 hours for 3 days  Qty: 6 tablet, Refills: 0    Associated Diagnoses: Urinary tract infection without " hematuria, site unspecified      cyanocobalamin (CYANOCOBALAMIN) 100 MCG tablet Take 1 tablet (100 mcg) by mouth daily  Qty: 30 tablet, Refills: 0    Associated Diagnoses: Lack of coordination      diclofenac (VOLTAREN) 1 % topical gel Apply 4 g topically 3 times daily Apply to lower back and painful joints    Associated Diagnoses: Hip pain, left      lidocaine (XYLOCAINE) 5 % external ointment Apply topically 4 times daily Apply to lower back and abdomen  Qty: 100 g, Refills: 0    Associated Diagnoses: Hip pain, left      magnesium hydroxide (MILK OF MAGNESIA) 400 MG/5ML suspension Take 30 mLs by mouth daily as needed for constipation Hold for loose stools  Qty: 30 mL, Refills: 0    Associated Diagnoses: Constipation, unspecified constipation type      melatonin 1 MG TABS tablet Take 1 tablet (1 mg) by mouth nightly as needed for sleep  Qty: 30 tablet, Refills: 0    Associated Diagnoses: Insomnia, unspecified type      polyethylene glycol (MIRALAX) 17 GM/Dose powder Take 17 g by mouth daily Hold for loose stools  Qty: 510 g    Associated Diagnoses: Constipation, unspecified constipation type      sennosides (SENOKOT) 8.6 MG tablet Take 2 tablets by mouth 2 times daily as needed for constipation  Qty: 60 tablet, Refills: 0    Associated Diagnoses: Constipation, unspecified constipation type         CONTINUE these medications which have NOT CHANGED    Details   allopurinoL (ZYLOPRIM) 100 MG tablet [ALLOPURINOL (ZYLOPRIM) 100 MG TABLET] TAKE ONE TABLET BY MOUTH ONE TIME DAILY   Qty: 90 tablet, Refills: 0    Associated Diagnoses: Gout      aspirin 81 MG EC tablet [ASPIRIN 81 MG EC TABLET] Take 81 mg by mouth daily.      carvediloL (COREG) 6.25 MG tablet [CARVEDILOL (COREG) 6.25 MG TABLET] TAKE ONE TABLET BY MOUTH TWICE DAILY   Qty: 180 tablet, Refills: 0    Associated Diagnoses: Essential hypertension      losartan (COZAAR) 50 MG tablet [LOSARTAN (COZAAR) 50 MG TABLET] TAKE ONE TABLET BY MOUTH ONE TIME DAILY   Qty: 30  tablet, Refills: 0    Associated Diagnoses: HTN (hypertension)      Multiple Vitamins-Minerals (CENTRUM SILVER 50+WOMEN PO) Take 1 tablet by mouth daily      phenytoin (DILANTIN) 100 MG ER capsule [PHENYTOIN (DILANTIN) 100 MG ER CAPSULE] Take 1 capsule (100 mg total) by mouth 3 (three) times a day.  Qty: 270 capsule, Refills: 3    Comments: Keep on file until patient requests refill  Associated Diagnoses: Convulsions (H)         STOP taking these medications       acetaminophen (TYLENOL 8 HOUR ARTHRITIS PAIN) 650 MG CR tablet Comments:   Reason for Stopping:                     Rationale for medication changes:      Please refer to Summary of Hospital Course Section          Consults       SOCIAL WORK IP CONSULT  ORTHOPEDIC SURGERY IP CONSULT  NEUROSURGERY IP CONSULT  PAIN MANAGEMENT ADULT IP CONSULT  PHYSICAL THERAPY ADULT IP CONSULT  OCCUPATIONAL THERAPY ADULT IP CONSULT  NEUROSURGERY IP CONSULT  ORTHOSIS BRACE IP CONSULT  ORTHOSIS BRACE IP CONSULT  PODIATRY IP CONSULT    Immunizations given this encounter     Most Recent Immunizations   Administered Date(s) Administered     COVID-19,PF,Pfizer 05/27/2021     Flu, Unspecified 10/20/2010     Influenza (High Dose) 3 valent vaccine 11/20/2019     Influenza Vaccine, 6+MO IM (QUADRIVALENT W/PRESERVATIVES) 09/19/2013     Pneumo Conj 13-V (2010&after) 10/19/2015     Pneumococcal 23 valent 11/20/2019     Td,adult,historic,unspecified 09/15/2009     Zoster vaccine, live 07/30/2009           Anticoagulation Information      Recent INR results: No results for input(s): INR in the last 168 hours.  Warfarin doses (if applicable) or name of other anticoagulant: NA      SIGNIFICANT IMAGING FINDINGS     Results for orders placed or performed during the hospital encounter of 08/05/21   CT Abdomen Pelvis w Contrast    Impression    IMPRESSION:     1.  No acute bowel inflammation or findings to suggest mechanical bowel obstruction.  2.  Bilateral adrenal gland thickening consistent  with hyperplasia.  3.  Generalized demineralization of the bones with chronic appearing bilateral sacral insufficiency fractures.   MR Thoracic Spine w/o Contrast    Impression    IMPRESSION:  1.  Mild chronic superior endplate compression fractures of T2 and T4.   2.  Stable mild to moderate inferior endplate chronic compression fracture of L2. 6 mm retrolisthesis of the inferior endplate of L2 into the spinal canal causing mild to moderate spinal canal narrowing at L2-L3.  3.  No MRI evidence of recent osseous or ligamentous injury.  4.  Mild spinal canal narrowing at C6-C7.  5.  No high-grade spinal canal narrowing in the thoracic spine. No high-grade neuroforaminal narrowing.   MR Lumbar Spine w/o Contrast    Impression    IMPRESSION:  1.  Bilateral acute/subacute sacral insufficiency fractures.  2.  Mild to moderate chronic compression fracture of the inferior endplate of L2 with associated 6 mm retropulsion of the posterior inferior endplate of L2 into the spinal canal.   3.  Mild to moderate chronic compression fracture of L4 and L5 with associated vertebroplasty changes.   4.  Mild spinal canal narrowing at L2-L3.  5.  Moderate narrowing of the left lateral recess and mild narrowing of the right lateral recess at L4-L5.  6.  Severe left and mild right neuroforaminal narrowing at L5-S1.  7.  Mild to moderate bilateral neuroforaminal narrowing at L4-L5.  8.  Mild to moderate left neuroforaminal narrowing at L2-L3.       SIGNIFICANT LABORATORY FINDINGS     Most Recent 3 CBC's:Recent Labs   Lab Test 08/11/21  0921 08/10/21  0755 08/09/21  1024 08/08/21  0916   WBC  --  7.2 5.7 6.9   HGB  --  13.3 12.9 14.4   MCV  --  102* 101* 100    399 348 357     Most Recent 3 BMP's:Recent Labs   Lab Test 08/11/21  0921 08/10/21  0755 08/09/21  1024 08/08/21  0916   NA  --  136 133* 132*   POTASSIUM 4.0 4.1 4.1 4.2   CHLORIDE  --  102 100 98   CO2  --  24 31 20*   BUN  --  8 9 7*   CR 0.59* 0.59* 0.57* 0.72   ANIONGAP   --  10 2* 14   OBED  --  9.1 9.0 9.4   GLC  --  101 118 204*     Most Recent 2 LFT's:Recent Labs   Lab Test 08/10/21  0755 08/09/21  1024   AST 33 29   ALT 18 16   ALKPHOS 208* 193*   BILITOTAL 0.3 0.3           Discharge Orders        General info for SNF    Length of Stay Estimate: Short Term Care: Estimated # of Days <30  Condition at Discharge: Improving  Level of care:skilled   Rehabilitation Potential: Good  Admission H&P remains valid and up-to-date: Yes  Recent Chemotherapy: N/A  Use Nursing Home Standing Orders: Yes     Mantoux instructions    Give two-step Mantoux (PPD) Per Facility Policy Yes     Follow Up and recommended labs and tests    Follow up with Nursing home physician. Monitor blood pressure and adjust BP regimen PRN. Monitor magnesium level, potassium level, and sodium level closely. Monitor for repeat urinary retention. Monitor constipation. Verify resolution of UTI. Monitor pain control.     Follow up with primary care provider in 2-3 days for hospital follow-up. Monitor blood pressure and adjust BP regimen PRN. Monitor magnesium level, potassium level, and sodium level closely. Monitor for repeat urinary retention. Monitor constipation. Verify resolution of UTI. Monitor pain control.     Reason for your hospital stay    Patient is a 89 year old female with history significant for Anemia, Ataxia, Epilepsy (H), Essential hypertension, Gout, Monoclonal gammopathy of undetermined significance, Osteoporosis, Peripheral neuropathy, and Secondary hyperparathyroidism (H) comes in for weakness in b/l legs and left hip/lower back/buttock pain.     Intake and output    Every shift     Activity - Up with assistive device     Full Code     Fall precautions     Advance Diet as Tolerated    Follow this diet upon discharge: Orders Placed This Encounter      2 Gram Sodium Diet       Examination   Physical Exam   Temp:  [97.7  F (36.5  C)-98.2  F (36.8  C)] 97.7  F (36.5  C)  Pulse:  [67-78] 67  Resp:  [18]  18  BP: (124-182)/(63-86) 182/86  SpO2:  [95 %-98 %] 97 %  Wt Readings from Last 1 Encounters:   08/10/21 61.3 kg (135 lb 1.6 oz)       GENRL: Not in acute distress. Satting at 97% on room air.   HEENT: NC/AT                 Neck- supple                 Sclera- anicteric                 Mucous membrane- moist and pink  CHEST: Clear to auscultation bilaterally  HEART: S1S2 regular. No murmurs, rubs or gallops  ABDMN:  non-tender on palpation of abdomen. No guarding or rigidity. Bowel sounds present.   EXTRM:  No pedal edema.   NEURO: no involuntary movements   INTGM: please see nursing assessment for full skin assessment  PULSES: 2+ and symmetric all extremities  PSYCH: normal affect, normal speech       Please see EMR for more detailed significant labs, imaging, consultant notes etc.    IWolf MD, personally saw the patient today and spent greater than 30 minutes discharging this patient.    Wolf Vaughn MD  [facility]    CC:Monroe Brito

## 2021-08-11 NOTE — PLAN OF CARE
Physical Therapy Discharge Summary    Reason for therapy discharge:    Discharged to transitional care facility.    Progress towards therapy goal(s). See goals on Care Plan in [facility] electronic health record for goal details.  Goals partially met.  Barriers to achieving goals:   discharge from facility.    Therapy recommendation(s):    Continued therapy is recommended.  Rationale/Recommendations:  To improve mobility and strength  .

## 2021-08-11 NOTE — PROGRESS NOTES
Care Management Follow Up    Length of Stay (days): 6    Expected Discharge Date: 08/11/2021     Concerns to be Addressed: resolved  Patient plan of care discussed at interdisciplinary rounds: Yes    Anticipated Discharge Disposition: Transitional Care     Anticipated Discharge Services: Transitional Care      Referrals Placed by DULCE/ROMA:  Several TCU referrals; Choices previously given to family, who provided preferences     Private pay costs discussed: transportation costs    Additional Information:    Plan to go to [facility] TCU with 1 PM [facility] transport. Discharge orders have been completed by MD. CARDONA following.    Per previous note, pt's daughter, Ernestine, accepted TCU placement at [facility] and is agreeable to potential out of pocket expenses for [facility].     SARTHAK Lindsay

## 2021-08-11 NOTE — PLAN OF CARE
Pt to discharge to TCU around 1pm via [facility] wheel chair. Pt denies pain this am. She does report feeling anxious about being discharged today. Pt is worried about what they will do there and how much they will push her. Pt provided with reassurance. Pt able to transfer self to commode with minimal assist of two to the commode. She denies feeling dizzy or light headed when up. Call light with in reach and able to make needs known

## 2021-09-20 ENCOUNTER — TELEPHONE (OUTPATIENT)
Dept: FAMILY MEDICINE | Facility: CLINIC | Age: 86
End: 2021-09-20

## 2021-09-20 NOTE — TELEPHONE ENCOUNTER
Reason for Call:  Verbal orders    Orders are needed for this patient.     PT: NA    OT: NA    Skilled Nursing: Delay in start of care. Start on Wednesday 9/22    Pt Provider: Alisha    Name of Homecare Nurse: [facility]    Phone Number Homecare Nurse can be reached at: [phone]    Can we leave a detailed message on this number? Yes

## 2021-09-22 ENCOUNTER — TELEPHONE (OUTPATIENT)
Dept: FAMILY MEDICINE | Facility: CLINIC | Age: 86
End: 2021-09-22

## 2021-09-22 NOTE — TELEPHONE ENCOUNTER
Reason for Call:  Home Health Care    [facility] called regarding (reason for call): verbal orders    Orders are needed for this patient. PT, PT and SW    PT: Mckay    OT: Mckay    Social Work:  Eval - resources for assisted living    Skilled Nursing: N/A    Pt Provider: Dr. Brito    Phone Number Homecare Nurse can be reached at: [phone]    Can we leave a detailed message on this number? YES    Call taken on 9/22/2021 at 1:02 PM by Erich Field

## 2021-09-23 DIAGNOSIS — I10 HTN (HYPERTENSION): ICD-10-CM

## 2021-09-23 DIAGNOSIS — I10 BENIGN ESSENTIAL HYPERTENSION: Primary | ICD-10-CM

## 2021-09-23 DIAGNOSIS — M10.9 GOUT: ICD-10-CM

## 2021-09-23 DIAGNOSIS — M10.9 GOUT, UNSPECIFIED CAUSE, UNSPECIFIED CHRONICITY, UNSPECIFIED SITE: ICD-10-CM

## 2021-09-23 DIAGNOSIS — I10 ESSENTIAL HYPERTENSION: ICD-10-CM

## 2021-09-24 RX ORDER — LOSARTAN POTASSIUM 50 MG/1
TABLET ORAL
Qty: 90 TABLET | Refills: 0 | Status: SHIPPED | OUTPATIENT
Start: 2021-09-24 | End: 2022-02-15

## 2021-09-24 RX ORDER — ALLOPURINOL 100 MG/1
TABLET ORAL
Qty: 90 TABLET | Refills: 0 | Status: SHIPPED | OUTPATIENT
Start: 2021-09-24 | End: 2022-02-15

## 2021-09-24 RX ORDER — CARVEDILOL 6.25 MG/1
TABLET ORAL
Qty: 180 TABLET | Refills: 0 | Status: SHIPPED | OUTPATIENT
Start: 2021-09-24 | End: 2022-02-15

## 2021-09-24 NOTE — TELEPHONE ENCOUNTER
"Routing refill request to provider for review/approval because:  Labs out of range:  Multiple   Labs not current:  Multiple     Last Written Prescription Date:  3/18/21  Last Fill Quantity: 90,  # refills: 0   Last office visit provider:  12/14/20     Last Written Prescription Date:  5/28/21  Last Fill Quantity: 180,  # refills: 0   Last office visit provider:  12/14/20     Last Written Prescription Date:  5/26/21  Last Fill Quantity: 30,  # refills: 0   Last office visit provider:  12/14/20     Requested Prescriptions   Pending Prescriptions Disp Refills     allopurinol (ZYLOPRIM) 100 MG tablet [Pharmacy Med Name: Allopurinol Oral Tablet 100 MG] 90 tablet 0     Sig: Take 1 tablet (100 mg total) by mouth daily       Gout Agents Protocol Failed - 9/23/2021  1:36 PM        Failed - CBC on file in past 12 months     Recent Labs   Lab Test 08/11/21  0921 08/10/21  0755 08/10/21  0755   WBC  --   --  7.2   RBC  --   --  3.95   HGB  --   --  13.3   HCT  --   --  40.1      < > 399    < > = values in this interval not displayed.                 Failed - Has Uric Acid on file in past 12 months and value is less than 6     Recent Labs   Lab Test 11/20/19  1025   URIC 4.6     If level is 6mg/dL or greater, ok to refill one time and refer to provider.           Failed - Normal serum creatinine on file in the past 12 months     Recent Labs   Lab Test 08/11/21  0921   CR 0.59*       Ok to refill medication if creatinine is low          Passed - ALT on file in past 12 months     Recent Labs   Lab Test 08/10/21  0755   ALT 18             Passed - Recent (12 mo) or future (30 days) visit within the authorizing provider's specialty     Patient has had an office visit with the authorizing provider or a provider within the authorizing providers department within the previous 12 mos or has a future within next 30 days. See \"Patient Info\" tab in inbasket, or \"Choose Columns\" in Meds & Orders section of the refill encounter.       " "       Passed - Medication is active on med list        Passed - Patient is age 18 or older        Passed - No active pregnancy on record        Passed - No positive pregnancy test in past year           carvedilol (COREG) 6.25 MG tablet [Pharmacy Med Name: Carvedilol Oral Tablet 6.25 MG] 180 tablet 0     Sig: TAKE ONE TABLET BY MOUTH TWICE DAILY       Beta-Blockers Protocol Failed - 9/23/2021  1:36 PM        Failed - Blood pressure under 140/90 in past 12 months     BP Readings from Last 3 Encounters:   08/11/21 (!) 149/69   11/20/19 130/80                 Passed - Patient is age 6 or older        Passed - Recent (12 mo) or future (30 days) visit within the authorizing provider's specialty     Patient has had an office visit with the authorizing provider or a provider within the authorizing providers department within the previous 12 mos or has a future within next 30 days. See \"Patient Info\" tab in inbasket, or \"Choose Columns\" in Meds & Orders section of the refill encounter.              Passed - Medication is active on med list           losartan (COZAAR) 50 MG tablet [Pharmacy Med Name: Losartan Potassium Oral Tablet 50 MG] 30 tablet 0     Sig: TAKE ONE TABLET BY MOUTH ONE TIME DAILY       Angiotensin-II Receptors Failed - 9/23/2021  1:36 PM        Failed - Last blood pressure under 140/90 in past 12 months     BP Readings from Last 3 Encounters:   08/11/21 (!) 149/69   11/20/19 130/80                 Failed - Normal serum creatinine on file in past 12 months     Recent Labs   Lab Test 08/11/21  0921   CR 0.59*       Ok to refill medication if creatinine is low          Passed - Recent (12 mo) or future (30 days) visit within the authorizing provider's specialty     Patient has had an office visit with the authorizing provider or a provider within the authorizing providers department within the previous 12 mos or has a future within next 30 days. See \"Patient Info\" tab in inbasket, or \"Choose Columns\" in Meds " & Orders section of the refill encounter.              Passed - Medication is active on med list        Passed - Patient is age 18 or older        Passed - No active pregnancy on record        Passed - Normal serum potassium on file in past 12 months     Recent Labs   Lab Test 08/11/21  0921   POTASSIUM 4.0                    Passed - No positive pregnancy test in past 12 months             Monroe Whelan RN 09/24/21 7:57 AM

## 2021-09-27 ENCOUNTER — TELEPHONE (OUTPATIENT)
Dept: FAMILY MEDICINE | Facility: CLINIC | Age: 86
End: 2021-09-27

## 2021-09-27 NOTE — TELEPHONE ENCOUNTER
[facility] is calling to get verbal orders for patient:  Home Care Occupational therapy for 1 time a week for 4 weeks.    Please call orders to [phone].

## 2021-10-04 DIAGNOSIS — Z53.9 DIAGNOSIS NOT YET DEFINED: Primary | ICD-10-CM

## 2021-10-04 PROCEDURE — G0180 MD CERTIFICATION HHA PATIENT: HCPCS | Performed by: FAMILY MEDICINE

## 2021-10-05 ENCOUNTER — MEDICAL CORRESPONDENCE (OUTPATIENT)
Dept: HEALTH INFORMATION MANAGEMENT | Facility: CLINIC | Age: 86
End: 2021-10-05

## 2021-10-12 ENCOUNTER — MEDICAL CORRESPONDENCE (OUTPATIENT)
Dept: HEALTH INFORMATION MANAGEMENT | Facility: CLINIC | Age: 86
End: 2021-10-12

## 2022-02-15 ENCOUNTER — OFFICE VISIT (OUTPATIENT)
Dept: FAMILY MEDICINE | Facility: CLINIC | Age: 87
End: 2022-02-15
Payer: MEDICARE

## 2022-02-15 VITALS
RESPIRATION RATE: 14 BRPM | SYSTOLIC BLOOD PRESSURE: 174 MMHG | HEIGHT: 61 IN | OXYGEN SATURATION: 97 % | WEIGHT: 145 LBS | DIASTOLIC BLOOD PRESSURE: 84 MMHG | HEART RATE: 56 BPM | TEMPERATURE: 98.4 F | BODY MASS INDEX: 27.38 KG/M2

## 2022-02-15 DIAGNOSIS — Z00.00 ENCOUNTER FOR ANNUAL WELLNESS EXAM IN MEDICARE PATIENT: Primary | ICD-10-CM

## 2022-02-15 DIAGNOSIS — I10 ESSENTIAL HYPERTENSION: ICD-10-CM

## 2022-02-15 DIAGNOSIS — M10.9 GOUT, UNSPECIFIED CAUSE, UNSPECIFIED CHRONICITY, UNSPECIFIED SITE: ICD-10-CM

## 2022-02-15 DIAGNOSIS — E21.1 SECONDARY HYPERPARATHYROIDISM, NON-RENAL (H): ICD-10-CM

## 2022-02-15 DIAGNOSIS — G40.409 OTHER GENERALIZED EPILEPSY, NOT INTRACTABLE, WITHOUT STATUS EPILEPTICUS (H): ICD-10-CM

## 2022-02-15 DIAGNOSIS — E55.9 VITAMIN D DEFICIENCY: ICD-10-CM

## 2022-02-15 DIAGNOSIS — Z23 HIGH PRIORITY FOR 2019 NOVEL CORONAVIRUS VACCINATION: ICD-10-CM

## 2022-02-15 DIAGNOSIS — E87.6 LOW SERUM POTASSIUM: ICD-10-CM

## 2022-02-15 DIAGNOSIS — Z23 FLU VACCINE NEED: ICD-10-CM

## 2022-02-15 DIAGNOSIS — M62.81 MUSCLE WEAKNESS (GENERALIZED): ICD-10-CM

## 2022-02-15 DIAGNOSIS — M62.81 GENERALIZED MUSCLE WEAKNESS: ICD-10-CM

## 2022-02-15 DIAGNOSIS — R79.0 LOW MAGNESIUM LEVEL: ICD-10-CM

## 2022-02-15 DIAGNOSIS — D64.9 ANEMIA, UNSPECIFIED TYPE: ICD-10-CM

## 2022-02-15 DIAGNOSIS — I10 BENIGN ESSENTIAL HYPERTENSION: ICD-10-CM

## 2022-02-15 DIAGNOSIS — E56.9 VITAMIN DEFICIENCY: ICD-10-CM

## 2022-02-15 LAB
ALBUMIN SERPL-MCNC: 3.9 G/DL (ref 3.5–5)
ALP SERPL-CCNC: 139 U/L (ref 45–120)
ALT SERPL W P-5'-P-CCNC: 13 U/L (ref 0–45)
ANION GAP SERPL CALCULATED.3IONS-SCNC: 9 MMOL/L (ref 5–18)
AST SERPL W P-5'-P-CCNC: 22 U/L (ref 0–40)
BASOPHILS # BLD AUTO: 0 10E3/UL (ref 0–0.2)
BASOPHILS NFR BLD AUTO: 1 %
BILIRUB SERPL-MCNC: 0.4 MG/DL (ref 0–1)
BUN SERPL-MCNC: 8 MG/DL (ref 8–28)
CALCIUM SERPL-MCNC: 9.3 MG/DL (ref 8.5–10.5)
CHLORIDE BLD-SCNC: 104 MMOL/L (ref 98–107)
CO2 SERPL-SCNC: 25 MMOL/L (ref 22–31)
CREAT SERPL-MCNC: 0.58 MG/DL (ref 0.6–1.1)
EOSINOPHIL # BLD AUTO: 0.1 10E3/UL (ref 0–0.7)
EOSINOPHIL NFR BLD AUTO: 2 %
ERYTHROCYTE [DISTWIDTH] IN BLOOD BY AUTOMATED COUNT: 13 % (ref 10–15)
ERYTHROCYTE [SEDIMENTATION RATE] IN BLOOD BY WESTERGREN METHOD: 21 MM/HR (ref 0–20)
GFR SERPL CREATININE-BSD FRML MDRD: 85 ML/MIN/1.73M2
GLUCOSE BLD-MCNC: 94 MG/DL (ref 70–125)
HCT VFR BLD AUTO: 37.7 % (ref 35–47)
HGB BLD-MCNC: 12.1 G/DL (ref 11.7–15.7)
IMM GRANULOCYTES # BLD: 0 10E3/UL
IMM GRANULOCYTES NFR BLD: 0 %
LYMPHOCYTES # BLD AUTO: 2.5 10E3/UL (ref 0.8–5.3)
LYMPHOCYTES NFR BLD AUTO: 40 %
MAGNESIUM SERPL-MCNC: 1.5 MG/DL (ref 1.8–2.6)
MCH RBC QN AUTO: 33 PG (ref 26.5–33)
MCHC RBC AUTO-ENTMCNC: 32.1 G/DL (ref 31.5–36.5)
MCV RBC AUTO: 103 FL (ref 78–100)
MONOCYTES # BLD AUTO: 0.5 10E3/UL (ref 0–1.3)
MONOCYTES NFR BLD AUTO: 8 %
NEUTROPHILS # BLD AUTO: 3.2 10E3/UL (ref 1.6–8.3)
NEUTROPHILS NFR BLD AUTO: 50 %
PHENYTOIN SERPL-MCNC: 7.5 UG/ML
PLATELET # BLD AUTO: 246 10E3/UL (ref 150–450)
POTASSIUM BLD-SCNC: 3.9 MMOL/L (ref 3.5–5)
PROT SERPL-MCNC: 7.3 G/DL (ref 6–8)
PTH-INTACT SERPL-MCNC: 78 PG/ML (ref 10–86)
RBC # BLD AUTO: 3.67 10E6/UL (ref 3.8–5.2)
SODIUM SERPL-SCNC: 138 MMOL/L (ref 136–145)
TSH SERPL DL<=0.005 MIU/L-ACNC: 2.48 UIU/ML (ref 0.3–5)
URATE SERPL-MCNC: 4.6 MG/DL (ref 2–7.5)
VIT B12 SERPL-MCNC: 256 PG/ML (ref 213–816)
WBC # BLD AUTO: 6.3 10E3/UL (ref 4–11)

## 2022-02-15 PROCEDURE — 90662 IIV NO PRSV INCREASED AG IM: CPT | Performed by: FAMILY MEDICINE

## 2022-02-15 PROCEDURE — G0008 ADMIN INFLUENZA VIRUS VAC: HCPCS | Performed by: FAMILY MEDICINE

## 2022-02-15 PROCEDURE — 36415 COLL VENOUS BLD VENIPUNCTURE: CPT | Performed by: FAMILY MEDICINE

## 2022-02-15 PROCEDURE — 84550 ASSAY OF BLOOD/URIC ACID: CPT | Performed by: FAMILY MEDICINE

## 2022-02-15 PROCEDURE — 0054A COVID-19,PF,PFIZER (12+ YRS): CPT | Performed by: FAMILY MEDICINE

## 2022-02-15 PROCEDURE — 83970 ASSAY OF PARATHORMONE: CPT | Performed by: FAMILY MEDICINE

## 2022-02-15 PROCEDURE — 82607 VITAMIN B-12: CPT | Performed by: FAMILY MEDICINE

## 2022-02-15 PROCEDURE — 91305 COVID-19,PF,PFIZER (12+ YRS): CPT | Performed by: FAMILY MEDICINE

## 2022-02-15 PROCEDURE — 83735 ASSAY OF MAGNESIUM: CPT | Performed by: FAMILY MEDICINE

## 2022-02-15 PROCEDURE — 84443 ASSAY THYROID STIM HORMONE: CPT | Performed by: FAMILY MEDICINE

## 2022-02-15 PROCEDURE — G0439 PPPS, SUBSEQ VISIT: HCPCS | Performed by: FAMILY MEDICINE

## 2022-02-15 PROCEDURE — 85652 RBC SED RATE AUTOMATED: CPT | Performed by: FAMILY MEDICINE

## 2022-02-15 PROCEDURE — 85025 COMPLETE CBC W/AUTO DIFF WBC: CPT | Performed by: FAMILY MEDICINE

## 2022-02-15 PROCEDURE — 80053 COMPREHEN METABOLIC PANEL: CPT | Performed by: FAMILY MEDICINE

## 2022-02-15 PROCEDURE — 86038 ANTINUCLEAR ANTIBODIES: CPT | Performed by: FAMILY MEDICINE

## 2022-02-15 PROCEDURE — 80186 ASSAY OF PHENYTOIN FREE: CPT | Performed by: FAMILY MEDICINE

## 2022-02-15 PROCEDURE — 82306 VITAMIN D 25 HYDROXY: CPT | Performed by: FAMILY MEDICINE

## 2022-02-15 PROCEDURE — 80185 ASSAY OF PHENYTOIN TOTAL: CPT | Performed by: FAMILY MEDICINE

## 2022-02-15 RX ORDER — LOSARTAN POTASSIUM 50 MG/1
50 TABLET ORAL DAILY
Qty: 90 TABLET | Refills: 4 | Status: SHIPPED | OUTPATIENT
Start: 2022-02-15 | End: 2022-04-26 | Stop reason: DRUGHIGH

## 2022-02-15 RX ORDER — ALLOPURINOL 100 MG/1
TABLET ORAL
Qty: 90 TABLET | Refills: 3 | Status: SHIPPED | OUTPATIENT
Start: 2022-02-15

## 2022-02-15 RX ORDER — PHENYTOIN SODIUM 100 MG/1
CAPSULE, EXTENDED RELEASE ORAL
Qty: 270 CAPSULE | Refills: 3 | Status: ON HOLD | OUTPATIENT
Start: 2022-02-15 | End: 2023-09-18

## 2022-02-15 RX ORDER — CARVEDILOL 6.25 MG/1
6.25 TABLET ORAL 2 TIMES DAILY
Qty: 180 TABLET | Refills: 3 | Status: SHIPPED | OUTPATIENT
Start: 2022-02-15

## 2022-02-15 ASSESSMENT — MIFFLIN-ST. JEOR: SCORE: 1010.48

## 2022-02-15 ASSESSMENT — ACTIVITIES OF DAILY LIVING (ADL)
CURRENT_FUNCTION: SHOPPING REQUIRES ASSISTANCE
CURRENT_FUNCTION: TRANSPORTATION REQUIRES ASSISTANCE
CURRENT_FUNCTION: HOUSEWORK REQUIRES ASSISTANCE
CURRENT_FUNCTION: LAUNDRY REQUIRES ASSISTANCE

## 2022-02-15 NOTE — LETTER
February 22, 2022      Peggy Bairdkamini  [address] [facility] [address]        Dear ,    We are writing to inform you of your test results.    Your potassium and magnesium are low.   You should have gotten a call about this and that I sent prescriptions to your pharmacy.   I am sending this letter so you can have the results of all the tests for your records.     Resulted Orders   TSH with free T4 reflex   Result Value Ref Range    TSH 2.48 0.30 - 5.00 uIU/mL   ESR: Erythrocyte sedimentation rate   Result Value Ref Range    Erythrocyte Sedimentation Rate 21 (H) 0 - 20 mm/hr   Anti Nuclear Marline IgG by IFA with Reflex   Result Value Ref Range    ELHAM interpretation Negative Negative      Comment:        Cytoplasmic pattern observed. The ELHAM test is intended to detect antibodies directed against the cell nucleus. However, antibodies against other cellular structures may also be detected. These are often non-specific, although some clinically significant antibodies directed against cytoplasmic components may be observed. Follow-up testing may be considered if clinically indicated.    Negative:              <1:40  Borderline Positive:   1:40 - 1:80  Positive:              >1:80   Parathyroid Hormone Intact   Result Value Ref Range    Parathyroid Hormone Intact 78 10 - 86 pg/mL   Comprehensive metabolic panel (BMP + Alb, Alk Phos, ALT, AST, Total. Bili, TP)   Result Value Ref Range    Sodium 138 136 - 145 mmol/L    Potassium 3.9 3.5 - 5.0 mmol/L    Chloride 104 98 - 107 mmol/L    Carbon Dioxide (CO2) 25 22 - 31 mmol/L    Anion Gap 9 5 - 18 mmol/L    Urea Nitrogen 8 8 - 28 mg/dL    Creatinine 0.58 (L) 0.60 - 1.10 mg/dL    Calcium 9.3 8.5 - 10.5 mg/dL    Glucose 94 70 - 125 mg/dL    Alkaline Phosphatase 139 (H) 45 - 120 U/L    AST 22 0 - 40 U/L    ALT 13 0 - 45 U/L    Protein Total 7.3 6.0 - 8.0 g/dL    Albumin 3.9 3.5 - 5.0 g/dL    Bilirubin Total 0.4 0.0 - 1.0 mg/dL    GFR Estimate 85 >60 mL/min/1.73m2       Comment:      Effective December 21, 2021 eGFRcr in adults is calculated using the 2021 CKD-EPI creatinine equation which includes age and gender (oLretta et al., [place], DOI: 10.1056/CFKGub5931454)   Uric acid   Result Value Ref Range    Uric Acid 4.6 2.0 - 7.5 mg/dL   Vitamin D Deficiency   Result Value Ref Range    Vitamin D, Total (25-Hydroxy) 21 (L) 30 - 80 ug/L    Narrative    Deficiency <10.0 ug/L  Insufficiency 10.0-29.9 ug/L  Sufficiency 30.0-80.0 ug/L  Toxicity (possible) >100.0 ug/L    Vitamin B12   Result Value Ref Range    Vitamin B12 256 213 - 816 pg/mL   PHENYTOIN LEVEL   Result Value Ref Range    Phenytoin 7.5   ug/mL      Comment:        Therapeutic Range: 10.0-20.0 ug/mL  Critical: Greater than 25.0 ug/mL   PHENYTOIN FREE   Result Value Ref Range    Phenytoin Free 0.5   ug/mL      Comment:        Therapeutic Range: 1.0-2.0 ug/mL  Critical: Greater than 2.6 ug/mL   Magnesium   Result Value Ref Range    Magnesium 1.5 (L) 1.8 - 2.6 mg/dL   CBC with platelets and differential   Result Value Ref Range    WBC Count 6.3 4.0 - 11.0 10e3/uL    RBC Count 3.67 (L) 3.80 - 5.20 10e6/uL    Hemoglobin 12.1 11.7 - 15.7 g/dL    Hematocrit 37.7 35.0 - 47.0 %     (H) 78 - 100 fL    MCH 33.0 26.5 - 33.0 pg    MCHC 32.1 31.5 - 36.5 g/dL    RDW 13.0 10.0 - 15.0 %    Platelet Count 246 150 - 450 10e3/uL    % Neutrophils 50 %    % Lymphocytes 40 %    % Monocytes 8 %    % Eosinophils 2 %    % Basophils 1 %    % Immature Granulocytes 0 %    Absolute Neutrophils 3.2 1.6 - 8.3 10e3/uL    Absolute Lymphocytes 2.5 0.8 - 5.3 10e3/uL    Absolute Monocytes 0.5 0.0 - 1.3 10e3/uL    Absolute Eosinophils 0.1 0.0 - 0.7 10e3/uL    Absolute Basophils 0.0 0.0 - 0.2 10e3/uL    Absolute Immature Granulocytes 0.0 <=0.4 10e3/uL       If you have any questions or concerns, please call the clinic at the number listed above.       Sincerely,      Dimple Bhatia MD

## 2022-02-15 NOTE — PROGRESS NOTES
"SUBJECTIVE:   Peggy De Anda is a 89 year old female who presents for Preventive Visit.  Patient new to [facility]. Here with daughter and son-in-law. Plans to switch to this clinic.     Patient has been advised of split billing requirements and indicates understanding: Yes  Are you in the first 12 months of your Medicare coverage?  No    HPI   Right foot/leg swelling    Left foot sore from callous with sore from h/o improper healing of broken toe   Walks off balance  Needs new shoes     Dizziness /wobbly  Needs to hold to something so she does not fall   Room not spinning    Generally weakness - unable to get dishes out of cupboard and living legs to get into car - not painful   Getting progressively worse for the last 2 years  Fell in house last year and broke toe but did not see an     Per chart review:   Admission:   Hip injury in 8/2021 with hip pain and weakness and admitted 8/5 - 8/11/21 then went TCU and then home therapy then outpatient therapy  Left hip/lower back/flank/buttock pain  -CT abdomen showed chronic appearing bilateral sacral insufficiency fractures and showed \"Moderate inferior endplate compression deformity of L2\"  Low K, Mg, Na  And elevated CRP    H/o seizures  On phenytoin     H/o gout on allopurinol    Podiatrist at [facility] on [address]     Social -   Lives alone    Do you feel safe in your environment? Yes    Have you ever done Advance Care Planning? (For example, a Health Directive, POLST, or a discussion with a medical provider or your loved ones about your wishes): No, advance care planning information given to patient to review.  Patient plans to discuss their wishes with loved ones or provider.         Fall risk  Fallen 2 or more times in the past year?: No  Any fall with injury in the past year?: No    Cognitive Screening   1) Repeat 3 items (Leader, Season, Table)      2) Clock draw:   NORMAL  3) 3 item recall: Recalls 3 objects  Results: NORMAL clock, 1-2 items " recalled: COGNITIVE IMPAIRMENT LESS LIKELY    Mini-CogTM Copyright TERE Lopez. Licensed by the author for use in [facility]; reprinted with permission (vanessa@.[facility]). All rights reserved.      Do you have sleep apnea, excessive snoring or daytime drowsiness?: no    Reviewed and updated as needed this visit by clinical staff    Meds           Current Outpatient Medications   Medication Instructions     acetaminophen (TYLENOL) 975 mg, Oral, EVERY 8 HOURS     allopurinol (ZYLOPRIM) 100 MG tablet Take 1 tablet (100 mg total) by mouth daily     amLODIPine (NORVASC) 7.5 mg, Oral, DAILY     aspirin (ASA) 81 mg, DAILY     carvedilol (COREG) 6.25 MG tablet TAKE ONE TABLET BY MOUTH TWICE DAILY      diclofenac (VOLTAREN) 4 g, Topical, 3 TIMES DAILY, Apply to lower back and painful joints     lidocaine (XYLOCAINE) 5 % external ointment Topical, 4 TIMES DAILY, Apply to lower back and abdomen     losartan (COZAAR) 50 MG tablet TAKE ONE TABLET BY MOUTH ONE TIME DAILY      magnesium hydroxide (MILK OF MAGNESIA) 400 MG/5ML suspension 30 mLs, Oral, DAILY PRN, Hold for loose stools     melatonin 1 mg, Oral, AT BEDTIME PRN     Multiple Vitamins-Minerals (CENTRUM SILVER 50+WOMEN PO) 1 tablet, Oral, DAILY     phenytoin (DILANTIN) 100 MG capsule Take 1 capsule (100 mg total) by mouth 3 (three) times a day.     polyethylene glycol (MIRALAX) 17 g, Oral, DAILY, Hold for loose stools     sennosides (SENOKOT) 8.6 MG tablet 2 tablets, Oral, 2 TIMES DAILY PRN     vitamin B-12 (CYANOCOBALAMIN) 100 mcg, Oral, DAILY         Reviewed and updated as needed this visit by Provider               Patient Active Problem List   Diagnosis     Epilepsy And Recurrent Seizures     Nonrenal Secondary Hyperparathyroidism     Vitamin D Deficiency     Monoclonal Gammopathy Of Undetermined Significance     Anemia     Joint Pain, Localized In The Hip     Peripheral Neuropathy     Hypertension     Osteoporosis     Fatigue     Obesity     Convulsive  Disorder     Ataxia     Dermatitis     Gout     Muscle weakness (generalized)     Hip pain, left     Past Medical History:   Diagnosis Date     Anemia      Ataxia      Epilepsy (H)      Essential hypertension      Gout      Monoclonal gammopathy of undetermined significance      Osteoporosis      Peripheral neuropathy      Secondary hyperparathyroidism (H)     non-renal     Past Surgical History:   Procedure Laterality Date     APPENDECTOMY      AGE 13     CATARACT EXTRACTION, BILATERAL       CHOLECYSTECTOMY       IR LUMBAR EPIDURAL STEROID INJECTION  10/5/2007     IR MISCELLANEOUS PROCEDURE  10/30/2007     NEUROPLASTY / TRANSPOSITION MEDIAN NERVE AT CARPAL TUNNEL BILATERAL       TONSILLECTOMY           Social History     Tobacco Use     Smoking status: Never Smoker     Smokeless tobacco: Not on file   Substance Use Topics     Alcohol use: Not on file     If you drink alcohol do you typically have >3 drinks per day or >7 drinks per week? Not applicable    Alcohol Use 2/15/2022   Prescreen: >3 drinks/day or >7 drinks/week? Not Applicable         Current providers sharing in care for this patient include:     Patient Care Team:  Monroe Brito MD as PCP - General  Monroe Brito MD as Assigned PCP    The following health maintenance items are reviewed in Epic and correct as of today:  Health Maintenance Due   Topic Date Due     ANNUAL REVIEW OF HM ORDERS  Never done     ADVANCE CARE PLANNING  Never done     DTAP/TDAP/TD IMMUNIZATION (1 - Tdap) 09/16/2009     ZOSTER IMMUNIZATION (2 of 3) 09/24/2009     MEDICARE ANNUAL WELLNESS VISIT  11/20/2020     FALL RISK ASSESSMENT  11/20/2020     INFLUENZA VACCINE (1) 09/01/2021     COVID-19 Vaccine (3 - Booster for Pfizer series) 10/27/2021     BP Readings from Last 3 Encounters:   02/15/22 (!) 176/80   08/11/21 (!) 149/69   11/20/19 130/80    Wt Readings from Last 3 Encounters:   02/15/22 65.8 kg (145 lb)   08/10/21 61.3 kg (135 lb 1.6 oz)   11/20/19 66.8 kg (147 lb 3.2  oz)                  Patient Active Problem List   Diagnosis     Epilepsy And Recurrent Seizures     Nonrenal Secondary Hyperparathyroidism     Vitamin D Deficiency     Monoclonal Gammopathy Of Undetermined Significance     Anemia     Joint Pain, Localized In The Hip     Peripheral Neuropathy     Hypertension     Osteoporosis     Fatigue     Obesity     Convulsive Disorder     Ataxia     Dermatitis     Gout     Muscle weakness (generalized)     Hip pain, left     Past Surgical History:   Procedure Laterality Date     APPENDECTOMY      AGE 13     CATARACT EXTRACTION, BILATERAL       CHOLECYSTECTOMY       IR LUMBAR EPIDURAL STEROID INJECTION  10/5/2007     IR MISCELLANEOUS PROCEDURE  10/30/2007     NEUROPLASTY / TRANSPOSITION MEDIAN NERVE AT CARPAL TUNNEL BILATERAL       TONSILLECTOMY         Social History     Tobacco Use     Smoking status: Never Smoker     Smokeless tobacco: Not on file   Substance Use Topics     Alcohol use: Not on file     No family history on file.      Current Outpatient Medications   Medication Sig Dispense Refill     acetaminophen (TYLENOL) 325 MG tablet Take 3 tablets (975 mg) by mouth every 8 hours 90 tablet 0     allopurinol (ZYLOPRIM) 100 MG tablet Take 1 tablet (100 mg total) by mouth daily 90 tablet 0     aspirin 81 MG EC tablet [ASPIRIN 81 MG EC TABLET] Take 81 mg by mouth daily.       carvedilol (COREG) 6.25 MG tablet TAKE ONE TABLET BY MOUTH TWICE DAILY  180 tablet 0     Multiple Vitamins-Minerals (CENTRUM SILVER 50+WOMEN PO) Take 1 tablet by mouth daily       phenytoin (DILANTIN) 100 MG capsule Take 1 capsule (100 mg total) by mouth 3 (three) times a day. 270 capsule 0     amLODIPine (NORVASC) 2.5 MG tablet Take 3 tablets (7.5 mg) by mouth daily (Patient not taking: Reported on 2/15/2022)       cyanocobalamin (CYANOCOBALAMIN) 100 MCG tablet Take 1 tablet (100 mcg) by mouth daily (Patient not taking: Reported on 2/15/2022) 30 tablet 0     diclofenac (VOLTAREN) 1 % topical gel Apply 4  "g topically 3 times daily Apply to lower back and painful joints (Patient not taking: Reported on 2/15/2022)       lidocaine (XYLOCAINE) 5 % external ointment Apply topically 4 times daily Apply to lower back and abdomen (Patient not taking: Reported on 2/15/2022) 100 g 0     losartan (COZAAR) 50 MG tablet TAKE ONE TABLET BY MOUTH ONE TIME DAILY  (Patient not taking: Reported on 2/15/2022) 90 tablet 0     magnesium hydroxide (MILK OF MAGNESIA) 400 MG/5ML suspension Take 30 mLs by mouth daily as needed for constipation Hold for loose stools (Patient not taking: Reported on 2/15/2022) 30 mL 0     melatonin 1 MG TABS tablet Take 1 tablet (1 mg) by mouth nightly as needed for sleep (Patient not taking: Reported on 2/15/2022) 30 tablet 0     polyethylene glycol (MIRALAX) 17 GM/Dose powder Take 17 g by mouth daily Hold for loose stools (Patient not taking: Reported on 2/15/2022) 510 g      sennosides (SENOKOT) 8.6 MG tablet Take 2 tablets by mouth 2 times daily as needed for constipation (Patient not taking: Reported on 2/15/2022) 60 tablet 0     Allergies   Allergen Reactions     Oxycodone Unknown     Quinolones Rash     Levofloxacin Rash       Mammogram Screening - Patient over age 75, has elected to discontinue screenings.  Pertinent mammograms are reviewed under the imaging tab.    Review of Systems   Please see above.  The rest of the review of systems are negative for all systems.      OBJECTIVE:   BP (!) 176/80   Temp 98.4  F (36.9  C) (Oral)   Resp 14   Ht 1.542 m (5' 0.71\")   Wt 65.8 kg (145 lb)   SpO2 97%   BMI 27.66 kg/m   Estimated body mass index is 27.66 kg/m  as calculated from the following:    Height as of this encounter: 1.542 m (5' 0.71\").    Weight as of this encounter: 65.8 kg (145 lb).  Physical Exam  GENERAL: healthy, alert and no distress  EYES: Eyes grossly normal to inspection, PERRL and conjunctivae and sclerae normal  HENT: ear and nose normal   NECK: no adenopathy, no asymmetry, masses, " or scars and thyroid normal to palpation  RESP: lungs clear to auscultation - no rales, rhonchi or wheezes  CV: regular rate and rhythm, normal S1 S2, no S3 or S4, no murmur, click or rub, bilateral pitting peripheral edema and peripheral pulses moderate  ABDOMEN: soft, nontender  MS:general weakness and difficulty getting out of chair  SKIN: callous under left metatarsal with hardened nodule   NEURO: Normal strength and tone, mentation intact and speech normal  PSYCH: mentation appears normal, affect normal/bright        ASSESSMENT / PLAN:   Peggy was seen today for wellness visit and callus bottom of feet.    Diagnoses and all orders for this visit:    Encounter for annual wellness exam in Medicare patient  No evidence for cognitive decline  Concerns for fall risk  Flu shot and covid booster given today  Past the age of cancer screening      Gout, unspecified cause, unspecified chronicity, unspecified site  H/o gout years ago and been on allopurinol for many years  Recheck uric acid level  -     allopurinol (ZYLOPRIM) 100 MG tablet; Take 1 tablet (100 mg total) by mouth daily  -     Uric acid    Benign essential hypertension  BP very high today which is apparently unusual for her. Will refill her meds and recheck at next appt.   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 Will recheck level today  Refills ordered  -     phenytoin (DILANTIN) 100 MG capsule; Take 1 capsule (100 mg total) by mouth 3 (three) times a day.  -     PHENYTOIN LEVEL  -     PHENYTOIN FREE    Muscle weakness  "(generalized)  Generalized muscle weakness  Will check ESR to r/o PMR, will check ELHAM for potential autoimmune d/o   Will check thyroid   -     TSH with free T4 reflex  -     ESR: Erythrocyte sedimentation rate  -     Anti Nuclear Marline IgG by IFA with Reflex    Vitamin D deficiency  -     Vitamin D Deficiency    Nonrenal Secondary Hyperparathyroidism  -     Parathyroid Hormone Intact    Anemia, unspecified type  -     CBC with platelets and differential    Low magnesium level  -     Magnesium    Low serum potassium  -     Comprehensive metabolic panel (BMP + Alb, Alk Phos, ALT, AST, Total. Bili, TP)    Vitamin deficiency  -     Vitamin B12          Estimated body mass index is 27.66 kg/m  as calculated from the following:    Height as of this encounter: 1.542 m (5' 0.71\").    Weight as of this encounter: 65.8 kg (145 lb).        She reports that she has never smoked. She does not have any smokeless tobacco history on file.      Appropriate preventive services were discussed with this patient, including applicable screening as appropriate for cardiovascular disease, diabetes, osteopenia/osteoporosis, and glaucoma.  As appropriate for age/gender, discussed screening for colorectal cancer, prostate cancer, breast cancer, and cervical cancer. Checklist reviewing preventive services available has been given to the patient.    Reviewed patients plan of care and provided an AVS. The Basic Care Plan (routine screening as documented in Health Maintenance) for Peggy meets the Care Plan requirement. This Care Plan has been established and reviewed with the Patient and daughter and other:son in law.    Counseling Resources:  ATP IV Guidelines  Pooled Cohorts Equation Calculator  Breast Cancer Risk Calculator  Breast Cancer: Medication to Reduce Risk  FRAX Risk Assessment  ICSI Preventive Guidelines  Dietary Guidelines for Americans, 2010  USDA's MyPlate  ASA Prophylaxis  Lung CA Screening    Dimple Bhatia MD  [facility] " [facility]    Identified Health Risks:

## 2022-02-16 LAB
ANA SER QL IF: NEGATIVE
DEPRECATED CALCIDIOL+CALCIFEROL SERPL-MC: 21 UG/L (ref 30–80)
PHENYTOIN (DILATIN) FREE: 0.5 UG/ML

## 2022-02-22 DIAGNOSIS — R79.0 LOW MAGNESIUM LEVEL: ICD-10-CM

## 2022-02-22 DIAGNOSIS — E55.9 VITAMIN D DEFICIENCY: Primary | ICD-10-CM

## 2022-02-22 RX ORDER — ERGOCALCIFEROL 1.25 MG/1
50000 CAPSULE, LIQUID FILLED ORAL WEEKLY
Qty: 4 CAPSULE | Refills: 11 | Status: SHIPPED | OUTPATIENT
Start: 2022-02-22 | End: 2023-09-07

## 2022-02-22 NOTE — RESULT ENCOUNTER NOTE
Please let patient know that her Vitamin D is low. Rx for high dose Vitamin D (ergocalciferol 50,000 units) to take one tab weekly was sent to her pharmacy.     She also had low magnesium. I ordered a magnesium tab to take once daily.     Dr. Dimple Bhatia

## 2022-03-22 ENCOUNTER — OFFICE VISIT (OUTPATIENT)
Dept: FAMILY MEDICINE | Facility: CLINIC | Age: 87
End: 2022-03-22
Payer: MEDICARE

## 2022-03-22 VITALS
DIASTOLIC BLOOD PRESSURE: 70 MMHG | HEIGHT: 60 IN | TEMPERATURE: 98.4 F | SYSTOLIC BLOOD PRESSURE: 180 MMHG | WEIGHT: 147.4 LBS | HEART RATE: 70 BPM | BODY MASS INDEX: 28.94 KG/M2 | OXYGEN SATURATION: 95 %

## 2022-03-22 DIAGNOSIS — Z00.00 ENCOUNTER FOR PREVENTIVE CARE: ICD-10-CM

## 2022-03-22 DIAGNOSIS — I10 BENIGN ESSENTIAL HYPERTENSION: Primary | ICD-10-CM

## 2022-03-22 DIAGNOSIS — M10.9 GOUT, UNSPECIFIED CAUSE, UNSPECIFIED CHRONICITY, UNSPECIFIED SITE: ICD-10-CM

## 2022-03-22 DIAGNOSIS — R42 DIZZINESS: ICD-10-CM

## 2022-03-22 DIAGNOSIS — E55.9 VITAMIN D DEFICIENCY: ICD-10-CM

## 2022-03-22 DIAGNOSIS — Z23 NEED FOR VACCINATION: ICD-10-CM

## 2022-03-22 DIAGNOSIS — L84 CALLUS OF FOOT: ICD-10-CM

## 2022-03-22 DIAGNOSIS — R79.0 LOW MAGNESIUM LEVEL: ICD-10-CM

## 2022-03-22 DIAGNOSIS — R60.0 BILATERAL LEG EDEMA: ICD-10-CM

## 2022-03-22 PROCEDURE — 90715 TDAP VACCINE 7 YRS/> IM: CPT | Performed by: FAMILY MEDICINE

## 2022-03-22 PROCEDURE — 90471 IMMUNIZATION ADMIN: CPT | Performed by: FAMILY MEDICINE

## 2022-03-22 PROCEDURE — 99215 OFFICE O/P EST HI 40 MIN: CPT | Mod: 25 | Performed by: FAMILY MEDICINE

## 2022-03-22 RX ORDER — LOSARTAN POTASSIUM 100 MG/1
100 TABLET ORAL DAILY
Qty: 30 TABLET | Refills: 3 | Status: SHIPPED | OUTPATIENT
Start: 2022-03-22 | End: 2022-04-26

## 2022-03-22 NOTE — PATIENT INSTRUCTIONS
BP pressure -   Increase losartan from 50mg -> 100mg daily  With current bottle of 50mg tablets take 2 tabs daily until run outs  Then get new bottle of 100mg tabs and take one tab daily       Wrap legs with ACE wrap during the day to help with swelling  Elevated legs/feet as much as possible

## 2022-03-22 NOTE — PROGRESS NOTES
Assessment & Plan     Benign essential hypertension  Increase losartan from 50mg -> 100mg   Recheck in 4 weeks to recheck  - losartan (COZAAR) 100 MG tablet  Dispense: 30 tablet; Refill: 3    Bilateral leg edema  Not on amlodipine  Advised ace wrap or use compression wraps given by podiatry-   She lives alone and does not feel she can manage these  Advised to elevated her legs as much as possible     Need for vaccination  - TDAP VACCINE (Adacel, Boostrix)  [[id number]]    Vitamin D deficiency  Continue weekly ergo    Gout, unspecified cause, unspecified chronicity, unspecified site  Continue allopurinol    Low magnesium level  Continue MagOx    Callus of foot  Much improved after removed by podiatry    Dizziness  Mostly related to changing positions for sitting to standing which causes her to feel wobbly  Her orthostatic BP's were not c/w orthostatic hypotension  May be related to autonomic dysfunction  Advised to move slowly when changing positions  Consider referral to PT/dizzy clinic  Continue to monitor    Encounter for preventive care  - REVIEW OF HEALTH MAINTENANCE PROTOCOL ORDERS        45 minutes spent on the date of the encounter doing chart review, history and exam, documentation and further activities per the note  [id number]}    Return in about 4 weeks (around 4/19/2022) for Follow up, with me, in person, for HTN.    Dimple Bhatia MD  [facility]     Peggy KATHLEEN Adilson is a 89 year old female who presents today for the following   health issues:    bilateraly LE edema -  Started on the right and then went to the left for a few months  No painful and unable to put on new shoes   Not been taking allopurinol for several months  Renal function is good - GFR 85 on 2/15/22     Dizziness and wobbling - when getting up from sitting     Foot callous -  Seen by podiatry and consult note from 3/16/22 reviewed  Removed callous and given compression stocking   Pain improved     Vit D  deficiency -   Last Vit D 21 on 2/15/22   Started weekly ergo       Low mag   Last level was 1.5 on 2/15/22   Started on Mag Ox     H/o gout   Last uric acid was good 4.6 on 2/15/22  On allopurinol     Polypharmacy   Not taking amlodipine, vitamin B12     Review of Systems     Please see above.  The rest of the review of systems are negative for all systems.    Current Outpatient Medications   Medication Instructions     acetaminophen (TYLENOL) 975 mg, Oral, EVERY 8 HOURS     allopurinol (ZYLOPRIM) 100 MG tablet Take 1 tablet (100 mg total) by mouth daily     aspirin (ASA) 81 mg, DAILY     carvedilol (COREG) 6.25 mg, Oral, 2 TIMES DAILY     losartan (COZAAR) 50 mg, Oral, DAILY     magnesium oxide (MAG-OX) 400 mg, Oral, DAILY     melatonin 1 mg, Oral, AT BEDTIME PRN     Multiple Vitamins-Minerals (CENTRUM SILVER 50+WOMEN PO) 1 tablet, Oral, DAILY     phenytoin (DILANTIN) 100 MG capsule Take 1 capsule (100 mg total) by mouth 3 (three) times a day.     vitamin D2 (ERGOCALCIFEROL) 50,000 Units, Oral, WEEKLY         Objective     Vitals:    03/22/22 0836 03/22/22 0840 03/22/22 0923   BP: (!) 170/74 (!) 150/68 (!) 162/68   Pulse: 56     Temp: 98.4  F (36.9  C)     TempSrc: Oral     SpO2: 95%     Weight: 66.9 kg (147 lb 6.4 oz)     Height: 1.524 m (5')           Body mass index is 28.79 kg/m .    Physical Exam    OBJECTIVE:  Vitals listed above within normal limits  General appearance: well groomed, pleasant, well hydrated, nontoxic appearing  ENT: PERRL, throat clear  Neck: neck supple, no lymphadenopathy, no thyromegaly  Lungs: lungs clear to auscultation bilaterally, no wheezes or rhonchi  Heart: regular rate and rhythm, no murmurs, rubs or gallops  Abdomen: soft, nontender  Neuro: no focal deficits, CN II-XII grossly intact, alert and oriented  Psych:  mood stable, appears to have good insight and judgment  MSK- significant swelling from ankle to mid shin 2+ pitting edema, no redness, no tendernss, no signs of  infection  Left foot callous - now flush with bottom of foot, no redness or tenderness and no signs of infection    Recent Results (from the past 1008 hour(s))   TSH with free T4 reflex    Collection Time: 02/15/22 12:39 PM   Result Value Ref Range    TSH 2.48 0.30 - 5.00 uIU/mL   ESR: Erythrocyte sedimentation rate    Collection Time: 02/15/22 12:39 PM   Result Value Ref Range    Erythrocyte Sedimentation Rate 21 (H) 0 - 20 mm/hr   Anti Nuclear Marline IgG by IFA with Reflex    Collection Time: 02/15/22 12:39 PM   Result Value Ref Range    EL[facility] interpretation Negative Negative   Parathyroid Hormone Intact    Collection Time: 02/15/22 12:39 PM   Result Value Ref Range    Parathyroid Hormone Intact 78 10 - 86 pg/mL   Comprehensive metabolic panel (BMP + Alb, Alk Phos, ALT, AST, Total. Bili, TP)    Collection Time: 02/15/22 12:39 PM   Result Value Ref Range    Sodium 138 136 - 145 mmol/L    Potassium 3.9 3.5 - 5.0 mmol/L    Chloride 104 98 - 107 mmol/L    Carbon Dioxide (CO2) 25 22 - 31 mmol/L    Anion Gap 9 5 - 18 mmol/L    Urea Nitrogen 8 8 - 28 mg/dL    Creatinine 0.58 (L) 0.60 - 1.10 mg/dL    Calcium 9.3 8.5 - 10.5 mg/dL    Glucose 94 70 - 125 mg/dL    Alkaline Phosphatase 139 (H) 45 - 120 U/L    AST 22 0 - 40 U/L    ALT 13 0 - 45 U/L    Protein Total 7.3 6.0 - 8.0 g/dL    Albumin 3.9 3.5 - 5.0 g/dL    Bilirubin Total 0.4 0.0 - 1.0 mg/dL    GFR Estimate 85 >60 mL/min/1.73m2   Uric acid    Collection Time: 02/15/22 12:39 PM   Result Value Ref Range    Uric Acid 4.6 2.0 - 7.5 mg/dL   Vitamin D Deficiency    Collection Time: 02/15/22 12:39 PM   Result Value Ref Range    Vitamin D, Total (25-Hydroxy) 21 (L) 30 - 80 ug/L   Vitamin B12    Collection Time: 02/15/22 12:39 PM   Result Value Ref Range    Vitamin B12 256 213 - 816 pg/mL   PHENYTOIN LEVEL    Collection Time: 02/15/22 12:39 PM   Result Value Ref Range    Phenytoin 7.5   ug/mL   PHENYTOIN FREE    Collection Time: 02/15/22 12:39 PM   Result Value Ref Range     Phenytoin Free 0.5   ug/mL   Magnesium    Collection Time: 02/15/22 12:39 PM   Result Value Ref Range    Magnesium 1.5 (L) 1.8 - 2.6 mg/dL   CBC with platelets and differential    Collection Time: 02/15/22 12:39 PM   Result Value Ref Range    WBC Count 6.3 4.0 - 11.0 10e3/uL    RBC Count 3.67 (L) 3.80 - 5.20 10e6/uL    Hemoglobin 12.1 11.7 - 15.7 g/dL    Hematocrit 37.7 35.0 - 47.0 %     (H) 78 - 100 fL    MCH 33.0 26.5 - 33.0 pg    MCHC 32.1 31.5 - 36.5 g/dL    RDW 13.0 10.0 - 15.0 %    Platelet Count 246 150 - 450 10e3/uL    % Neutrophils 50 %    % Lymphocytes 40 %    % Monocytes 8 %    % Eosinophils 2 %    % Basophils 1 %    % Immature Granulocytes 0 %    Absolute Neutrophils 3.2 1.6 - 8.3 10e3/uL    Absolute Lymphocytes 2.5 0.8 - 5.3 10e3/uL    Absolute Monocytes 0.5 0.0 - 1.3 10e3/uL    Absolute Eosinophils 0.1 0.0 - 0.7 10e3/uL    Absolute Basophils 0.0 0.0 - 0.2 10e3/uL    Absolute Immature Granulocytes 0.0 <=0.4 10e3/uL

## 2022-04-26 ENCOUNTER — OFFICE VISIT (OUTPATIENT)
Dept: FAMILY MEDICINE | Facility: CLINIC | Age: 87
End: 2022-04-26
Payer: MEDICARE

## 2022-04-26 VITALS
DIASTOLIC BLOOD PRESSURE: 84 MMHG | TEMPERATURE: 97.5 F | HEART RATE: 54 BPM | OXYGEN SATURATION: 99 % | RESPIRATION RATE: 12 BRPM | WEIGHT: 148.25 LBS | HEIGHT: 60 IN | BODY MASS INDEX: 29.11 KG/M2 | SYSTOLIC BLOOD PRESSURE: 180 MMHG

## 2022-04-26 DIAGNOSIS — L84 CALLUS OF FOOT: ICD-10-CM

## 2022-04-26 DIAGNOSIS — I10 BENIGN ESSENTIAL HYPERTENSION: ICD-10-CM

## 2022-04-26 DIAGNOSIS — R60.0 BILATERAL LEG EDEMA: Primary | ICD-10-CM

## 2022-04-26 DIAGNOSIS — I10 ESSENTIAL HYPERTENSION: ICD-10-CM

## 2022-04-26 DIAGNOSIS — R42 DIZZINESS: ICD-10-CM

## 2022-04-26 PROCEDURE — 99213 OFFICE O/P EST LOW 20 MIN: CPT | Mod: 25 | Performed by: FAMILY MEDICINE

## 2022-04-26 PROCEDURE — 11055 PARING/CUTG B9 HYPRKER LES 1: CPT | Mod: 59 | Performed by: FAMILY MEDICINE

## 2022-04-26 PROCEDURE — 17000 DESTRUCT PREMALG LESION: CPT | Performed by: FAMILY MEDICINE

## 2022-04-26 RX ORDER — FUROSEMIDE 20 MG
20 TABLET ORAL 2 TIMES DAILY
Qty: 60 TABLET | Refills: 3 | Status: SHIPPED | OUTPATIENT
Start: 2022-04-26 | End: 2022-07-13

## 2022-04-26 RX ORDER — LOSARTAN POTASSIUM 100 MG/1
100 TABLET ORAL DAILY
Qty: 90 TABLET | Refills: 3 | Status: SHIPPED | OUTPATIENT
Start: 2022-04-26

## 2022-04-26 RX ORDER — LOSARTAN POTASSIUM 50 MG/1
50 TABLET ORAL DAILY
Qty: 90 TABLET | Refills: 4 | Status: CANCELLED | OUTPATIENT
Start: 2022-04-26

## 2022-04-26 NOTE — PROGRESS NOTES
Assessment & Plan     Bilateral leg edema  Start lasix  Encouraged again to elevate legs as much as possible  Previously declined compression socks and ACE wrap  Offered referral to vascular clinic but she declined  - furosemide (LASIX) 20 MG tablet  Dispense: 60 tablet; Refill: 3    Benign essential hypertension  Continue losartan  Start lasix of edema and BP management  Order for home BP machine to monitor and assess if BP is elevated at home as well as at clinic  - losartan (COZAAR) 100 MG tablet  Dispense: 90 tablet; Refill: 3  - Home Blood Pressure Monitor Order for DME - ONLY FOR DME  - furosemide (LASIX) 20 MG tablet  Dispense: 60 tablet; Refill: 3    Callus of foot  Callus on bottom of left foot as a result of excessive pressure on MTM joint of big toe b/c hammer toe in 1st and 2 toes  Shaving of callus with 11 blade scapel  Cryotherapy with liquid nitrogen       Dizziness  Discussed the need to move slowly, especially when transitioning from sitting to standing.   Suspect autonomic dysfunction  Discussed referral to dizziness center or neurology but she declined  Will discuss option of meclizine prn next visit  Recommended getting a fall alert systems since she lives alone at 89 yo      Review of external notes as documented elsewhere in note  Prescription drug management  41minutes spent on the date of the encounter doing chart review, history and exam, documentation and further activities per the note      Return in about 6 weeks (around 6/7/2022) for Follow up, with me, in person, for HTN.    Dimple Bhatia MD  [facility] STEVE Parsonse HEVER Adilson is a 89 year old female who presents today for the following   health issues: HTN and edema    Bilateral leg edema  Not on amlodipine  Advised ace wrap or use compression wraps given by podiatry-   She lives alone and does not feel she can manage these  She had been advised to elevated her legs as much as possible      Dizziness  Mostly related to changing positions for sitting to standing which causes her to feel wobbly  Her orthostatic BP's were not c/w orthostatic hypotension  May be related to autonomic dysfunction  Advised to move slowly when changing positions  Declines referral to PT/dizzy clinic    Benign essential hypertension  Increased losartan from 50mg -> 100mg on 3/22/22  BP meds: carvedilol, losartan  BP elevated      Vitamin D deficiency  Last Vit D 21 on 2/15/22    weekly ergo     Gout, unspecified cause, unspecified chronicity, unspecified site  taking allopurinol  Last uric acid was good 4.6 on 2/15/22     Low magnesium level  Last level was 1.5 on 2/15/22   Started on Mag Ox      Callus of foot  Much improved after removed by podiatry  Still having some pain when standing on her feet      H/o seizures   Started 15 years ago  On dilantin and seizures since then      Review of Systems     Please see above.  The rest of the review of systems are negative for all systems.    Current Outpatient Medications   Medication Instructions     acetaminophen (TYLENOL) 975 mg, Oral, EVERY 8 HOURS     allopurinol (ZYLOPRIM) 100 MG tablet Take 1 tablet (100 mg total) by mouth daily     aspirin (ASA) 81 mg, DAILY     carvedilol (COREG) 6.25 mg, Oral, 2 TIMES DAILY     losartan (COZAAR) 50 mg, Oral, DAILY     losartan (COZAAR) 100 mg, Oral, DAILY     magnesium oxide (MAG-OX) 400 mg, Oral, DAILY     melatonin 1 mg, Oral, AT BEDTIME PRN     Multiple Vitamins-Minerals (CENTRUM SILVER 50+WOMEN PO) 1 tablet, Oral, DAILY     phenytoin (DILANTIN) 100 MG capsule Take 1 capsule (100 mg total) by mouth 3 (three) times a day.     vitamin D2 (ERGOCALCIFEROL) 50,000 Units, Oral, WEEKLY         Objective   Vitals:    04/26/22 0939 04/26/22 0943   BP: (!) 180/70 (!) 180/84   BP Location: Left arm Left arm   Patient Position: Sitting Sitting   Cuff Size: Adult Regular Adult Regular   Pulse: 54    Resp: 12    Temp: 97.5  F (36.4  C)     TempSrc: Oral    SpO2: 99%    Weight: 67.2 kg (148 lb 4 oz)    Height: 1.524 m (5')        Body mass index is 28.95 kg/m .    Physical Exam    OBJECTIVE:  Vitals listed above within normal limits  General appearance: well groomed, pleasant, well hydrated, nontoxic appearing  ENT: PERRL, throat clear  Neck: neck supple, no lymphadenopathy, no thyromegaly  Lungs: lungs clear to auscultation bilaterally, no wheezes or rhonchi  Heart: regular rate and rhythm, no murmurs, rubs or gallops  Abdomen: soft, nontender  Neuro: no focal deficits, CN II-XII grossly intact, alert and oriented  Psych:  mood stable, appears to have good insight and judgment

## 2022-06-20 DIAGNOSIS — R79.0 LOW MAGNESIUM LEVEL: ICD-10-CM

## 2022-06-20 DIAGNOSIS — Z76.0 ENCOUNTER FOR MEDICATION REFILL: Primary | ICD-10-CM

## 2022-06-20 RX ORDER — MAGNESIUM OXIDE 400 MG/1
TABLET ORAL
Qty: 90 TABLET | Refills: 3 | Status: SHIPPED | OUTPATIENT
Start: 2022-06-20

## 2022-07-13 DIAGNOSIS — R60.0 BILATERAL LEG EDEMA: ICD-10-CM

## 2022-07-13 DIAGNOSIS — I10 ESSENTIAL HYPERTENSION: ICD-10-CM

## 2022-07-13 DIAGNOSIS — Z76.0 ENCOUNTER FOR MEDICATION REFILL: Primary | ICD-10-CM

## 2022-07-13 RX ORDER — FUROSEMIDE 20 MG
20 TABLET ORAL 2 TIMES DAILY
Qty: 180 TABLET | Refills: 0 | Status: SHIPPED | OUTPATIENT
Start: 2022-07-13 | End: 2023-09-07

## 2022-07-13 NOTE — TELEPHONE ENCOUNTER
Med refilled.  Was supposed to follow-up with Dr. Bhatia last month.    Please call and schedule pt for establish care/follow-up BP within 3 months.

## 2022-07-14 NOTE — TELEPHONE ENCOUNTER
Talked to pt she stated that she will have her daughter call back to schedule.     Victor Hugo Canopy 7.14.22

## 2023-09-07 ENCOUNTER — HOSPITAL ENCOUNTER (INPATIENT)
Facility: HOSPITAL | Age: 88
LOS: 9 days | Discharge: NURSING, REHAB OR LONG-TERM CARE | DRG: 093 | End: 2023-09-18
Attending: EMERGENCY MEDICINE | Admitting: INTERNAL MEDICINE
Payer: MEDICARE

## 2023-09-07 ENCOUNTER — APPOINTMENT (OUTPATIENT)
Dept: RADIOLOGY | Facility: HOSPITAL | Age: 88
DRG: 093 | End: 2023-09-07
Attending: EMERGENCY MEDICINE
Payer: MEDICARE

## 2023-09-07 ENCOUNTER — APPOINTMENT (OUTPATIENT)
Dept: CT IMAGING | Facility: HOSPITAL | Age: 88
DRG: 093 | End: 2023-09-07
Attending: INTERNAL MEDICINE
Payer: MEDICARE

## 2023-09-07 DIAGNOSIS — G40.409 OTHER GENERALIZED EPILEPSY, NOT INTRACTABLE, WITHOUT STATUS EPILEPTICUS (H): ICD-10-CM

## 2023-09-07 DIAGNOSIS — L89.626 PRESSURE INJURY OF DEEP TISSUE OF LEFT HEEL: ICD-10-CM

## 2023-09-07 DIAGNOSIS — L89.611 PRESSURE INJURY OF RIGHT HEEL, STAGE 1: Primary | ICD-10-CM

## 2023-09-07 DIAGNOSIS — D51.9 ANEMIA DUE TO VITAMIN B12 DEFICIENCY, UNSPECIFIED B12 DEFICIENCY TYPE: ICD-10-CM

## 2023-09-07 DIAGNOSIS — R53.1 GENERALIZED WEAKNESS: ICD-10-CM

## 2023-09-07 LAB
ALBUMIN SERPL BCG-MCNC: 3.1 G/DL (ref 3.5–5.2)
ALP SERPL-CCNC: 92 U/L (ref 35–104)
ALT SERPL W P-5'-P-CCNC: 38 U/L (ref 0–50)
ANION GAP SERPL CALCULATED.3IONS-SCNC: 12 MMOL/L (ref 7–15)
AST SERPL W P-5'-P-CCNC: 110 U/L (ref 0–45)
ATRIAL RATE - MUSE: 56 BPM
BASOPHILS # BLD AUTO: 0 10E3/UL (ref 0–0.2)
BASOPHILS NFR BLD AUTO: 0 %
BILIRUB DIRECT SERPL-MCNC: <0.2 MG/DL (ref 0–0.3)
BILIRUB SERPL-MCNC: 0.4 MG/DL
BUN SERPL-MCNC: 19.1 MG/DL (ref 8–23)
CALCIUM SERPL-MCNC: 8.1 MG/DL (ref 8.2–9.6)
CHLORIDE SERPL-SCNC: 103 MMOL/L (ref 98–107)
CK SERPL-CCNC: 886 U/L (ref 26–192)
CREAT SERPL-MCNC: 0.57 MG/DL (ref 0.51–0.95)
DEPRECATED HCO3 PLAS-SCNC: 22 MMOL/L (ref 22–29)
DIASTOLIC BLOOD PRESSURE - MUSE: 63 MMHG
EGFRCR SERPLBLD CKD-EPI 2021: 85 ML/MIN/1.73M2
EOSINOPHIL # BLD AUTO: 0.1 10E3/UL (ref 0–0.7)
EOSINOPHIL NFR BLD AUTO: 1 %
ERYTHROCYTE [DISTWIDTH] IN BLOOD BY AUTOMATED COUNT: 12.4 % (ref 10–15)
GLUCOSE SERPL-MCNC: 80 MG/DL (ref 70–99)
HCT VFR BLD AUTO: 32.1 % (ref 35–47)
HGB BLD-MCNC: 10.4 G/DL (ref 11.7–15.7)
IMM GRANULOCYTES # BLD: 0 10E3/UL
IMM GRANULOCYTES NFR BLD: 0 %
INTERPRETATION ECG - MUSE: NORMAL
LYMPHOCYTES # BLD AUTO: 1.7 10E3/UL (ref 0.8–5.3)
LYMPHOCYTES NFR BLD AUTO: 21 %
MAGNESIUM SERPL-MCNC: 1.7 MG/DL (ref 1.7–2.3)
MCH RBC QN AUTO: 32.8 PG (ref 26.5–33)
MCHC RBC AUTO-ENTMCNC: 32.4 G/DL (ref 31.5–36.5)
MCV RBC AUTO: 101 FL (ref 78–100)
MONOCYTES # BLD AUTO: 0.6 10E3/UL (ref 0–1.3)
MONOCYTES NFR BLD AUTO: 7 %
NEUTROPHILS # BLD AUTO: 5.8 10E3/UL (ref 1.6–8.3)
NEUTROPHILS NFR BLD AUTO: 71 %
NRBC # BLD AUTO: 0 10E3/UL
NRBC BLD AUTO-RTO: 0 /100
P AXIS - MUSE: 35 DEGREES
PHENYTOIN SERPL-MCNC: 14.4 UG/ML
PHOSPHATE SERPL-MCNC: 3.4 MG/DL (ref 2.5–4.5)
PLATELET # BLD AUTO: 230 10E3/UL (ref 150–450)
POTASSIUM SERPL-SCNC: 3.6 MMOL/L (ref 3.4–5.3)
PR INTERVAL - MUSE: 222 MS
PROT SERPL-MCNC: 5.8 G/DL (ref 6.4–8.3)
QRS DURATION - MUSE: 128 MS
QT - MUSE: 526 MS
QTC - MUSE: 507 MS
R AXIS - MUSE: -53 DEGREES
RBC # BLD AUTO: 3.17 10E6/UL (ref 3.8–5.2)
SODIUM SERPL-SCNC: 137 MMOL/L (ref 136–145)
SYSTOLIC BLOOD PRESSURE - MUSE: 127 MMHG
T AXIS - MUSE: 61 DEGREES
TSH SERPL DL<=0.005 MIU/L-ACNC: 3.06 UIU/ML (ref 0.3–4.2)
VENTRICULAR RATE- MUSE: 56 BPM
VIT B12 SERPL-MCNC: 339 PG/ML (ref 232–1245)
WBC # BLD AUTO: 8.2 10E3/UL (ref 4–11)

## 2023-09-07 PROCEDURE — G1010 CDSM STANSON: HCPCS

## 2023-09-07 PROCEDURE — 73630 X-RAY EXAM OF FOOT: CPT | Mod: LT

## 2023-09-07 PROCEDURE — 82607 VITAMIN B-12: CPT | Performed by: INTERNAL MEDICINE

## 2023-09-07 PROCEDURE — 82746 ASSAY OF FOLIC ACID SERUM: CPT | Performed by: INTERNAL MEDICINE

## 2023-09-07 PROCEDURE — 250N000013 HC RX MED GY IP 250 OP 250 PS 637: Performed by: INTERNAL MEDICINE

## 2023-09-07 PROCEDURE — 36415 COLL VENOUS BLD VENIPUNCTURE: CPT | Performed by: EMERGENCY MEDICINE

## 2023-09-07 PROCEDURE — 96366 THER/PROPH/DIAG IV INF ADDON: CPT

## 2023-09-07 PROCEDURE — 258N000003 HC RX IP 258 OP 636: Performed by: INTERNAL MEDICINE

## 2023-09-07 PROCEDURE — 83735 ASSAY OF MAGNESIUM: CPT | Performed by: INTERNAL MEDICINE

## 2023-09-07 PROCEDURE — 82310 ASSAY OF CALCIUM: CPT | Performed by: EMERGENCY MEDICINE

## 2023-09-07 PROCEDURE — 93005 ELECTROCARDIOGRAM TRACING: CPT | Performed by: INTERNAL MEDICINE

## 2023-09-07 PROCEDURE — 85004 AUTOMATED DIFF WBC COUNT: CPT | Performed by: EMERGENCY MEDICINE

## 2023-09-07 PROCEDURE — 84443 ASSAY THYROID STIM HORMONE: CPT | Performed by: INTERNAL MEDICINE

## 2023-09-07 PROCEDURE — 80185 ASSAY OF PHENYTOIN TOTAL: CPT | Performed by: INTERNAL MEDICINE

## 2023-09-07 PROCEDURE — 80186 ASSAY OF PHENYTOIN FREE: CPT | Performed by: INTERNAL MEDICINE

## 2023-09-07 PROCEDURE — 36415 COLL VENOUS BLD VENIPUNCTURE: CPT | Performed by: INTERNAL MEDICINE

## 2023-09-07 PROCEDURE — 999N000065 XR TIBIA AND FIBULA RIGHT 2 VIEWS: Mod: RT

## 2023-09-07 PROCEDURE — 96365 THER/PROPH/DIAG IV INF INIT: CPT

## 2023-09-07 PROCEDURE — 250N000011 HC RX IP 250 OP 636: Mod: JZ | Performed by: INTERNAL MEDICINE

## 2023-09-07 PROCEDURE — 96361 HYDRATE IV INFUSION ADD-ON: CPT

## 2023-09-07 PROCEDURE — 73560 X-RAY EXAM OF KNEE 1 OR 2: CPT | Mod: RT

## 2023-09-07 PROCEDURE — 99285 EMERGENCY DEPT VISIT HI MDM: CPT | Mod: 25

## 2023-09-07 PROCEDURE — 73610 X-RAY EXAM OF ANKLE: CPT | Mod: RT

## 2023-09-07 PROCEDURE — 82248 BILIRUBIN DIRECT: CPT | Performed by: INTERNAL MEDICINE

## 2023-09-07 PROCEDURE — G0378 HOSPITAL OBSERVATION PER HR: HCPCS

## 2023-09-07 PROCEDURE — 84100 ASSAY OF PHOSPHORUS: CPT | Performed by: INTERNAL MEDICINE

## 2023-09-07 PROCEDURE — 82550 ASSAY OF CK (CPK): CPT | Performed by: EMERGENCY MEDICINE

## 2023-09-07 PROCEDURE — 73630 X-RAY EXAM OF FOOT: CPT | Mod: RT,76

## 2023-09-07 PROCEDURE — 99222 1ST HOSP IP/OBS MODERATE 55: CPT | Mod: AI | Performed by: INTERNAL MEDICINE

## 2023-09-07 RX ORDER — ONDANSETRON 4 MG/1
4 TABLET, ORALLY DISINTEGRATING ORAL EVERY 6 HOURS PRN
Status: DISCONTINUED | OUTPATIENT
Start: 2023-09-07 | End: 2023-09-18 | Stop reason: HOSPADM

## 2023-09-07 RX ORDER — ACETAMINOPHEN 325 MG/1
650 TABLET ORAL EVERY 6 HOURS PRN
COMMUNITY

## 2023-09-07 RX ORDER — LORAZEPAM 2 MG/ML
0.5 INJECTION INTRAMUSCULAR EVERY 6 HOURS PRN
Status: DISCONTINUED | OUTPATIENT
Start: 2023-09-07 | End: 2023-09-08

## 2023-09-07 RX ORDER — ONDANSETRON 2 MG/ML
4 INJECTION INTRAMUSCULAR; INTRAVENOUS EVERY 6 HOURS PRN
Status: DISCONTINUED | OUTPATIENT
Start: 2023-09-07 | End: 2023-09-18 | Stop reason: HOSPADM

## 2023-09-07 RX ORDER — SODIUM CHLORIDE 9 MG/ML
INJECTION, SOLUTION INTRAVENOUS CONTINUOUS
Status: DISCONTINUED | OUTPATIENT
Start: 2023-09-07 | End: 2023-09-09

## 2023-09-07 RX ORDER — POLYETHYLENE GLYCOL 3350 17 G/17G
17 POWDER, FOR SOLUTION ORAL DAILY PRN
Status: DISCONTINUED | OUTPATIENT
Start: 2023-09-07 | End: 2023-09-18 | Stop reason: HOSPADM

## 2023-09-07 RX ORDER — ACETAMINOPHEN 325 MG/1
650 TABLET ORAL EVERY 4 HOURS PRN
Status: DISCONTINUED | OUTPATIENT
Start: 2023-09-07 | End: 2023-09-11

## 2023-09-07 RX ORDER — MAGNESIUM SULFATE HEPTAHYDRATE 40 MG/ML
2 INJECTION, SOLUTION INTRAVENOUS ONCE
Status: COMPLETED | OUTPATIENT
Start: 2023-09-07 | End: 2023-09-07

## 2023-09-07 RX ORDER — ALLOPURINOL 100 MG/1
100 TABLET ORAL DAILY
Status: DISCONTINUED | OUTPATIENT
Start: 2023-09-07 | End: 2023-09-18 | Stop reason: HOSPADM

## 2023-09-07 RX ORDER — CARVEDILOL 3.12 MG/1
3.12 TABLET ORAL 2 TIMES DAILY
Status: DISCONTINUED | OUTPATIENT
Start: 2023-09-07 | End: 2023-09-08

## 2023-09-07 RX ORDER — CALCIUM CARBONATE 500 MG/1
500 TABLET, CHEWABLE ORAL 3 TIMES DAILY PRN
Status: DISCONTINUED | OUTPATIENT
Start: 2023-09-07 | End: 2023-09-18 | Stop reason: HOSPADM

## 2023-09-07 RX ORDER — LOSARTAN POTASSIUM 50 MG/1
100 TABLET ORAL DAILY
Status: DISCONTINUED | OUTPATIENT
Start: 2023-09-07 | End: 2023-09-11

## 2023-09-07 RX ORDER — LIDOCAINE 40 MG/G
CREAM TOPICAL
Status: DISCONTINUED | OUTPATIENT
Start: 2023-09-07 | End: 2023-09-18 | Stop reason: HOSPADM

## 2023-09-07 RX ORDER — PHENYTOIN SODIUM 100 MG/1
100 CAPSULE, EXTENDED RELEASE ORAL 3 TIMES DAILY
Status: DISCONTINUED | OUTPATIENT
Start: 2023-09-07 | End: 2023-09-12

## 2023-09-07 RX ADMIN — CARVEDILOL 3.12 MG: 3.12 TABLET, FILM COATED ORAL at 18:12

## 2023-09-07 RX ADMIN — ALLOPURINOL 100 MG: 100 TABLET ORAL at 18:12

## 2023-09-07 RX ADMIN — MAGNESIUM SULFATE HEPTAHYDRATE 2 G: 40 INJECTION, SOLUTION INTRAVENOUS at 17:12

## 2023-09-07 RX ADMIN — PHENYTOIN SODIUM 100 MG: 100 CAPSULE ORAL at 18:12

## 2023-09-07 RX ADMIN — SODIUM CHLORIDE: 9 INJECTION, SOLUTION INTRAVENOUS at 14:25

## 2023-09-07 ASSESSMENT — ACTIVITIES OF DAILY LIVING (ADL)
ADLS_ACUITY_SCORE: 37
ADLS_ACUITY_SCORE: 37
DEPENDENT_IADLS:: LAUNDRY;SHOPPING;TRANSPORTATION;CLEANING
ADLS_ACUITY_SCORE: 35
ADLS_ACUITY_SCORE: 37
ADLS_ACUITY_SCORE: 35

## 2023-09-07 NOTE — ED NOTES
Bed: [facility]  Expected date:   Expected time:   Means of arrival: Ambulance  Comments:  [facility]: Fall, down x 4 hours

## 2023-09-07 NOTE — ED PROVIDER NOTES
EMERGENCY DEPARTMENT ENCOUNTER     NAME: Peggy De Anda   AGE: 89 year old female   YOB: 1931   MRN: [id number]   EVALUATION DATE & TIME: 9/7/2023  9:20 AM   PCP: Dimple Bhatia     Chief Complaint   Patient presents with    Fall   :    FINAL IMPRESSION       1. Generalized weakness           ED COURSE & MEDICAL DECISION MAKING      Pertinent Labs & Imaging studies reviewed. (See chart for details)   89 year old female  presents to the Emergency Department for evaluation of mechanical fall and generalized weakness.  Initially we thought that the fall she had was this morning but after family arrived it turns out that she actually had a fall yesterday evening, took 4 hours to crawl to the phone, was put back in bed last night, and they actually brought her into the ED because she could not ambulate today and she lives alone and cannot take care of herself if she cannot get up out of bed. Initial Vitals Reviewed. Initial exam notable for patient who complains of bilateral foot pain but has a GCS of 15, is not anticoagulated, is alert and oriented.  I did do imaging of her left foot and bilateral leg from the knee down where she is complaining of pain and fortunately there are no signs of fracture or dislocation.  A CK is not critical, no signs of renal failure, other basic labs are unremarkable for source of why she feels weaker.  It sounds like the foot pain from the event where she was laying on the floor for few hours and crawled to the phone is what is causing her inability to ambulate.  Unfortunately she lives at home and otherwise normally takes care of herself and cannot do so in her current condition.  Therefore, I have discussed the case with hospitalist Dr. Morales and we will admit for observation, PT OT.  Patient and family are comfortable with this plan at time of admission.           At the conclusion of the encounter I discussed the results of all of the tests and the disposition. The  questions were answered. The patient or family acknowledged understanding and was agreeable with the care plan.     0 minutes critical care time, see procedure note below for details if relevant    Medical Decision Making    History:  Supplemental history from: Family Member/Significant Other and EMS  External Record(s) reviewed: Outpatient Record: podiatry visit 4/26/23    Work Up:  Chart documentation includes differential considered and any EKGs or imaging independently interpreted by provider, where specified.  In additional to work up documented, I considered the following work up: Documented in chart, if applicable.    External consultation:  Discussion of management with another provider: Hospitalist    Complicating factors:  Care impacted by chronic illness: Hypertension  Care affected by social determinants of health: No Support for Care at Home    Disposition considerations: Admit.        MEDICATIONS GIVEN IN THE EMERGENCY:   Medications - No data to display   NEW PRESCRIPTIONS STARTED AT TODAY'S ER VISIT   New Prescriptions    No medications on file     ================================================================   HISTORY OF PRESENT ILLNESS       Patient information was obtained from: patient and EMS  Use of Intrepreter: N/A , Yes   Peggy De Anda is a 89 year old female with history of ataxia, hypertension who presents for evaluation of a fall.  Patient lives independently at home, states she was walking across her carpet this morning, tripped on the carpet.  She fell onto the floor, did not hit her head or lose consciousness.  She is not anticoagulated.  She states that she then crawled to the phone to call EMS who came to pick her up.  She thinks it took about 4 hours for her to get to the phone.  Her only complaint is left great toe pain after the fall.    ================================================================        PAST HISTORY     PAST MEDICAL HISTORY:   Past Medical History:    Diagnosis Date    Anemia     Ataxia     Dermatitis     Created by Conversion     Epilepsy (H)     Essential hypertension     Gout     Monoclonal gammopathy of undetermined significance     Osteoporosis     Peripheral neuropathy     Secondary hyperparathyroidism (H)     non-renal      PAST SURGICAL HISTORY:   Past Surgical History:   Procedure Laterality Date    APPENDECTOMY      AGE 13    CATARACT EXTRACTION, BILATERAL      CHOLECYSTECTOMY      IR LUMBAR EPIDURAL STEROID INJECTION  10/5/2007    IR MISCELLANEOUS PROCEDURE  10/30/2007    NEUROPLASTY / TRANSPOSITION MEDIAN NERVE AT CARPAL TUNNEL BILATERAL      TONSILLECTOMY        CURRENT MEDICATIONS:   acetaminophen (TYLENOL) 325 MG tablet  allopurinol (ZYLOPRIM) 100 MG tablet  aspirin 81 MG EC tablet  carvedilol (COREG) 6.25 MG tablet  furosemide (LASIX) 20 MG tablet  losartan (COZAAR) 100 MG tablet  magnesium oxide (MAG-OX) 400 MG tablet  Multiple Vitamins-Minerals (CENTRUM SILVER 50+WOMEN PO)  phenytoin (DILANTIN) 100 MG capsule  vitamin D2 (ERGOCALCIFEROL) 87293 units (1250 mcg) capsule      ALLERGIES:   Allergies   Allergen Reactions    Oxycodone Unknown    Quinolones Rash    Levofloxacin Rash      FAMILY HISTORY:   No family history on file.   SOCIAL HISTORY:   Social History     Socioeconomic History    Marital status:    Tobacco Use    Smoking status: Never        VITALS  Patient Vitals for the past 24 hrs:   BP Temp Temp src Pulse Resp SpO2 Height Weight   09/07/23 0926 136/70 98.2  F (36.8  C) Oral 60 18 100 % 1.524 m (5') 72.6 kg (160 lb)        ================================================================    PHYSICAL EXAM     VITAL SIGNS: /70   Pulse 60   Temp 98.2  F (36.8  C) (Oral)   Resp 18   Ht 1.524 m (5')   Wt 72.6 kg (160 lb)   SpO2 100%   BMI 31.25 kg/m     Constitutional:  Awake, no acute distress   HENT:  Atraumatic, oropharynx without exudate or erythema, membranes moist  Lymph:  No adenopathy  Eyes: EOM intact, PERRL,  no injection  Neck: Supple  Respiratory:  Clear to auscultation bilaterally, no wheezes or crackles   Cardiovascular:  Regular rate and rhythm, single S1 and S2   GI:  Soft, nontender, nondistended, no rebound or guarding   Musculoskeletal:  Moves all extremities, no lower extremity edema, no deformities, tender to palpation over first MTP joint with intact range of motion on left foot  Skin:  Warm, dry  Neurologic:  Alert and oriented x3, no focal deficits noted, GCS 15      ================================================================  LAB       All pertinent labs reviewed and interpreted.   Labs Ordered and Resulted from Time of ED Arrival to Time of ED Departure   BASIC METABOLIC PANEL - Abnormal       Result Value    Sodium 137      Potassium 3.6      Chloride 103      Carbon Dioxide (CO2) 22      Anion Gap 12      Urea Nitrogen 19.1      Creatinine 0.57      Calcium 8.1 (*)     Glucose 80      GFR Estimate 85     CK TOTAL - Abnormal     (*)    CBC WITH PLATELETS AND DIFFERENTIAL - Abnormal    WBC Count 8.2      RBC Count 3.17 (*)     Hemoglobin 10.4 (*)     Hematocrit 32.1 (*)      (*)     MCH 32.8      MCHC 32.4      RDW 12.4      Platelet Count 230      % Neutrophils 71      % Lymphocytes 21      % Monocytes 7      % Eosinophils 1      % Basophils 0      % Immature Granulocytes 0      NRBCs per 100 WBC 0      Absolute Neutrophils 5.8      Absolute Lymphocytes 1.7      Absolute Monocytes 0.6      Absolute Eosinophils 0.1      Absolute Basophils 0.0      Absolute Immature Granulocytes 0.0      Absolute NRBCs 0.0          ===============================================================  RADIOLOGY       Reviewed all pertinent imaging. Please see official radiology report.   XR Foot Right G/E 3 Views   Final Result   IMPRESSION: Marked diffuse osseous demineralization. No definite fracture. 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 Ankle mortise is intact. Calcaneal enthesophytes.      Ankle XR, G/E 3 views, right   Final Result   IMPRESSION: Marked diffuse osseous demineralization. No definite fracture. 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 Ankle mortise is intact. Calcaneal enthesophytes.      XR Tibia and Fibula Right 2 Views   Final Result   IMPRESSION: The right tibia and fibula are negative for fracture. Diffuse demineralization. Degenerative change at the knee joint.      XR Knee Right 1/2 Views   Final Result   IMPRESSION: Degenerative change patellofemoral compartment and medial compartment where there is bone-on-bone contact. No evidence for fracture or significant effusion. Diffuse demineralization.      Foot XR, G/E 3 views, left   Final Result   IMPRESSION: Diffuse demineralization. Hammertoe deformities. Plantar calcaneal spurring. Severe end-stage degenerative change at the tibiotalar joint. No evidence for acute fracture but the significant demineralization reduces the sensitivity this    examination and MRI would be helpful for further evaluation for the presence or absence of bone edema if indicated.            ================================================================  EKG         I have independently reviewed and interpreted the EKG(s) documented above.     ================================================================  PROCEDURES           Sarah Nicholas M.D.   Emergency Medicine   [facility] EMERGENCY DEPARTMENT  [facility] [facility] [address] [phone]  Dept: [phone]       Sarah Nicholas MD  09/07/23 122

## 2023-09-07 NOTE — ED NOTES
Report given to DAYSI Gerardo.  Writer assisted patient up to the commode with a pivot assistance x2.  Patient able to do, but very slow and weak.

## 2023-09-07 NOTE — ED TRIAGE NOTES
"Pt brought in by EMS from own home.  Pt had a fall by tripping on some carpet.  Then \"crawled across the linoleum\" to the phone and called daughter after being down for 4 hours.  Pt denies hitting head.  No LOC.  No blood thinners.  Pt A&O x4.  Pt has swelling in right ankle.     Triage Assessment       Row Name 09/07/23 0928       Triage Assessment (Adult)    Airway WDL WDL       Respiratory WDL    Respiratory WDL WDL                    "

## 2023-09-07 NOTE — H&P
[facility] [place]    History and Physical - Hospitalist Service       Date of Admission:  9/7/2023    Assessment & Plan      Mechanical fall  Mild rhabdomyolysis, traumatic  HTN  Lightheadedness, dizziness, vertigo acute on chronic  H/O Ataxia, and peripheral neuropathy, falls  Generalized weakness  Macrocytic anemia  Right shoulder pain and contusion  Chronic LBP, compression fx's L spine  H/O epilepsy    Plan:  -pt/ot/SW  -fall precautions  -orthostatics, tele, labs as ordered  -NS 125ml/h.   -TTE, Tele, CT head, C spine, right shoulder xray  -minimize opiates. Pain/supportive meds as ordered  -check phenytoin levels       Diet: Regular Diet Adult    DVT Prophylaxis: Pneumatic Compression Devices  Gamez Catheter: Not present  Lines: None     Cardiac Monitoring: None  Code Status: Full Code      Clinically Significant Risk Factors Present on Admission                # Drug Induced Platelet Defect: home medication list includes an antiplatelet medication   # Hypertension: Noted on problem list      # Obesity: Estimated body mass index is 31.25 kg/m  as calculated from the following:    Height as of this encounter: 1.524 m (5').    Weight as of this encounter: 72.6 kg (160 lb).              Disposition Plan      Expected Discharge Date: 09/08/2023                  Feng Morales DO, DO  Hospitalist Service  [facility] [place]  Securely message with [employer] (more info)  Text page via "[employer] Paging/Directory     ______________________________________________________________________    Chief Complaint   fall    History of Present Illness   Peggy De Anda is a 89 year old female who has a PMHx of ataxia, chronic lumbar compression fractures, foraminal stenoses, HTN who presented to the ED for evaluation after a fall and generalized weakness.      mechanical fall yesterday evening, took 4 hours to crawl to the phone, was put back in bed last night by family, and family brought her  into the ED because she could not ambulate today, she lives alone and cannot take care of herself.  In ED CBC, BMP unremarkable, 's. Bilateral ankle/foot xrays neg for acute fx/dislocation.  Admitted for further observation and management.  Reports right shoulder pain and right tib/fib pain. No HA, CP, SOB, pelvis/hip/femur/arms neck or low back pain. No vision changes acutely, denies new onset paresthesias. No dysphagia or dysarthria.      Past Medical History    Past Medical History:   Diagnosis Date    Anemia     Ataxia     Dermatitis     Created by Conversion     Epilepsy (H)     Essential hypertension     Gout     Monoclonal gammopathy of undetermined significance     Osteoporosis     Peripheral neuropathy     Secondary hyperparathyroidism (H)     non-renal       Past Surgical History   Past Surgical History:   Procedure Laterality Date    APPENDECTOMY      AGE 13    CATARACT EXTRACTION, BILATERAL      CHOLECYSTECTOMY      IR LUMBAR EPIDURAL STEROID INJECTION  10/5/2007    IR MISCELLANEOUS PROCEDURE  10/30/2007    NEUROPLASTY / TRANSPOSITION MEDIAN NERVE AT CARPAL TUNNEL BILATERAL      TONSILLECTOMY         Prior to Admission Medications   Prior to Admission Medications   Prescriptions Last Dose Informant Patient Reported? Taking?   Multiple Vitamins-Minerals (CENTRUM SILVER 50+WOMEN PO)   Yes No   Sig: Take 1 tablet by mouth daily   acetaminophen (TYLENOL) 325 MG tablet   No No   Sig: Take 3 tablets (975 mg) by mouth every 8 hours   allopurinol (ZYLOPRIM) 100 MG tablet   No No   Sig: Take 1 tablet (100 mg total) by mouth daily   aspirin 81 MG EC tablet   Yes No   Sig: [ASPIRIN 81 MG EC TABLET] Take 81 mg by mouth daily.   carvedilol (COREG) 6.25 MG tablet   No No   Sig: Take 1 tablet (6.25 mg) by mouth 2 times daily   furosemide (LASIX) 20 MG tablet   No No   Sig: Take 1 tablet (20 mg) by mouth 2 times daily   losartan (COZAAR) 100 MG tablet   No No   Sig: Take 1 tablet (100 mg) by mouth daily    magnesium oxide (MAG-OX) 400 MG tablet   No No   Sig: Take 1 tablet (400 mg) by mouth daily.   phenytoin (DILANTIN) 100 MG capsule   No No   Sig: Take 1 capsule (100 mg total) by mouth 3 (three) times a day.   vitamin D2 (ERGOCALCIFEROL) 85562 units (1250 mcg) capsule   No No   Sig: Take 1 capsule (50,000 Units) by mouth once a week      Facility-Administered Medications: None          Physical Exam   Vital Signs: Temp: 98.2  F (36.8  C) Temp src: Oral BP: 132/63 Pulse: 55   Resp: 18 SpO2: 99 % O2 Device: None (Room air)    Weight: 160 lbs 0 oz    Physical Examination:   General appearance - alert, and in no distress  Eyes - pupils equal and reactive, extraocular eye movements intact, sclera anicteric  Mouth - mucous membranes moist, pharynx normal without lesions  Lungs - clear to auscultation, no wheezes, rales or rhonchi, symmetric air entry  Heart - normal rate, regular rhythm, normal S1, S2, no murmurs, rubs, clicks or gallops. No peripheral edema.  Abdomen - soft, nontender, nondistended, no masses or organomegaly, BS+  Neurological - alert, oriented, Symmetric strength bue/ble, neg pronator drift, cn II-XII intact with otherwise left sided facial flattening vs right.  Skin - right shoulder bruise, right elbow, hands on dorsum, right 4th distal toe.     Lab/imaging reviewed    D/w Dr. Nicholas

## 2023-09-07 NOTE — CONSULTS
"Care Management Initial Consult    General Information  Assessment completed with: Patient, Children, Peggy and daughter Ernestine  Type of CM/SW Visit: Initial Assessment    Primary Care Provider verified and updated as needed: Yes   Readmission within the last 30 days: no previous admission in last 30 days      Reason for Consult: discharge planning  Advance Care Planning: Advance Care Planning Reviewed: no concerns identified          Communication Assessment  Patient's communication style: spoken language (English or Bilingual)             Cognitive  Cognitive/Neuro/Behavioral:                        Living Environment:   People in home: alone     Current living Arrangements: house (\"It is a single unit town house. She has a few steps to enter the house and she always needs family walking with her helping her up and down these steps. Then the house is all one main level\".)      Able to return to prior arrangements: other (see comments) (per daughter, \"I think she needs more help and that might need to be TCU for a while\")       Family/Social Support:  Care provided by: self, child(kamini)  Provides care for: no one, unable/limited ability to care for self  Marital Status:   Children          Description of Support System: Supportive, Involved    Support Assessment: Adequate family and caregiver support, Adequate social supports, Patient communicates needs well met    Current Resources:   Patient receiving home care services: No     Community Resources: None  Equipment currently used at home: walker, rolling (\"FWW\")  Supplies currently used at home: Incontinence Supplies, Other (\"Reading glasses, She most often wears the pad in case of an accident, but now with her diarrhea she needs it\".)    Employment/Financial:  Employment Status: retired     Employment/ Comments: \"no  history\"  Financial Concerns:     Referral to Financial Worker: No       Does the patient's insurance plan have a 3 day " "qualifying hospital stay waiver?  Yes   Will the waiver be used for post-acute placement? Yes, she is currently ordered observation so if TCU is needed then will need to use the Medicare ACO Reach waiver. Daughter given list.    Lifestyle & Psychosocial Needs:  Social Determinants of Health     Tobacco Use: Unknown (4/26/2022)    Patient History     Smoking Tobacco Use: Never     Smokeless Tobacco Use: Unknown     Passive Exposure: Not on file   Alcohol Use: Not on file   Financial Resource Strain: Not on file   Food Insecurity: Not on file   Transportation Needs: Not on file   Physical Activity: Not on file   Stress: Not on file   Social Connections: Not on file   Intimate Partner Violence: Not on file   Depression: Not at risk (2/15/2022)    PHQ-2     PHQ-2 Score: 0   Housing Stability: Not on file       Functional Status:  Prior to admission patient needed assistance:   Dependent ADLs:: Ambulation-walker, Independent  Dependent IADLs:: Laundry, Shopping, Transportation, Cleaning  Assesssment of Functional Status: Not at baseline with ADL Functioning, Not at baseline with mobility, Not at  functional baseline    Mental Health Status:          Chemical Dependency Status:                Values/Beliefs:  Spiritual, Cultural Beliefs, Oriental orthodox Practices, Values that affect care:                 Additional Information:  Peggy lives in a townhouse alone. \"It is a single unit town house. She has a few steps to enter the house and she always needs family walking with her helping her up and down these steps. Then the house is all one main level\".    She was independent with ADLs and IADLs, but now weak and unable to care for herself. Her daughter states, \"My mom doesn't want to move, but I am in the process of looking into moving her for more help. She won't like it, but I just know she is now starting to need more and more help. I have an appointment set up with [facility]. They are going to do an assessment for " "Elderly Waiver 9/28/23. Then I was going to visit [facility] and get her on waiting lists\".    She uses a FWW for mobility.    Likely needs a TCU and daughter given ACO Reach list. She states, \"I will look into the list tonight, but I most likely want [facility] as first choice and then the 2 in [place] also because all of those would be closest\". Need PT/OT recommendations still. Referral placed.    Ernestine daughter [phone].      Health transport at discharge.    CM to follow for medical progression of care, discharge recommendations, and final discharge plan.    Virginia Smiley RN    "

## 2023-09-07 NOTE — PHARMACY-ADMISSION MEDICATION HISTORY
Pharmacist Admission Medication History    Admission medication history is complete. The information provided in this note is only as accurate as the sources available at the time of the update.    Medication reconciliation/reorder completed by provider prior to medication history? No    Information Source(s): Patient, Family member, Caregiver, Clinic records, and CareEverywhere/SureScripts via in-person    Pertinent Information: daughter handles her moms medications     Changes made to PTA medication list:  Added: None  Deleted: aspirin, furosemide, [id number]  Changed: APAP to prn     Medication Affordability:       Allergies reviewed with patient and updates made in EHR: yes    Medication History Completed By: Cale Proctor [facility] 9/7/2023 1:31 PM    Prior to Admission medications    Medication Sig Last Dose Taking? Auth Provider Long Term End Date   acetaminophen (TYLENOL) 325 MG tablet Take 650 mg by mouth every 6 hours as needed for mild pain More than a month Yes Unknown, Entered By History     allopurinol (ZYLOPRIM) 100 MG tablet Take 1 tablet (100 mg total) by mouth daily 9/6/2023 at am Yes Dimple Bhatia MD     carvedilol (COREG) 6.25 MG tablet Take 1 tablet (6.25 mg) by mouth 2 times daily 9/6/2023 at pm Yes Dimple Bhatia MD Yes    losartan (COZAAR) 100 MG tablet Take 1 tablet (100 mg) by mouth daily 9/6/2023 at am Yes Dimple Bhatia MD Yes    magnesium oxide (MAG-OX) 400 MG tablet Take 1 tablet (400 mg) by mouth daily. 9/6/2023 at am Yes Dimple Bhatia MD     Multiple Vitamins-Minerals (CENTRUM SILVER 50+WOMEN PO) Take 1 tablet by mouth daily 9/6/2023 at am Yes Unknown, Entered By History     phenytoin (DILANTIN) 100 MG capsule Take 1 capsule (100 mg total) by mouth 3 (three) times a day. 9/6/2023 at pm Yes Dimple Bhatia MD Yes

## 2023-09-08 ENCOUNTER — APPOINTMENT (OUTPATIENT)
Dept: MRI IMAGING | Facility: HOSPITAL | Age: 88
DRG: 093 | End: 2023-09-08
Attending: INTERNAL MEDICINE
Payer: MEDICARE

## 2023-09-08 ENCOUNTER — APPOINTMENT (OUTPATIENT)
Dept: CARDIOLOGY | Facility: HOSPITAL | Age: 88
DRG: 093 | End: 2023-09-08
Attending: INTERNAL MEDICINE
Payer: MEDICARE

## 2023-09-08 ENCOUNTER — APPOINTMENT (OUTPATIENT)
Dept: OCCUPATIONAL THERAPY | Facility: HOSPITAL | Age: 88
DRG: 093 | End: 2023-09-08
Attending: INTERNAL MEDICINE
Payer: MEDICARE

## 2023-09-08 ENCOUNTER — APPOINTMENT (OUTPATIENT)
Dept: PHYSICAL THERAPY | Facility: HOSPITAL | Age: 88
DRG: 093 | End: 2023-09-08
Attending: INTERNAL MEDICINE
Payer: MEDICARE

## 2023-09-08 LAB
ALBUMIN SERPL BCG-MCNC: 3.4 G/DL (ref 3.5–5.2)
ALBUMIN UR-MCNC: 20 MG/DL
ALP SERPL-CCNC: 109 U/L (ref 35–104)
ALT SERPL W P-5'-P-CCNC: 41 U/L (ref 0–50)
ANION GAP SERPL CALCULATED.3IONS-SCNC: 12 MMOL/L (ref 7–15)
APPEARANCE UR: ABNORMAL
AST SERPL W P-5'-P-CCNC: 93 U/L (ref 0–45)
BACTERIA #/AREA URNS HPF: ABNORMAL /HPF
BASOPHILS # BLD AUTO: 0 10E3/UL (ref 0–0.2)
BASOPHILS NFR BLD AUTO: 0 %
BILIRUB DIRECT SERPL-MCNC: <0.2 MG/DL (ref 0–0.3)
BILIRUB SERPL-MCNC: 0.4 MG/DL
BILIRUB UR QL STRIP: NEGATIVE
BUN SERPL-MCNC: 9.6 MG/DL (ref 8–23)
CALCIUM SERPL-MCNC: 8.6 MG/DL (ref 8.2–9.6)
CHLORIDE SERPL-SCNC: 102 MMOL/L (ref 98–107)
CK SERPL-CCNC: 571 U/L (ref 26–192)
COLOR UR AUTO: ABNORMAL
CREAT SERPL-MCNC: 0.47 MG/DL (ref 0.51–0.95)
DEPRECATED HCO3 PLAS-SCNC: 24 MMOL/L (ref 22–29)
EGFRCR SERPLBLD CKD-EPI 2021: 89 ML/MIN/1.73M2
EOSINOPHIL # BLD AUTO: 0.2 10E3/UL (ref 0–0.7)
EOSINOPHIL NFR BLD AUTO: 2 %
ERYTHROCYTE [DISTWIDTH] IN BLOOD BY AUTOMATED COUNT: 12.5 % (ref 10–15)
FOLATE SERPL-MCNC: 24.2 NG/ML (ref 4.6–34.8)
GLUCOSE BLDC GLUCOMTR-MCNC: 90 MG/DL (ref 70–99)
GLUCOSE SERPL-MCNC: 98 MG/DL (ref 70–99)
GLUCOSE UR STRIP-MCNC: NEGATIVE MG/DL
HCT VFR BLD AUTO: 36.6 % (ref 35–47)
HGB BLD-MCNC: 11.9 G/DL (ref 11.7–15.7)
HGB UR QL STRIP: NEGATIVE
HOLD SPECIMEN: NORMAL
HYALINE CASTS: 1 /LPF
IMM GRANULOCYTES # BLD: 0 10E3/UL
IMM GRANULOCYTES NFR BLD: 0 %
KETONES UR STRIP-MCNC: ABNORMAL MG/DL
LEUKOCYTE ESTERASE UR QL STRIP: ABNORMAL
LVEF ECHO: NORMAL
LYMPHOCYTES # BLD AUTO: 1.8 10E3/UL (ref 0.8–5.3)
LYMPHOCYTES NFR BLD AUTO: 20 %
MAGNESIUM SERPL-MCNC: 1.6 MG/DL (ref 1.7–2.3)
MCH RBC QN AUTO: 32.3 PG (ref 26.5–33)
MCHC RBC AUTO-ENTMCNC: 32.5 G/DL (ref 31.5–36.5)
MCV RBC AUTO: 100 FL (ref 78–100)
MONOCYTES # BLD AUTO: 0.7 10E3/UL (ref 0–1.3)
MONOCYTES NFR BLD AUTO: 8 %
MUCOUS THREADS #/AREA URNS LPF: PRESENT /LPF
NEUTROPHILS # BLD AUTO: 6.3 10E3/UL (ref 1.6–8.3)
NEUTROPHILS NFR BLD AUTO: 70 %
NITRATE UR QL: NEGATIVE
NRBC # BLD AUTO: 0 10E3/UL
NRBC BLD AUTO-RTO: 0 /100
NT-PROBNP SERPL-MCNC: 2187 PG/ML (ref 0–1800)
PH UR STRIP: 6 [PH] (ref 5–7)
PLATELET # BLD AUTO: 267 10E3/UL (ref 150–450)
POTASSIUM SERPL-SCNC: 3.1 MMOL/L (ref 3.4–5.3)
POTASSIUM SERPL-SCNC: 3.4 MMOL/L (ref 3.4–5.3)
PROT SERPL-MCNC: 6.5 G/DL (ref 6.4–8.3)
RBC # BLD AUTO: 3.68 10E6/UL (ref 3.8–5.2)
RBC URINE: 1 /HPF
SODIUM SERPL-SCNC: 138 MMOL/L (ref 136–145)
SP GR UR STRIP: 1.01 (ref 1–1.03)
SQUAMOUS EPITHELIAL: 11 /HPF
TROPONIN T SERPL HS-MCNC: 36 NG/L
UROBILINOGEN UR STRIP-MCNC: <2 MG/DL
WBC # BLD AUTO: 9.1 10E3/UL (ref 4–11)
WBC URINE: 5 /HPF

## 2023-09-08 PROCEDURE — 96375 TX/PRO/DX INJ NEW DRUG ADDON: CPT

## 2023-09-08 PROCEDURE — 999N000127 HC STATISTIC PERIPHERAL IV START W US GUIDANCE

## 2023-09-08 PROCEDURE — 85025 COMPLETE CBC W/AUTO DIFF WBC: CPT | Performed by: INTERNAL MEDICINE

## 2023-09-08 PROCEDURE — 36415 COLL VENOUS BLD VENIPUNCTURE: CPT | Performed by: INTERNAL MEDICINE

## 2023-09-08 PROCEDURE — 250N000011 HC RX IP 250 OP 636: Mod: JZ | Performed by: HOSPITALIST

## 2023-09-08 PROCEDURE — 93306 TTE W/DOPPLER COMPLETE: CPT

## 2023-09-08 PROCEDURE — 82310 ASSAY OF CALCIUM: CPT | Performed by: INTERNAL MEDICINE

## 2023-09-08 PROCEDURE — 96376 TX/PRO/DX INJ SAME DRUG ADON: CPT

## 2023-09-08 PROCEDURE — 84484 ASSAY OF TROPONIN QUANT: CPT | Performed by: INTERNAL MEDICINE

## 2023-09-08 PROCEDURE — 96372 THER/PROPH/DIAG INJ SC/IM: CPT | Performed by: HOSPITALIST

## 2023-09-08 PROCEDURE — 250N000013 HC RX MED GY IP 250 OP 250 PS 637: Performed by: INTERNAL MEDICINE

## 2023-09-08 PROCEDURE — 83880 ASSAY OF NATRIURETIC PEPTIDE: CPT | Performed by: INTERNAL MEDICINE

## 2023-09-08 PROCEDURE — 250N000011 HC RX IP 250 OP 636: Mod: JZ | Performed by: INTERNAL MEDICINE

## 2023-09-08 PROCEDURE — 70553 MRI BRAIN STEM W/O & W/DYE: CPT | Mod: MG

## 2023-09-08 PROCEDURE — 82962 GLUCOSE BLOOD TEST: CPT

## 2023-09-08 PROCEDURE — 81001 URINALYSIS AUTO W/SCOPE: CPT | Performed by: INTERNAL MEDICINE

## 2023-09-08 PROCEDURE — 97535 SELF CARE MNGMENT TRAINING: CPT | Mod: GO

## 2023-09-08 PROCEDURE — G0378 HOSPITAL OBSERVATION PER HR: HCPCS

## 2023-09-08 PROCEDURE — 82248 BILIRUBIN DIRECT: CPT | Performed by: INTERNAL MEDICINE

## 2023-09-08 PROCEDURE — 255N000002 HC RX 255 OP 636: Mod: JZ | Performed by: INTERNAL MEDICINE

## 2023-09-08 PROCEDURE — 97162 PT EVAL MOD COMPLEX 30 MIN: CPT | Mod: GP

## 2023-09-08 PROCEDURE — 93306 TTE W/DOPPLER COMPLETE: CPT | Mod: 26 | Performed by: INTERNAL MEDICINE

## 2023-09-08 PROCEDURE — 84132 ASSAY OF SERUM POTASSIUM: CPT | Performed by: INTERNAL MEDICINE

## 2023-09-08 PROCEDURE — 83735 ASSAY OF MAGNESIUM: CPT | Performed by: INTERNAL MEDICINE

## 2023-09-08 PROCEDURE — 97166 OT EVAL MOD COMPLEX 45 MIN: CPT | Mod: GO

## 2023-09-08 PROCEDURE — A9585 GADOBUTROL INJECTION: HCPCS | Mod: JZ | Performed by: INTERNAL MEDICINE

## 2023-09-08 PROCEDURE — 99232 SBSQ HOSP IP/OBS MODERATE 35: CPT | Performed by: INTERNAL MEDICINE

## 2023-09-08 PROCEDURE — 97530 THERAPEUTIC ACTIVITIES: CPT | Mod: GP

## 2023-09-08 PROCEDURE — 82550 ASSAY OF CK (CPK): CPT | Performed by: INTERNAL MEDICINE

## 2023-09-08 RX ORDER — CARVEDILOL 3.12 MG/1
6.25 TABLET ORAL 2 TIMES DAILY
Status: DISCONTINUED | OUTPATIENT
Start: 2023-09-08 | End: 2023-09-18 | Stop reason: HOSPADM

## 2023-09-08 RX ORDER — MAGNESIUM OXIDE 400 MG/1
400 TABLET ORAL DAILY
Status: DISCONTINUED | OUTPATIENT
Start: 2023-09-08 | End: 2023-09-08

## 2023-09-08 RX ORDER — LORAZEPAM 2 MG/ML
0.5 INJECTION INTRAMUSCULAR
Status: DISCONTINUED | OUTPATIENT
Start: 2023-09-08 | End: 2023-09-09

## 2023-09-08 RX ORDER — MAGNESIUM SULFATE HEPTAHYDRATE 40 MG/ML
2 INJECTION, SOLUTION INTRAVENOUS ONCE
Status: DISCONTINUED | OUTPATIENT
Start: 2023-09-08 | End: 2023-09-08

## 2023-09-08 RX ORDER — HYDRALAZINE HYDROCHLORIDE 20 MG/ML
10 INJECTION INTRAMUSCULAR; INTRAVENOUS EVERY 6 HOURS PRN
Status: DISCONTINUED | OUTPATIENT
Start: 2023-09-08 | End: 2023-09-18 | Stop reason: HOSPADM

## 2023-09-08 RX ORDER — MAGNESIUM SULFATE HEPTAHYDRATE 40 MG/ML
2 INJECTION, SOLUTION INTRAVENOUS ONCE
Status: COMPLETED | OUTPATIENT
Start: 2023-09-08 | End: 2023-09-08

## 2023-09-08 RX ORDER — MAGNESIUM OXIDE 400 MG/1
400 TABLET ORAL 2 TIMES DAILY
Status: DISCONTINUED | OUTPATIENT
Start: 2023-09-08 | End: 2023-09-08

## 2023-09-08 RX ORDER — GADOBUTROL 604.72 MG/ML
7 INJECTION INTRAVENOUS ONCE
Status: COMPLETED | OUTPATIENT
Start: 2023-09-08 | End: 2023-09-08

## 2023-09-08 RX ORDER — POTASSIUM CHLORIDE 1500 MG/1
40 TABLET, EXTENDED RELEASE ORAL ONCE
Status: COMPLETED | OUTPATIENT
Start: 2023-09-09 | End: 2023-09-09

## 2023-09-08 RX ORDER — OLANZAPINE 10 MG/2ML
5 INJECTION, POWDER, FOR SOLUTION INTRAMUSCULAR EVERY 6 HOURS PRN
Status: DISCONTINUED | OUTPATIENT
Start: 2023-09-08 | End: 2023-09-18 | Stop reason: HOSPADM

## 2023-09-08 RX ORDER — POTASSIUM CHLORIDE 1500 MG/1
40 TABLET, EXTENDED RELEASE ORAL ONCE
Status: COMPLETED | OUTPATIENT
Start: 2023-09-08 | End: 2023-09-08

## 2023-09-08 RX ADMIN — OLANZAPINE 5 MG: 10 INJECTION, POWDER, FOR SOLUTION INTRAMUSCULAR at 01:10

## 2023-09-08 RX ADMIN — MAGNESIUM OXIDE TAB 400 MG (241.3 MG ELEMENTAL MG) 400 MG: 400 (241.3 MG) TAB at 14:02

## 2023-09-08 RX ADMIN — ALLOPURINOL 100 MG: 100 TABLET ORAL at 07:53

## 2023-09-08 RX ADMIN — ACETAMINOPHEN 650 MG: 325 TABLET ORAL at 14:02

## 2023-09-08 RX ADMIN — CARVEDILOL 3.12 MG: 3.12 TABLET, FILM COATED ORAL at 07:53

## 2023-09-08 RX ADMIN — CARVEDILOL 6.25 MG: 3.12 TABLET, FILM COATED ORAL at 21:17

## 2023-09-08 RX ADMIN — HYDRALAZINE HYDROCHLORIDE 10 MG: 20 INJECTION INTRAMUSCULAR; INTRAVENOUS at 17:53

## 2023-09-08 RX ADMIN — GADOBUTROL 7 ML: 604.72 INJECTION INTRAVENOUS at 21:59

## 2023-09-08 RX ADMIN — POTASSIUM CHLORIDE 40 MEQ: 1500 TABLET, EXTENDED RELEASE ORAL at 14:02

## 2023-09-08 RX ADMIN — PHENYTOIN SODIUM 100 MG: 100 CAPSULE ORAL at 14:02

## 2023-09-08 RX ADMIN — PHENYTOIN SODIUM 100 MG: 100 CAPSULE ORAL at 21:17

## 2023-09-08 RX ADMIN — MAGNESIUM SULFATE HEPTAHYDRATE 2 G: 40 INJECTION, SOLUTION INTRAVENOUS at 17:53

## 2023-09-08 RX ADMIN — PHENYTOIN SODIUM 100 MG: 100 CAPSULE ORAL at 07:53

## 2023-09-08 ASSESSMENT — ACTIVITIES OF DAILY LIVING (ADL)
ADLS_ACUITY_SCORE: 39
ADLS_ACUITY_SCORE: 37
ADLS_ACUITY_SCORE: 37
ADLS_ACUITY_SCORE: 43
ADLS_ACUITY_SCORE: 37
PREVIOUS_RESPONSIBILITIES: MEAL PREP;LAUNDRY;MEDICATION MANAGEMENT;FINANCES
ADLS_ACUITY_SCORE: 37
ADLS_ACUITY_SCORE: 43
ADLS_ACUITY_SCORE: 37
ADLS_ACUITY_SCORE: 37
ADLS_ACUITY_SCORE: 43

## 2023-09-08 NOTE — PROGRESS NOTES
Pt woke up due to IV beeping, continues to be confused but now is upset and frustrated about being here. Pulled off cardiac monitor, pulled out one IV. Will not let writer flush the other IV to reconnect continuous fluids. Is not redirectable towards plan of care, just wants to go home. Notified provider. Bed alarm on, IV stopped and cardiac monitor not on at this time.

## 2023-09-08 NOTE — ED NOTES
Paged Dr Morales about pt's PIV. Pt had pulled it out and they said it was okay to keep PIV out. Now all these PIV meds ordered.

## 2023-09-08 NOTE — PLAN OF CARE
Goal Outcome Evaluation:      Plan of Care Reviewed With: patient      Pt. Has bleeding from rectum and also blood clots from rectum the past 2 times she has gotten up to the commode. Pt. Also either has large hemorrhoids  and or a prolapsed rectum. MD informed.     Problem: Mobility Impairment  Goal: Optimal Mobility  Outcome: Progressing  Intervention: Optimize Mobility  Recent Flowsheet Documentation  Taken 9/7/2023 1515 by Humiara Nguyen, RN  Assistive Device Utilized:   walker   gait belt  Activity Management:   activity encouraged   up to bedside commode   Pt. Up with assist of 2. Pt. Moans when moving, but declines pain anywhere specific. CT scans done this evening.     Problem: Confusion Chronic  Goal: Optimal Cognitive Function  Outcome: Progressing  Pt. Alert to self only and she occasionally knows she is in the hospital. Bed alarm on.     Humaira Nguyen, RN

## 2023-09-08 NOTE — PROGRESS NOTES
"IM zyprexa given one hour ago, pt remains uncooperative w/ cares, refusing to let staff restart IV, refusing tele and pulse ox. Notified provider, provider states \"Her CK is not that high, let's keep her off tele and no IV.\" Pt sleeping and comfortable at this time.   "

## 2023-09-08 NOTE — PROGRESS NOTES
PRIMARY DIAGNOSIS: GENERALIZED WEAKNESS    OUTPATIENT/OBSERVATION GOALS TO BE MET BEFORE DISCHARGE  1. Orthostatic performed: Yes:          Lying Orthostatic BP: 184/72 (manual)         Sitting Orthostatic BP: 192/84 (manual)         Standing Orthostatic BP: 97/80     2. Tolerating PO medications:  None given. PO intake adequate    3. Return to near baseline physical activity: No    4. Cleared for discharge by consultants (if involved): No    Discharge Planner Nurse   Safe discharge environment identified: No  Barriers to discharge: Yes       Entered by: Brianna King RN 09/08/2023 6:35 PM   Pt oriented to self. Disoriented to place, time and situation. Cooperative with cares but trying to remove tele leads. IV mag 2G infusing. MRI ordered, need to call Daughter to complete checklist.   Please review provider order for any additional goals.   Nurse to notify provider when observation goals have been met and patient is ready for discharge.

## 2023-09-08 NOTE — PROGRESS NOTES
[facility]    Medicine Progress Note - Hospitalist Service    Date of Admission:  9/7/2023    Assessment & Plan   Mechanical fall, recurrent  Mild rhabdomyolysis, traumatic, improving  HTN, labile, probable orthostatic hypotension which needs confirmion as ordered  Lightheadedness, dizziness, vertigo (acute on chronic)  H/O Ataxia, and peripheral neuropathy, falls, chronic DDD  Generalized weakness  Macrocytic anemia, no abla  Right shoulder pain and contusion, refused xray  Chronic LBP, compression fx's L spine  H/O epilepsy, eeg pending, keppra levels in process  Acute Encephalopathy, toxic & metabolic with superimposed probable chronic cognitive decline progressively  BRBPR, no abdominal pain, h/o hemorrhoids - monitor  Chronic Multilevel cervical spondylosis including moderate to high-grade bilateral neural foraminal stenosis at C3-C4 and C4-C5.      Plan:  -pt/ot/SW  -fall precautions  -orthostatics, tele, labs/imaging as ordered  -NS 75ml/h.   -Tele, CT head, C spine, right shoulder xray  -minimize opiates. Pain/supportive meds as ordered  -needs tcu. SW following  -monitor for urine retention per bladder protocol  -ua/uc  -hold losartan, increase coreg, hydralazine prn       Diet: Regular Diet Adult    DVT Prophylaxis: Pneumatic Compression Devices  Gamez Catheter: Not present  Lines: None     Cardiac Monitoring: ACTIVE order. Indication: Bradycardias (48 hours)  Code Status: Full Code      Clinically Significant Risk Factors Present on Admission        # Hypokalemia: Lowest K = 3.1 mmol/L in last 2 days, will replace as needed     # Hypomagnesemia: Lowest Mg = 1.6 mg/dL in last 2 days, will replace as needed   # Hypoalbuminemia: Lowest albumin = 3.1 g/dL at 9/7/2023  9:40 AM, will monitor as appropriate     # Hypertension: Noted on problem list      # Obesity: Estimated body mass index is 31.25 kg/m  as calculated from the following:    Height as of this encounter: 1.524 m (5').     Weight as of this encounter: 72.6 kg (160 lb).              Disposition Plan      Expected Discharge Date: 09/09/2023                  Feng Morales DO, DO  Hospitalist Service  [facility]  Securely message with [employer] (more info)  Text page via [employer] Paging/Directory   ______________________________________________________________________    Interval History   reviewed    Physical Exam   Vital Signs: Temp: 97.8  F (36.6  C) Temp src: Oral BP: (!) 177/86 Pulse: 74   Resp: 24 SpO2: 97 % O2 Device: None (Room air)    Weight: 160 lbs 0 oz    General appearance - alert, and in no distress  Lungs -decreased, non labored  Heart - normal rate, regular rhythm, normal S1, S2, no murmurs, rubs, clicks or gallops. No peripheral edema.  Abdomen - soft, nontender, nondistended, BS+  Neurological - a&o, some disorientation and occasional agitation.     Lab/imaging reviewed

## 2023-09-08 NOTE — ED NOTES
[facility] ED Handoff Report    ED Chief Complaint: fall    ED Diagnosis:  (R53.1) Generalized weakness    PMH:    Past Medical History:   Diagnosis Date    Anemia     Ataxia     Dermatitis     Created by Conversion     Epilepsy (H)     Essential hypertension     Gout     Monoclonal gammopathy of undetermined significance     Osteoporosis     Peripheral neuropathy     Secondary hyperparathyroidism (H)     non-renal        Code Status:  Full Code     Falls Risk: Yes     Current Living Situation/Residence: lives alone     Elimination Status: Continent: Yes     Activity Level: SBA w/ walker/2A    Patients Preferred Language:  English     Needed: No    Vital Signs:  BP (!) 180/79   Pulse 72   Temp 97.7  F (36.5  C) (Oral)   Resp 27   Ht 1.524 m (5')   Wt 72.6 kg (160 lb)   SpO2 96%   BMI 31.25 kg/m       Pain Score: 5/10    Is the Patient Confused:  Yes    Last Food or Drink: 09/08/23 at breakfast    Tests Performed: Done: Labs and Imaging    Family Dynamics/Concerns: No    Family Updated On Visitor Policy: Yes    Plan of Care Communicated to Family: Yes    Who Was Updated about Plan of Care: daughter    Belongings Checklist Done and Signed by Patient: No    Medications sent with patient: none    Covid: asymptomatic ,     Additional Information: Hx of dementia. pt pulled out PIV last night. Has new one in now. Could potentially become 1:1 if becomes too agitated but right now doing well. PRN zyprexa and ativan if needed too. Pt had to get straight cath at 0530. Pt peed around 12-1 pm and bladder scan was 177 afterwards. Daughter is at bedside now. She was updated about pt and she wants staffs to tell her mom that she won't be going home but going to TCU.    RN: Jhonny Dow RN   9/8/2023 2:21 PM

## 2023-09-08 NOTE — PROGRESS NOTES
"Occupational Therapy      09/08/23 0915   Appointment Info   Signing Clinician's Name / Credentials (OT) SAEID Odom   Student Supervision Direct supervision provided   Living Environment   People in Home alone   Current Living Arrangements house  (CM states townhouse, pt states house)   Home Accessibility stairs to enter home   Number of Stairs, Main Entrance 1;2   Living Environment Comments Has W/I shower, GB, SC. Pt able to live on 1 level at home-unclear on accuracy of pt report   Self-Care   Usual Activity Tolerance moderate   Current Activity Tolerance poor   Equipment Currently Used at Home walker, standard   Fall history within last six months yes   Number of times patient has fallen within last six months 1   Activity/Exercise/Self-Care Comment Indp w/ dressing, bathing, toileting   Instrumental Activities of Daily Living (IADL)   Previous Responsibilities meal prep;laundry;medication management;finances   IADL Comments Sister manages groceries, driving   General Information   Onset of Illness/Injury or Date of Surgery 09/07/23   Referring Physician Feng Morales,    Additional Occupational Profile Info/Pertinent History of Current Problem \" 89 year old female who has a PMHx of ataxia, chronic lumbar compression fractures, foraminal stenoses, HTN who presented to the ED for evaluation after a fall and generalized weakness.       mechanical fall yesterday evening, took 4 hours to crawl to the phone, was put back in bed last night by family, and family brought her into the ED because she could not ambulate today, she lives alone and cannot take care of herself.  In ED CBC, BMP unremarkable, 's. Bilateral ankle/foot xrays neg for acute fx/dislocation.  Admitted for further observation and management.  Reports right shoulder pain and right tib/fib pain. No HA, CP, SOB, pelvis/hip/femur/arms neck or low back pain. No vision changes acutely, denies new onset paresthesias. No dysphagia or " dysarthria   Existing Precautions/Restrictions fall   Limitations/Impairments safety/cognitive   Cognitive Status Examination   Orientation Status time;person   Affect/Mental Status (Cognitive) confused   Follows Commands initiation impaired;physical/tactile prompts required;repetition of directions required   Safety Deficit insight into deficits/self-awareness   Range of Motion Comprehensive   General Range of Motion upper extremity range of motion deficits identified   General Upper Extremity Assessment (Range of Motion)   Comment: Upper Extremity ROM Pt has history of shoulder problems   Upper Extremity: Range of Motion shoulder, left: UE ROM;shoulder, right: UE ROM   Strength Comprehensive (MMT)   Comment, General Manual Muscle Testing (MMT) Assessment B  strength WNL, Shoulder strength not tested   Bed Mobility   Bed Mobility supine-sit   Supine-Sit Coal (Bed Mobility) supervision;verbal cues   Assistive Device (Bed Mobility) bed rails   Transfers   Transfers sit-stand transfer   Sit-Stand Transfer   Sit-Stand Coal (Transfers) minimum assist (75% patient effort)   Assistive Device (Sit-Stand Transfers) walker, front-wheeled   Balance   Balance Assessment standing balance: static   Activities of Daily Living   BADL Assessment/Intervention lower body dressing   Lower Body Dressing Assessment/Training   Coal Level (Lower Body Dressing) minimum assist (75% patient effort);moderate assist (50% patient effort)   Clinical Impression   Criteria for Skilled Therapeutic Interventions Met (OT) Yes, treatment indicated   OT Diagnosis Decreased ADL indp./safety   OT Problem List-Impairments impacting ADL problems related to;activity tolerance impaired;balance;mobility;strength;cognition   Assessment of Occupational Performance 3-5 Performance Deficits   Identified Performance Deficits balance, safety, ADLs, transfers   Planned Therapy Interventions (OT) ADL retraining;balance  training;cognition;strengthening;transfer training   Clinical Decision Making Complexity (OT) moderate complexity   Risk & Benefits of therapy have been explained evaluation/treatment results reviewed;care plan/treatment goals reviewed;patient   Clinical Impression Comments Pt demonstrating increased weakness/fatigue impacting overall ADL indp.   OT Total Evaluation Time   OT Eval, Moderate Complexity Minutes (86926) 10   OT Goals   Therapy Frequency (OT) Daily   OT Predicted Duration/Target Date for Goal Attainment 09/15/23   OT Goals Lower Body Dressing;Transfers;Toilet Transfer/Toileting;Cognition   OT: Lower Body Dressing Supervision/stand-by assist   OT: Transfer Supervision/stand-by assist;with assistive device   OT: Toilet Transfer/Toileting Supervision/stand-by assist;using adaptive equipment   OT: Cognitive Patient/caregiver will verbalize understanding of cognitive assessment results/recommendations as needed for safe discharge planning   Self-Care/Home Management   Self-Care/Home Mgmt/ADL, Compensatory, Meal Prep Minutes (00423) 9   Symptoms Noted During/After Treatment (Meal Preparation/Planning Training) dizziness;fatigue   Treatment Detail/Skilled Intervention Pt up in bed upon arrival, pt aggreable to therapy session with increased encouragment. /60s. Pt completed sup>EOB w. SBA and cues for tech. Pt additional STS w/ mod A x1 and CGA x1 w/ FWW, cues for hand placement, pt tolerated standing~1min. Pt declined any amb/marching in place and reported increased dizziness. Pt assisted back to EOB w/ BP 97/80. Min A x2 EOB>Sup, BP @ sup 109/86 w/ no complaints. RN notified of BP. Alarm on, call light w/in reach.   OT Discharge Planning   OT Plan SLUMS?, transfers, toileting, LB dressing   OT Discharge Recommendation (DC Rec) Transitional Care Facility   OT Rationale for DC Rec Pt demonstrating increased weakness requiring mod A x1 w/ FWW for transfers to enhance safety. OT rec. TCU as safe for DC to  improve overall ADL/mobility. Per chart review pt lives alone in home, sister assists as able. Further cog. screening will be completed.   OT Brief overview of current status Mod Ax1, FWW, STS   Total Session Time   Timed Code Treatment Minutes 9   Total Session Time (sum of timed and untimed services) 19   .ip

## 2023-09-08 NOTE — PLAN OF CARE
Goal Outcome Evaluation:    Problem: Confusion Chronic  Goal: Optimal Cognitive Function  Outcome: Not Progressing  Pt alert to self, confused and agitated NOC after waking up. IM medication needed after pulling out IV, pulling off tele and attempting to get out of bed multiple times. Hours after zyprexa pt back to pleasantly confused. Provider okayed no IV and no tele. Up A1-2 to commode, no BM NOC, PVR 450ml straight cath done NOC, 650ml out. PT/OT/SW consults.

## 2023-09-08 NOTE — ED NOTES
Spoke to pt's daughter and explained to her about plan of care. She is on track with us and agrees with everything. Just concern about pt wanting to leave, etc. And just really wants everyone to tell pt that she will be going to TCU and not back home. They will be heading up to p1 soon.

## 2023-09-08 NOTE — PROGRESS NOTES
09/08/23 1315   Appointment Info   Signing Clinician's Name / Credentials (PT) Manuela Proctor, EZ   Quick Adds   Quick Adds Certification   Living Environment   People in Home alone   Current Living Arrangements house  (CM states Town home and pt does say she lives in a house)   Home Accessibility stairs to enter home   Number of Stairs, Main Entrance 1   Stair Railings, Main Entrance none   Transportation Anticipated health plan transportation   Living Environment Comments Pt said she can stay on one level.   Self-Care   Current Activity Tolerance fair   Equipment Currently Used at Home walker, rolling  (FWW)   Fall history within last six months yes   Number of times patient has fallen within last six months 1   Activity/Exercise/Self-Care Comment Pt is ambulating indep with the FWW and is indep with bed mobility, transfers and ADLs.  Pt does have assist with driving and assist with shopping.   General Information   Onset of Illness/Injury or Date of Surgery 09/07/23   Referring Physician Dr Morales   Patient/Family Therapy Goals Statement (PT) To go home   Pertinent History of Current Problem (include personal factors and/or comorbidities that impact the POC) Per the chart, Mechanical fall  Mild rhabdomyolysis, traumatic  HTN  Lightheadedness, dizziness, vertigo acute on chronic  H/O Ataxia, and peripheral neuropathy, falls  Generalized weakness  Macrocytic anemia  Right shoulder pain and contusion  Chronic LBP, compression fx's L spine  H/O epilepsy   Existing Precautions/Restrictions fall  (bed alarm on and call light by the pt.)   Weight-Bearing Status - LLE weight-bearing as tolerated   Weight-Bearing Status - RLE weight-bearing as tolerated   Cognition   Orientation Status (Cognition) person;time  (Pt not oriented to place.  Increased cues for all tasks and ecnouragement to continue to move.)   Pain Assessment   Patient Currently in Pain   (yes, right ankle.)   Integumentary/Edema   Integumentary/Edema Comments  Some swelling in bilat LEs   Range of Motion (ROM)   ROM Comment Pt did have dec R knee flex to about 45 degress supine and limited  R DF due to increased pain.   Strength (Manual Muscle Testing)   Strength Comments Pt was able to WB bilat LE.  Pt did not lori DF test bilat feet.  Pt said she does have pain in the R ankle.   Bed Mobility   Comment, (Bed Mobility) Supine>sit with mod A  x1  with a rail.   Transfers   Comment, (Transfers) MIn A x 1 with FWW   Gait/Stairs (Locomotion)   [place] Level (Gait) verbal cues;moderate assist (50% patient effort)   Assistive Device (Gait) walker, front-wheeled  (FWW)   Distance in Feet standing   Balance   Balance Comments Pt tends to lean backwards.   Sensory Examination   Sensory Perception WNL   Sensory Perception Comments Bilat LEs   Clinical Impression   Criteria for Skilled Therapeutic Intervention Yes, treatment indicated   PT Diagnosis (PT) impaired functional mobility   Influenced by the following impairments dec bal, dec strength, increased pain, pt is not at PLOF.   Functional limitations due to impairments bed mobility, transfers, gait, steps.   Clinical Presentation (PT Evaluation Complexity) Stable/Uncomplicated   Clinical Presentation Rationale Pt presents medically diagnsed.   Clinical Decision Making (Complexity) moderate complexity   Planned Therapy Interventions (PT) balance training;bed mobility training;gait training;strengthening;stair training;transfer training   Anticipated Equipment Needs at Discharge (PT) walker, rolling  (FWW)   Risk & Benefits of therapy have been explained evaluation/treatment results reviewed;care plan/treatment goals reviewed;risks/benefits reviewed;patient;participants voiced agreement with care plan   PT Total Evaluation Time   PT Eval, Moderate Complexity Minutes (85108) 15   Therapy Certification   Start of care date 09/08/23   Certification date from 09/08/23   Certification date to 09/15/23   Medical Diagnosis weakness.    Physical Therapy Goals   PT Frequency Daily   PT Predicted Duration/Target Date for Goal Attainment 09/15/23   PT Goals Bed Mobility;Transfers;Gait;Stairs;PT Goal 1   PT: Bed Mobility Minimal assist;Supine to/from sit;Rolling   PT: Transfers Supervision/stand-by assist;Bed to/from chair;Assistive device   PT: Gait Minimal assist;Rolling walker;100 feet   PT: Stairs Minimal assist;2 stairs;Rail on both sides   Interventions   Interventions Quick Adds Therapeutic Activity   Therapeutic Activity   Therapeutic Activities: dynamic activities to improve functional performance Minutes (84100) 10   Treatment Detail/Skilled Intervention Supine>sit with mod A x 1 with  the pt using the rail.  Cues for technque.  Some assist with LEs and trunk.  Sit scoot with CGA.  Sit<>stand with min A x 1 with FWW with cues for hand placement.  Sit>supine with min A x 1 with cues for technique. Supine scoot with max A x 2.   .  (Pt said she cannot walk.)   PT Discharge Planning   PT Plan Bed mobility, transfers with FWW, gait w FWW, ex, steps when able.   PT Discharge Recommendation (DC Rec) Transitional Care Facility   PT Rationale for DC Rec Pt does need assist with all mobility due to increased weakness, dec bal and pt is confused. TCU is recommended.   PT Brief overview of current status PT eval, bed mobility with mod A x1. Sit<>stand with FWW with min A x1.   Total Session Time   Timed Code Treatment Minutes 10   Total Session Time (sum of timed and untimed services) 25   .ipp

## 2023-09-09 ENCOUNTER — APPOINTMENT (OUTPATIENT)
Dept: RADIOLOGY | Facility: HOSPITAL | Age: 88
DRG: 093 | End: 2023-09-09
Attending: INTERNAL MEDICINE
Payer: MEDICARE

## 2023-09-09 ENCOUNTER — APPOINTMENT (OUTPATIENT)
Dept: PHYSICAL THERAPY | Facility: HOSPITAL | Age: 88
DRG: 093 | End: 2023-09-09
Payer: MEDICARE

## 2023-09-09 ENCOUNTER — APPOINTMENT (OUTPATIENT)
Dept: CT IMAGING | Facility: HOSPITAL | Age: 88
DRG: 093 | End: 2023-09-09
Attending: INTERNAL MEDICINE
Payer: MEDICARE

## 2023-09-09 LAB
ALBUMIN SERPL BCG-MCNC: 3.2 G/DL (ref 3.5–5.2)
ALP SERPL-CCNC: 98 U/L (ref 35–104)
ALT SERPL W P-5'-P-CCNC: 41 U/L (ref 0–50)
AMMONIA PLAS-SCNC: 26 UMOL/L (ref 11–51)
ANION GAP SERPL CALCULATED.3IONS-SCNC: 12 MMOL/L (ref 7–15)
AST SERPL W P-5'-P-CCNC: 87 U/L (ref 0–45)
BASE EXCESS BLDV CALC-SCNC: -2.4 MMOL/L
BASOPHILS # BLD AUTO: 0.1 10E3/UL (ref 0–0.2)
BASOPHILS NFR BLD AUTO: 1 %
BILIRUB SERPL-MCNC: 0.4 MG/DL
BUN SERPL-MCNC: 7.9 MG/DL (ref 8–23)
CALCIUM SERPL-MCNC: 8.7 MG/DL (ref 8.2–9.6)
CHLORIDE SERPL-SCNC: 103 MMOL/L (ref 98–107)
CK SERPL-CCNC: 598 U/L (ref 26–192)
CREAT SERPL-MCNC: 0.39 MG/DL (ref 0.51–0.95)
DEPRECATED HCO3 PLAS-SCNC: 20 MMOL/L (ref 22–29)
EGFRCR SERPLBLD CKD-EPI 2021: >90 ML/MIN/1.73M2
EOSINOPHIL # BLD AUTO: 0.1 10E3/UL (ref 0–0.7)
EOSINOPHIL NFR BLD AUTO: 1 %
ERYTHROCYTE [DISTWIDTH] IN BLOOD BY AUTOMATED COUNT: 12.3 % (ref 10–15)
GLUCOSE SERPL-MCNC: 110 MG/DL (ref 70–99)
HCO3 BLDV-SCNC: 22 MMOL/L (ref 24–30)
HCT VFR BLD AUTO: 35.5 % (ref 35–47)
HGB BLD-MCNC: 11.9 G/DL (ref 11.7–15.7)
IMM GRANULOCYTES # BLD: 0.1 10E3/UL
IMM GRANULOCYTES NFR BLD: 1 %
LACTATE SERPL-SCNC: 1.1 MMOL/L (ref 0.7–2)
LYMPHOCYTES # BLD AUTO: 1.3 10E3/UL (ref 0.8–5.3)
LYMPHOCYTES NFR BLD AUTO: 16 %
MAGNESIUM SERPL-MCNC: 1.7 MG/DL (ref 1.7–2.3)
MCH RBC QN AUTO: 33.2 PG (ref 26.5–33)
MCHC RBC AUTO-ENTMCNC: 33.5 G/DL (ref 31.5–36.5)
MCV RBC AUTO: 99 FL (ref 78–100)
MONOCYTES # BLD AUTO: 0.7 10E3/UL (ref 0–1.3)
MONOCYTES NFR BLD AUTO: 9 %
NEUTROPHILS # BLD AUTO: 6.3 10E3/UL (ref 1.6–8.3)
NEUTROPHILS NFR BLD AUTO: 72 %
NRBC # BLD AUTO: 0 10E3/UL
NRBC BLD AUTO-RTO: 0 /100
OXYHGB MFR BLDV: 75.3 % (ref 70–75)
PCO2 BLDV: 32 MM HG (ref 35–50)
PH BLDV: 7.44 [PH] (ref 7.35–7.45)
PLATELET # BLD AUTO: 255 10E3/UL (ref 150–450)
PO2 BLDV: 38 MM HG (ref 25–47)
POTASSIUM SERPL-SCNC: 3.9 MMOL/L (ref 3.4–5.3)
POTASSIUM SERPL-SCNC: 3.9 MMOL/L (ref 3.4–5.3)
PROCALCITONIN SERPL IA-MCNC: 0.12 NG/ML
PROT SERPL-MCNC: 6.4 G/DL (ref 6.4–8.3)
RBC # BLD AUTO: 3.58 10E6/UL (ref 3.8–5.2)
SAO2 % BLDV: 76.8 % (ref 70–75)
SODIUM SERPL-SCNC: 135 MMOL/L (ref 136–145)
WBC # BLD AUTO: 8.6 10E3/UL (ref 4–11)

## 2023-09-09 PROCEDURE — 80053 COMPREHEN METABOLIC PANEL: CPT | Performed by: INTERNAL MEDICINE

## 2023-09-09 PROCEDURE — 71045 X-RAY EXAM CHEST 1 VIEW: CPT

## 2023-09-09 PROCEDURE — 250N000013 HC RX MED GY IP 250 OP 250 PS 637: Performed by: INTERNAL MEDICINE

## 2023-09-09 PROCEDURE — 82140 ASSAY OF AMMONIA: CPT | Performed by: INTERNAL MEDICINE

## 2023-09-09 PROCEDURE — 258N000003 HC RX IP 258 OP 636: Performed by: INTERNAL MEDICINE

## 2023-09-09 PROCEDURE — 82805 BLOOD GASES W/O2 SATURATION: CPT | Performed by: INTERNAL MEDICINE

## 2023-09-09 PROCEDURE — 250N000011 HC RX IP 250 OP 636: Mod: JZ | Performed by: HOSPITALIST

## 2023-09-09 PROCEDURE — 85025 COMPLETE CBC W/AUTO DIFF WBC: CPT | Performed by: INTERNAL MEDICINE

## 2023-09-09 PROCEDURE — 83605 ASSAY OF LACTIC ACID: CPT | Performed by: INTERNAL MEDICINE

## 2023-09-09 PROCEDURE — 99223 1ST HOSP IP/OBS HIGH 75: CPT | Performed by: PSYCHIATRY & NEUROLOGY

## 2023-09-09 PROCEDURE — 84145 PROCALCITONIN (PCT): CPT | Performed by: INTERNAL MEDICINE

## 2023-09-09 PROCEDURE — 83735 ASSAY OF MAGNESIUM: CPT | Performed by: INTERNAL MEDICINE

## 2023-09-09 PROCEDURE — 120N000001 HC R&B MED SURG/OB

## 2023-09-09 PROCEDURE — G1010 CDSM STANSON: HCPCS

## 2023-09-09 PROCEDURE — 36415 COLL VENOUS BLD VENIPUNCTURE: CPT | Performed by: INTERNAL MEDICINE

## 2023-09-09 PROCEDURE — G0378 HOSPITAL OBSERVATION PER HR: HCPCS

## 2023-09-09 PROCEDURE — 250N000011 HC RX IP 250 OP 636: Mod: JZ | Performed by: INTERNAL MEDICINE

## 2023-09-09 PROCEDURE — 87040 BLOOD CULTURE FOR BACTERIA: CPT | Performed by: INTERNAL MEDICINE

## 2023-09-09 PROCEDURE — 82550 ASSAY OF CK (CPK): CPT | Performed by: INTERNAL MEDICINE

## 2023-09-09 PROCEDURE — 96375 TX/PRO/DX INJ NEW DRUG ADDON: CPT

## 2023-09-09 PROCEDURE — 96372 THER/PROPH/DIAG INJ SC/IM: CPT | Performed by: HOSPITALIST

## 2023-09-09 PROCEDURE — 99233 SBSQ HOSP IP/OBS HIGH 50: CPT | Performed by: INTERNAL MEDICINE

## 2023-09-09 PROCEDURE — 97530 THERAPEUTIC ACTIVITIES: CPT | Mod: GP

## 2023-09-09 PROCEDURE — 84425 ASSAY OF VITAMIN B-1: CPT | Performed by: INTERNAL MEDICINE

## 2023-09-09 RX ORDER — PIPERACILLIN SODIUM, TAZOBACTAM SODIUM 3; .375 G/15ML; G/15ML
3.38 INJECTION, POWDER, LYOPHILIZED, FOR SOLUTION INTRAVENOUS EVERY 8 HOURS
Status: DISCONTINUED | OUTPATIENT
Start: 2023-09-09 | End: 2023-09-13

## 2023-09-09 RX ORDER — PIPERACILLIN SODIUM, TAZOBACTAM SODIUM 3; .375 G/15ML; G/15ML
3.38 INJECTION, POWDER, LYOPHILIZED, FOR SOLUTION INTRAVENOUS EVERY 8 HOURS
Status: DISCONTINUED | OUTPATIENT
Start: 2023-09-09 | End: 2023-09-09 | Stop reason: DRUGHIGH

## 2023-09-09 RX ORDER — FOLIC ACID 5 MG/ML
1 INJECTION, SOLUTION INTRAMUSCULAR; INTRAVENOUS; SUBCUTANEOUS DAILY
Status: DISCONTINUED | OUTPATIENT
Start: 2023-09-09 | End: 2023-09-11

## 2023-09-09 RX ORDER — THIAMINE HYDROCHLORIDE 100 MG/ML
100 INJECTION, SOLUTION INTRAMUSCULAR; INTRAVENOUS DAILY
Status: DISCONTINUED | OUTPATIENT
Start: 2023-09-09 | End: 2023-09-11

## 2023-09-09 RX ORDER — DEXTROSE MONOHYDRATE, SODIUM CHLORIDE, AND POTASSIUM CHLORIDE 50; 1.49; 9 G/1000ML; G/1000ML; G/1000ML
INJECTION, SOLUTION INTRAVENOUS CONTINUOUS
Status: DISCONTINUED | OUTPATIENT
Start: 2023-09-10 | End: 2023-09-10

## 2023-09-09 RX ORDER — ACETAMINOPHEN 650 MG/1
650 SUPPOSITORY RECTAL EVERY 4 HOURS PRN
Status: DISCONTINUED | OUTPATIENT
Start: 2023-09-09 | End: 2023-09-11

## 2023-09-09 RX ORDER — PIPERACILLIN SODIUM, TAZOBACTAM SODIUM 3; .375 G/15ML; G/15ML
3.38 INJECTION, POWDER, LYOPHILIZED, FOR SOLUTION INTRAVENOUS ONCE
Status: COMPLETED | OUTPATIENT
Start: 2023-09-09 | End: 2023-09-09

## 2023-09-09 RX ADMIN — CARVEDILOL 6.25 MG: 3.12 TABLET, FILM COATED ORAL at 08:44

## 2023-09-09 RX ADMIN — PIPERACILLIN AND TAZOBACTAM 3.38 G: 3; .375 INJECTION, POWDER, LYOPHILIZED, FOR SOLUTION INTRAVENOUS at 13:20

## 2023-09-09 RX ADMIN — PHENYTOIN SODIUM 100 MG: 100 CAPSULE ORAL at 08:44

## 2023-09-09 RX ADMIN — FOLIC ACID 1 MG: 5 INJECTION, SOLUTION INTRAMUSCULAR; INTRAVENOUS; SUBCUTANEOUS at 14:46

## 2023-09-09 RX ADMIN — PIPERACILLIN AND TAZOBACTAM 3.38 G: 3; .375 INJECTION, POWDER, LYOPHILIZED, FOR SOLUTION INTRAVENOUS at 19:25

## 2023-09-09 RX ADMIN — POTASSIUM CHLORIDE: 2 INJECTION, SOLUTION, CONCENTRATE INTRAVENOUS at 14:00

## 2023-09-09 RX ADMIN — THIAMINE HYDROCHLORIDE 100 MG: 100 INJECTION, SOLUTION INTRAMUSCULAR; INTRAVENOUS at 14:46

## 2023-09-09 RX ADMIN — SODIUM CHLORIDE: 9 INJECTION, SOLUTION INTRAVENOUS at 05:42

## 2023-09-09 RX ADMIN — OLANZAPINE 5 MG: 10 INJECTION, POWDER, FOR SOLUTION INTRAMUSCULAR at 20:52

## 2023-09-09 RX ADMIN — ALLOPURINOL 100 MG: 100 TABLET ORAL at 08:44

## 2023-09-09 RX ADMIN — POTASSIUM CHLORIDE 40 MEQ: 1500 TABLET, EXTENDED RELEASE ORAL at 04:35

## 2023-09-09 RX ADMIN — OLANZAPINE 5 MG: 10 INJECTION, POWDER, FOR SOLUTION INTRAMUSCULAR at 01:00

## 2023-09-09 RX ADMIN — FOSPHENYTOIN SODIUM 100 MG PE: 50 INJECTION INTRAMUSCULAR; INTRAVENOUS at 16:18

## 2023-09-09 RX ADMIN — HYDRALAZINE HYDROCHLORIDE 10 MG: 20 INJECTION INTRAMUSCULAR; INTRAVENOUS at 13:48

## 2023-09-09 ASSESSMENT — ACTIVITIES OF DAILY LIVING (ADL)
ADLS_ACUITY_SCORE: 43
ADLS_ACUITY_SCORE: 43
ADLS_ACUITY_SCORE: 45
ADLS_ACUITY_SCORE: 43
ADLS_ACUITY_SCORE: 43
ADLS_ACUITY_SCORE: 45
ADLS_ACUITY_SCORE: 43
ADLS_ACUITY_SCORE: 45
ADLS_ACUITY_SCORE: 43
ADLS_ACUITY_SCORE: 45

## 2023-09-09 NOTE — UTILIZATION REVIEW
Admission Status; Secondary Review Determination       Under the authority of the Utilization Management Committee, the utilization review process indicated a secondary review on the above patient. The review outcome is based on review of the medical records, discussions with staff, and applying clinical experience noted on the date of the review.     (x) Inpatient Status Appropriate - This patient's medical care is consistent with medical management for inpatient care and reasonable inpatient medical practice.     RATIONALE FOR DETERMINATION     Ms. De Anda is a 89 yo female with a PMH of ataxia, HTN and epilepsy suspected who presents to the ED with weakness after sustaining a mechanical fall at home.  Noted to have significant electrolyte deficiencies that have required IV supplements.  Noted to have acute rhabdo.  She continues to have significant metabolic encephalopathy and hallucinations; etiology is yet unknown.  She had abnromal UA; remains on IV zosyn q8.  CXR and work-up for respiratory viruses that can cause encephalopathy is pending.      At this time, she is not safe to swallow and remains on IV meds and IVF while NPO.      Neuro consult, MRI of the brain and EEG has been requested and is pending.    She is requiring ongoing inpatient evaluation, treatment and close clinical monitoring yet today.      At the time of admission with the information available to the attending physician more than 2 nights Hospital complex care was anticipated, based on patient risk of adverse outcome if treated as outpatient and complex care required. Inpatient admission is appropriate based on the Medicare guidelines.     This document was produced using voice recognition software       The information on this document is developed by the utilization review team in order for the business office to ensure compliance. This only denotes the appropriateness of proper admission status and does not reflect the quality of care  rendered.   The definitions of Inpatient Status and Observation Status used in making the determination above are those provided in the CMS Coverage Manual, Chapter 1 and Chapter 6, section 70.4.         Sincerely,     Ritu Burns, DO  Utilization Review  Physician Advisor  [facility].

## 2023-09-09 NOTE — CONSULTS
"This is a neurologic consultation    89-year-old admitted to hospital 9/7/2023        Chief complaint  Generalized weakness  Fall  Early cognitive impairment per family        HPI 89-year-old presented to the hospital on 9/7/2023 after mechanical fall with generalized weakness  After the fall patient was not able to ambulate  Patient states that she usually can ambulate independently had been walking across the carpet and tripped.  She fell onto the floor but did not lose consciousness  She did have to crawl to the phone    She complains of some the left great toe pain after the fall    Patient does live independently  Uses a walker  Has \"dizzy \"spells when she gets up and moves around    History of \"seizures maybe in 2000 6/2007 placed on Dilantin and none since  Takes Dilantin 100 mg 3 times daily per chart  Daughter states that she has never had any further seizures      Past medical history  Ataxia  Hypertension  Epilepsy  Peripheral neuropathy  MGUS  Osteoporosis  Gout  Secondary hyper parathyroidism    Habits  Non-smoker    Family history  Noncontributory      Work-up  HEAD CT: 9/7/2023  1.  No CT evidence for acute intracranial process.  2.  Brain atrophy and presumed chronic microvascular ischemic changes as above.  CERVICAL SPINE CT: 9/7/2023  1.  No CT evidence for acute fracture or post traumatic subluxation.  2.  Multilevel cervical spondylosis including moderate to high-grade bilateral neural foraminal stenosis at C3-C4 and C4-C5  MRI head 9/8/2023    B12 339, TSH 3.06 (9/7/2023)  -598  Dilantin level 8.1, (10/29/2018)  Dilantin level 14.4, (9/7/2023)  B1 pending      Labs  Sodium 135 potassium 3.9  BUN 7.9, creatinine 0.39  Glucose 110  WBC 8.6, hemoglobin 11.9, platelet 3 55,000        Exam  Blood pressure 125/96, pulse 82, temperature 98.0  Blood pressure range 97//86  Pulse range 64-92  Tmax 98.2    Review of systems limited as patient is confused  Unable to really answer " questions    General exam per primary MD  To loud voice and light touch opens eyes  Seems lethargic  Lungs decreased breath sounds  Heart rate regular  Abdomen soft  Some edema in the feet    Neurologic exam  Somnolent  With loud voice and light touch she opens her eyes  She does not really answer questions correctly but tries to make a comment  Very dysarthric  Denies any neck pain or headache when I talk to her or touch her head or neck    Cranial nerves II through XII  No obvious ophthalmoplegia  No nystagmus  Difficult to test visual fields    Upper extremities  Poor response to exam does move them to stimulus    Lower extremities  Moves legs to stimulus weakly    Gait  Deferred          Assessment/plan    Mechanical fall without loss of consciousness  Mild rhabdomyolysis due to her fall  History of epilepsy no seizure during this event        Dilantin level 8.1, (10/29/2018)        Dilantin level 14.4, (9/7/2023)        Dilantin 100 mg 3 times daily    Diagnosis  Mechanical fall  Rhabdomyolysis  Urinary retention  History of epilepsy      Question whether Dilantin level is partially the cause of her intermittent dizziness specially at her age  We will check EEG and consider decreasing Dilantin dose she has not had seizures in a long time and per family the only occurred the one time when she was hospitalized with an illness    Patient appears to be somewhat encephalopathic  Patient had urinary retention  Question whether she is developing some urinary infection    Discussed with patient's daughter at bedside concerned with her age and fall mild cognitive impairment she may be slow to rally      Diagnosis  Mechanical fall  Peripheral neuropathy/ataxia chronic  Mild rhabdomyolysis    EEG pending  MRI scan pending  Question decrease Dilantin dose to help prevent future falls    Discussed with patient and family member at bedside    Total care time today 80 minutes

## 2023-09-09 NOTE — PROGRESS NOTES
[facility]    Medicine Progress Note - Hospitalist Service    Date of Admission:  9/7/2023    Assessment & Plan   Mechanical fall, recurrent  Mild rhabdomyolysis, traumatic, improving  HTN, labile, probable orthostatic hypotension which needs confirmion as ordered  Lightheadedness, dizziness, vertigo (acute on chronic)  H/O Ataxia, and peripheral neuropathy, falls, chronic DDD  Generalized weakness  Macrocytic anemia, no abla  Right shoulder pain and contusion, refused xray  Chronic LBP, compression fx's L spine  H/O epilepsy  Acute Encephalopathy, toxic & metabolic with superimposed probable chronic cognitive decline progressively  BRBPR, no abdominal pain, h/o hemorrhoids - monitor  Chronic Multilevel cervical spondylosis including moderate to high-grade bilateral neural foraminal stenosis at C3-C4 and C4-C5.      Plan:  -pt/ot/SW  -fall precautions  -IVF  -hold losartan, increase coreg, hydralazine prn  -EEG, neuro consult  -NPO, HOB 30 degrees, SLP consult as high risk aspiration  -ct a/p  -garcia  -iv zosyn  -convert phenytoin PO to IV equivalent  -bc's x2       Diet: NPO for Medical/Clinical Reasons Except for: No Exceptions    DVT Prophylaxis: Pneumatic Compression Devices  Garcia Catheter: Not present  Lines: None     Cardiac Monitoring: ACTIVE order. Indication: Bradycardias (48 hours)  Code Status: Full Code      Clinically Significant Risk Factors Present on Admission        # Hypokalemia: Lowest K = 3.1 mmol/L in last 2 days, will replace as needed     # Hypomagnesemia: Lowest Mg = 1.6 mg/dL in last 2 days, will replace as needed   # Hypoalbuminemia: Lowest albumin = 3.1 g/dL at 9/7/2023  9:40 AM, will monitor as appropriate       # Hypertension: Noted on problem list      # Obesity: Estimated body mass index is 31.25 kg/m  as calculated from the following:    Height as of this encounter: 1.524 m (5').    Weight as of this encounter: 72.6 kg (160 lb).              Disposition Plan       Expected Discharge Date: 09/09/2023        Discharge Comments: 1-1 today confused          Feng Morales DO, DO  Hospitalist Service  [facility]  Securely message with [employer] (more info)  Text page via [employer] Paging/Directory   ______________________________________________________________________    Interval History   reviewed    Physical Exam   Vital Signs: Temp: 98  F (36.7  C) Temp src: Axillary BP: (!) 162/82 Pulse: 78   Resp: 22 SpO2: 94 % O2 Device: None (Room air)    Weight: 160 lbs 0 oz    General appearance - confused, lethargic  Lungs -decreased, non labored  Heart - normal rate, regular rhythm,. peripheral edema.  Abdomen - soft, nontender, BS+  Neurological - somnolent, lethargic, not following commands, agitated     Lab/imaging reviewed

## 2023-09-09 NOTE — PROGRESS NOTES
PRIMARY DIAGNOSIS: GENERALIZED WEAKNESS    OUTPATIENT/OBSERVATION GOALS TO BE MET BEFORE DISCHARGE  1. Orthostatic performed: Yes:          Lying Orthostatic BP: 184/72 (manual)         Sitting Orthostatic BP: 192/84 (manual)         Standing Orthostatic BP:  (unable to stand)     2. Tolerating PO medications: Yes    3. Return to near baseline physical activity: No    4. Cleared for discharge by consultants (if involved): No    Discharge Planner Nurse   Safe discharge environment identified: No  Barriers to discharge: Yes       Entered by: Brianna Kign RN 09/08/2023 11:25 PM   Pt confused, only oriented to self. Calls out for daughter Ernestine. Mostly redirectable. Using bed pan for elimination as pt cannot stand due to dizziness and weakness of lower extremities. MRI completed. VSS.  Please review provider order for any additional goals.   Nurse to notify provider when observation goals have been met and patient is ready for discharge.

## 2023-09-09 NOTE — PLAN OF CARE
Goal Outcome Evaluation:       Patient appears comfortable at times.  Patient confused and restless most of the day, with hallucinations.  Improved slightly when daughter visited.  NPO.  1:1 for safety.

## 2023-09-09 NOTE — PROGRESS NOTES
Pt disorientedx4, agitated, zyprexa given,    PRIMARY DIAGNOSIS: GENERALIZED WEAKNESS    OUTPATIENT/OBSERVATION GOALS TO BE MET BEFORE DISCHARGE  1. Orthostatic performed: N/A    2. Tolerating PO medications: Yes    3. Return to near baseline physical activity: No    4. Cleared for discharge by consultants (if involved): No    Discharge Planner Nurse   Safe discharge environment identified: No  Barriers to discharge: Yes       Entered by: Lori Eugene RN 09/09/2023 6:32 AM     Please review provider order for any additional goals.   Nurse to notify provider when observation goals have been met and patient is ready for discharge.

## 2023-09-10 ENCOUNTER — APPOINTMENT (OUTPATIENT)
Dept: SPEECH THERAPY | Facility: HOSPITAL | Age: 88
DRG: 093 | End: 2023-09-10
Attending: INTERNAL MEDICINE
Payer: MEDICARE

## 2023-09-10 ENCOUNTER — APPOINTMENT (OUTPATIENT)
Dept: CT IMAGING | Facility: HOSPITAL | Age: 88
DRG: 093 | End: 2023-09-10
Attending: INTERNAL MEDICINE
Payer: MEDICARE

## 2023-09-10 ENCOUNTER — APPOINTMENT (OUTPATIENT)
Dept: NEUROLOGY | Facility: HOSPITAL | Age: 88
DRG: 093 | End: 2023-09-10
Attending: INTERNAL MEDICINE
Payer: MEDICARE

## 2023-09-10 LAB
ALBUMIN SERPL BCG-MCNC: 2.9 G/DL (ref 3.5–5.2)
ALP SERPL-CCNC: 84 U/L (ref 35–104)
ALT SERPL W P-5'-P-CCNC: 37 U/L (ref 0–50)
ANION GAP SERPL CALCULATED.3IONS-SCNC: 11 MMOL/L (ref 7–15)
AST SERPL W P-5'-P-CCNC: 67 U/L (ref 0–45)
BASOPHILS # BLD AUTO: 0 10E3/UL (ref 0–0.2)
BASOPHILS NFR BLD AUTO: 1 %
BILIRUB DIRECT SERPL-MCNC: <0.2 MG/DL (ref 0–0.3)
BILIRUB SERPL-MCNC: 0.3 MG/DL
BUN SERPL-MCNC: 4.8 MG/DL (ref 8–23)
CALCIUM SERPL-MCNC: 8.1 MG/DL (ref 8.2–9.6)
CHLORIDE SERPL-SCNC: 106 MMOL/L (ref 98–107)
CK SERPL-CCNC: 463 U/L (ref 26–192)
CREAT SERPL-MCNC: 0.43 MG/DL (ref 0.51–0.95)
DEPRECATED HCO3 PLAS-SCNC: 21 MMOL/L (ref 22–29)
EGFRCR SERPLBLD CKD-EPI 2021: >90 ML/MIN/1.73M2
EOSINOPHIL # BLD AUTO: 0.2 10E3/UL (ref 0–0.7)
EOSINOPHIL NFR BLD AUTO: 2 %
ERYTHROCYTE [DISTWIDTH] IN BLOOD BY AUTOMATED COUNT: 12.6 % (ref 10–15)
GLUCOSE SERPL-MCNC: 139 MG/DL (ref 70–99)
HCT VFR BLD AUTO: 34.7 % (ref 35–47)
HGB BLD-MCNC: 11.4 G/DL (ref 11.7–15.7)
IMM GRANULOCYTES # BLD: 0.1 10E3/UL
IMM GRANULOCYTES NFR BLD: 1 %
LYMPHOCYTES # BLD AUTO: 1.2 10E3/UL (ref 0.8–5.3)
LYMPHOCYTES NFR BLD AUTO: 19 %
MCH RBC QN AUTO: 32.8 PG (ref 26.5–33)
MCHC RBC AUTO-ENTMCNC: 32.9 G/DL (ref 31.5–36.5)
MCV RBC AUTO: 100 FL (ref 78–100)
MONOCYTES # BLD AUTO: 0.7 10E3/UL (ref 0–1.3)
MONOCYTES NFR BLD AUTO: 12 %
NEUTROPHILS # BLD AUTO: 4.1 10E3/UL (ref 1.6–8.3)
NEUTROPHILS NFR BLD AUTO: 65 %
NRBC # BLD AUTO: 0 10E3/UL
NRBC BLD AUTO-RTO: 0 /100
PHENYTOIN SERPL-MCNC: 10.9 UG/ML
PLATELET # BLD AUTO: 304 10E3/UL (ref 150–450)
POTASSIUM SERPL-SCNC: 3.5 MMOL/L (ref 3.4–5.3)
PROT SERPL-MCNC: 5.6 G/DL (ref 6.4–8.3)
RBC # BLD AUTO: 3.48 10E6/UL (ref 3.8–5.2)
SODIUM SERPL-SCNC: 138 MMOL/L (ref 136–145)
WBC # BLD AUTO: 6.3 10E3/UL (ref 4–11)

## 2023-09-10 PROCEDURE — 258N000001 HC RX 258: Performed by: INTERNAL MEDICINE

## 2023-09-10 PROCEDURE — 99233 SBSQ HOSP IP/OBS HIGH 50: CPT | Performed by: INTERNAL MEDICINE

## 2023-09-10 PROCEDURE — 92610 EVALUATE SWALLOWING FUNCTION: CPT | Mod: GN

## 2023-09-10 PROCEDURE — 258N000003 HC RX IP 258 OP 636: Performed by: PSYCHIATRY & NEUROLOGY

## 2023-09-10 PROCEDURE — 250N000011 HC RX IP 250 OP 636: Mod: JZ | Performed by: INTERNAL MEDICINE

## 2023-09-10 PROCEDURE — G1010 CDSM STANSON: HCPCS

## 2023-09-10 PROCEDURE — 95819 EEG AWAKE AND ASLEEP: CPT

## 2023-09-10 PROCEDURE — 82550 ASSAY OF CK (CPK): CPT | Performed by: INTERNAL MEDICINE

## 2023-09-10 PROCEDURE — 36415 COLL VENOUS BLD VENIPUNCTURE: CPT | Performed by: PSYCHIATRY & NEUROLOGY

## 2023-09-10 PROCEDURE — 82248 BILIRUBIN DIRECT: CPT | Performed by: INTERNAL MEDICINE

## 2023-09-10 PROCEDURE — 999N000127 HC STATISTIC PERIPHERAL IV START W US GUIDANCE

## 2023-09-10 PROCEDURE — 99232 SBSQ HOSP IP/OBS MODERATE 35: CPT | Performed by: PSYCHIATRY & NEUROLOGY

## 2023-09-10 PROCEDURE — 120N000001 HC R&B MED SURG/OB

## 2023-09-10 PROCEDURE — 95816 EEG AWAKE AND DROWSY: CPT | Mod: 26 | Performed by: PSYCHIATRY & NEUROLOGY

## 2023-09-10 PROCEDURE — 250N000011 HC RX IP 250 OP 636: Mod: JZ | Performed by: PSYCHIATRY & NEUROLOGY

## 2023-09-10 PROCEDURE — 80185 ASSAY OF PHENYTOIN TOTAL: CPT | Performed by: PSYCHIATRY & NEUROLOGY

## 2023-09-10 PROCEDURE — 36415 COLL VENOUS BLD VENIPUNCTURE: CPT | Performed by: INTERNAL MEDICINE

## 2023-09-10 PROCEDURE — 80053 COMPREHEN METABOLIC PANEL: CPT | Performed by: INTERNAL MEDICINE

## 2023-09-10 PROCEDURE — 258N000003 HC RX IP 258 OP 636: Performed by: INTERNAL MEDICINE

## 2023-09-10 PROCEDURE — 85025 COMPLETE CBC W/AUTO DIFF WBC: CPT | Performed by: INTERNAL MEDICINE

## 2023-09-10 RX ORDER — CYANOCOBALAMIN 1000 UG/ML
1000 INJECTION, SOLUTION INTRAMUSCULAR; SUBCUTANEOUS
Status: DISCONTINUED | OUTPATIENT
Start: 2023-09-10 | End: 2023-09-18 | Stop reason: HOSPADM

## 2023-09-10 RX ORDER — IOPAMIDOL 755 MG/ML
90 INJECTION, SOLUTION INTRAVASCULAR ONCE
Status: COMPLETED | OUTPATIENT
Start: 2023-09-10 | End: 2023-09-10

## 2023-09-10 RX ORDER — ENOXAPARIN SODIUM 100 MG/ML
40 INJECTION SUBCUTANEOUS EVERY 24 HOURS
Status: DISCONTINUED | OUTPATIENT
Start: 2023-09-10 | End: 2023-09-18 | Stop reason: HOSPADM

## 2023-09-10 RX ADMIN — FOLIC ACID 1 MG: 5 INJECTION, SOLUTION INTRAMUSCULAR; INTRAVENOUS; SUBCUTANEOUS at 08:12

## 2023-09-10 RX ADMIN — PIPERACILLIN AND TAZOBACTAM 3.38 G: 3; .375 INJECTION, POWDER, LYOPHILIZED, FOR SOLUTION INTRAVENOUS at 11:49

## 2023-09-10 RX ADMIN — PIPERACILLIN AND TAZOBACTAM 3.38 G: 3; .375 INJECTION, POWDER, LYOPHILIZED, FOR SOLUTION INTRAVENOUS at 19:24

## 2023-09-10 RX ADMIN — HYDRALAZINE HYDROCHLORIDE 10 MG: 20 INJECTION INTRAMUSCULAR; INTRAVENOUS at 16:48

## 2023-09-10 RX ADMIN — ENOXAPARIN SODIUM 40 MG: 40 INJECTION SUBCUTANEOUS at 19:21

## 2023-09-10 RX ADMIN — IOPAMIDOL 90 ML: 755 INJECTION, SOLUTION INTRAVENOUS at 15:59

## 2023-09-10 RX ADMIN — FOSPHENYTOIN SODIUM 100 MG PE: 50 INJECTION INTRAMUSCULAR; INTRAVENOUS at 08:12

## 2023-09-10 RX ADMIN — THIAMINE HYDROCHLORIDE 100 MG: 100 INJECTION, SOLUTION INTRAMUSCULAR; INTRAVENOUS at 08:12

## 2023-09-10 RX ADMIN — FOSPHENYTOIN SODIUM 100 MG PE: 50 INJECTION INTRAMUSCULAR; INTRAVENOUS at 00:42

## 2023-09-10 RX ADMIN — CYANOCOBALAMIN 1000 MCG: 1000 INJECTION, SOLUTION INTRAMUSCULAR; SUBCUTANEOUS at 16:51

## 2023-09-10 RX ADMIN — PIPERACILLIN AND TAZOBACTAM 3.38 G: 3; .375 INJECTION, POWDER, LYOPHILIZED, FOR SOLUTION INTRAVENOUS at 03:12

## 2023-09-10 RX ADMIN — POTASSIUM CHLORIDE, DEXTROSE MONOHYDRATE AND SODIUM CHLORIDE: 150; 5; 900 INJECTION, SOLUTION INTRAVENOUS at 03:16

## 2023-09-10 RX ADMIN — FOSPHENYTOIN SODIUM 100 MG PE: 50 INJECTION INTRAMUSCULAR; INTRAVENOUS at 19:58

## 2023-09-10 ASSESSMENT — ACTIVITIES OF DAILY LIVING (ADL)
ADLS_ACUITY_SCORE: 41
ADLS_ACUITY_SCORE: 41
ADLS_ACUITY_SCORE: 45
ADLS_ACUITY_SCORE: 45
ADLS_ACUITY_SCORE: 41
ADLS_ACUITY_SCORE: 45
ADLS_ACUITY_SCORE: 41
ADLS_ACUITY_SCORE: 45
ADLS_ACUITY_SCORE: 41
ADLS_ACUITY_SCORE: 41

## 2023-09-10 NOTE — PLAN OF CARE
Goal Outcome Evaluation:       Patient denies pain this shift.  Remains NPO.  Continues to be confused and intermittently restless.  Better when family present.  Awaiting Neurology recommendations.  On 1:1 for safety.

## 2023-09-10 NOTE — PROGRESS NOTES
[facility]    Medicine Progress Note - Hospitalist Service    Date of Admission:  9/7/2023    Assessment & Plan   Mechanical fall, recurrent  Mild rhabdomyolysis, traumatic, improving  HTN, labile, probable orthostatic hypotension which needs confirmion as ordered  Lightheadedness, dizziness, vertigo (acute on chronic)  H/O Ataxia, and peripheral neuropathy, falls, chronic DDD  Generalized weakness  Macrocytic anemia, no abla  Right shoulder pain and contusion, refused xray  Chronic LBP, compression fx's L spine  H/O epilepsy  Acute Encephalopathy, toxic & metabolic with superimposed probable chronic cognitive decline progressively, agitation, persistent  BRBPR, no abdominal pain, h/o hemorrhoids - no recurrance  Chronic Multilevel cervical spondylosis including moderate to high-grade bilateral neural foraminal stenosis at C3-C4 and C4-C5.   Acute urine retention - CT a/p neg for obstruction/hydro  -Stable peripherally calcified 1.4 cm splenic artery aneurysm.      Plan:  -pt/ot/SW  -fall precautions  -IVF  -hold losartan and coreg  -IV hydralazine prn  -neuro assistance appreciated  -NPO, HOB 30 degrees  -garcia  -iv zosyn  -convert phenytoin PO to IV equivalent while NPO  -monitor cultures  -UA/UC  -VSS in a.m.  -no recurrent GIB start lovenox dvt proph  -CT Chest to assess edema and widened mediastinum       Diet: NPO for Medical/Clinical Reasons Except for: No Exceptions    DVT Prophylaxis: Pneumatic Compression Devices  Garcia Catheter: PRESENT, indication: Retention  Lines: None     Cardiac Monitoring: ACTIVE order. Indication: Bradycardias (48 hours)  Code Status: Full Code      Clinically Significant Risk Factors Present on Admission              # Hypoalbuminemia: Lowest albumin = 2.9 g/dL at 9/10/2023  7:54 AM, will monitor as appropriate       # Hypertension: Noted on problem list      # Obesity: Estimated body mass index is 30.14 kg/m  as calculated from the following:    Height as  of this encounter: 1.524 m (5').    Weight as of this encounter: 70 kg (154 lb 5.2 oz).              Disposition Plan      Expected Discharge Date: 09/11/2023        Discharge Comments: 1-1 today confused          Feng Morales DO,   Hospitalist Service  [facility]  Securely message with [employer] (more info)  Text page via [employer] Paging/Directory   ______________________________________________________________________    Interval History   reviewed    Physical Exam   Vital Signs: Temp: 98.2  F (36.8  C) Temp src: Axillary BP: (!) 180/84 Pulse: 65   Resp: 21 SpO2: 96 % O2 Device: None (Room air)    Weight: 154 lbs 5.15 oz    General appearance - confused, lethargic  Lungs -decreased, non labored  Heart - reg rhythm, tachy, peripheral edema.  Abdomen - soft, nd, BS+  Neurological - somnolent, lethargic, not following commands, dysarthric     Lab/imaging reviewed

## 2023-09-10 NOTE — PROGRESS NOTES
Care Management Follow Up    Length of Stay (days): 1    Expected Discharge Date: 09/11/2023     Concerns to be Addressed:     discharge planning/pt currently on 1:1. Needs to be off for 24 hours for TCU  Patient plan of care discussed at interdisciplinary rounds: Yes    Anticipated Discharge Disposition:  TCU     Anticipated Discharge Services:    Anticipated Discharge DME:      Patient/family educated on Medicare website which has current facility and service quality ratings:    Education Provided on the Discharge Plan:    Patient/Family in Agreement with the Plan:      Referrals Placed by CM/SW:    Private pay costs discussed: Not applicable    Additional Information:  Pt currently on 1:1 for safety. SW following for discharge planning, PT recommends TCU. Referrals sent.   SW placed call to pt daughter Ernestine castellanos 2. She did not answer, unable to leave voicemail as Mailbox is full and unable to accept messages.     Park Randall MSW

## 2023-09-10 NOTE — PROGRESS NOTES
"This is a neurologic follow-up note    89-year-old admitted to hospital 9/7/2023      Chief complaint  Generalized weakness  Fall  Early cognitive impairment per family        HPI 89-year-old presented to the hospital on 9/7/2023 after mechanical fall with generalized weakness  After the fall patient was not able to ambulate  Patient states that she usually can ambulate independently had been walking across the carpet and tripped.  She fell onto the floor but did not lose consciousness  She did have to crawl to the phone    She complains of some the left great toe pain after the fall    Patient does live independently  Uses a walker  Has \"dizzy \"spells when she gets up and moves around    History of \"seizures maybe in 2006/2007 placed on Dilantin and none since  Takes Dilantin 100 mg 3 times daily per chart  Daughter states that she has never had any further seizures      Past medical history  Ataxia  Hypertension  Epilepsy  Peripheral neuropathy  MGUS  Osteoporosis  Gout  Secondary hyper parathyroidism    Habits  Non-smoker    Family history  Noncontributory      Work-up  HEAD CT: 9/7/2023  1.  No CT evidence for acute intracranial process.  2.  Brain atrophy and presumed chronic microvascular ischemic changes as above.  CERVICAL SPINE CT: 9/7/2023  1.  No CT evidence for acute fracture or post traumatic subluxation.  2.  Multilevel cervical spondylosis including moderate to high-grade bilateral neural foraminal stenosis at C3-C4 and C4-C5  MRI head 9/8/2023 pending report    B12 339, TSH 3.06 (9/7/2023)  -598  BNP 2187, (9/8/2023)  Ammonia level 26  Dilantin level 8.1, (10/29/2018)  Dilantin level 14.4, (9/7/2023)  B1 pending    EEG 9/10/2023, Impression:  Moderately abnormal awake drowsy and sleep stage I EEG due to:  Theta disorganization of the background 6-5 Hz  Occasional bilateral 3-4 Hz theta delta slowing  Clinical correlation:  This record is indicative of diffuse cerebral dysfunction consistent " with diffuse processes such as  Metabolic toxic infectious anoxic or degenerative type disease    Labs  Sodium 138 potassium 3.5  BUN 4.8, creatinine 0.43  Glucose 139  WBC 6.3, hemoglobin 9.4, platelets 304,000            Exam  Blood pressure 180/84, pulse 65, temperature 98.2  Blood pressure range 113//86  Pulse range 65-92  Tmax 98.2      Review of systems limited as patient is confused  Unable to really answer questions    General exam per primary MD  To loud voice and light touch opens eyes  Seems lethargic  Lungs decreased breath sounds  Heart rate regular  Abdomen soft  Some edema in the feet    Neurologic exam  Somnolent  With loud voice and light touch she opens her eyes  She does not really answer questions correctly but tries to make a comment  Very dysarthric  Denies any neck pain or headache when I talk to her or touch her head or neck    Cranial nerves II through XII  No obvious ophthalmoplegia  No nystagmus  Difficult to test visual fields    Upper extremities  Poor response to exam does move them to stimulus    Lower extremities  Moves legs to stimulus weakly    Gait  Deferred    Exam similar possibly a little bit more interactive and awake.      Assessment/plan    Mechanical fall without loss of consciousness  Mild rhabdomyolysis due to her fall  History of epilepsy no seizure during this event        Dilantin level 8.1, (10/29/2018)        Dilantin level 14.4, (9/7/2023)        Dilantin 100 mg 3 times daily    Diagnosis  Mechanical fall  Rhabdomyolysis  Urinary retention  History of epilepsy  Peripheral neuropathy/ataxia chronic    Question whether Dilantin level is partially the cause of her intermittent dizziness specially at her age  We will check EEG and consider decreasing Dilantin dose she has not had seizures in a long time and per family the only occurred the one time when she was hospitalized with an illness    Patient appears to be somewhat encephalopathic  Patient had urinary  retention  Question whether she is developing some urinary infection    Discussed with patient's daughter at bedside concerned with her age and fall mild cognitive impairment she may be slow to rally        EEG 9/10/2023, Impression:  Moderately abnormal awake drowsy and sleep stage I EEG due to:  Theta disorganization of the background 6-5 Hz  Occasional bilateral 3-4 Hz theta delta slowing  Clinical correlation:  This record is indicative of diffuse cerebral dysfunction consistent with diffuse processes such as  Metabolic toxic infectious anoxic or degenerative type disease    MRI scan pending reading (no large acute stroke seen on review of pictures)    Recheck Dilantin level  Decrease Dilantin dose to 100 mg every 12 hours starting tonight    Discussed with nursing staff face-to-face    35 minutes total care time today.

## 2023-09-10 NOTE — PROGRESS NOTES
Bedside EEG was performed that included photic stimulation; hyperventilation was deferred. The patient was awake and asleep throughout the recording. Skin intact.    [id number] used.

## 2023-09-10 NOTE — PLAN OF CARE
Problem: Plan of Care - These are the overarching goals to be used throughout the patient stay.    Goal: Plan of Care Review  Description: The Plan of Care Review/Shift note should be completed every shift.  The Outcome Evaluation is a brief statement about your assessment that the patient is improving, declining, or no change.  This information will be displayed automatically on your shift note.  9/10/2023 0654 by Lori Eugene RN  Outcome: Progressing  9/9/2023 2230 by Lori Eugene RN  Outcome: Progressing   Goal Outcome Evaluation:       Pt disoriented x 4, NPO no exception,restless, fell asleep around 0230, garcia in place and patent, 1:1 remains for safety

## 2023-09-10 NOTE — CARE PLAN
Pt is restless and disoriented. 1:1 sitter in room with pt at all times for pt's safety. PVR around 1600 was 898cc. MD updated and ordered garcia placed. 900cc of clear yellow urine obtained within 10mins of garcia placement. Pt appears sl calmer now. Will cont to montor VS and behavior.

## 2023-09-10 NOTE — PROGRESS NOTES
"Speech-Language Pathology: Clinical Swallow Evaluation     09/10/23 1600   Appointment Info   Signing Clinician's Name / Credentials (SLP) Chandni Henry MS, CCC-SLP   General Information   Onset of Illness/Injury or Date of Surgery 09/07/23   Referring Physician Feng Morales, DO   Pertinent History of Current Problem Per EMR, \"89 year old female who has a PMHx of ataxia, chronic lumbar compression fractures, foraminal stenoses, HTN who presented to the ED for evaluation after a fall and generalized weakness.       mechanical fall yesterday evening, took 4 hours to crawl to the phone, was put back in bed last night by family, and family brought her into the ED because she could not ambulate today, she lives alone and cannot take care of herself.  In ED CBC, BMP unremarkable, 's. Bilateral ankle/foot xrays neg for acute fx/dislocation.  Admitted for further observation and management.  Reports right shoulder pain and right tib/fib pain. No HA, CP, SOB, pelvis/hip/femur/arms neck or low back pain. No vision changes acutely, denies new onset paresthesias. No dysphagia or dysarthria.\"   General Observations Alert, pleasantly confused.   Type of Evaluation   Type of Evaluation Swallow Evaluation   Oral Motor   Oral Musculature unable to assess due to poor participation/comprehension   Mucosal Quality good   Dentition (Oral Motor)   Dentition (Oral Motor) natural dentition;adequate dentition   Facial Symmetry (Oral Motor)   Facial Symmetry (Oral Motor) WNL   Comment, Facial Symmetry (Oral Motor) per informal observation   Lip Function (Oral Motor)   Lip Range of Motion (Oral Motor) unable/difficult to assess   Tongue Function (Oral Motor)   Tongue ROM (Oral Motor) unable/difficult to assess   Jaw Function (Oral Motor)   Jaw Function (Oral Motor) unable/difficult to assess   Vocal Quality/Secretion Management (Oral Motor)   Vocal Quality (Oral Motor) WFL;hypophonic   Secretion Management (Oral Motor) WNL " "  General Swallowing Observations   Past History of Dysphagia Per EMR review, none. RN noted \"coughing with water\". Pt was made NPO and speech consult was placed.   Respiratory Support (General Swallowing Observations) none   Current Diet/Method of Nutritional Intake (General Swallowing Observations, NIS) NPO   Swallowing Evaluation Clinical swallow evaluation   Clinical Swallow Evaluation   Feeding Assistance dependent   Additional evaluation(s) completed today Recommended   Rationale for completing additional evaluation throat clearing with purees and mildly thickened liquids; delayed weak cough with thin and highly audible swallow for liquids.   Clinical Swallow Evaluation Textures Trialed thin liquids;mildly thick liquids;pureed   Clinical Swallow Eval: Thin Liquid Texture Trial   Mode of Presentation, Thin Liquids straw;fed by clinician   Volume of Liquid or Food Presented 2 oz   Oral Phase of Swallow premature pharyngeal entry  (suspected)   Pharyngeal Phase of Swallow impaired;coughing/choking;throat clearing;repeated swallows  (delayed cough)   Diagnostic Statement Highly audible swallow, suggestive of dyscoordination. Delayed weak cough.   Clinical Swallow Eval: Mildly Thick Liquids   Mode of Presentation spoon;fed by clinician   Volume Presented 2 spoons   Oral Phase premature pharyngeal entry  (suspected)   Pharyngeal Phase impaired;throat clearing   Diagnostic Statement Highly audible swallow, suggestive of dyscoordination. Delayed throat clear.   Clinical Swallow Evaluation: Puree Solid Texture Trial   Mode of Presentation, Puree spoon;fed by clinician   Volume of Puree Presented x4 tsp   Oral Phase, Puree WFL   Pharyngeal Phase, Puree impaired;throat clearing   Diagnostic Statement Delayed throat clear. Pt denied pharyngeal residue when asked by SLP.   Esophageal Phase of Swallow   Patient reports or presents with symptoms of esophageal dysphagia No   Swallowing Recommendations   Diet Consistency " Recommendations NPO   Medication Administration Recommendations, Swallowing (SLP) Non-orally, if possible.   Instrumental Assessment Recommendations VFSS (videofluoroscopic swallowing study)   General Therapy Interventions   Planned Therapy Interventions Dysphagia Treatment   Clinical Impression   Criteria for Skilled Therapeutic Interventions Met (SLP Eval) Yes, treatment indicated   SLP Diagnosis Dysphagia   Risks & Benefits of therapy have been explained evaluation/treatment results reviewed;care plan/treatment goals reviewed;participants voiced agreement with care plan;participants included;patient   Clinical Impression Comments Clinical Swallow Evaluation completed. Patient had delayed, overt s/s aspiration with all textures trialed. Oral motor function was difficult to be formally assessed with patient's confusion and became frustrated with tasks. Mastication was not tested. Hyolaryngeal elevation appears reduced upon visualization and palpation. Recommend NPO until VFSS can be complete.   SLP Total Evaluation Time   Eval: oral/pharyngeal swallow function, clinical swallow Minutes (15111) 20                      SLP Discharge Planning   SLP Plan 5x; VFSS   SLP Discharge Recommendation Transitional Care Facility   SLP Rationale for DC Rec NPO; further evaluation warrented   SLP Brief overview of current status  Recommend NPO until VFSS can be completed. Delayed, overt s/s of aspiration noted across multiple textures trialed & highly audible swallow with liquids.   Total Session Time   Total Session Time (sum of timed and untimed services) 20

## 2023-09-10 NOTE — PLAN OF CARE
Problem: Plan of Care - These are the overarching goals to be used throughout the patient stay.    Goal: Plan of Care Review  Description: The Plan of Care Review/Shift note should be completed every shift.  The Outcome Evaluation is a brief statement about your assessment that the patient is improving, declining, or no change.  This information will be displayed automatically on your shift note.  Outcome: Progressing   Goal Outcome Evaluation:       Pt disoriented x4, NPO no exception, agitated, pulling at lines, PRN Zyprexa given, D5&NS w/ KCL infusing at 75/hr

## 2023-09-11 ENCOUNTER — APPOINTMENT (OUTPATIENT)
Dept: RADIOLOGY | Facility: HOSPITAL | Age: 88
DRG: 093 | End: 2023-09-11
Attending: INTERNAL MEDICINE
Payer: MEDICARE

## 2023-09-11 ENCOUNTER — APPOINTMENT (OUTPATIENT)
Dept: ULTRASOUND IMAGING | Facility: HOSPITAL | Age: 88
DRG: 093 | End: 2023-09-11
Attending: INTERNAL MEDICINE
Payer: MEDICARE

## 2023-09-11 ENCOUNTER — APPOINTMENT (OUTPATIENT)
Dept: OCCUPATIONAL THERAPY | Facility: HOSPITAL | Age: 88
DRG: 093 | End: 2023-09-11
Payer: MEDICARE

## 2023-09-11 ENCOUNTER — APPOINTMENT (OUTPATIENT)
Dept: SPEECH THERAPY | Facility: HOSPITAL | Age: 88
DRG: 093 | End: 2023-09-11
Payer: MEDICARE

## 2023-09-11 LAB
ALBUMIN SERPL BCG-MCNC: 3.1 G/DL (ref 3.5–5.2)
ALBUMIN UR-MCNC: 30 MG/DL
ALP SERPL-CCNC: 81 U/L (ref 35–104)
ALT SERPL W P-5'-P-CCNC: 34 U/L (ref 0–50)
ANION GAP SERPL CALCULATED.3IONS-SCNC: 14 MMOL/L (ref 7–15)
APPEARANCE FLD: ABNORMAL
APPEARANCE UR: CLEAR
AST SERPL W P-5'-P-CCNC: 60 U/L (ref 0–45)
BASOPHILS # BLD AUTO: 0 10E3/UL (ref 0–0.2)
BASOPHILS NFR BLD AUTO: 0 %
BILIRUB SERPL-MCNC: 0.4 MG/DL
BILIRUB UR QL STRIP: NEGATIVE
BUN SERPL-MCNC: 8.1 MG/DL (ref 8–23)
CALCIUM SERPL-MCNC: 8.6 MG/DL (ref 8.2–9.6)
CELL COUNT BODY FLUID SOURCE: ABNORMAL
CHLORIDE SERPL-SCNC: 104 MMOL/L (ref 98–107)
CK SERPL-CCNC: 341 U/L (ref 26–192)
COLOR FLD: YELLOW
COLOR UR AUTO: ABNORMAL
CREAT SERPL-MCNC: 0.46 MG/DL (ref 0.51–0.95)
CRP SERPL-MCNC: 250.5 MG/L
CRYSTALS SNV MICRO: ABNORMAL
DEPRECATED HCO3 PLAS-SCNC: 21 MMOL/L (ref 22–29)
EGFRCR SERPLBLD CKD-EPI 2021: 90 ML/MIN/1.73M2
EOSINOPHIL # BLD AUTO: 0 10E3/UL (ref 0–0.7)
EOSINOPHIL NFR BLD AUTO: 0 %
ERYTHROCYTE [DISTWIDTH] IN BLOOD BY AUTOMATED COUNT: 12.8 % (ref 10–15)
ERYTHROCYTE [SEDIMENTATION RATE] IN BLOOD BY WESTERGREN METHOD: 74 MM/HR (ref 0–30)
GLUCOSE SERPL-MCNC: 82 MG/DL (ref 70–99)
GLUCOSE UR STRIP-MCNC: NEGATIVE MG/DL
HCT VFR BLD AUTO: 36.4 % (ref 35–47)
HGB BLD-MCNC: 12.2 G/DL (ref 11.7–15.7)
HGB UR QL STRIP: ABNORMAL
IMM GRANULOCYTES # BLD: 0.2 10E3/UL
IMM GRANULOCYTES NFR BLD: 2 %
KETONES UR STRIP-MCNC: NEGATIVE MG/DL
LEUKOCYTE ESTERASE UR QL STRIP: NEGATIVE
LYMPHOCYTES # BLD AUTO: 1.4 10E3/UL (ref 0.8–5.3)
LYMPHOCYTES NFR BLD AUTO: 13 %
LYMPHOCYTES NFR FLD MANUAL: 4 %
MAGNESIUM SERPL-MCNC: 1.5 MG/DL (ref 1.7–2.3)
MCH RBC QN AUTO: 33 PG (ref 26.5–33)
MCHC RBC AUTO-ENTMCNC: 33.5 G/DL (ref 31.5–36.5)
MCV RBC AUTO: 98 FL (ref 78–100)
MONOCYTES # BLD AUTO: 1.5 10E3/UL (ref 0–1.3)
MONOCYTES NFR BLD AUTO: 14 %
MONOS+MACROS NFR FLD MANUAL: 1 %
MUCOUS THREADS #/AREA URNS LPF: PRESENT /LPF
NEUTROPHILS # BLD AUTO: 7.7 10E3/UL (ref 1.6–8.3)
NEUTROPHILS NFR BLD AUTO: 71 %
NEUTS BAND NFR FLD MANUAL: 95 %
NITRATE UR QL: NEGATIVE
NRBC # BLD AUTO: 0 10E3/UL
NRBC BLD AUTO-RTO: 0 /100
PH UR STRIP: 5.5 [PH] (ref 5–7)
PHENYTOIN FREE SERPL-MCNC: 1.7 UG/ML
PLATELET # BLD AUTO: 339 10E3/UL (ref 150–450)
POTASSIUM SERPL-SCNC: 3.1 MMOL/L (ref 3.4–5.3)
POTASSIUM SERPL-SCNC: 3.7 MMOL/L (ref 3.4–5.3)
PROCALCITONIN SERPL IA-MCNC: 0.37 NG/ML
PROT SERPL-MCNC: 6.4 G/DL (ref 6.4–8.3)
RBC # BLD AUTO: 3.7 10E6/UL (ref 3.8–5.2)
RBC URINE: <1 /HPF
SODIUM SERPL-SCNC: 139 MMOL/L (ref 136–145)
SP GR UR STRIP: 1.01 (ref 1–1.03)
SQUAMOUS EPITHELIAL: <1 /HPF
URATE SERPL-MCNC: 2.2 MG/DL (ref 2.4–5.7)
UROBILINOGEN UR STRIP-MCNC: <2 MG/DL
VIT B1 PYROPHOSHATE BLD-SCNC: 132 NMOL/L
WBC # BLD AUTO: 10.8 10E3/UL (ref 4–11)
WBC # FLD AUTO: ABNORMAL /UL
WBC URINE: 2 /HPF

## 2023-09-11 PROCEDURE — 272N000042 US JOINT INJECTION ASPIRATION MAJOR LEFT

## 2023-09-11 PROCEDURE — 84550 ASSAY OF BLOOD/URIC ACID: CPT | Performed by: INTERNAL MEDICINE

## 2023-09-11 PROCEDURE — 85652 RBC SED RATE AUTOMATED: CPT | Performed by: INTERNAL MEDICINE

## 2023-09-11 PROCEDURE — 99233 SBSQ HOSP IP/OBS HIGH 50: CPT | Performed by: INTERNAL MEDICINE

## 2023-09-11 PROCEDURE — 74230 X-RAY XM SWLNG FUNCJ C+: CPT

## 2023-09-11 PROCEDURE — 120N000001 HC R&B MED SURG/OB

## 2023-09-11 PROCEDURE — 258N000003 HC RX IP 258 OP 636: Performed by: PSYCHIATRY & NEUROLOGY

## 2023-09-11 PROCEDURE — 80053 COMPREHEN METABOLIC PANEL: CPT | Performed by: INTERNAL MEDICINE

## 2023-09-11 PROCEDURE — 87070 CULTURE OTHR SPECIMN AEROBIC: CPT | Performed by: INTERNAL MEDICINE

## 2023-09-11 PROCEDURE — 250N000011 HC RX IP 250 OP 636: Mod: JZ | Performed by: PSYCHIATRY & NEUROLOGY

## 2023-09-11 PROCEDURE — 250N000011 HC RX IP 250 OP 636: Mod: JZ | Performed by: INTERNAL MEDICINE

## 2023-09-11 PROCEDURE — 0S9D3ZZ DRAINAGE OF LEFT KNEE JOINT, PERCUTANEOUS APPROACH: ICD-10-PCS | Performed by: RADIOLOGY

## 2023-09-11 PROCEDURE — 250N000013 HC RX MED GY IP 250 OP 250 PS 637: Performed by: INTERNAL MEDICINE

## 2023-09-11 PROCEDURE — 87205 SMEAR GRAM STAIN: CPT | Performed by: INTERNAL MEDICINE

## 2023-09-11 PROCEDURE — 84145 PROCALCITONIN (PCT): CPT | Performed by: INTERNAL MEDICINE

## 2023-09-11 PROCEDURE — 89050 BODY FLUID CELL COUNT: CPT | Performed by: INTERNAL MEDICINE

## 2023-09-11 PROCEDURE — 250N000009 HC RX 250: Performed by: INTERNAL MEDICINE

## 2023-09-11 PROCEDURE — 81003 URINALYSIS AUTO W/O SCOPE: CPT | Performed by: INTERNAL MEDICINE

## 2023-09-11 PROCEDURE — 73552 X-RAY EXAM OF FEMUR 2/>: CPT | Mod: RT

## 2023-09-11 PROCEDURE — 86140 C-REACTIVE PROTEIN: CPT | Performed by: INTERNAL MEDICINE

## 2023-09-11 PROCEDURE — 97535 SELF CARE MNGMENT TRAINING: CPT | Mod: GO

## 2023-09-11 PROCEDURE — 73590 X-RAY EXAM OF LOWER LEG: CPT | Mod: LT

## 2023-09-11 PROCEDURE — 85025 COMPLETE CBC W/AUTO DIFF WBC: CPT | Performed by: INTERNAL MEDICINE

## 2023-09-11 PROCEDURE — 84132 ASSAY OF SERUM POTASSIUM: CPT | Performed by: INTERNAL MEDICINE

## 2023-09-11 PROCEDURE — 83735 ASSAY OF MAGNESIUM: CPT | Performed by: INTERNAL MEDICINE

## 2023-09-11 PROCEDURE — 82550 ASSAY OF CK (CPK): CPT | Performed by: INTERNAL MEDICINE

## 2023-09-11 PROCEDURE — 87040 BLOOD CULTURE FOR BACTERIA: CPT | Performed by: INTERNAL MEDICINE

## 2023-09-11 PROCEDURE — 92611 MOTION FLUOROSCOPY/SWALLOW: CPT | Mod: GN

## 2023-09-11 PROCEDURE — 36415 COLL VENOUS BLD VENIPUNCTURE: CPT | Performed by: INTERNAL MEDICINE

## 2023-09-11 PROCEDURE — 99231 SBSQ HOSP IP/OBS SF/LOW 25: CPT | Performed by: PSYCHIATRY & NEUROLOGY

## 2023-09-11 PROCEDURE — 89060 EXAM SYNOVIAL FLUID CRYSTALS: CPT | Performed by: INTERNAL MEDICINE

## 2023-09-11 RX ORDER — METHYLPREDNISOLONE SODIUM SUCCINATE 40 MG/ML
40 INJECTION, POWDER, LYOPHILIZED, FOR SOLUTION INTRAMUSCULAR; INTRAVENOUS ONCE
Status: COMPLETED | OUTPATIENT
Start: 2023-09-11 | End: 2023-09-11

## 2023-09-11 RX ORDER — POTASSIUM CHLORIDE 7.45 MG/ML
10 INJECTION INTRAVENOUS
Status: COMPLETED | OUTPATIENT
Start: 2023-09-11 | End: 2023-09-11

## 2023-09-11 RX ORDER — LIDOCAINE 50 MG/G
OINTMENT TOPICAL 4 TIMES DAILY
Status: DISCONTINUED | OUTPATIENT
Start: 2023-09-11 | End: 2023-09-18 | Stop reason: HOSPADM

## 2023-09-11 RX ORDER — BARIUM SULFATE 400 MG/ML
SUSPENSION ORAL ONCE
Status: COMPLETED | OUTPATIENT
Start: 2023-09-11 | End: 2023-09-11

## 2023-09-11 RX ORDER — FUROSEMIDE 10 MG/ML
20 INJECTION INTRAMUSCULAR; INTRAVENOUS EVERY 8 HOURS
Status: COMPLETED | OUTPATIENT
Start: 2023-09-11 | End: 2023-09-12

## 2023-09-11 RX ORDER — LOSARTAN POTASSIUM 50 MG/1
100 TABLET ORAL DAILY
Status: DISCONTINUED | OUTPATIENT
Start: 2023-09-12 | End: 2023-09-18 | Stop reason: HOSPADM

## 2023-09-11 RX ORDER — MAGNESIUM SULFATE 4 G/50ML
4 INJECTION INTRAVENOUS ONCE
Status: COMPLETED | OUTPATIENT
Start: 2023-09-11 | End: 2023-09-11

## 2023-09-11 RX ORDER — ACETAMINOPHEN 325 MG/1
650 TABLET ORAL 3 TIMES DAILY
Status: DISCONTINUED | OUTPATIENT
Start: 2023-09-11 | End: 2023-09-18 | Stop reason: HOSPADM

## 2023-09-11 RX ORDER — PREDNISONE 20 MG/1
40 TABLET ORAL DAILY
Status: COMPLETED | OUTPATIENT
Start: 2023-09-12 | End: 2023-09-18

## 2023-09-11 RX ORDER — PANTOPRAZOLE SODIUM 40 MG/1
40 TABLET, DELAYED RELEASE ORAL
Status: DISCONTINUED | OUTPATIENT
Start: 2023-09-11 | End: 2023-09-18 | Stop reason: HOSPADM

## 2023-09-11 RX ORDER — FOLIC ACID 1 MG/1
1 TABLET ORAL DAILY
Status: DISCONTINUED | OUTPATIENT
Start: 2023-09-12 | End: 2023-09-18 | Stop reason: HOSPADM

## 2023-09-11 RX ADMIN — FUROSEMIDE 20 MG: 10 INJECTION, SOLUTION INTRAMUSCULAR; INTRAVENOUS at 18:29

## 2023-09-11 RX ADMIN — POTASSIUM CHLORIDE 10 MEQ: 10 INJECTION, SOLUTION INTRAVENOUS at 19:16

## 2023-09-11 RX ADMIN — FOSPHENYTOIN SODIUM 100 MG PE: 50 INJECTION INTRAMUSCULAR; INTRAVENOUS at 08:21

## 2023-09-11 RX ADMIN — MAGNESIUM SULFATE HEPTAHYDRATE 4 G: 80 INJECTION, SOLUTION INTRAVENOUS at 18:31

## 2023-09-11 RX ADMIN — ACETAMINOPHEN 650 MG: 325 TABLET ORAL at 18:40

## 2023-09-11 RX ADMIN — FOSPHENYTOIN SODIUM 100 MG PE: 50 INJECTION INTRAMUSCULAR; INTRAVENOUS at 21:39

## 2023-09-11 RX ADMIN — ENOXAPARIN SODIUM 40 MG: 40 INJECTION SUBCUTANEOUS at 18:33

## 2023-09-11 RX ADMIN — LIDOCAINE: 50 OINTMENT TOPICAL at 22:46

## 2023-09-11 RX ADMIN — POTASSIUM CHLORIDE 10 MEQ: 10 INJECTION, SOLUTION INTRAVENOUS at 17:48

## 2023-09-11 RX ADMIN — PIPERACILLIN AND TAZOBACTAM 3.38 G: 3; .375 INJECTION, POWDER, LYOPHILIZED, FOR SOLUTION INTRAVENOUS at 22:35

## 2023-09-11 RX ADMIN — FOLIC ACID 1 MG: 5 INJECTION, SOLUTION INTRAMUSCULAR; INTRAVENOUS; SUBCUTANEOUS at 08:21

## 2023-09-11 RX ADMIN — POTASSIUM CHLORIDE 10 MEQ: 10 INJECTION, SOLUTION INTRAVENOUS at 16:25

## 2023-09-11 RX ADMIN — PIPERACILLIN AND TAZOBACTAM 3.38 G: 3; .375 INJECTION, POWDER, LYOPHILIZED, FOR SOLUTION INTRAVENOUS at 12:03

## 2023-09-11 RX ADMIN — ACETAMINOPHEN 650 MG: 325 TABLET ORAL at 22:45

## 2023-09-11 RX ADMIN — BARIUM SULFATE 60 ML: 400 SUSPENSION ORAL at 14:31

## 2023-09-11 RX ADMIN — POTASSIUM CHLORIDE 10 MEQ: 10 INJECTION, SOLUTION INTRAVENOUS at 20:36

## 2023-09-11 RX ADMIN — METHYLPREDNISOLONE SODIUM SUCCINATE 40 MG: 40 INJECTION, POWDER, FOR SOLUTION INTRAMUSCULAR; INTRAVENOUS at 18:23

## 2023-09-11 RX ADMIN — LIDOCAINE: 50 OINTMENT TOPICAL at 18:28

## 2023-09-11 RX ADMIN — PIPERACILLIN AND TAZOBACTAM 3.38 G: 3; .375 INJECTION, POWDER, LYOPHILIZED, FOR SOLUTION INTRAVENOUS at 03:21

## 2023-09-11 RX ADMIN — CARVEDILOL 6.25 MG: 3.12 TABLET, FILM COATED ORAL at 22:45

## 2023-09-11 RX ADMIN — THIAMINE HYDROCHLORIDE 100 MG: 100 INJECTION, SOLUTION INTRAMUSCULAR; INTRAVENOUS at 08:21

## 2023-09-11 RX ADMIN — PANTOPRAZOLE SODIUM 40 MG: 40 TABLET, DELAYED RELEASE ORAL at 18:40

## 2023-09-11 ASSESSMENT — ACTIVITIES OF DAILY LIVING (ADL)
ADLS_ACUITY_SCORE: 41
TOILETING_ISSUES: OTHER (SEE COMMENTS)
ADLS_ACUITY_SCORE: 41
EQUIPMENT_CURRENTLY_USED_AT_HOME: WALKER, ROLLING
FALL_HISTORY_WITHIN_LAST_SIX_MONTHS: YES
DRESSING/BATHING_DIFFICULTY: OTHER (SEE COMMENTS)
ADLS_ACUITY_SCORE: 41
CHANGE_IN_FUNCTIONAL_STATUS_SINCE_ONSET_OF_CURRENT_ILLNESS/INJURY: YES
ADLS_ACUITY_SCORE: 41
ADLS_ACUITY_SCORE: 41
DOING_ERRANDS_INDEPENDENTLY_DIFFICULTY: OTHER (SEE COMMENTS)
WALKING_OR_CLIMBING_STAIRS_DIFFICULTY: OTHER (SEE COMMENTS)
HEARING_DIFFICULTY_OR_DEAF: NO
NUMBER_OF_TIMES_PATIENT_HAS_FALLEN_WITHIN_LAST_SIX_MONTHS: 1
WEAR_GLASSES_OR_BLIND: OTHER (SEE COMMENTS)
ADLS_ACUITY_SCORE: 41
CONCENTRATING,_REMEMBERING_OR_MAKING_DECISIONS_DIFFICULTY: OTHER (SEE COMMENTS)
ADLS_ACUITY_SCORE: 41
ADLS_ACUITY_SCORE: 41
DIFFICULTY_EATING/SWALLOWING: OTHER (SEE COMMENTS)
ADLS_ACUITY_SCORE: 41
ADLS_ACUITY_SCORE: 41

## 2023-09-11 NOTE — PROGRESS NOTES
Care Management Follow Up    Length of Stay (days): 2    Expected Discharge Date: 09/12/2023     Concerns to be Addressed:       Patient plan of care discussed at interdisciplinary rounds: Yes    Anticipated Discharge Disposition: Transitional Care     Anticipated Discharge Services:    Anticipated Discharge DME:      Patient/family educated on Medicare website which has current facility and service quality ratings:    Education Provided on the Discharge Plan:    Patient/Family in Agreement with the Plan:      Referrals Placed by CM/SW:    Private pay costs discussed: Not applicable    Additional Information:  Patient taken off 1:1 today at 0945 per nurse. TCU referrals previously sent. CM will follow     Ivy Salgado RN

## 2023-09-11 NOTE — PROGRESS NOTES
"Speech-Language Pathology: Video Swallow Study      09/11/23 1400   Appointment Info   Signing Clinician's Name / Credentials (SLP) Amarilys Scanlon MA, CCC-SLP   General Information   Onset of Illness/Injury or Date of Surgery 09/07/23   Referring Physician Feng Morales, DO   Pertinent History of Current Problem Per EMR, \"89 year old female who has a PMHx of ataxia, chronic lumbar compression fractures, foraminal stenoses, HTN who presented to the ED for evaluation after a fall and generalized weakness. mechanical fall yesterday evening, took 4 hours to crawl to the phone, was put back in bed last night by family, and family brought her into the ED because she could not ambulate today, she lives alone and cannot take care of herself. In ED CBC, BMP unremarkable, 's. Bilateral ankle/foot xrays neg for acute fx/dislocation. Admitted for further observation and management. Reports right shoulder pain and right tib/fib pain. No HA, CP, SOB, pelvis/hip/femur/arms neck or low back pain. No vision changes acutely, denies new onset paresthesias. No dysphagia or dysarthria.\"   General Observations Patient was lethargic upon arrival to Fluoroscopy. She awoke to verbal and tactile cues. Her daughter, Ernestine, was present.   Type of Evaluation   Type of Evaluation Swallow Evaluation   General Swallowing Observations   Past History of Dysphagia None per review of EMR.   Comment, General Swallowing Observations Patient participated in a clinical swallow evaluation with Speech Therapy on 9/10/23. At that time, she presented with delayed coughing with all consistencies trialed. It was recommended that she remain NPO until a video swallow study could be completed.   Respiratory Support (General Swallowing Observations) none   Current Diet/Method of Nutritional Intake (General Swallowing Observations, NIS) NPO   Swallowing Evaluation Videofluoroscopic swallow study (VFSS)   VFSS Evaluation   Radiologist Dr. Hitchcock   Views " Taken left lateral   Physical Location of Procedure [facility]   VFSS Textures Trialed thin liquids;mildly thick liquids;pureed   VFSS Eval: Thin Liquid Texture Trial   Mode of Presentation, Thin Liquid straw;self-fed  (Pt unable to bring cup to her mouth due to UE weakness. Clinician held cup as patient iliana liquid through straw.)   Order of Presentation Trial 3, 4   Preparatory Phase WFL   Oral Phase, Thin Liquid premature pharyngeal entry   Bolus Location When Swallow Triggered pyriforms   Pharyngeal Phase, Thin Liquid impaired hyolaryngeal excursion;impaired epiglottic movement;impaired tongue base retraction  (Delayed epiglottic inversion)   Rosenbek's Penetration Aspiration Scale: Thin Liquid Trial Results 5 - contrast contacts vocal cords, visible residue remains (penetration)   Response to Aspiration absent response   Diagnostic Statement Deep laryngeal penetration to the level of the vocal folds. While the majority of this cleared spontaneously, there appeared to be some residual contrast material on the vocal folds. No cough or throat clear response at the folds.   VFSS Eval: Mildly Thick Liquids   Mode of Presentation spoon;fed by clinician;straw;self-fed  (Pt unable to bring cup to her mouth due to UE weakness. Clinician held cup as patient iliana liquid through straw.)   Order of Presentation Trials 1, 2, 7, 8   Preparatory Phase WFL   Oral Phase premature pharyngeal entry   Bolus Location When Swallow Triggered pyriforms   Pharyngeal Phase impaired hyolaryngel excursion;impaired epiglottic movement;impaired tongue base retraction;residue in vallecula  (Delayed epiglottic inversion)   Rosenbek's Penetration Aspiration Scale 4 - contrast contacts vocal cords, no residue remains (penetration)   Response to Aspiration absent response   Diagnostic Statement Deep laryngeal penetration to the level of the vocal folds with large and/or consecutive boluses. This cleared spontaneously,  with minimal to no residual contrast material on the vocal folds. No cough or throat clear response at the folds. No laryngeal penetration observed with small, single sips.   VFSS Evaluation: Puree Solid Texture Trial   Mode of Presentation, Puree spoon;fed by clinician   Order of Presentation Trials 5, 6   Preparatory Phase prolonged bolus preparation   Oral Phase, Puree impaired AP movement;residue in oral cavity;premature pharyngeal entry   Bolus Location When Swallow Triggered valleculae   Pharyngeal Phase, Puree impaired hyolaryngel excursion;impaired tongue base retraction;residue in vallecula   Rosenbek's Penetration Aspiration Scale: Puree Food Trial Results 1 - no aspiration, contrast does not enter airway   Diagnostic Statement Bolus prep was delayed. Stationary tongue-pumping was used to initiate posterior transit. A-P bolus transit was slow and effortful. This appeared related, at least in part, to the sticky nature of the pudding-thick barium. Mild-moderate stasis noted along the tongue base following swallows. This partially cleared with a liquid wash.   Esophageal Phase of Swallow   Patient reports or presents with symptoms of esophageal dysphagia No   Swallowing Recommendations   Diet Consistency Recommendations pureed (level 4);mildly thick liquids (level 2)   Supervision Level for Intake 1:1 supervision needed  (Patient will require 1:1 assistance w/ feeding, at least initially.)   Mode of Delivery Recommendations liquids via spoon only;bolus size, small;slow rate of intake;no cups;no straws   Swallowing Maneuver Recommendations alternate food and liquid intake   Monitoring/Assistance Required (Eating/Swallowing) stop eating activities when fatigue is present;monitor for cough or change in vocal quality with intake   Recommended Feeding/Eating Techniques (Swallow Eval) provide oral hygiene prior to intake;maintain upright sitting position for eating;minimize distractions during oral intake;provide  assist with feeding   Medication Administration Recommendations, Swallowing (SLP) Give pills one at a time (whole), mixed in applesauce or pudding. Follow with sips of mildly thick liquid by spoon only. Crush pills only if necessary.   General Therapy Interventions   Planned Therapy Interventions Dysphagia Treatment   Dysphagia treatment Modified diet education;Instruction of safe swallow strategies   Clinical Impression   Criteria for Skilled Therapeutic Interventions Met (SLP [place]) Yes, treatment indicated   SLP Diagnosis Oropharyngeal dysphagia  (In the setting of mechanical fall with generalized weakness)   Risks & Benefits of therapy have been explained evaluation/treatment results reviewed;care plan/treatment goals reviewed;risks/benefits reviewed;current/potential barriers reviewed;participants voiced agreement with care plan;participants included;patient;daughter  (Patient unable to indicate understanding due to confusion and lethargy.)   Clinical Impression Comments   Videofluoroscopic swallow study (VFSS) completed. No definite aspiration was observed during this study, however, there was deep laryngeal penetration with thin liquid and large or consecutive sips of mildly thick liquid. Oral phase was notable for delayed and effortful A-P bolus transit with puree consistency. Tongue base retraction was reduced. Mild-moderate stasis was noted along the tongue base following boluses of puree. This partially cleared with liquid washes. Pharyngeal phase was notable for delayed timing of the swallow response with all consistencies. This resulted in pourover to the pyriform sinuses with thin and mildly thick liquid. With puree, swallow response occurred when the vallecular space was filled to capacity. Epiglottic inversion was completed but delayed with thin and mildly thick liquid. This appeared to contribute to laryngeal penetration with these consistencies. Hyolaryngeal excursion was reduced. Hyolaryngeal  elevation was WNL. Pharyngeal constriction was WNL. Patient has a mildly prominent cricopharyngeus, but this does not impede bolus transit through the PES and cervical esophagus.     Patient is at high risk for aspiration with thin and mildly thick liquids. Risk appears reduced, though not eliminated, with mildly thick liquid given by spoon. At this time, recommend diet of Pureed textures (Level 4) and mildly thick liquids (Level 2) with use of the safe swallowing/feeding strategies listed above. Speech Therapy will follow to monitor diet tolerance, reinforce strategies with caregivers, and trial advanced textures as appropriate. Recommend repeat VFSS prior to liquid advancement     SLP Total Evaluation Time   Evaluation, videofluoroscopic eval of swallow function Minutes (18070) 16   SLP Goals   Therapy Frequency (SLP Eval) 5 times/wk   SLP Predicted Duration/Target Date for Goal Attainment 09/20/23   SLP Goals Swallow   SLP: Safely tolerate diet without signs/symptoms of aspiration Soft & bite sized diet;With use of swallow precautions;With assistance/supervision   SLP Discharge Planning   SLP Plan F/U re: diet tolerance. Review safe swallow/feeding strategies w/ caregivers.   SLP Discharge Recommendation Transitional Care Facility   SLP Rationale for DC Rec Patient's swallow function is below baseline. She is on a modified diet and requires assistance w/ feeding.   SLP Brief overview of current status  Pt appears appropriate for diet of Pureed textures and mildly thick liquids. 1:1 assist w/ feeding. Pt to be alert and sitting fully upright. Hold trays if lethargic at mealtime. Feed pt slowly (at her own pace), offer small bites, & alternate bite/sip. Give liquids by spoon only.   Total Session Time   Total Session Time (sum of timed and untimed services) 16

## 2023-09-11 NOTE — PROVIDER NOTIFICATION
"\"Daughter Ernestine is here and would like to talk to a doctor. Also, I noticed her left knee looks swollen, warm and appears to be painful when we move her left leg.\"   "

## 2023-09-11 NOTE — PROCEDURES
U/S guided left knee aspiration    Dx: Left knee joint effusion    50cc of serous fluid removed from Left knee suprapatellar joint space and sent for lab analysis as ordered.    Local anethesia: 10cc 1% lidocaine subcutaneous.    No complications. Pt tolerated procedure well.

## 2023-09-11 NOTE — PLAN OF CARE
Goal Outcome Evaluation:       Pt appears calmer with 1:1 staff in room this shift. VSS except  SBP up to 196 around 1630. Hydralazine IV given which brought B/P down to 166/82.  SR with BBB on tele. Speech andrew completed this ladan. Recommendation per SLP is to keep pt NPO and follow up with video swallow tomorrow.  Cont to monitor.

## 2023-09-11 NOTE — PLAN OF CARE
"  Problem: Plan of Care - These are the overarching goals to be used throughout the patient stay.    Goal: Patient-Specific Goal (Individualized)  Description: You can add care plan individualizations to a care plan. Examples of Individualization might be:  \"Parent requests to be called daily at 9am for status\", \"I have a hard time hearing out of my right ear\", or \"Do not touch me to wake me up as it startles me\".  Outcome: Progressing     Problem: Plan of Care - These are the overarching goals to be used throughout the patient stay.    Goal: Optimal Comfort and Wellbeing  Outcome: Progressing     Problem: Plan of Care - These are the overarching goals to be used throughout the patient stay.    Goal: Readiness for Transition of Care  Outcome: Progressing   Goal Outcome Evaluation:       Pt alert but confused, easily redirectable. Pt reports pain in BLE. VSS. Potassium at 3.1, replacement ordered. No significant events.       1:1 removed at 0945.            "

## 2023-09-11 NOTE — PLAN OF CARE
Problem: Hypertension Comorbidity  Goal: Blood Pressure in Desired Range  Intervention: Maintain Blood Pressure Management  Recent Flowsheet Documentation  Taken 9/11/2023 0010 by Shruthi Mckeon, RN  Medication Review/Management: medications reviewed  Taken 9/10/2023 2022 by Shruthi Mckeon, RN  Medication Review/Management: medications reviewed     Problem: Risk for Delirium  Goal: Improved Sleep  9/11/2023 0637 by Shruthi Mckeon, RN  Outcome: Progressing  9/10/2023 2022 by Shruthi Mckeon, RN  Outcome: Progressing   Goal Outcome Evaluation:       Pt is alert and confused. VSS. Awake approx 50% of the night and sometime talkative. No significant behavior problems observed this shift. Pt remains NPO. Assist with repo in bed with oral cares. Video swallow study scheduled for today.  Gamez patent and intact with 350cc urine output this shift. Will cont with plan of care.

## 2023-09-11 NOTE — PROGRESS NOTES
"This is a neurologic follow-up note    89-year-old admitted to hospital 9/7/2023      Chief complaint  Generalized weakness  Fall  Early cognitive impairment per family  History of seizures      HPI   89-year-old presented to the hospital on 9/7/2023 after mechanical fall with generalized weakness  After the fall patient was not able to ambulate  Patient states that she usually can ambulate independently had been walking across the carpet and tripped.  She fell onto the floor but did not lose consciousness  She did have to crawl to the phone    She complains of some the left great toe pain after the fall    Patient does live independently  Uses a walker  Has \"dizzy \"spells when she gets up and moves around    History of \"seizures maybe in 2006/2007 placed on Dilantin and none since  Takes Dilantin 100 mg 3 times daily per chart  Daughter states that she has never had any further seizures      Past medical history  Ataxia  Hypertension  Epilepsy  Peripheral neuropathy  MGUS  Osteoporosis  Gout  Secondary hyper parathyroidism    Habits  Non-smoker    Family history  Noncontributory      Work-up  HEAD CT: 9/7/2023  1.  No CT evidence for acute intracranial process.  2.  Brain atrophy and presumed chronic microvascular ischemic changes as above.  CERVICAL SPINE CT: 9/7/2023  1.  No CT evidence for acute fracture or post traumatic subluxation.  2.  Multilevel cervical spondylosis including moderate to high-grade bilateral neural foraminal stenosis at C3-C4 and C4-C5  MRI head 9/8/2023 pending report    B12 339, TSH 3.06 (9/7/2023)  -598  BNP 2187, (9/8/2023)  Ammonia level 26  Dilantin level 8.1, (10/29/2018)  Dilantin level 14.4, free level 1.7, (9/7/2023)  Dilantin level 10.9, (9/10/2023)  B1 pending    EEG 9/10/2023, Impression:  Moderately abnormal awake drowsy and sleep stage I EEG due to:  Theta disorganization of the background 6-5 Hz  Occasional bilateral 3-4 Hz theta delta slowing  Clinical " correlation:  This record is indicative of diffuse cerebral dysfunction consistent with diffuse processes such as  Metabolic toxic infectious anoxic or degenerative type disease    Labs  Sodium 138 potassium 3.5  BUN 4.8, creatinine 0.43  Glucose 139  WBC 6.3, hemoglobin 9.4, platelets 304,000            Exam  Blood pressure 197/90, pulse 82, temperature 98.4  Blood pressure range 142//90  Pulse range 65-93  Tmax 98.8          Review of systems limited as patient is confused  Unable to really answer questions    General exam per primary MD  To loud voice and light touch opens eyes  Seems lethargic  Lungs decreased breath sounds  Heart rate regular  Abdomen soft  Some edema in the feet    Neurologic exam  Somnolent  With loud voice and light touch she opens her eyes  She does not really answer questions correctly but tries to make a comment  Very dysarthric  Denies any neck pain or headache when I talk to her or touch her head or neck    Cranial nerves II through XII  No obvious ophthalmoplegia  No nystagmus  Difficult to test visual fields    Upper extremities  Poor response to exam does move them to stimulus    Lower extremities  Moves legs to stimulus weakly    Gait  Deferred    Patient sleepy arouses to loud voice we will try decreasing Dilantin dose to see if this helps with sedation      Assessment/plan    Mechanical fall without loss of consciousness  Mild rhabdomyolysis due to her fall  History of epilepsy no seizure during this event        Dilantin level 8.1, (10/29/2018)        Dilantin level 14.4, (9/7/2023)        Dilantin 100 mg 3 times daily    Diagnosis  Mechanical fall  Rhabdomyolysis  Urinary retention  History of epilepsy  Peripheral neuropathy/ataxia chronic    Question whether Dilantin level is partially the cause of her intermittent dizziness specially at her age  We will check EEG and consider decreasing Dilantin dose she has not had seizures in a long time and per family the only  occurred the one time when she was hospitalized with an illness    Patient appears to be somewhat encephalopathic  Patient had urinary retention  Question whether she is developing some urinary infection    Discussed with patient's daughter at bedside concerned with her age and fall mild cognitive impairment she may be slow to rally        EEG 9/10/2023, Impression:  Moderately abnormal awake drowsy and sleep stage I EEG due to:  Theta disorganization of the background 6-5 Hz  Occasional bilateral 3-4 Hz theta delta slowing  Clinical correlation:  This record is indicative of diffuse cerebral dysfunction consistent with diffuse processes such as  Metabolic toxic infectious anoxic or degenerative type disease    MRI scan pending reading (no large acute stroke seen on review of pictures)    Dilantin level 8.1, (10/29/2018)  Dilantin level 14.4, free level 1.7, (9/7/2023)  Dilantin level 10.9, (9/10/2023)    Decrease Dilantin dose to 100 mg every 12 hours (9/10/2023)  Patient with slight increase free fraction question of some of sedation is from Dilantin  Has been seizure-free for a long time.  Try at a lower dose  When able to swallow switch to oral Dilantin same dose as IV for conversion    Swallow study 9/11/2023 pending    Patient will need TCU  Disposition per primary MD    32 minutes total care time today

## 2023-09-11 NOTE — PROGRESS NOTES
[facility]    Medicine Progress Note - Hospitalist Service    Date of Admission:  9/7/2023    Assessment & Plan   Mechanical fall, recurrent  Mild rhabdomyolysis, traumatic, improving  HTN, labile, probable orthostatic hypotension which needs confirmion as ordered  Lightheadedness, dizziness, vertigo (acute on chronic)  H/O Ataxia, and peripheral neuropathy, falls, chronic DDD  Generalized weakness  Macrocytic anemia, no abla  Right shoulder pain and contusion, refused xray  Chronic LBP, compression fx's L spine  H/O epilepsy  Acute Encephalopathy, toxic & metabolic with superimposed probable chronic cognitive decline progressively, agitation, persistent  BRBPR, no abdominal pain, h/o hemorrhoids - no recurrance  Chronic Multilevel cervical spondylosis including moderate to high-grade bilateral neural foraminal stenosis at C3-C4 and C4-C5.   Acute urine retention - CT a/p neg for obstruction/hydro  -Stable peripherally calcified 1.4 cm splenic artery aneurysm.   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  -Anasarca, hypervolemia, chronic lymphedema     Plan:  -pt/ot/SW  -fall precautions  -stop IVF  Start diet per SLP recs  -restart losartan and coreg  -IV hydralazine prn  -HOB 30 degrees  -garcia  -iv zosyn  -convert phenytoin back to PO in a.m. if tolerating PO  -BC x 2  -UA/UC  -left knee arthrocentesis, follow cultures, cell count and for crystals  -start prednisone 40mg every day  -start Lasix 20mg IV q8 x 3 doses  -lymphedema wraps  -BLE venous US    Family at bedside updated       Diet: Pureed Diet (level 4) Mildly Thick (level 2) (LIQUIDS BY SPOON ONLY)    DVT Prophylaxis: Pneumatic Compression Devices  Garcia Catheter: PRESENT, indication: Retention  Lines: None     Cardiac Monitoring: ACTIVE order. Indication: Bradycardias (48 hours)  Code Status: Full Code      Clinically Significant Risk Factors         # Hypokalemia: Lowest K = 3.1 mmol/L in last 2 days, will replace as needed     # Hypomagnesemia: Lowest Mg = 1.5 mg/dL in last 2 days, will replace as needed   # Hypoalbuminemia: Lowest albumin = 2.9 g/dL at 9/10/2023  7:54 AM, will monitor as appropriate       # Hypertension: Noted on problem list        # Obesity: Estimated body mass index is 30.14 kg/m  as calculated from the following:    Height as of this encounter: 1.524 m (5').    Weight as of this encounter: 70 kg (154 lb 5.2 oz)., PRESENT ON ADMISSION            Disposition Plan      Expected Discharge Date: 09/12/2023        Discharge Comments: off 1:1 9/11 at 0945          Feng Morales DO, DO  Hospitalist Service  [facility]  Securely message with [employer] (more info)  Text page via [employer] Paging/Directory   ______________________________________________________________________    Interval History   reviewed    Physical Exam   Vital Signs: Temp: 98.3  F (36.8  C) Temp src: Oral BP: (!) 150/70 Pulse: 78   Resp: 22 SpO2: 97 % O2 Device: None (Room air)    Weight: 154 lbs 5.15 oz    General appearance - confused, lethargic  Lungs -decreased, non labored  Heart - reg rhythm, tachy, peripheral edema.  Abdomen - soft, nd, BS+  Neurological - somnolent, but overall more alert  MSK: r>l shoulder effusion, chronic DJD changes. Left knee effusion, not erythematous but ttp.     Lab/imaging reviewed

## 2023-09-12 ENCOUNTER — APPOINTMENT (OUTPATIENT)
Dept: OCCUPATIONAL THERAPY | Facility: HOSPITAL | Age: 88
DRG: 093 | End: 2023-09-12
Attending: INTERNAL MEDICINE
Payer: MEDICARE

## 2023-09-12 ENCOUNTER — APPOINTMENT (OUTPATIENT)
Dept: SPEECH THERAPY | Facility: HOSPITAL | Age: 88
DRG: 093 | End: 2023-09-12
Payer: MEDICARE

## 2023-09-12 ENCOUNTER — APPOINTMENT (OUTPATIENT)
Dept: OCCUPATIONAL THERAPY | Facility: HOSPITAL | Age: 88
DRG: 093 | End: 2023-09-12
Payer: MEDICARE

## 2023-09-12 ENCOUNTER — APPOINTMENT (OUTPATIENT)
Dept: ULTRASOUND IMAGING | Facility: HOSPITAL | Age: 88
DRG: 093 | End: 2023-09-12
Attending: INTERNAL MEDICINE
Payer: MEDICARE

## 2023-09-12 LAB
ALBUMIN SERPL BCG-MCNC: 2.8 G/DL (ref 3.5–5.2)
ALP SERPL-CCNC: 71 U/L (ref 35–104)
ALT SERPL W P-5'-P-CCNC: 30 U/L (ref 0–50)
ANION GAP SERPL CALCULATED.3IONS-SCNC: 12 MMOL/L (ref 7–15)
AST SERPL W P-5'-P-CCNC: 52 U/L (ref 0–45)
BILIRUB SERPL-MCNC: 0.3 MG/DL
BUN SERPL-MCNC: 14.4 MG/DL (ref 8–23)
CALCIUM SERPL-MCNC: 8.5 MG/DL (ref 8.2–9.6)
CHLORIDE SERPL-SCNC: 104 MMOL/L (ref 98–107)
CK SERPL-CCNC: 179 U/L (ref 26–192)
CREAT SERPL-MCNC: 0.61 MG/DL (ref 0.51–0.95)
DEPRECATED HCO3 PLAS-SCNC: 24 MMOL/L (ref 22–29)
EGFRCR SERPLBLD CKD-EPI 2021: 84 ML/MIN/1.73M2
GLUCOSE SERPL-MCNC: 76 MG/DL (ref 70–99)
HGB BLD-MCNC: 10.7 G/DL (ref 11.7–15.7)
MAGNESIUM SERPL-MCNC: 2.1 MG/DL (ref 1.7–2.3)
POTASSIUM SERPL-SCNC: 3.2 MMOL/L (ref 3.4–5.3)
POTASSIUM SERPL-SCNC: 4 MMOL/L (ref 3.4–5.3)
PROT SERPL-MCNC: 6.1 G/DL (ref 6.4–8.3)
SODIUM SERPL-SCNC: 140 MMOL/L (ref 136–145)

## 2023-09-12 PROCEDURE — 84132 ASSAY OF SERUM POTASSIUM: CPT | Performed by: INTERNAL MEDICINE

## 2023-09-12 PROCEDURE — 250N000012 HC RX MED GY IP 250 OP 636 PS 637: Performed by: INTERNAL MEDICINE

## 2023-09-12 PROCEDURE — 82550 ASSAY OF CK (CPK): CPT | Performed by: INTERNAL MEDICINE

## 2023-09-12 PROCEDURE — 250N000011 HC RX IP 250 OP 636: Mod: JZ | Performed by: PSYCHIATRY & NEUROLOGY

## 2023-09-12 PROCEDURE — 93970 EXTREMITY STUDY: CPT

## 2023-09-12 PROCEDURE — 97535 SELF CARE MNGMENT TRAINING: CPT | Mod: GO

## 2023-09-12 PROCEDURE — 250N000013 HC RX MED GY IP 250 OP 250 PS 637: Performed by: INTERNAL MEDICINE

## 2023-09-12 PROCEDURE — 258N000003 HC RX IP 258 OP 636: Performed by: PSYCHIATRY & NEUROLOGY

## 2023-09-12 PROCEDURE — 120N000001 HC R&B MED SURG/OB

## 2023-09-12 PROCEDURE — 83735 ASSAY OF MAGNESIUM: CPT | Performed by: INTERNAL MEDICINE

## 2023-09-12 PROCEDURE — 85018 HEMOGLOBIN: CPT | Performed by: INTERNAL MEDICINE

## 2023-09-12 PROCEDURE — 99232 SBSQ HOSP IP/OBS MODERATE 35: CPT | Performed by: PSYCHIATRY & NEUROLOGY

## 2023-09-12 PROCEDURE — 250N000011 HC RX IP 250 OP 636: Mod: JZ | Performed by: INTERNAL MEDICINE

## 2023-09-12 PROCEDURE — 80053 COMPREHEN METABOLIC PANEL: CPT | Performed by: INTERNAL MEDICINE

## 2023-09-12 PROCEDURE — 92526 ORAL FUNCTION THERAPY: CPT | Mod: GN

## 2023-09-12 PROCEDURE — 36415 COLL VENOUS BLD VENIPUNCTURE: CPT | Performed by: INTERNAL MEDICINE

## 2023-09-12 PROCEDURE — 99233 SBSQ HOSP IP/OBS HIGH 50: CPT | Performed by: INTERNAL MEDICINE

## 2023-09-12 RX ORDER — FUROSEMIDE 10 MG/ML
20 INJECTION INTRAMUSCULAR; INTRAVENOUS EVERY 8 HOURS
Status: COMPLETED | OUTPATIENT
Start: 2023-09-12 | End: 2023-09-13

## 2023-09-12 RX ORDER — POTASSIUM CHLORIDE 7.45 MG/ML
10 INJECTION INTRAVENOUS
Status: COMPLETED | OUTPATIENT
Start: 2023-09-12 | End: 2023-09-12

## 2023-09-12 RX ORDER — PHENYTOIN SODIUM 100 MG/1
100 CAPSULE, EXTENDED RELEASE ORAL 2 TIMES DAILY
Status: DISCONTINUED | OUTPATIENT
Start: 2023-09-12 | End: 2023-09-18 | Stop reason: HOSPADM

## 2023-09-12 RX ADMIN — LIDOCAINE: 50 OINTMENT TOPICAL at 13:50

## 2023-09-12 RX ADMIN — Medication 1 MG: at 20:07

## 2023-09-12 RX ADMIN — FUROSEMIDE 20 MG: 10 INJECTION, SOLUTION INTRAMUSCULAR; INTRAVENOUS at 16:55

## 2023-09-12 RX ADMIN — POTASSIUM CHLORIDE 10 MEQ: 10 INJECTION, SOLUTION INTRAVENOUS at 16:55

## 2023-09-12 RX ADMIN — POTASSIUM CHLORIDE 10 MEQ: 10 INJECTION, SOLUTION INTRAVENOUS at 19:49

## 2023-09-12 RX ADMIN — LIDOCAINE: 50 OINTMENT TOPICAL at 20:07

## 2023-09-12 RX ADMIN — ENOXAPARIN SODIUM 40 MG: 40 INJECTION SUBCUTANEOUS at 17:08

## 2023-09-12 RX ADMIN — ACETAMINOPHEN 650 MG: 325 TABLET ORAL at 20:07

## 2023-09-12 RX ADMIN — FOSPHENYTOIN SODIUM 100 MG PE: 50 INJECTION INTRAMUSCULAR; INTRAVENOUS at 08:21

## 2023-09-12 RX ADMIN — CARVEDILOL 6.25 MG: 3.12 TABLET, FILM COATED ORAL at 20:06

## 2023-09-12 RX ADMIN — PIPERACILLIN AND TAZOBACTAM 3.38 G: 3; .375 INJECTION, POWDER, LYOPHILIZED, FOR SOLUTION INTRAVENOUS at 21:28

## 2023-09-12 RX ADMIN — THIAMINE HCL TAB 100 MG 100 MG: 100 TAB at 09:44

## 2023-09-12 RX ADMIN — PANTOPRAZOLE SODIUM 40 MG: 40 TABLET, DELAYED RELEASE ORAL at 06:11

## 2023-09-12 RX ADMIN — PREDNISONE 40 MG: 20 TABLET ORAL at 09:44

## 2023-09-12 RX ADMIN — CARVEDILOL 6.25 MG: 3.12 TABLET, FILM COATED ORAL at 09:43

## 2023-09-12 RX ADMIN — FOLIC ACID 1 MG: 1 TABLET ORAL at 09:43

## 2023-09-12 RX ADMIN — PHENYTOIN SODIUM 100 MG: 100 CAPSULE ORAL at 20:06

## 2023-09-12 RX ADMIN — LIDOCAINE: 50 OINTMENT TOPICAL at 09:45

## 2023-09-12 RX ADMIN — POTASSIUM CHLORIDE 10 MEQ: 10 INJECTION, SOLUTION INTRAVENOUS at 15:21

## 2023-09-12 RX ADMIN — LOSARTAN POTASSIUM 100 MG: 50 TABLET, FILM COATED ORAL at 09:45

## 2023-09-12 RX ADMIN — PIPERACILLIN AND TAZOBACTAM 3.38 G: 3; .375 INJECTION, POWDER, LYOPHILIZED, FOR SOLUTION INTRAVENOUS at 10:33

## 2023-09-12 RX ADMIN — PIPERACILLIN AND TAZOBACTAM 3.38 G: 3; .375 INJECTION, POWDER, LYOPHILIZED, FOR SOLUTION INTRAVENOUS at 02:53

## 2023-09-12 RX ADMIN — LIDOCAINE: 50 OINTMENT TOPICAL at 16:55

## 2023-09-12 RX ADMIN — FUROSEMIDE 20 MG: 10 INJECTION, SOLUTION INTRAMUSCULAR; INTRAVENOUS at 08:21

## 2023-09-12 RX ADMIN — FUROSEMIDE 20 MG: 10 INJECTION, SOLUTION INTRAMUSCULAR; INTRAVENOUS at 00:17

## 2023-09-12 RX ADMIN — POTASSIUM CHLORIDE 10 MEQ: 10 INJECTION, SOLUTION INTRAVENOUS at 18:25

## 2023-09-12 RX ADMIN — ACETAMINOPHEN 650 MG: 325 TABLET ORAL at 09:43

## 2023-09-12 RX ADMIN — ACETAMINOPHEN 650 MG: 325 TABLET ORAL at 13:50

## 2023-09-12 ASSESSMENT — ACTIVITIES OF DAILY LIVING (ADL)
ADLS_ACUITY_SCORE: 41
ADLS_ACUITY_SCORE: 45
ADLS_ACUITY_SCORE: 41
ADLS_ACUITY_SCORE: 41
ADLS_ACUITY_SCORE: 45
ADLS_ACUITY_SCORE: 41
ADLS_ACUITY_SCORE: 41
ADLS_ACUITY_SCORE: 45
ADLS_ACUITY_SCORE: 41
ADLS_ACUITY_SCORE: 41

## 2023-09-12 NOTE — PLAN OF CARE
"  Problem: Plan of Care - These are the overarching goals to be used throughout the patient stay.    Goal: Plan of Care Review  Description: The Plan of Care Review/Shift note should be completed every shift.  The Outcome Evaluation is a brief statement about your assessment that the patient is improving, declining, or no change.  This information will be displayed automatically on your shift note.  Outcome: Progressing  Goal: Patient-Specific Goal (Individualized)  Description: You can add care plan individualizations to a care plan. Examples of Individualization might be:  \"Parent requests to be called daily at 9am for status\", \"I have a hard time hearing out of my right ear\", or \"Do not touch me to wake me up as it startles me\".  Outcome: Progressing  Goal: Absence of Hospital-Acquired Illness or Injury  Outcome: Progressing  Intervention: Identify and Manage Fall Risk  Recent Flowsheet Documentation  Taken 9/11/2023 2000 by Natasha Gandara RN  Safety Promotion/Fall Prevention:   activity supervised   bedside attendant   nonskid shoes/slippers when out of bed   safety round/check completed  Intervention: Prevent Skin Injury  Recent Flowsheet Documentation  Taken 9/11/2023 2000 by Natasha Gandara RN  Body Position: supine  Goal: Optimal Comfort and Wellbeing  Outcome: Progressing  Goal: Readiness for Transition of Care  Outcome: Progressing   Goal Outcome Evaluation:                        "

## 2023-09-12 NOTE — PLAN OF CARE
Goal Outcome Evaluation:      Plan of Care Reviewed With: patient, child    Overall Patient Progress: improvingOverall Patient Progress: improving       Problem: Plan of Care - These are the overarching goals to be used throughout the patient stay.    Goal: Absence of Hospital-Acquired Illness or Injury  Intervention: Prevent Skin Injury  Recent Flowsheet Documentation  Taken 9/11/2023 1510 by Anita Vences RN  Body Position:   right   turned     Problem: Plan of Care - These are the overarching goals to be used throughout the patient stay.    Goal: Absence of Hospital-Acquired Illness or Injury  Outcome: Progressing  Intervention: Identify and Manage Fall Risk  Recent Flowsheet Documentation  Taken 9/11/2023 1510 by Anita Vences, RN  Safety Promotion/Fall Prevention:   activity supervised   bedside attendant   nonskid shoes/slippers when out of bed   safety round/check completed  Intervention: Prevent Skin Injury  Recent Flowsheet Documentation  Taken 9/11/2023 1510 by Anita Vences RN  Body Position:   right   turned     Problem: Plan of Care - These are the overarching goals to be used throughout the patient stay.    Goal: Optimal Comfort and Wellbeing  Outcome: Progressing     Problem: Confusion Chronic  Goal: Optimal Cognitive Function  Outcome: Progressing  Intervention: Minimize Injury Risk and Provide Safety  Recent Flowsheet Documentation  Taken 9/11/2023 1510 by Anita Vences, RN  Enhanced Safety Measures: 1:1 removed this am 0945    Patient remains confused to place, time and situation.  Patient went to ultrasound for aspiration of fluid.  New order for Doppler.  UA/UC send from 9/10 order per new patient care order from MD.  Turn and reposition every 2 hours.  Daughter Ernestine at bedside this afternoon, she will come back tomorrow after 11:00.

## 2023-09-12 NOTE — PLAN OF CARE
"  Problem: Plan of Care - These are the overarching goals to be used throughout the patient stay.    Goal: Optimal Comfort and Wellbeing  Intervention: Monitor Pain and Promote Comfort  Recent Flowsheet Documentation  Taken 9/12/2023 1154 by Yoli Murillo RN  Pain Management Interventions: emotional support  Taken 9/12/2023 1039 by Yoli Murillo, RN  Pain Management Interventions:   medication (see MAR)   emotional support     Problem: Mobility Impairment  Goal: Optimal Mobility  Outcome: Progressing     Problem: Confusion Chronic  Goal: Optimal Cognitive Function  Outcome: Progressing   Goal Outcome Evaluation:      Plan of Care Reviewed With: patient, child    Overall Patient Progress: improvingOverall Patient Progress: improving       Pt alert to self only. Asks appropriate questions. Daughter visiting this afternoon and states that over the past year they have noticed \"signs of dementia\". Pt was living at home by herself but no longer able to take care of herself. Pt has been up in chair via lift since admission but has been unable to bear any weight. Good appetite but fills up quickly. Diet advanced today. 4 BM's this shift. Awaiting placement.     "

## 2023-09-12 NOTE — PROGRESS NOTES
[facility]    Medicine Progress Note - Hospitalist Service    Date of Admission:  9/7/2023    Assessment & Plan   Mechanical fall, recurrent  Mild rhabdomyolysis, traumatic, improving  HTN, labile, probable orthostatic hypotension which needs confirmion as ordered  Lightheadedness, dizziness, vertigo (acute on chronic)  H/O Ataxia, and peripheral neuropathy, falls, chronic DDD  Generalized weakness  Macrocytic anemia, no abla  Right shoulder pain and contusion, refused xray  Chronic LBP, compression fx's L spine  H/O epilepsy  Acute Encephalopathy, toxic & metabolic with superimposed probable chronic cognitive decline progressively, agitation, persistent  BRBPR, no abdominal pain, h/o hemorrhoids - no recurrance  Chronic Multilevel cervical spondylosis including moderate to high-grade bilateral neural foraminal stenosis at C3-C4 and C4-C5.   Acute urine retention - CT a/p neg for obstruction/hydro  -Stable peripherally calcified 1.4 cm splenic artery aneurysm.   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  -Anasarca, hypervolemia, chronic lymphedema (BLE venous US negative)  -Acute pseudogout polyarticular exacerbation: chronic changes of pseudogout.  Left knee aspirated on 9/11/23 with negative gram stain and culture NGTD, +CPPD crystals.     Plan:  -pt/ot/SW as will need TCU  -fall precautions  -ADAT per SLP recs  -losartan and coreg  -IV hydralazine prn  -HOB 30 degrees  -garcia today then remove tomorrow following IV Lasix x 3 doses  -iv zosyn  -Phenytoin 100mg BID (dose decreased this admission by Neurology so must order on discharge)  -follow cultures  -continue prednisone 40mg every day x 5d (day 2/5) -> 20mg every day x 5d  -start Lasix 20mg IV q8 x 3 doses  -lymphedema wraps as tolerated    Daughter at bedside updated       Diet: Combination Diet Soft and Bite Sized Diet (level 6); Mildly Thick  (level 2) (BY SPOON); Mildly Thick (level 2)    DVT Prophylaxis: Pneumatic Compression Devices  Gamez Catheter: PRESENT, indication: Retention  Lines: None     Cardiac Monitoring: ACTIVE order. Indication: Bradycardias (48 hours)  Code Status: Full Code      Clinically Significant Risk Factors        # Hypokalemia: Lowest K = 3.1 mmol/L in last 2 days, will replace as needed     # Hypomagnesemia: Lowest Mg = 1.5 mg/dL in last 2 days, will replace as needed   # Hypoalbuminemia: Lowest albumin = 2.8 g/dL at 9/12/2023  9:59 AM, will monitor as appropriate       # Hypertension: Noted on problem list        # Obesity: Estimated body mass index is 30.14 kg/m  as calculated from the following:    Height as of this encounter: 1.524 m (5').    Weight as of this encounter: 70 kg (154 lb 5.2 oz)., PRESENT ON ADMISSION            Disposition Plan      Expected Discharge Date: 09/13/2023        Discharge Comments: off 1:1 9/11 at 0945  lymph wraps          Feng Morales DO, DO  Hospitalist Service  [facility]  Securely message with [employer] (more info)  Text page via [employer] Paging/Directory   ______________________________________________________________________    Interval History   reviewed    Physical Exam   Vital Signs: Temp: 97.1  F (36.2  C) Temp src: Oral BP: 123/59 Pulse: 61   Resp: 18 SpO2: 98 % O2 Device: None (Room air)    Weight: 154 lbs 5.15 oz    General appearance -alert, talkative, smiling  Lungs -decreased, non labored  Heart - rrr, peripheral edema.  Abdomen - soft, nd, BS+  Neurological - a&ox3, symmetric strength BUE/BLE     Lab/imaging reviewed

## 2023-09-12 NOTE — PROGRESS NOTES
"This is a neurologic follow-up note    89-year-old admitted to hospital 9/7/2023      Chief complaint  Generalized weakness  Fall  Early cognitive impairment per family  History of seizures      HPI   89-year-old presented to the hospital on 9/7/2023 after mechanical fall with generalized weakness  After the fall patient was not able to ambulate  Patient states that she usually can ambulate independently had been walking across the carpet and tripped.  She fell onto the floor but did not lose consciousness  She did have to crawl to the phone    She complains of some the left great toe pain after the fall    Patient does live independently  Uses a walker  Has \"dizzy \"spells when she gets up and moves around    History of \"seizures maybe in 2006/2007 placed on Dilantin and none since  Takes Dilantin 100 mg 3 times daily per chart  Daughter states that she has never had any further seizures      Past medical history  Ataxia  Hypertension  Epilepsy  Peripheral neuropathy  MGUS  Osteoporosis  Gout  Secondary hyper parathyroidism    Habits  Non-smoker    Family history  Noncontributory      Work-up  HEAD CT: 9/7/2023  1.  No CT evidence for acute intracranial process.  2.  Brain atrophy and presumed chronic microvascular ischemic changes as above.  CERVICAL SPINE CT: 9/7/2023  1.  No CT evidence for acute fracture or post traumatic subluxation.  2.  Multilevel cervical spondylosis including moderate to high-grade bilateral neural foraminal stenosis at C3-C4 and C4-C5  MRI scan official reading (performed 9/8/2023  Moderately motion degraded study without convincing evidence of acute intracranial abnormality.   Specifically, there are no definite sites of restricted diffusion or edema signal to suggest acute infarct.     B12 339, TSH 3.06 (9/7/2023)  -598  BNP 2187, (9/8/2023)  Ammonia level 26  Dilantin level 8.1, (10/29/2018)  Dilantin level 14.4, free level 1.7, (9/7/2023)  Dilantin level 10.9, (9/10/2023)  B1 " 132    EEG 9/10/2023, Impression:  Moderately abnormal awake drowsy and sleep stage I EEG due to:  Theta disorganization of the background 6-5 Hz  Occasional bilateral 3-4 Hz theta delta slowing  Clinical correlation:  This record is indicative of diffuse cerebral dysfunction consistent with diffuse processes such as  Metabolic toxic infectious anoxic or degenerative type disease    Swallow study 9/11/2023 impression:  1.  Laryngeal penetration to the level of the cords with thin consistency and larger boluses of mildly thick consistency. Consecutive swallows also resulted in greater frequency and depth of laryngeal penetration.  2.  Only mild superficial and transient laryngeal penetration with moderate consistency and the first trial.  3.  No penetration or aspiration with puree consistency.  Please see separately dictated report from speech pathology for additional description, analysis, and management recommendations.    Labs  Sodium 138 potassium 3.7  BUN 4.8, creatinine 0.43  Glucose 139  WBC 6.3, hemoglobin 10.7, platelets 304,000    .5, ESR 74 (9/11/2023)  Positive crystals tap of knee see labs and lab report (9/11/2023)          Exam  Blood pressure 121/58, pulse 60, temperature 98.0  Blood pressure range 102//90  Pulse range 59-82  Tmax 99.9            Review of systems limited as patient is less somnolent  No headache or chest pain        General exam per primary MD  Alert is when I walk in the room  Less somnolent today  Moving air bilaterally  Heart rate regular  Abdomen soft  Chronic edema in the feet      Neurologic exam  Alerts to me walking in the room  Interacts with examiner  Prosody of speech okay  Some simple naming okay  Has difficulty with memory chronic        Cranial nerves II through XII  No obvious ophthalmoplegia  No nystagmus  Seems to attend to both visual fields  Still with slightly thick voice but less thick than the past    Upper extremities  Moves both arms    Lower  extremities  And leg stimulated moves them weakly    Gait  Deferred    Seems to be somewhat more alert with lower dose of Dilantin      Assessment/plan    Mechanical fall without loss of consciousness  Mild rhabdomyolysis due to her fall  History of epilepsy no seizure during this event        Dilantin level 8.1, (10/29/2018)        Dilantin level 14.4, (9/7/2023)        Dilantin 100 mg 3 times daily    Diagnosis  Mechanical fall  Rhabdomyolysis  Urinary retention  History of epilepsy  Peripheral neuropathy/ataxia chronic    Question whether Dilantin level is partially the cause of her intermittent dizziness specially at her age  We will check EEG and consider decreasing Dilantin dose she has not had seizures in a long time and per family the only occurred the one time when she was hospitalized with an illness    Patient appears to be somewhat encephalopathic  Patient had urinary retention  Question whether she is developing some urinary infection    Discussed with patient's daughter at bedside concerned with her age and fall mild cognitive impairment she may be slow to rally    EEG 9/10/2023, Impression:  Moderately abnormal awake drowsy and sleep stage I EEG due to:  Theta disorganization of the background 6-5 Hz  Occasional bilateral 3-4 Hz theta delta slowing  Clinical correlation:  This record is indicative of diffuse cerebral dysfunction consistent with diffuse processes such as  Metabolic toxic infectious anoxic or degenerative type disease    MRI scan official reading (performed 9/8/2023  Moderately motion degraded study without convincing evidence of acute intracranial abnormality.   Specifically, there are no definite sites of restricted diffusion or edema signal to suggest acute infarct.         Dilantin level 8.1, (10/29/2018)  Dilantin level 14.4, free level 1.7, (9/7/2023)  Dilantin level 10.9, (9/10/2023)    Decrease Dilantin dose to 100 mg every 12 hours (9/10/2023)  Patient with slight increase free  fraction question of some of sedation is from Dilantin  Has been seizure-free for a long time.  Try at a lower dose  When able to swallow switch to oral Dilantin same dose as IV for conversion    Swallow study 9/11/2023 impression:  1.  Laryngeal penetration to the level of the cords with thin consistency and larger boluses of mildly thick consistency. Consecutive swallows also resulted in greater frequency and depth of laryngeal penetration.  2.  Only mild superficial and transient laryngeal penetration with moderate consistency and the first trial.  3.  No penetration or aspiration with puree consistency.  Please see separately dictated report from speech pathology for additional description, analysis, and management recommendations.    We will have the patient on Dilantin 100 mg p.o. twice daily smaller dose    Patient will need TCU  Disposition per primary MD    Discussed with primary MD face-to-face    Total care time today 35 minutes  Neurology will sign off at this time

## 2023-09-13 ENCOUNTER — APPOINTMENT (OUTPATIENT)
Dept: OCCUPATIONAL THERAPY | Facility: HOSPITAL | Age: 88
DRG: 093 | End: 2023-09-13
Payer: MEDICARE

## 2023-09-13 ENCOUNTER — APPOINTMENT (OUTPATIENT)
Dept: SPEECH THERAPY | Facility: HOSPITAL | Age: 88
DRG: 093 | End: 2023-09-13
Payer: MEDICARE

## 2023-09-13 ENCOUNTER — APPOINTMENT (OUTPATIENT)
Dept: PHYSICAL THERAPY | Facility: HOSPITAL | Age: 88
DRG: 093 | End: 2023-09-13
Payer: MEDICARE

## 2023-09-13 LAB
ALBUMIN SERPL BCG-MCNC: 2.7 G/DL (ref 3.5–5.2)
ALP SERPL-CCNC: 62 U/L (ref 35–104)
ALT SERPL W P-5'-P-CCNC: 28 U/L (ref 0–50)
ANION GAP SERPL CALCULATED.3IONS-SCNC: 14 MMOL/L (ref 7–15)
AST SERPL W P-5'-P-CCNC: 47 U/L (ref 0–45)
BACTERIA SNV CULT: NO GROWTH
BASOPHILS # BLD AUTO: 0 10E3/UL (ref 0–0.2)
BASOPHILS NFR BLD AUTO: 1 %
BILIRUB DIRECT SERPL-MCNC: <0.2 MG/DL (ref 0–0.3)
BILIRUB SERPL-MCNC: 0.2 MG/DL
BUN SERPL-MCNC: 18 MG/DL (ref 8–23)
CALCIUM SERPL-MCNC: 8.5 MG/DL (ref 8.2–9.6)
CHLORIDE SERPL-SCNC: 104 MMOL/L (ref 98–107)
CREAT SERPL-MCNC: 0.6 MG/DL (ref 0.51–0.95)
DEPRECATED HCO3 PLAS-SCNC: 23 MMOL/L (ref 22–29)
EGFRCR SERPLBLD CKD-EPI 2021: 84 ML/MIN/1.73M2
EOSINOPHIL # BLD AUTO: 0.2 10E3/UL (ref 0–0.7)
EOSINOPHIL NFR BLD AUTO: 2 %
ERYTHROCYTE [DISTWIDTH] IN BLOOD BY AUTOMATED COUNT: 12.9 % (ref 10–15)
GLUCOSE SERPL-MCNC: 96 MG/DL (ref 70–99)
GRAM STAIN RESULT: NORMAL
GRAM STAIN RESULT: NORMAL
HCT VFR BLD AUTO: 33.7 % (ref 35–47)
HGB BLD-MCNC: 10.7 G/DL (ref 11.7–15.7)
IMM GRANULOCYTES # BLD: 0.1 10E3/UL
IMM GRANULOCYTES NFR BLD: 2 %
LYMPHOCYTES # BLD AUTO: 2.2 10E3/UL (ref 0.8–5.3)
LYMPHOCYTES NFR BLD AUTO: 27 %
MCH RBC QN AUTO: 31.8 PG (ref 26.5–33)
MCHC RBC AUTO-ENTMCNC: 31.8 G/DL (ref 31.5–36.5)
MCV RBC AUTO: 100 FL (ref 78–100)
MONOCYTES # BLD AUTO: 0.8 10E3/UL (ref 0–1.3)
MONOCYTES NFR BLD AUTO: 9 %
NEUTROPHILS # BLD AUTO: 4.9 10E3/UL (ref 1.6–8.3)
NEUTROPHILS NFR BLD AUTO: 59 %
NRBC # BLD AUTO: 0 10E3/UL
NRBC BLD AUTO-RTO: 0 /100
PLATELET # BLD AUTO: 411 10E3/UL (ref 150–450)
POTASSIUM SERPL-SCNC: 3.3 MMOL/L (ref 3.4–5.3)
POTASSIUM SERPL-SCNC: 3.9 MMOL/L (ref 3.4–5.3)
PROT SERPL-MCNC: 6.1 G/DL (ref 6.4–8.3)
RBC # BLD AUTO: 3.37 10E6/UL (ref 3.8–5.2)
SODIUM SERPL-SCNC: 141 MMOL/L (ref 136–145)
WBC # BLD AUTO: 8.2 10E3/UL (ref 4–11)

## 2023-09-13 PROCEDURE — 99233 SBSQ HOSP IP/OBS HIGH 50: CPT | Performed by: INTERNAL MEDICINE

## 2023-09-13 PROCEDURE — 97530 THERAPEUTIC ACTIVITIES: CPT | Mod: GP

## 2023-09-13 PROCEDURE — 84132 ASSAY OF SERUM POTASSIUM: CPT | Performed by: INTERNAL MEDICINE

## 2023-09-13 PROCEDURE — 97530 THERAPEUTIC ACTIVITIES: CPT | Mod: GO

## 2023-09-13 PROCEDURE — 250N000011 HC RX IP 250 OP 636: Mod: JZ | Performed by: INTERNAL MEDICINE

## 2023-09-13 PROCEDURE — 92526 ORAL FUNCTION THERAPY: CPT | Mod: GN

## 2023-09-13 PROCEDURE — 250N000013 HC RX MED GY IP 250 OP 250 PS 637: Performed by: INTERNAL MEDICINE

## 2023-09-13 PROCEDURE — 82310 ASSAY OF CALCIUM: CPT | Performed by: INTERNAL MEDICINE

## 2023-09-13 PROCEDURE — 250N000012 HC RX MED GY IP 250 OP 636 PS 637: Performed by: INTERNAL MEDICINE

## 2023-09-13 PROCEDURE — 36415 COLL VENOUS BLD VENIPUNCTURE: CPT | Performed by: INTERNAL MEDICINE

## 2023-09-13 PROCEDURE — 120N000001 HC R&B MED SURG/OB

## 2023-09-13 PROCEDURE — 80053 COMPREHEN METABOLIC PANEL: CPT | Performed by: INTERNAL MEDICINE

## 2023-09-13 PROCEDURE — 97535 SELF CARE MNGMENT TRAINING: CPT | Mod: GO

## 2023-09-13 PROCEDURE — 97110 THERAPEUTIC EXERCISES: CPT | Mod: GO

## 2023-09-13 PROCEDURE — 85004 AUTOMATED DIFF WBC COUNT: CPT | Performed by: INTERNAL MEDICINE

## 2023-09-13 RX ORDER — POTASSIUM CHLORIDE 7.45 MG/ML
10 INJECTION INTRAVENOUS
Status: COMPLETED | OUTPATIENT
Start: 2023-09-13 | End: 2023-09-13

## 2023-09-13 RX ADMIN — POTASSIUM CHLORIDE 10 MEQ: 7.46 INJECTION, SOLUTION INTRAVENOUS at 11:44

## 2023-09-13 RX ADMIN — PHENYTOIN SODIUM 100 MG: 100 CAPSULE ORAL at 08:41

## 2023-09-13 RX ADMIN — PIPERACILLIN AND TAZOBACTAM 3.38 G: 3; .375 INJECTION, POWDER, LYOPHILIZED, FOR SOLUTION INTRAVENOUS at 02:33

## 2023-09-13 RX ADMIN — THIAMINE HCL TAB 100 MG 100 MG: 100 TAB at 08:41

## 2023-09-13 RX ADMIN — LIDOCAINE: 50 OINTMENT TOPICAL at 09:17

## 2023-09-13 RX ADMIN — POTASSIUM CHLORIDE 10 MEQ: 7.46 INJECTION, SOLUTION INTRAVENOUS at 10:18

## 2023-09-13 RX ADMIN — ENOXAPARIN SODIUM 40 MG: 40 INJECTION SUBCUTANEOUS at 17:37

## 2023-09-13 RX ADMIN — FOLIC ACID 1 MG: 1 TABLET ORAL at 08:41

## 2023-09-13 RX ADMIN — ACETAMINOPHEN 650 MG: 325 TABLET ORAL at 20:24

## 2023-09-13 RX ADMIN — ACETAMINOPHEN 650 MG: 325 TABLET ORAL at 13:28

## 2023-09-13 RX ADMIN — LOSARTAN POTASSIUM 100 MG: 50 TABLET, FILM COATED ORAL at 08:41

## 2023-09-13 RX ADMIN — PHENYTOIN SODIUM 100 MG: 100 CAPSULE ORAL at 20:24

## 2023-09-13 RX ADMIN — CARVEDILOL 6.25 MG: 3.12 TABLET, FILM COATED ORAL at 20:24

## 2023-09-13 RX ADMIN — CARVEDILOL 6.25 MG: 3.12 TABLET, FILM COATED ORAL at 08:41

## 2023-09-13 RX ADMIN — PANTOPRAZOLE SODIUM 40 MG: 40 TABLET, DELAYED RELEASE ORAL at 04:26

## 2023-09-13 RX ADMIN — PREDNISONE 40 MG: 20 TABLET ORAL at 08:41

## 2023-09-13 RX ADMIN — POTASSIUM CHLORIDE 10 MEQ: 7.46 INJECTION, SOLUTION INTRAVENOUS at 08:42

## 2023-09-13 RX ADMIN — FUROSEMIDE 20 MG: 10 INJECTION, SOLUTION INTRAMUSCULAR; INTRAVENOUS at 08:40

## 2023-09-13 RX ADMIN — FUROSEMIDE 20 MG: 10 INJECTION, SOLUTION INTRAMUSCULAR; INTRAVENOUS at 00:25

## 2023-09-13 RX ADMIN — LIDOCAINE: 50 OINTMENT TOPICAL at 13:28

## 2023-09-13 RX ADMIN — LIDOCAINE: 50 OINTMENT TOPICAL at 17:37

## 2023-09-13 RX ADMIN — ACETAMINOPHEN 650 MG: 325 TABLET ORAL at 08:40

## 2023-09-13 RX ADMIN — POTASSIUM CHLORIDE 10 MEQ: 7.46 INJECTION, SOLUTION INTRAVENOUS at 12:55

## 2023-09-13 ASSESSMENT — ACTIVITIES OF DAILY LIVING (ADL)
ADLS_ACUITY_SCORE: 40
ADLS_ACUITY_SCORE: 40
ADLS_ACUITY_SCORE: 45
ADLS_ACUITY_SCORE: 41
ADLS_ACUITY_SCORE: 45
ADLS_ACUITY_SCORE: 41
ADLS_ACUITY_SCORE: 41
ADLS_ACUITY_SCORE: 40
ADLS_ACUITY_SCORE: 45
ADLS_ACUITY_SCORE: 41

## 2023-09-13 NOTE — PROGRESS NOTES
Care Management Follow Up    Length of Stay (days): 4    Expected Discharge Date: 09/14/2023     Concerns to be Addressed:       Patient plan of care discussed at interdisciplinary rounds: Yes    Anticipated Discharge Disposition: Transitional Care     Anticipated Discharge Services:    Anticipated Discharge DME:      Patient/family educated on Medicare website which has current facility and service quality ratings:    Education Provided on the Discharge Plan:    Patient/Family in Agreement with the Plan:      Referrals Placed by CM/SW:    Private pay costs discussed: Not applicable    Additional Information:  See below     Ivy Salgado RN        Met with patient and daughter bedside . Told daughter patient is medically ready for discharge per MD and we need to find a TCU. Daughter was surprised to hear that patient is ready for discharge.     We reviewed the TCU list together and looked at a map of the facilities on the computer. Daughters top choices are [facility] (agrees to private room charge) and [facility]. Told her we will need more choices if those facilities cannot accept     Patient has been off the 1:1 since 9/11 at 0945

## 2023-09-13 NOTE — PLAN OF CARE
/58 (BP Location: Left arm)   Pulse 64   Temp 98.1  F (36.7  C) (Oral)   Resp 18   Ht 1.524 m (5')   Wt 70 kg (154 lb 5.2 oz)   SpO2 93%   BMI 30.14 kg/m      Patient VSS, heart rate NS with BBB and AV block.    Patient was up in recliner for most of shift. She ate about 50% of her breakfast tray. No complaints of pain. Worked with PT to practice some standing and strength exercises.    Patient has received K replacement. Patient can probably start taking small pills whole, did just fine taking capsule pill whole this morning.     No zyprexa given this shift. Patient had hair washed and got up to recliner with Ax2.    Gamez removed per provider order, Purewick in place. Patient back to bed around 1430. Family at bedside. Ate 50% breakfast and lunch.

## 2023-09-13 NOTE — PROGRESS NOTES
[facility]    Medicine Progress Note - Hospitalist Service    Date of Admission:  9/7/2023    Assessment & Plan   Altered mental status likely related to underlying cognitive decline  Acute Encephalopathy, toxic & metabolic with superimposed probable chronic cognitive decline progressively, agitation, persistent- improving and off 1:1 since 9/11    CT head and MRI without acute pathology    Phenytoin 100mg BID (dose decreased this admission by Neurology so must order on discharge)    Concern for sepsis 9/9?   IV zosyn initiated 9/9 for sepsis of unclear source    UA and blood cultures negative. Discontinued Zosyn     Mechanical fall, recurrent  Mild rhabdomyolysis, traumatic, resolved     Acute pseudogout polyarticular exacerbation: chronic changes of pseudogout.    Left knee aspirated on 9/11/23 with negative gram stain and culture NGTD, +CPPD crystals.  Prednisone: 40mg every day x 7d (day 3/7)     HTN, labile, probable orthostatic hypotension which needs confirmion as ordered   Losartan and coreg  IV hydralazine prn    Lightheadedness, dizziness, vertigo (acute on chronic)  H/O Ataxia, and peripheral neuropathy, falls, chronic DDD    Generalized weakness    Macrocytic anemia, no abla    Right shoulder pain and contusion, refused xray    Chronic LBP, compression fx's L spine    H/O epilepsy    BRBPR, no abdominal pain, h/o hemorrhoids - no recurrance  Chronic Multilevel cervical spondylosis including moderate to high-grade bilateral neural foraminal stenosis at C3-C4 and C4-C5.     Acute urine retention - CT a/p neg for obstruction/hydro   Gamez catheter TOV today 9/13    Stable peripherally calcified 1.4 cm splenic artery aneurysm.     Anasarca, hypervolemia, chronic lymphedema (BLE venous US negative)   S/p 3 doses IV lasix   Lymphedema wraps as tolerated     PT/OT- needs TCU placement- referrals out     Updated daughter at bedside.  Discussed patient's gradual cognitive decline and  likely outlook over the next few months to years.  Daughter planning for long-term care following TCU placement.  All questions answered.  Spent greater than 20 minutes at bedside.         Diet: Combination Diet Soft and Bite Sized Diet (level 6); Mildly Thick (level 2) (BY SPOON); Mildly Thick (level 2)    DVT Prophylaxis: Enoxaparin (Lovenox) SQ  Gamez Catheter: PRESENT, indication: Other (Comment) (IV lasix and impaired mobility)  Lines: None     Cardiac Monitoring: ACTIVE order. Indication: Bradycardias (48 hours)  Code Status: Full Code      Clinically Significant Risk Factors        # Hypokalemia: Lowest K = 3.2 mmol/L in last 2 days, will replace as needed       # Hypoalbuminemia: Lowest albumin = 2.7 g/dL at 9/13/2023  7:07 AM, will monitor as appropriate     # Hypertension: Noted on problem list        # Obesity: Estimated body mass index is 30.14 kg/m  as calculated from the following:    Height as of this encounter: 1.524 m (5').    Weight as of this encounter: 70 kg (154 lb 5.2 oz)., PRESENT ON ADMISSION            Disposition Plan     Expected Discharge Date: 09/13/2023    Discharge Delays: *Medically Ready for Discharge  Placement - TCU    Discharge Comments: off 1:1 9/11 at 0945  lymph wraps          Karly Fisher DO  Hospitalist Service  [facility]  Securely message with [employer] (more info)  Text page via [facility] Paging/Directory   ______________________________________________________________________    Interval History   Patient is pleasant sitting up in a chair.  Confused as to where she is.  No agitation.    Physical Exam   Vital Signs: Temp: 98.1  F (36.7  C) Temp src: Oral BP: 121/58 Pulse: 64   Resp: 18 SpO2: 93 % O2 Device: None (Room air)    Weight: 154 lbs 5.15 oz    GENRL: Alert.  Confused.  No agitation.  No acute distress.  Sitting up in a chair.   CHEST:  Breathing easily   ABDMN: Soft. Non-tender, non-distended. No organomegaly. No guarding or rigidity.  Bowel sounds present   EXTRM: + pedal edema  NEURO: Following commands and moving all extremities.    PSYCH: pleasant affect and mood.   INTGM: No skin rash, no cyanosis or clubbing    Medical Decision Making       Discontinued Zosyn.  Discontinued Gamez catheter.  Trial of void pending.  Discussed with care management placement to TCU.       Data     I have personally reviewed the following data over the past 24 hrs:    8.2  \   10.7 (L)   / 411     141 104 18.0 /  96   3.3 (L) 23 0.60 \     ALT: 28 AST: 47 (H) AP: 62 TBILI: 0.2   ALB: 2.7 (L) TOT PROTEIN: 6.1 (L) LIPASE: N/A       Imaging results reviewed over the past 24 hrs:   No results found for this or any previous visit (from the past 24 hour(s)).

## 2023-09-13 NOTE — PLAN OF CARE
Lymphedema Discharge Summary    Reason for therapy discharge:    All goals and outcomes met, no further needs identified.    Progress towards therapy goal(s). See goals on Care Plan in [facility] electronic health record for goal details.  Goals met    Therapy recommendation(s):    Patient care order placed for staff to continue with tubigrip management  Faith GAMING, OTR/L, CLT 9/13/2023 , 4:11 PM      Goal Outcome Evaluation:

## 2023-09-13 NOTE — PLAN OF CARE
"  Problem: Plan of Care - These are the overarching goals to be used throughout the patient stay.    Goal: Plan of Care Review  Description: The Plan of Care Review/Shift note should be completed every shift.  The Outcome Evaluation is a brief statement about your assessment that the patient is improving, declining, or no change.  This information will be displayed automatically on your shift note.  Outcome: Progressing  Goal: Patient-Specific Goal (Individualized)  Description: You can add care plan individualizations to a care plan. Examples of Individualization might be:  \"Parent requests to be called daily at 9am for status\", \"I have a hard time hearing out of my right ear\", or \"Do not touch me to wake me up as it startles me\".  Outcome: Progressing  Goal: Absence of Hospital-Acquired Illness or Injury  Outcome: Progressing  Intervention: Identify and Manage Fall Risk  Recent Flowsheet Documentation  Taken 9/12/2023 2000 by Natasha Gandara RN  Safety Promotion/Fall Prevention:   activity supervised   bedside attendant   nonskid shoes/slippers when out of bed   safety round/check completed  Intervention: Prevent Skin Injury  Recent Flowsheet Documentation  Taken 9/12/2023 2000 by Natasha Gandara RN  Body Position: supine  Goal: Optimal Comfort and Wellbeing  Outcome: Progressing  Goal: Readiness for Transition of Care  Outcome: Progressing   Goal Outcome Evaluation:                        " No

## 2023-09-13 NOTE — PLAN OF CARE
Goal Outcome Evaluation:      Plan of Care Reviewed With: patient, child    Overall Patient Progress: improvingOverall Patient Progress: improving           Problem: Plan of Care - These are the overarching goals to be used throughout the patient stay.    Goal: Optimal Comfort and Wellbeing  Outcome: Progressing     Problem: Plan of Care - These are the overarching goals to be used throughout the patient stay.    Goal: Readiness for Transition of Care  Outcome: Progressing     Problem: Mobility Impairment  Goal: Optimal Mobility  Outcome: Progressing     Patient assist of 2 with gait belt and a walker to the commode. Patient had a loose brown stool.  No void noted at 1600.  Gamez had been removed on day shift at 1400, will continue to monitor for void.  Patient able to answer some orientation questions correctly, became more confused after daughter Ernestine left.  Patient was very concerned about Ernestine's safety.  Ernestine was called, patient talked to her and was less concerned after talking with her.  Heels elevated on pillows.  Patient requested to have socks on so she can get up to commode for toileting.

## 2023-09-14 ENCOUNTER — APPOINTMENT (OUTPATIENT)
Dept: SPEECH THERAPY | Facility: HOSPITAL | Age: 88
DRG: 093 | End: 2023-09-14
Payer: MEDICARE

## 2023-09-14 ENCOUNTER — APPOINTMENT (OUTPATIENT)
Dept: OCCUPATIONAL THERAPY | Facility: HOSPITAL | Age: 88
DRG: 093 | End: 2023-09-14
Payer: MEDICARE

## 2023-09-14 ENCOUNTER — APPOINTMENT (OUTPATIENT)
Dept: PHYSICAL THERAPY | Facility: HOSPITAL | Age: 88
DRG: 093 | End: 2023-09-14
Payer: MEDICARE

## 2023-09-14 LAB
BACTERIA BLD CULT: NO GROWTH
BACTERIA BLD CULT: NO GROWTH
POTASSIUM SERPL-SCNC: 3.4 MMOL/L (ref 3.4–5.3)
POTASSIUM SERPL-SCNC: 4.2 MMOL/L (ref 3.4–5.3)

## 2023-09-14 PROCEDURE — 97530 THERAPEUTIC ACTIVITIES: CPT | Mod: GP

## 2023-09-14 PROCEDURE — 250N000012 HC RX MED GY IP 250 OP 636 PS 637: Performed by: INTERNAL MEDICINE

## 2023-09-14 PROCEDURE — 97110 THERAPEUTIC EXERCISES: CPT | Mod: GO

## 2023-09-14 PROCEDURE — 84132 ASSAY OF SERUM POTASSIUM: CPT | Performed by: HOSPITALIST

## 2023-09-14 PROCEDURE — 250N000013 HC RX MED GY IP 250 OP 250 PS 637: Performed by: HOSPITALIST

## 2023-09-14 PROCEDURE — 84132 ASSAY OF SERUM POTASSIUM: CPT | Performed by: INTERNAL MEDICINE

## 2023-09-14 PROCEDURE — 250N000013 HC RX MED GY IP 250 OP 250 PS 637: Performed by: INTERNAL MEDICINE

## 2023-09-14 PROCEDURE — 250N000011 HC RX IP 250 OP 636: Mod: JZ | Performed by: INTERNAL MEDICINE

## 2023-09-14 PROCEDURE — 120N000001 HC R&B MED SURG/OB

## 2023-09-14 PROCEDURE — 97110 THERAPEUTIC EXERCISES: CPT | Mod: GP

## 2023-09-14 PROCEDURE — 36415 COLL VENOUS BLD VENIPUNCTURE: CPT | Performed by: INTERNAL MEDICINE

## 2023-09-14 PROCEDURE — 99233 SBSQ HOSP IP/OBS HIGH 50: CPT | Performed by: HOSPITALIST

## 2023-09-14 PROCEDURE — 92526 ORAL FUNCTION THERAPY: CPT | Mod: GN

## 2023-09-14 PROCEDURE — 36415 COLL VENOUS BLD VENIPUNCTURE: CPT | Performed by: HOSPITALIST

## 2023-09-14 PROCEDURE — 250N000011 HC RX IP 250 OP 636: Mod: JZ | Performed by: HOSPITALIST

## 2023-09-14 RX ORDER — FUROSEMIDE 10 MG/ML
20 INJECTION INTRAMUSCULAR; INTRAVENOUS ONCE
Status: DISCONTINUED | OUTPATIENT
Start: 2023-09-14 | End: 2023-09-14

## 2023-09-14 RX ORDER — FUROSEMIDE 10 MG/ML
10 INJECTION INTRAMUSCULAR; INTRAVENOUS ONCE
Status: COMPLETED | OUTPATIENT
Start: 2023-09-14 | End: 2023-09-14

## 2023-09-14 RX ORDER — POTASSIUM CHLORIDE 1500 MG/1
40 TABLET, EXTENDED RELEASE ORAL ONCE
Status: COMPLETED | OUTPATIENT
Start: 2023-09-14 | End: 2023-09-14

## 2023-09-14 RX ADMIN — PHENYTOIN SODIUM 100 MG: 100 CAPSULE ORAL at 20:30

## 2023-09-14 RX ADMIN — PHENYTOIN SODIUM 100 MG: 100 CAPSULE ORAL at 09:24

## 2023-09-14 RX ADMIN — LIDOCAINE: 50 OINTMENT TOPICAL at 17:26

## 2023-09-14 RX ADMIN — LIDOCAINE: 50 OINTMENT TOPICAL at 13:48

## 2023-09-14 RX ADMIN — LOSARTAN POTASSIUM 100 MG: 50 TABLET, FILM COATED ORAL at 09:24

## 2023-09-14 RX ADMIN — THIAMINE HCL TAB 100 MG 100 MG: 100 TAB at 09:24

## 2023-09-14 RX ADMIN — CARVEDILOL 6.25 MG: 3.12 TABLET, FILM COATED ORAL at 09:24

## 2023-09-14 RX ADMIN — POTASSIUM CHLORIDE 40 MEQ: 1500 TABLET, EXTENDED RELEASE ORAL at 11:33

## 2023-09-14 RX ADMIN — LIDOCAINE: 50 OINTMENT TOPICAL at 09:24

## 2023-09-14 RX ADMIN — ACETAMINOPHEN 650 MG: 325 TABLET ORAL at 13:48

## 2023-09-14 RX ADMIN — FOLIC ACID 1 MG: 1 TABLET ORAL at 09:23

## 2023-09-14 RX ADMIN — ACETAMINOPHEN 650 MG: 325 TABLET ORAL at 09:24

## 2023-09-14 RX ADMIN — CARVEDILOL 6.25 MG: 3.12 TABLET, FILM COATED ORAL at 20:30

## 2023-09-14 RX ADMIN — FUROSEMIDE 10 MG: 10 INJECTION, SOLUTION INTRAMUSCULAR; INTRAVENOUS at 14:40

## 2023-09-14 RX ADMIN — ENOXAPARIN SODIUM 40 MG: 40 INJECTION SUBCUTANEOUS at 17:26

## 2023-09-14 RX ADMIN — LIDOCAINE: 50 OINTMENT TOPICAL at 20:36

## 2023-09-14 RX ADMIN — PANTOPRAZOLE SODIUM 40 MG: 40 TABLET, DELAYED RELEASE ORAL at 06:41

## 2023-09-14 RX ADMIN — ACETAMINOPHEN 650 MG: 325 TABLET ORAL at 20:30

## 2023-09-14 RX ADMIN — PREDNISONE 40 MG: 20 TABLET ORAL at 09:23

## 2023-09-14 ASSESSMENT — ACTIVITIES OF DAILY LIVING (ADL)
ADLS_ACUITY_SCORE: 41
ADLS_ACUITY_SCORE: 40
ADLS_ACUITY_SCORE: 41
ADLS_ACUITY_SCORE: 40
ADLS_ACUITY_SCORE: 40
ADLS_ACUITY_SCORE: 41
ADLS_ACUITY_SCORE: 40
ADLS_ACUITY_SCORE: 41
ADLS_ACUITY_SCORE: 40
ADLS_ACUITY_SCORE: 44

## 2023-09-14 NOTE — PROGRESS NOTES
11:05 AM   ROMA reviewed chart.  Pt seeking TCU placement.  ROMA spoke with Trini in admissions at [facility] who is reviewing referral and reviewing bed availability at facility.   ROMA spoke with admissions at [facility].  Referral has not been reviewing, will be reviewed by the team.  ROMA requested call back with response.     AMINAH Winslow

## 2023-09-14 NOTE — PROGRESS NOTES
[facility] Hospitalist Progress Note  [facility]  Admission date: 9/7/2023    Summary:    Assessment/Plan    Altered mental status likely related to underlying cognitive decline  Acute Encephalopathy, toxic & metabolic with superimposed probable chronic cognitive decline progressively, agitation, persistent- improving and off 1:1 since 9/11               CT head and MRI without acute pathology               Phenytoin 100mg BID (dose decreased this admission by Neurology so must order on discharge)    L heel pressure injury.  WOC and offload.     Anasarca, hypervolemia, chronic lymphedema (BLE venous US negative)              S/p 3 doses IV lasix. LE edema - lasix PRN.  10mg IV today.   Lymphedema wraps as tolerated    Concern for sepsis 9/9?              IV zosyn initiated 9/9 for sepsis of unclear source               UA and blood cultures negative. Discontinued Zosyn      Mechanical fall, recurrent  Mild rhabdomyolysis, traumatic, resolved      Acute pseudogout polyarticular exacerbation: chronic changes of pseudogout.    Left knee aspirated on 9/11/23 with negative gram stain and culture NGTD, +CPPD crystals.  Prednisone: 40mg every day x 7d (day 4/7)      HTN, labile, probable orthostatic hypotension which needs confirmion as ordered              Losartan and coreg  IV hydralazine prn     Lightheadedness, dizziness, vertigo (acute on chronic)  H/O Ataxia, and peripheral neuropathy, falls, chronic DDD     Generalized weakness     Macrocytic anemia, no abla     Right shoulder pain and contusion, refused xray     Chronic LBP, compression fx's L spine     H/O epilepsy     BRBPR, no abdominal pain, h/o hemorrhoids - no recurrance  Chronic Multilevel cervical spondylosis including moderate to high-grade bilateral neural foraminal stenosis at C3-C4 and C4-C5.      Acute urine retention - CT a/p neg for obstruction/hydro              Gamez catheter TOV today 9/13     Stable peripherally calcified 1.4 cm  "splenic artery aneurysm.               PT/OT- needs TCU placement- referrals out      Updated daughter at bedside.  Discussed patient's gradual cognitive decline and likely outlook over the next few months to years.  Daughter planning for long-term care following TCU placement.  All questions answered.  Spent greater than 20 minutes at bedside.          Checklist:  Code Status: Full Code    Diet: Combination Diet Regular Diet; Mildly Thick (level 2)        Disposition and Discharge Planning  Auto-populated from discharge tab:     Expected Discharge Date: 09/15/2023    Discharge Delays: *Medically Ready for Discharge  Placement - TCU    Discharge Comments: off 1:1 9/11 at 0945  lymph wraps         System Identified Risk Variables  Auto-populated based on system request - if needs to be addressed in treatment plan they will be addressed above:  \"  Clinically Significant Risk Factors        # Hypokalemia: Lowest K = 3.3 mmol/L in last 2 days, will replace as needed       # Hypoalbuminemia: Lowest albumin = 2.7 g/dL at 9/13/2023  7:07 AM, will monitor as appropriate     # Hypertension: Noted on problem list        # Obesity: Estimated body mass index is 30.14 kg/m  as calculated from the following:    Height as of this encounter: 1.524 m (5').    Weight as of this encounter: 70 kg (154 lb 5.2 oz).             \"    Interval Events/Subjective/Notable results:    Wasn't comfortable with leg wraps.  C/o pain in bottom of heals.  Foot and ankle edema    Objective    Vital signs in last 24 hours  Temp:  [97.3  F (36.3  C)-98  F (36.7  C)] 97.3  F (36.3  C)  Pulse:  [61-71] 65  Resp:  [19-20] 19  BP: (118-161)/(59-72) 161/72  SpO2:  [95 %-97 %] 95 % O2 Device: None (Room air)    Weight:   154 lbs 5.15 oz  Body mass index is 30.14 kg/m .    Intake/Output last 3 shifts  I/O last 3 completed shifts:  In: 463 [P.O.:460; I.V.:3]  Out: 550 [Urine:550]    Physical Exam  General:  Alert, cooperative, no distress    Neurologic:  " oriented, facial symmetry preserved, fluent speech.   Psych: calm  HEENT:  Anicteric, MMM  CV: foot and ankle edema  Lungs: CTAB.  Easyrespirations  Abd: soft, NT  Skin: L heel erythema/blister likely with some deeper tissue injury.    oley Catheter: Not present  Lines: None          Medical Decision Making               Brunilda Felix MD, [facility]  Internal Medicine Hospitalist

## 2023-09-14 NOTE — PLAN OF CARE
"Goal Outcome Evaluation:      Plan of Care Reviewed With: patient, child    Overall Patient Progress: improvingOverall Patient Progress: improving       Problem: Mobility Impairment  Goal: Optimal Mobility  Outcome: Progressing  Intervention: Optimize Mobility  Recent Flowsheet Documentation  Taken 9/14/2023 8785 by Yoli Murillo, RN  Activity Management:   up to bedside commode   up in chair     Problem: Confusion Chronic  Goal: Optimal Cognitive Function  Outcome: Progressing     Problem: Plan of Care - These are the overarching goals to be used throughout the patient stay.    Goal: Optimal Comfort and Wellbeing  Outcome: Progressing     Problem: Plan of Care - These are the overarching goals to be used throughout the patient stay.    Goal: Readiness for Transition of Care  Outcome: Progressing     Pt up with assist of two, gait belt and walker to commode and chair. Pt frightful when she us that she is going to fall. Pt orientation fluctuates. Conversations with accurate information mixed in things that didn't happen (ex \"I can't walk anymore\" \"No one has brushed my teeth\" \"My daughter Ernestine hasn't been here to see me\"). Good appetite. Watch during meals especially when drinking. Lido oint to left knee. Moans with any movement but denies pain when you ask. Awaiting placement.     "

## 2023-09-14 NOTE — PLAN OF CARE
Problem: Mobility Impairment  Goal: Optimal Mobility  Intervention: Optimize Mobility  Recent Flowsheet Documentation  Taken 9/14/2023 0130 by Feng Castañeda, DAYSI  Positioning/Transfer Devices:   pillows   in use  Taken 9/13/2023 2022 by Feng Castañeda, DAYSI  Positioning/Transfer Devices:   pillows   in use     Problem: Confusion Chronic  Goal: Optimal Cognitive Function  Intervention: Minimize and Manage Confusion  Recent Flowsheet Documentation  Taken 9/14/2023 0130 by Feng Castañeda, RN  Reorientation Measures: clock in view  Taken 9/13/2023 2022 by Feng Castañeda, RN  Reorientation Measures: clock in view   Goal Outcome Evaluation:       Patient is alert with some intermittent confusion. Requires assist of 2 with transfers and toileting. Vitals stable. Denies pain or discomfort. No significant changes noted.

## 2023-09-15 ENCOUNTER — APPOINTMENT (OUTPATIENT)
Dept: SPEECH THERAPY | Facility: HOSPITAL | Age: 88
DRG: 093 | End: 2023-09-15
Payer: MEDICARE

## 2023-09-15 ENCOUNTER — APPOINTMENT (OUTPATIENT)
Dept: OCCUPATIONAL THERAPY | Facility: HOSPITAL | Age: 88
DRG: 093 | End: 2023-09-15
Payer: MEDICARE

## 2023-09-15 ENCOUNTER — APPOINTMENT (OUTPATIENT)
Dept: PHYSICAL THERAPY | Facility: HOSPITAL | Age: 88
DRG: 093 | End: 2023-09-15
Payer: MEDICARE

## 2023-09-15 LAB
ANION GAP SERPL CALCULATED.3IONS-SCNC: 10 MMOL/L (ref 7–15)
BUN SERPL-MCNC: 17.8 MG/DL (ref 8–23)
CALCIUM SERPL-MCNC: 8.5 MG/DL (ref 8.2–9.6)
CHLORIDE SERPL-SCNC: 108 MMOL/L (ref 98–107)
CREAT SERPL-MCNC: 0.49 MG/DL (ref 0.51–0.95)
DEPRECATED HCO3 PLAS-SCNC: 25 MMOL/L (ref 22–29)
EGFRCR SERPLBLD CKD-EPI 2021: 88 ML/MIN/1.73M2
GLUCOSE BLDC GLUCOMTR-MCNC: 110 MG/DL (ref 70–99)
GLUCOSE BLDC GLUCOMTR-MCNC: 82 MG/DL (ref 70–99)
GLUCOSE SERPL-MCNC: 91 MG/DL (ref 70–99)
POTASSIUM SERPL-SCNC: 3.7 MMOL/L (ref 3.4–5.3)
SODIUM SERPL-SCNC: 143 MMOL/L (ref 136–145)

## 2023-09-15 PROCEDURE — 250N000013 HC RX MED GY IP 250 OP 250 PS 637: Performed by: HOSPITALIST

## 2023-09-15 PROCEDURE — 97535 SELF CARE MNGMENT TRAINING: CPT | Mod: GO

## 2023-09-15 PROCEDURE — 92526 ORAL FUNCTION THERAPY: CPT | Mod: GN

## 2023-09-15 PROCEDURE — 250N000011 HC RX IP 250 OP 636: Mod: JZ | Performed by: INTERNAL MEDICINE

## 2023-09-15 PROCEDURE — 97110 THERAPEUTIC EXERCISES: CPT | Mod: GO

## 2023-09-15 PROCEDURE — 99233 SBSQ HOSP IP/OBS HIGH 50: CPT | Performed by: HOSPITALIST

## 2023-09-15 PROCEDURE — 80048 BASIC METABOLIC PNL TOTAL CA: CPT | Performed by: HOSPITALIST

## 2023-09-15 PROCEDURE — 36415 COLL VENOUS BLD VENIPUNCTURE: CPT | Performed by: HOSPITALIST

## 2023-09-15 PROCEDURE — 250N000012 HC RX MED GY IP 250 OP 636 PS 637: Performed by: INTERNAL MEDICINE

## 2023-09-15 PROCEDURE — 97110 THERAPEUTIC EXERCISES: CPT | Mod: GP

## 2023-09-15 PROCEDURE — G0463 HOSPITAL OUTPT CLINIC VISIT: HCPCS

## 2023-09-15 PROCEDURE — 97530 THERAPEUTIC ACTIVITIES: CPT | Mod: GP

## 2023-09-15 PROCEDURE — 120N000001 HC R&B MED SURG/OB

## 2023-09-15 PROCEDURE — 250N000013 HC RX MED GY IP 250 OP 250 PS 637: Performed by: INTERNAL MEDICINE

## 2023-09-15 RX ORDER — TORSEMIDE 20 MG/1
20 TABLET ORAL DAILY
Status: COMPLETED | OUTPATIENT
Start: 2023-09-15 | End: 2023-09-17

## 2023-09-15 RX ADMIN — ENOXAPARIN SODIUM 40 MG: 40 INJECTION SUBCUTANEOUS at 17:13

## 2023-09-15 RX ADMIN — PREDNISONE 40 MG: 20 TABLET ORAL at 09:21

## 2023-09-15 RX ADMIN — PANTOPRAZOLE SODIUM 40 MG: 40 TABLET, DELAYED RELEASE ORAL at 06:26

## 2023-09-15 RX ADMIN — THIAMINE HCL TAB 100 MG 100 MG: 100 TAB at 09:21

## 2023-09-15 RX ADMIN — ALLOPURINOL 100 MG: 100 TABLET ORAL at 09:21

## 2023-09-15 RX ADMIN — ACETAMINOPHEN 650 MG: 325 TABLET ORAL at 20:25

## 2023-09-15 RX ADMIN — PHENYTOIN SODIUM 100 MG: 100 CAPSULE ORAL at 20:26

## 2023-09-15 RX ADMIN — CARVEDILOL 6.25 MG: 3.12 TABLET, FILM COATED ORAL at 09:21

## 2023-09-15 RX ADMIN — LIDOCAINE: 50 OINTMENT TOPICAL at 17:16

## 2023-09-15 RX ADMIN — LIDOCAINE: 50 OINTMENT TOPICAL at 13:45

## 2023-09-15 RX ADMIN — LIDOCAINE: 50 OINTMENT TOPICAL at 09:22

## 2023-09-15 RX ADMIN — ACETAMINOPHEN 650 MG: 325 TABLET ORAL at 13:44

## 2023-09-15 RX ADMIN — PHENYTOIN SODIUM 100 MG: 100 CAPSULE ORAL at 09:21

## 2023-09-15 RX ADMIN — ACETAMINOPHEN 650 MG: 325 TABLET ORAL at 09:21

## 2023-09-15 RX ADMIN — TORSEMIDE 20 MG: 20 TABLET ORAL at 11:32

## 2023-09-15 RX ADMIN — FOLIC ACID 1 MG: 1 TABLET ORAL at 09:21

## 2023-09-15 RX ADMIN — CARVEDILOL 6.25 MG: 3.12 TABLET, FILM COATED ORAL at 20:24

## 2023-09-15 RX ADMIN — LOSARTAN POTASSIUM 100 MG: 50 TABLET, FILM COATED ORAL at 09:21

## 2023-09-15 ASSESSMENT — ACTIVITIES OF DAILY LIVING (ADL)
ADLS_ACUITY_SCORE: 44
ADLS_ACUITY_SCORE: 45
ADLS_ACUITY_SCORE: 45
ADLS_ACUITY_SCORE: 41
ADLS_ACUITY_SCORE: 44
ADLS_ACUITY_SCORE: 39
ADLS_ACUITY_SCORE: 45
ADLS_ACUITY_SCORE: 44
ADLS_ACUITY_SCORE: 41
ADLS_ACUITY_SCORE: 45
ADLS_ACUITY_SCORE: 41
ADLS_ACUITY_SCORE: 44

## 2023-09-15 NOTE — PROGRESS NOTES
"Care Management Follow Up    Length of Stay (days): 6    Expected Discharge Date: 09/15/2023     Concerns to be Addressed:     discharge planning   Patient plan of care discussed at interdisciplinary rounds: Yes    Anticipated Discharge Disposition: Transitional Care     Anticipated Discharge Services:  skilled nursing, therapy   Anticipated Discharge DME:  per treatment team    Patient/family educated on Medicare website which has current facility and service quality ratings:  yes  Education Provided on the Discharge Plan:  yes  Patient/Family in Agreement with the Plan:  yes    Referrals Placed by CM/SW:  post acute care facilities   Private pay costs discussed: Not applicable    Social History:  Peggy lives in a townhouse alone. \"It is a single unit town house. She has a few steps to enter the house and she always needs family walking with her helping her up and down these steps. Then the house is all one main level\".     She was independent with ADLs and IADLs, but now weak and unable to care for herself. Her daughter states, \"My mom doesn't want to move, but I am in the process of looking into moving her for more help. She won't like it, but I just know she is now starting to need more and more help. I have an appointment set up with [facility]. They are going to do an assessment for Elderly Waiver 9/28/23. Then I was going to visit [facility] and get her on waiting lists\".     She uses a FWW for mobility.    Additional Information:  ROMA reviewed chart.  Pt seeking TCU placement.    ROMA called and left voice mail for Trini in admissions at [facility] requesting call back.   ROMA called [facility] admissions, no beds available until next week.      ROMA attempted to call Pt's daughter, Ernestine to request additional TCU choices.  No answer and unable to leave voice mail.     2:50 PM  Pt accepted at [facility] with bed available on Monday 9/18.  Trini in admissions confirms they " can accept Pt for a morning discharge.  SW attempted to contact Pt's daughter to provide update, unable to reach or leave voice mail.  Will need transport arranged and PAS.    AMINAH Winslow

## 2023-09-15 NOTE — CONSULTS
[facility] Nurse Inpatient Assessment     Consulted for: B heels    Patient History (according to provider note(s):        Assessment:    Pressure Injury Location: B heels  L heel                                                                R heel    Last photo: 9/15/23  Wound type: Pressure Injury     Pressure Injury Stage: Deep Tissue Pressure Injury (DTPI), hospital acquired - L heel; Stage 1 HAPI - R heel     Patient was using Tubigrip for compression, but unlikely this is principle contributing factor to areas of injury  Wound history/plan of care:   New injuries    Wound base: R heel 100 % non-blanchable, erythema, epidermis, and small area of light purple discoloration , L heel 100 % maroon, purple, and epidermis     Palpation of the wound bed: normal      Drainage: none     Description of drainage: none     Measurements (length x width x depth, in cm) L heel area of maroon/purple injury is approximately 3 cm x 3.5 cm  surrounded but non-blanchable erythema; R heel with approximate 6 cm x 6 cm area of non-blanchable erythema     Tunneling N/A     Undermining N/A  Periwound skin:  See above      Color:  See above      Temperature: normal   Odor: none  Pain:  L is painful with removal of Tubigrip/sock but not much pain with palpation , constant  Pain intervention prior to dressing change: patient tolerated well and slow and gentle cares   Treatment goal: Protection  STATUS: initial assessment  Supplies ordered: at bedside    My PI Risk Assessment     Sensory Perception: 3 - Slightly Limited     Moisture: 3 - Occasionally moist      Activity: 3 - Walks occasionally      Mobility: 2 - Very limited     Nutrition: 2 - Probably inadequate      Friction/Shear: 1 - Problem     TOTAL: 14     Treatment Plan:   B heels - at all times while in bed/recliner  Float heels - continue use of Rooke boot on L heel; may use pillow for R heel if effective.    Orders: Written    RECOMMEND PRIMARY  TEAM ORDER: None, at this time  Education provided: plan of care  Discussed plan of care with: Patient  WOC nurse follow-up plan: weekly  Notify WOC if wound(s) deteriorate.  Nursing to notify the Provider(s) and re-consult the WOC Nurse if new skin concern.    DATA:     Current support surface: Standard  Standard gel/foam mattress (IsoFlex, Atmos air, etc)  Containment of urine/stool: Incontinence Protocol  BMI: Body mass index is 30.14 kg/m .   Active diet order: Orders Placed This Encounter      Combination Diet Regular Diet; Mildly Thick (level 2)     Output: I/O last 3 completed shifts:  In: 360 [P.O.:360]  Out: -      Labs:   Recent Labs   Lab 09/13/23  0707   ALBUMIN 2.7*   HGB 10.7*   WBC 8.2     Pressure injury risk assessment:   Sensory Perception: 3-->slightly limited  Moisture: 3-->occasionally moist  Activity: 3-->walks occasionally  Mobility: 2-->very limited  Nutrition: 3-->adequate  Friction and Shear: 2-->potential problem  Yaya Score: 16    Lenora Pollard, MSN RN CWOCN  Pager no longer is use, please contact through [employer]: [facility] [employer]

## 2023-09-15 NOTE — PROGRESS NOTES
CLINICAL NUTRITION SERVICES    Reviewed nutrition risk factors due to LOS. Pt is tolerating diet, eating well per nursing documentation, requires tray set-up and supervision with meals. RD will follow peripherally at this time, unless consulted.

## 2023-09-15 NOTE — PLAN OF CARE
Problem: Mobility Impairment  Goal: Optimal Mobility  Outcome: Progressing  Intervention: Optimize Mobility  Recent Flowsheet Documentation  Taken 9/15/2023 0016 by Feng Castañeda, RN  Assistive Device Utilized:   gait belt   walker  Activity Management:   up to bedside commode   up in chair  Positioning/Transfer Devices:   pillows   in use  Taken 9/14/2023 2028 by Feng Castañeda, RN  Assistive Device Utilized:   gait belt   walker  Activity Management:   up to bedside commode   up in chair     Problem: Confusion Chronic  Goal: Optimal Cognitive Function  Outcome: Progressing  Intervention: Minimize and Manage Confusion  Recent Flowsheet Documentation  Taken 9/15/2023 0016 by Feng Castañeda, RN  Reorientation Measures: clock in view  Taken 9/14/2023 2028 by Feng Castañeda, RN  Reorientation Measures: clock in view   Goal Outcome Evaluation:    Requires assist of 2 with walker to commode. Turned to sides every 2 hrs as patient allows. Alert with intermittent confusion but easily redirectable. No significant changes noted. Denies pain or discomfort.

## 2023-09-15 NOTE — PLAN OF CARE
Goal Outcome Evaluation:                        Problem: Mobility Impairment  Goal: Optimal Mobility  Outcome: Progressing         Orientation fluctuates. Have to repeat things frequently. Pt up to commode. Very fearful of falling.  LSC but diminished. BS active. Med soft BM. Voiding without difficulty.

## 2023-09-15 NOTE — PLAN OF CARE
Problem: Plan of Care - These are the overarching goals to be used throughout the patient stay.    Goal: Readiness for Transition of Care  Outcome: Adequate for Care Transition     Problem: Confusion Chronic  Goal: Optimal Cognitive Function  Outcome: Adequate for Care Transition   Goal Outcome Evaluation:    Patient AAO for most of they day, became more forgetful this evening. Pain controlled with scheduled Tylenol and lidocaine gel. Compliant with floating heels and wearing boot in bed for pressure wounds on heels. Up to bedside commode with assist of 1-2.

## 2023-09-15 NOTE — DISCHARGE INSTRUCTIONS
B heels - at all times while in bed/recliner  Float heels - continue use of Rooke boot on L heel; may use pillow for R heel if effective.

## 2023-09-15 NOTE — PROGRESS NOTES
[facility] Hospitalist Progress Note  [facility]  Admission date: 9/7/2023    Summary:    89F hx of ataxia, chronic lumbar compression fractures, foraminal stenoses, HTN who presented to the ED for evaluation after a fall and generalized weakness.       Assessment/Plan    #Altered mental status likely related to underlying cognitive decline  Acute Encephalopathy, toxic & metabolic with superimposed probable chronic cognitive decline progressively, agitation, persistent- improving and off 1:1 since 9/11               CT head and MRI without acute pathology               Phenytoin 100mg BID (dose decreased this admission by Neurology so must order on discharge)    #L heel pressure injury.  WOC and offload.     #Anasarca, hypervolemia, chronic lymphedema (BLE venous US negative)  -has received a few doses of IV lasix.  Start torsemide 20mg qday x 3 days  -Lymphedema wraps as tolerated     #Acute pseudogout polyarticular exacerbation: chronic changes of pseudogout.    #SIRS due to this  -Prednisone: 40mg every day x 7d (day 5/7)     Mechanical fall, recurrent  Mild rhabdomyolysis, traumatic, resolved       #HTN, labile -  Losartan and coreg, diuretic as above.  IV hydralazine prn     Lightheadedness, dizziness, vertigo (acute on chronic)  H/O Ataxia, and peripheral neuropathy, falls, chronic DDD  Generalized weakness  Macrocytic anemia - folate and B12 WNL     Right shoulder pain and contusion, refused xray     Chronic LBP, compression fx's L spine     H/O epilepsy     BRBPR, no abdominal pain, h/o hemorrhoids - no recurrance  Chronic Multilevel cervical spondylosis including moderate to high-grade bilateral neural foraminal stenosis at C3-C4 and C4-C5.      Acute urine retention - CT a/p neg for obstruction/hydro.  Gamez catheter removed 9/13  Stable peripherally calcified 1.4 cm splenic artery aneurysm.           PT/OT- needs TCU placement- referrals out      Daughter planning for long-term care following  "TCU placement.          Checklist:  Code Status: Full Code    Diet: Combination Diet Regular Diet; Mildly Thick (level 2)        Disposition and Discharge Planning  Auto-populated from discharge tab:    Expected Discharge Date: 09/15/2023    Discharge Delays: *Medically Ready for Discharge  Placement - TCU    Discharge Comments: off 1:1 9/11 at 0945  lymph wraps         System Identified Risk Variables  Auto-populated based on system request - if needs to be addressed in treatment plan they will be addressed above:  \"  Clinically Significant Risk Factors              # Hypoalbuminemia: Lowest albumin = 2.7 g/dL at 9/13/2023  7:07 AM, will monitor as appropriate       # Hypertension: Noted on problem list        # Obesity: Estimated body mass index is 30.14 kg/m  as calculated from the following:    Height as of this encounter: 1.524 m (5').    Weight as of this encounter: 70 kg (154 lb 5.2 oz).               \"    Interval Events/Subjective/Notable results:    No physical complaints.   Not sure if she urinated much yesterday.  Confused.   D/w RN re: heel offloading.    Objective    Vital signs in last 24 hours  Temp:  [97.4  F (36.3  C)-97.6  F (36.4  C)] 97.6  F (36.4  C)  Pulse:  [60-74] 64  Resp:  [16-18] 18  BP: (139-162)/(62-78) 141/62  SpO2:  [94 %-99 %] 94 % O2 Device: None (Room air)    Weight:   154 lbs 5.15 oz  Body mass index is 30.14 kg/m .    Intake/Output last 3 shifts  I/O last 3 completed shifts:  In: 360 [P.O.:360]  Out: -     Physical Exam  General:  Alert, cooperative, no distress    Neurologic:  oriented, facial symmetry preserved, fluent speech.   Psych: calm  HEENT:  Anicteric, MMM  CV: foot and ankle edema  Lungs: CTAB.  Easyrespirations  Abd: soft, NT  oley Catheter: Not present  Lines: None          Medical Decision Making               Brunilda Felix MD, [facility]  Internal Medicine Hospitalist    "

## 2023-09-16 ENCOUNTER — APPOINTMENT (OUTPATIENT)
Dept: OCCUPATIONAL THERAPY | Facility: HOSPITAL | Age: 88
DRG: 093 | End: 2023-09-16
Payer: MEDICARE

## 2023-09-16 ENCOUNTER — APPOINTMENT (OUTPATIENT)
Dept: PHYSICAL THERAPY | Facility: HOSPITAL | Age: 88
DRG: 093 | End: 2023-09-16
Payer: MEDICARE

## 2023-09-16 LAB
GLUCOSE BLDC GLUCOMTR-MCNC: 134 MG/DL (ref 70–99)
GLUCOSE BLDC GLUCOMTR-MCNC: 88 MG/DL (ref 70–99)
POTASSIUM SERPL-SCNC: 3.4 MMOL/L (ref 3.4–5.3)
POTASSIUM SERPL-SCNC: 4.1 MMOL/L (ref 3.4–5.3)

## 2023-09-16 PROCEDURE — 250N000009 HC RX 250: Performed by: INTERNAL MEDICINE

## 2023-09-16 PROCEDURE — 36415 COLL VENOUS BLD VENIPUNCTURE: CPT | Performed by: HOSPITALIST

## 2023-09-16 PROCEDURE — 97535 SELF CARE MNGMENT TRAINING: CPT | Mod: GO

## 2023-09-16 PROCEDURE — 250N000012 HC RX MED GY IP 250 OP 636 PS 637: Performed by: INTERNAL MEDICINE

## 2023-09-16 PROCEDURE — 120N000001 HC R&B MED SURG/OB

## 2023-09-16 PROCEDURE — 97110 THERAPEUTIC EXERCISES: CPT | Mod: GO

## 2023-09-16 PROCEDURE — 97530 THERAPEUTIC ACTIVITIES: CPT | Mod: GP | Performed by: PHYSICAL THERAPIST

## 2023-09-16 PROCEDURE — 250N000013 HC RX MED GY IP 250 OP 250 PS 637: Performed by: INTERNAL MEDICINE

## 2023-09-16 PROCEDURE — 250N000013 HC RX MED GY IP 250 OP 250 PS 637: Performed by: HOSPITALIST

## 2023-09-16 PROCEDURE — 97116 GAIT TRAINING THERAPY: CPT | Mod: GP | Performed by: PHYSICAL THERAPIST

## 2023-09-16 PROCEDURE — 99233 SBSQ HOSP IP/OBS HIGH 50: CPT | Performed by: INTERNAL MEDICINE

## 2023-09-16 PROCEDURE — 84132 ASSAY OF SERUM POTASSIUM: CPT | Performed by: HOSPITALIST

## 2023-09-16 PROCEDURE — 250N000011 HC RX IP 250 OP 636: Mod: JZ | Performed by: INTERNAL MEDICINE

## 2023-09-16 RX ORDER — POTASSIUM CHLORIDE 1500 MG/1
40 TABLET, EXTENDED RELEASE ORAL ONCE
Status: COMPLETED | OUTPATIENT
Start: 2023-09-16 | End: 2023-09-16

## 2023-09-16 RX ADMIN — PANTOPRAZOLE SODIUM 40 MG: 40 TABLET, DELAYED RELEASE ORAL at 09:03

## 2023-09-16 RX ADMIN — ACETAMINOPHEN 650 MG: 325 TABLET ORAL at 09:03

## 2023-09-16 RX ADMIN — TORSEMIDE 20 MG: 20 TABLET ORAL at 09:03

## 2023-09-16 RX ADMIN — ACETAMINOPHEN 650 MG: 325 TABLET ORAL at 20:32

## 2023-09-16 RX ADMIN — CARVEDILOL 6.25 MG: 3.12 TABLET, FILM COATED ORAL at 09:02

## 2023-09-16 RX ADMIN — LIDOCAINE: 50 OINTMENT TOPICAL at 17:38

## 2023-09-16 RX ADMIN — POTASSIUM CHLORIDE 40 MEQ: 1500 TABLET, EXTENDED RELEASE ORAL at 12:01

## 2023-09-16 RX ADMIN — PREDNISONE 40 MG: 20 TABLET ORAL at 09:03

## 2023-09-16 RX ADMIN — ENOXAPARIN SODIUM 40 MG: 40 INJECTION SUBCUTANEOUS at 17:20

## 2023-09-16 RX ADMIN — CARVEDILOL 6.25 MG: 3.12 TABLET, FILM COATED ORAL at 20:32

## 2023-09-16 RX ADMIN — LOSARTAN POTASSIUM 100 MG: 50 TABLET, FILM COATED ORAL at 09:02

## 2023-09-16 RX ADMIN — LIDOCAINE: 50 OINTMENT TOPICAL at 20:32

## 2023-09-16 RX ADMIN — THIAMINE HCL TAB 100 MG 100 MG: 100 TAB at 09:02

## 2023-09-16 RX ADMIN — LIDOCAINE: 50 OINTMENT TOPICAL at 12:07

## 2023-09-16 RX ADMIN — FOLIC ACID 1 MG: 1 TABLET ORAL at 09:03

## 2023-09-16 RX ADMIN — PHENYTOIN SODIUM 100 MG: 100 CAPSULE ORAL at 20:32

## 2023-09-16 RX ADMIN — ACETAMINOPHEN 650 MG: 325 TABLET ORAL at 14:14

## 2023-09-16 RX ADMIN — PHENYTOIN SODIUM 100 MG: 100 CAPSULE ORAL at 09:02

## 2023-09-16 RX ADMIN — ALLOPURINOL 100 MG: 100 TABLET ORAL at 09:02

## 2023-09-16 ASSESSMENT — ACTIVITIES OF DAILY LIVING (ADL)
ADLS_ACUITY_SCORE: 45
ADLS_ACUITY_SCORE: 39
ADLS_ACUITY_SCORE: 45
ADLS_ACUITY_SCORE: 45

## 2023-09-16 NOTE — PROGRESS NOTES
[facility]    Medicine Progress Note - Hospitalist Service    Date of Admission:  9/7/2023    Assessment & Plan      89F with history of ataxia, chronic lumbar compression fractures, foraminal stenoses, HTN who presented to the ED for evaluation after a fall and generalized weakness.      Acute encephalopathy:   Had agitation and confusion. Likely due to toxic and metabolic encephalopathy in the setting of chronic progressive cognitive decline.  CT and MRI head without acute pathology   Phenytoin dose was decreased by neurology.  Mental status now improved. Off 1:1 since 9/11   - Continue Phenytoin 100mg BID.   - Supportive care     Anasarca, chronic bilateral lower extremity lymphedema:  BLE venous US negative for DVT.  - Received a few doses of IV lasix.  Started torsemide 20mg qday x 3 days (9/15-9/17). Monitor volume status.  - Lymphedema wraps as tolerated     Acute pseudogout: polyarticular exacerbation.  - Prednisone: 40mg every day x 7d (since 9/12)    Essential hypertension: BP now controlled. Continue losartan and coreg. Diuretic as above. Hydralazine prn.      Mild rhabdomyolysis, traumatic: resolved       Macrocytic anemia: Hemoglobin lw at 10.4. Folate normal and B12 at the low end of normal. Started Vitamin B12 and folic acid supplement.     Epilepsy: Home dose Phenytoin was decreased as above.      BRBPR: h/o hemorrhoids. no abdominal pain. Now resolved.     Chronic multilevel cervical spondylosis: CT cervical spine 9/7/23 with moderate to high-grade bilateral neural foraminal stenosis at C3-C4 and C4-C5.  Supportive care.     Acute urinary retention: CT a/p negative for obstruction, hydronephrosis . Gamez catheter removed 9/13. Has been voiding well.     Left heel pressure injury:  WOC care and offload.          Generalized weakness, frequent falls: PT/OT. Needs TCU placement. Daughter planning for long-term care following TCU placement.            Diet: Combination Diet  Regular Diet; Mildly Thick (level 2)    DVT Prophylaxis: Enoxaparin (Lovenox) SQ  Gamez Catheter: Not present  Lines: None     Cardiac Monitoring: None  Code Status: Full Code      Clinically Significant Risk Factors              # Hypoalbuminemia: Lowest albumin = 2.7 g/dL at 9/13/2023  7:07 AM, will monitor as appropriate     # Hypertension: Noted on problem list        # Obesity: Estimated body mass index is 30.14 kg/m  as calculated from the following:    Height as of this encounter: 1.524 m (5').    Weight as of this encounter: 70 kg (154 lb 5.2 oz).             Disposition Plan      Expected Discharge Date: 09/18/2023    Discharge Delays: *Medically Ready for Discharge  Placement - TCU    Discharge Comments: off 1:1 9/11 at 0945  lymph wraps          Kael Wallace MD  Hospitalist Service  [facility]  Securely message with [email or web address] (more info)  Text page via [employer] Paging/Directory   ______________________________________________________________________    Interval History   Patient is new to me. Previous progress notes reviewed.  No overnight event. Patient had no complaints today.     Physical Exam   Vital Signs: Temp: 97.7  F (36.5  C) Temp src: Oral BP: 106/57 Pulse: 71   Resp: 18 SpO2: 97 % O2 Device: None (Room air)    Weight: 154 lbs 5.15 oz    General appearance: not in acute distress  HEENT: PERRL, EOMI  Lungs: Clear breath sounds in bilateral lung fields  Cardiovascular: Regular rate and rhythm, normal S1-S2  Abdomen: Soft, non tender, no distension  Musculoskeletal: No joint swelling  Skin: No rash and no edema  Neurology: Awake and alert. Confused.  Cranial nerves II - XII normal.  Normal muscle strength in all four extremities.     Medical Decision Making       47 MINUTES SPENT BY ME on the date of service doing chart review, history, exam, documentation & further activities per the note.      Data     I have personally reviewed the following data over the past 24 hrs:    N/A   \   N/A   / N/A     N/A N/A N/A /  134 (H)   4.1 N/A N/A \       Imaging results reviewed over the past 24 hrs:   No results found for this or any previous visit (from the past 24 hour(s)).

## 2023-09-16 NOTE — PLAN OF CARE
Problem: Mobility Impairment  Goal: Optimal Mobility  Outcome: Progressing   Goal Outcome Evaluation:    Patient intermittently forgetful and confused. Pain controlled with Tylenol and Lidocaine gel. Up with an assist of 1-2, sat in recliner for a few hours this afternoon.

## 2023-09-16 NOTE — PLAN OF CARE
Problem: Plan of Care - These are the overarching goals to be used throughout the patient stay.    Goal: Optimal Comfort and Wellbeing  Outcome: Progressing     Problem: Mobility Impairment  Goal: Optimal Mobility  Outcome: Progressing   Goal Outcome Evaluation:  Two assist to the commode. Alert and confused pt. Denies pain. VSS. Slept well through the night. Refused to wear boot on left foot.

## 2023-09-16 NOTE — PROGRESS NOTES
Care Management Follow Up    Length of Stay (days): 7    Expected Discharge Date: 09/18/2023     Concerns to be Addressed:   patient needing TCU placement    Patient plan of care discussed at interdisciplinary rounds: Yes    Anticipated Discharge Disposition: Transitional Care     Anticipated Discharge Services:  care at the TCU. Likely LTC following TCU stay.  Anticipated Discharge DME:  as per care team    Patient/family educated on Medicare website which has current facility and service quality ratings:  yes  Education Provided on the Discharge Plan:  unable to reach daughter to review  Patient/Family in Agreement with the Plan:      Referrals Placed by CM/SW:  TCU  Private pay costs discussed: Not applicable    Additional Information:  Pt accepted at [facility] with bed available on Monday 9/18. Trini in admissions confirms they can accept Pt for a morning discharge. SW attempted to contact Pt's daughter to provide update, unable to reach or leave voice mail. Will need transport arranged - anticipate stretcher needed due to dementia. PAS completed and on chart - [id number].    Natasha Dutton, LEESW

## 2023-09-17 ENCOUNTER — APPOINTMENT (OUTPATIENT)
Dept: PHYSICAL THERAPY | Facility: HOSPITAL | Age: 88
DRG: 093 | End: 2023-09-17
Payer: MEDICARE

## 2023-09-17 LAB
BACTERIA BLD CULT: NO GROWTH
BACTERIA BLD CULT: NO GROWTH
POTASSIUM SERPL-SCNC: 3.7 MMOL/L (ref 3.4–5.3)

## 2023-09-17 PROCEDURE — 250N000012 HC RX MED GY IP 250 OP 636 PS 637: Performed by: INTERNAL MEDICINE

## 2023-09-17 PROCEDURE — 250N000011 HC RX IP 250 OP 636: Mod: JZ | Performed by: INTERNAL MEDICINE

## 2023-09-17 PROCEDURE — 120N000001 HC R&B MED SURG/OB

## 2023-09-17 PROCEDURE — 36415 COLL VENOUS BLD VENIPUNCTURE: CPT | Performed by: HOSPITALIST

## 2023-09-17 PROCEDURE — 250N000013 HC RX MED GY IP 250 OP 250 PS 637: Performed by: INTERNAL MEDICINE

## 2023-09-17 PROCEDURE — 250N000013 HC RX MED GY IP 250 OP 250 PS 637: Performed by: HOSPITALIST

## 2023-09-17 PROCEDURE — 97530 THERAPEUTIC ACTIVITIES: CPT | Mod: GP | Performed by: PHYSICAL THERAPIST

## 2023-09-17 PROCEDURE — 99232 SBSQ HOSP IP/OBS MODERATE 35: CPT | Performed by: HOSPITALIST

## 2023-09-17 PROCEDURE — 84132 ASSAY OF SERUM POTASSIUM: CPT | Performed by: HOSPITALIST

## 2023-09-17 RX ADMIN — PANTOPRAZOLE SODIUM 40 MG: 40 TABLET, DELAYED RELEASE ORAL at 09:10

## 2023-09-17 RX ADMIN — ACETAMINOPHEN 650 MG: 325 TABLET ORAL at 09:10

## 2023-09-17 RX ADMIN — ACETAMINOPHEN 650 MG: 325 TABLET ORAL at 14:03

## 2023-09-17 RX ADMIN — CARVEDILOL 6.25 MG: 3.12 TABLET, FILM COATED ORAL at 20:08

## 2023-09-17 RX ADMIN — PHENYTOIN SODIUM 100 MG: 100 CAPSULE ORAL at 20:10

## 2023-09-17 RX ADMIN — THIAMINE HCL TAB 100 MG 100 MG: 100 TAB at 09:10

## 2023-09-17 RX ADMIN — CARVEDILOL 6.25 MG: 3.12 TABLET, FILM COATED ORAL at 09:10

## 2023-09-17 RX ADMIN — ALLOPURINOL 100 MG: 100 TABLET ORAL at 09:10

## 2023-09-17 RX ADMIN — LOSARTAN POTASSIUM 100 MG: 50 TABLET, FILM COATED ORAL at 09:10

## 2023-09-17 RX ADMIN — LIDOCAINE: 50 OINTMENT TOPICAL at 09:11

## 2023-09-17 RX ADMIN — FOLIC ACID 1 MG: 1 TABLET ORAL at 09:10

## 2023-09-17 RX ADMIN — ENOXAPARIN SODIUM 40 MG: 40 INJECTION SUBCUTANEOUS at 18:04

## 2023-09-17 RX ADMIN — PREDNISONE 40 MG: 20 TABLET ORAL at 09:11

## 2023-09-17 RX ADMIN — TORSEMIDE 20 MG: 20 TABLET ORAL at 09:11

## 2023-09-17 RX ADMIN — ACETAMINOPHEN 650 MG: 325 TABLET ORAL at 20:08

## 2023-09-17 RX ADMIN — PHENYTOIN SODIUM 100 MG: 100 CAPSULE ORAL at 09:10

## 2023-09-17 ASSESSMENT — ACTIVITIES OF DAILY LIVING (ADL)
ADLS_ACUITY_SCORE: 39
ADLS_ACUITY_SCORE: 39
ADLS_ACUITY_SCORE: 41
ADLS_ACUITY_SCORE: 39
ADLS_ACUITY_SCORE: 45
ADLS_ACUITY_SCORE: 43
ADLS_ACUITY_SCORE: 39
ADLS_ACUITY_SCORE: 45
ADLS_ACUITY_SCORE: 41
ADLS_ACUITY_SCORE: 39
ADLS_ACUITY_SCORE: 45
ADLS_ACUITY_SCORE: 41

## 2023-09-17 NOTE — PROGRESS NOTES
Care Management Follow Up    Length of Stay (days): 8    Expected Discharge Date: 09/18/2023     Concerns to be Addressed:     discharge planning   Patient plan of care discussed at interdisciplinary rounds: Yes    Anticipated Discharge Disposition: Transitional Care     Anticipated Discharge Services:  skilled nursing and therapy   Anticipated Discharge DME:  per treatment team     Patient/family educated on Medicare website which has current facility and service quality ratings:  yes  Education Provided on the Discharge Plan:  yes  Patient/Family in Agreement with the Plan:  yes    Referrals Placed by CM/SW:  post acute care facilities   Private pay costs discussed: Not applicable    Additional Information:  ROMA met with Pt's daughter, Ernestine, who was visiting Pt.  ROMA discussed discharge planning and acceptance for TCU bed at [facility].  Ernestine is in agreement with discharge plan and is understanding of potential out of pocket cost for transportation.     ROMA spoke with Trini in admissions on 9/15 who confirmed ok with morning discharge.  [facility] stretcher transport arranged for  between [id number] on 9/18.  PCS and PAS completed and on chart.      AMINAH iWnslow

## 2023-09-17 NOTE — PROGRESS NOTES
[facility]    Medicine Progress Note - Hospitalist Service    Date of Admission:  9/7/2023    Assessment & Plan      89F with history of ataxia, chronic lumbar compression fractures, foraminal stenoses, HTN who presented to the ED for evaluation after a fall and generalized weakness.      Acute encephalopathy:  resolved  Had agitation and confusion. Likely due to toxic and metabolic encephalopathy in the setting of chronic progressive cognitive decline.  CT and MRI head without acute pathology   Phenytoin dose was decreased by neurology.  Mental status now improved. Off 1:1 since 9/11   - Continue Phenytoin 100mg BID.   - Supportive care     Anasarca, chronic bilateral lower extremity lymphedema:  BLE venous US negative for DVT.  - Received a few doses of IV lasix and torsemide 20mg qday x 3 days (9/15-9/17). Monitor volume status.  - Lymphedema wraps as tolerated     Acute pseudogout: polyarticular exacerbation.  - Prednisone: 40mg every day x 7d (since 9/12)    Essential hypertension: BP currently stable.   Continue losartan and coreg. Diuretic as above. Hydralazine prn.      Mild rhabdomyolysis, traumatic: resolved       Macrocytic anemia: Hemoglobin low at 10.4. Folate normal and B12 at the low end of normal.   Started Vitamin B12 and folic acid supplement.     Epilepsy: Home dose Phenytoin was decreased as above.      BRBPR: h/o hemorrhoids. no abdominal pain. Now resolved.     Chronic multilevel cervical spondylosis: CT cervical spine 9/7/23 with moderate to high-grade bilateral neural foraminal stenosis at C3-C4 and C4-C5.  Supportive care.     Acute urinary retention: CT a/p negative for obstruction, hydronephrosis . Gamez catheter removed 9/13. Has been voiding well.     Left heel pressure injury:  WOC care and offload.          Generalized weakness, frequent falls: PT/OT. Needs TCU placement on discharge.Daughter planning for long-term care following TCU placement.            Diet:  Combination Diet Regular Diet; Mildly Thick (level 2)    DVT Prophylaxis: Enoxaparin (Lovenox) SQ  Gamez Catheter: Not present  Lines: None     Cardiac Monitoring: None  Code Status: Full Code      Clinically Significant Risk Factors              # Hypoalbuminemia: Lowest albumin = 2.7 g/dL at 9/13/2023  7:07 AM, will monitor as appropriate       # Hypertension: Noted on problem list        # Obesity: Estimated body mass index is 30.14 kg/m  as calculated from the following:    Height as of this encounter: 1.524 m (5').    Weight as of this encounter: 70 kg (154 lb 5.2 oz).               Disposition Plan     Expected Discharge Date: 09/18/2023    Discharge Delays: *Medically Ready for Discharge  Placement - TCU    Discharge Comments: off 1:1 9/11 at 0945  lymph wraps          Dionne Samano MD  Hospitalist Service  [facility]  Securely message with [email or web address] (more info)  Text page via [employer] Paging/Directory   ______________________________________________________________________    Interval History   .  Patient is new to me. Chart reviewed and events noted.  Patient seen and examined.   Sitting up in bed, states she feels well and no complaints today. Accepted at TCU and is awake of possible tomorrow discharge.       Physical Exam   Vital Signs: Temp: 97.9  F (36.6  C) Temp src: Axillary BP: (!) 147/70 (Nurse notified) Pulse: 66   Resp: 20 SpO2: 98 % O2 Device: None (Room air)    Weight: 154 lbs 5.15 oz    General: Pleasant female in NAD  HEENT:EOMI, AT,NC  CVS:RRR, LE edema +  RS:CTAB  Neurology:Awake, alert, speech fluent.   Psy:Approrpiate affect       Medical Decision Making       >45 MINUTES SPENT BY ME on the date of service doing chart review, history, exam, documentation & further activities per the note.      Data     I have personally reviewed the following data over the past 24 hrs:    N/A  \   N/A   / N/A     N/A N/A N/A /  134 (H)   3.7 N/A N/A \       Imaging results reviewed  over the past 24 hrs:   No results found for this or any previous visit (from the past 24 hour(s)).

## 2023-09-17 NOTE — PLAN OF CARE
"  Problem: Plan of Care - These are the overarching goals to be used throughout the patient stay.    Goal: Optimal Comfort and Wellbeing  Outcome: Progressing     Problem: Mobility Impairment  Goal: Optimal Mobility  Outcome: Progressing  Intervention: Optimize Mobility  Recent Flowsheet Documentation  Taken 9/17/2023 1400 by Bhumi Curran, RN  Activity Management: up to bedside commode  Taken 9/17/2023 0810 by Bhumi Curran, RN  Activity Management: up to bedside commode   Goal Outcome Evaluation:    Patient AAO with intermittent forgetfulness. Pain controlled with scheduled Tylenol but patient refused lidocaine gel. Refusing to wear boot to left heel, educated patient on importance but prefers to have heels floated on pillow at this time. Buttocks blanchable red, refused to let staff off load her with pillows reporting it is not comfortable for her, patient does get in and out of bed to use commode and occasionally will get to chair but refused to sit up in chair for this writer today. An assist of 1-2 to commode. Patient very hesitant with transfers stating things such as, \"I can't stand\", \"I can't do it.\"       "

## 2023-09-17 NOTE — PLAN OF CARE
Problem: Plan of Care - These are the overarching goals to be used throughout the patient stay.    Goal: Optimal Comfort and Wellbeing  Outcome: Progressing   Goal Outcome Evaluation:  Two assist to the bedside commode. Pt got more confused in the evening, but got better once she was settled in bed. Denied pain. Slept well through the night.

## 2023-09-18 VITALS
OXYGEN SATURATION: 97 % | DIASTOLIC BLOOD PRESSURE: 67 MMHG | RESPIRATION RATE: 18 BRPM | SYSTOLIC BLOOD PRESSURE: 149 MMHG | HEART RATE: 63 BPM | TEMPERATURE: 98 F | BODY MASS INDEX: 29.91 KG/M2 | HEIGHT: 60 IN | WEIGHT: 152.34 LBS

## 2023-09-18 LAB
HOLD SPECIMEN: NORMAL
POTASSIUM SERPL-SCNC: 3.1 MMOL/L (ref 3.4–5.3)

## 2023-09-18 PROCEDURE — 250N000013 HC RX MED GY IP 250 OP 250 PS 637: Performed by: INTERNAL MEDICINE

## 2023-09-18 PROCEDURE — 84132 ASSAY OF SERUM POTASSIUM: CPT | Performed by: HOSPITALIST

## 2023-09-18 PROCEDURE — 250N000012 HC RX MED GY IP 250 OP 636 PS 637: Performed by: INTERNAL MEDICINE

## 2023-09-18 PROCEDURE — 92526 ORAL FUNCTION THERAPY: CPT | Mod: GN

## 2023-09-18 PROCEDURE — 99239 HOSP IP/OBS DSCHRG MGMT >30: CPT | Performed by: HOSPITALIST

## 2023-09-18 PROCEDURE — 250N000013 HC RX MED GY IP 250 OP 250 PS 637: Performed by: HOSPITALIST

## 2023-09-18 PROCEDURE — 36415 COLL VENOUS BLD VENIPUNCTURE: CPT | Performed by: HOSPITALIST

## 2023-09-18 RX ORDER — LANOLIN ALCOHOL/MO/W.PET/CERES
1000 CREAM (GRAM) TOPICAL DAILY
Qty: 30 TABLET | Refills: 0 | Status: SHIPPED | OUTPATIENT
Start: 2023-09-18 | End: 2023-10-18

## 2023-09-18 RX ORDER — POTASSIUM CHLORIDE 1500 MG/1
40 TABLET, EXTENDED RELEASE ORAL ONCE
Status: COMPLETED | OUTPATIENT
Start: 2023-09-18 | End: 2023-09-18

## 2023-09-18 RX ORDER — FOLIC ACID 1 MG/1
1 TABLET ORAL DAILY
Qty: 30 TABLET | Refills: 0 | Status: SHIPPED | OUTPATIENT
Start: 2023-09-18 | End: 2023-10-18

## 2023-09-18 RX ORDER — PHENYTOIN SODIUM 100 MG/1
100 CAPSULE, EXTENDED RELEASE ORAL 2 TIMES DAILY
Qty: 270 CAPSULE | Refills: 3 | COMMUNITY
Start: 2023-09-18 | End: 2023-09-18

## 2023-09-18 RX ORDER — PHENYTOIN SODIUM 100 MG/1
100 CAPSULE, EXTENDED RELEASE ORAL 2 TIMES DAILY
Qty: 270 CAPSULE | Refills: 3 | Status: SHIPPED | OUTPATIENT
Start: 2023-09-18

## 2023-09-18 RX ADMIN — PANTOPRAZOLE SODIUM 40 MG: 40 TABLET, DELAYED RELEASE ORAL at 08:49

## 2023-09-18 RX ADMIN — POTASSIUM CHLORIDE 40 MEQ: 1500 TABLET, EXTENDED RELEASE ORAL at 08:48

## 2023-09-18 RX ADMIN — PHENYTOIN SODIUM 100 MG: 100 CAPSULE ORAL at 08:49

## 2023-09-18 RX ADMIN — THIAMINE HCL TAB 100 MG 100 MG: 100 TAB at 08:49

## 2023-09-18 RX ADMIN — PREDNISONE 40 MG: 20 TABLET ORAL at 08:48

## 2023-09-18 RX ADMIN — FOLIC ACID 1 MG: 1 TABLET ORAL at 08:49

## 2023-09-18 RX ADMIN — CARVEDILOL 6.25 MG: 3.12 TABLET, FILM COATED ORAL at 08:48

## 2023-09-18 RX ADMIN — ALLOPURINOL 100 MG: 100 TABLET ORAL at 08:49

## 2023-09-18 RX ADMIN — ACETAMINOPHEN 650 MG: 325 TABLET ORAL at 08:49

## 2023-09-18 RX ADMIN — LOSARTAN POTASSIUM 100 MG: 50 TABLET, FILM COATED ORAL at 08:48

## 2023-09-18 ASSESSMENT — ACTIVITIES OF DAILY LIVING (ADL)
ADLS_ACUITY_SCORE: 41

## 2023-09-18 NOTE — PROGRESS NOTES
SPIRITUAL HEALTH SERVICES NOTE  [facility]; P1    Reason for Visit: admission screening response  Plan of Care: No plans for follow-up at this time due to patient's anticipated discharge today.  available by patient or staff request.     SPIRITUAL ASSESSMENT  Adjusting to changes in her mobility and mentation  Identifies good support from daughter, son, and JIMBO  Hoping to obtain a memory care bed in the next few weeks    FULL SPIRITUAL CARE NOTE:   Saw Peggy due to admission screening response. She was accompanied by her daughter Ernestine. They share that Peggy is being discharged today to a TCU, in hopes that she can regain some strength in her legs. Peggy says she is okay with this plan. Ernestine shares that her mom has lived alone previously, but needs memory care going forward as her short-term memory has diminished. They are hoping that a bed becomes available while Peggy is in the TCU.     Peggy shares that Ernestine and her , and Peggy's son Shantanu are sources of support for her. She trusts them and is grateful for their care. She is Bahai and denies any concerns at this time.     Time Spent with Patient/Family: 15 minutes    Faiza Mcmillan M.Div.      Office: [phone] (for non-urgent requests)  Please Vocera or page through [facility] for time-sensitive requests

## 2023-09-18 NOTE — PLAN OF CARE
Occupational Therapy Discharge Summary    Reason for therapy discharge:    Discharged to transitional care facility.    Progress towards therapy goal(s). See goals on Care Plan in [facility] electronic health record for goal details.  Goals partially met.  Barriers to achieving goals:   discharge from facility.    Therapy recommendation(s):    Continued therapy is recommended.  Rationale/Recommendations:  Continue OT services at TCU.

## 2023-09-18 NOTE — PLAN OF CARE
Goal Outcome Evaluation    Patient is AAO, Denies pain. Will discharge later this morning to TCU.

## 2023-09-18 NOTE — PLAN OF CARE
Physical Therapy Discharge Summary    Reason for therapy discharge:    Discharged to transitional care facility.    Progress towards therapy goal(s). See goals on Care Plan in [facility] electronic health record for goal details.  Goals partially met.  Barriers to achieving goals:   discharge from facility.    Therapy recommendation(s):    Recommend continued therapies to improve overall strength, balance and mobility.       Heide Jimenes, PT, DPT  9/18/2023

## 2023-09-18 NOTE — PROGRESS NOTES
"Care Management Follow Up    Length of Stay (days): 9    Expected Discharge Date: 09/18/2023     Concerns to be Addressed:     Care progression - discharge planning  Patient plan of care discussed at interdisciplinary rounds: Yes    Anticipated Discharge Disposition: Transitional Care     Anticipated Discharge Services:  Transitional care  Anticipated Discharge DME:  NA    Patient/family educated on Medicare website which has current facility and service quality ratings:    Education Provided on the Discharge Plan:  NA  Patient/Family in Agreement with the Plan:  Yes    Referrals Placed by CM/SW:    Private pay costs discussed: Not applicable    Additional Information:  RNCM [place] [facility] [place] and [facility] transport 11:00 - 11:45 AM. [facility] will let bedside nurse know.    RNCM called [facility] and spoke with Trini to confirm receiving patient 11:00 - 11:45 AM and she was fine with the time. Awaiting discharge orders to be fax.    Social Hx: \"Lives in a townhouse alone. Daughter Ernestine main contact and is working on placement after TCU, has EW assessment 9/28.  Accepted at [facility] for Monday 9/18.  [facility] stretcher between [id number].  PCS completed. PAS complete.\"     RNCM to follow for medical progression, recommendations, and final discharge plan.     Krystal Morelos RN     8:54 AM Provider came by and confirmed patient medically ready for discharge today.  "

## 2023-09-18 NOTE — PROGRESS NOTES
Patient discharged to TCU via [facility] transport. All personal belongings taken with from room. Daughter to follow pt to facility.

## 2023-09-18 NOTE — PROGRESS NOTES
Care Management Discharge Note    Discharge Date: 09/18/2023       Discharge Disposition: Skilled Nursing Facility - [facility]    Discharge Services: Skilled Nursing Facility - [facility]    Discharge DME: None    Discharge Transportation: health plan transportation    Private pay costs discussed: Not applicable    Does the patient's insurance plan have a 3 day qualifying hospital stay waiver?  Yes   Will the waiver be used for post-acute placement? Yes    PAS Confirmation Code: [id number]  Patient/family educated on Medicare website which has current facility and service quality ratings:      Education Provided on the Discharge Plan:  per team  Persons Notified of Discharge Plans: patient per team  Patient/Family in Agreement with the Plan: yes    Handoff Referral Completed: Yes    Additional Information:  Patient discharge to [facility] via [facility] stretcher transport 11:00 - 11:45 AM.    Updated patient's daughterErnestine [phone].    Krystal Morelos RN     11:10 AM Bedside nurse question why patient needed a stretcher? Patient is alert, oriented and able to stand/pivot into the WC.  RNCM met with patient at bedside. She and CG prefer a WC.  RNCM called [facility] transport to request WC transport. She will down grade to WC  within the hour.  RNCM notified [facility], bedside nurse and patient/CG

## 2023-09-18 NOTE — DISCHARGE SUMMARY
[facility] MEDICINE  DISCHARGE SUMMARY     Primary Care Physician: No Ref-Primary, Physician  Admission Date: 9/7/2023   Discharge Provider: Dionne Samano MD Discharge Date: 9/18/2023   Diet:   Active Diet and Nourishment Order   Procedures    Combination Diet Regular Diet; Mildly Thick (level 2)    Diet       Code Status: Full Code   Activity: DCACTIVITY: Activity as tolerated        Condition at Discharge: Stable     REASON FOR PRESENTATION(See Admission Note for Details)     Encephalopathy    PRINCIPAL & ACTIVE DISCHARGE DIAGNOSES     Principal Problem:    Generalized weakness      PENDING LABS     Unresulted Labs Ordered in the Past 30 Days of this Admission       No orders found from 8/8/2023 to 9/8/2023.              PROCEDURES ( this hospitalization only)          RECOMMENDATIONS TO OUTPATIENT PROVIDER FOR F/U VISIT     Follow-up Appointments     Follow Up and recommended labs and tests      Follow up with Nursing home physician.  No follow up labs or test are   needed.              DISPOSITION     Skilled Nursing Facility    SUMMARY OF HOSPITAL COURSE:      89F with history of ataxia, chronic lumbar compression fractures, foraminal stenoses, HTN who presented to the ED for evaluation after a fall and generalized weakness.      Acute encephalopathy:  resolved  Had agitation and confusion. Likely due to toxic and metabolic encephalopathy in the setting of chronic progressive cognitive decline.  CT and MRI head without acute pathology   Phenytoin dose was decreased from PTA 100mg TID to 100mg BID  Mental status now improved. Off 1:1 since 9/11        Anasarca, chronic bilateral lower extremity lymphedema:  BLE venous US negative for DVT.  - Received a few doses of IV lasix and torsemide 20mg qday x 3 days (9/15-9/17). Monitor volume status.  - Lymphedema wraps as tolerated     Acute pseudogout: polyarticular exacerbation.  - Completed Prednisone: 40mg X 7 days      Essential hypertension: BP currently stable.   Continue losartan and coreg.       Mild rhabdomyolysis, traumatic: resolved       Macrocytic anemia: Hemoglobin low at 10.4. Folate normal and B12 at the low end of normal.   Started Vitamin B12 and folic acid supplement.    HypoK - replete.     Epilepsy: Home dose Phenytoin was decreased as above.      BRBPR: h/o hemorrhoids. no abdominal pain. Now resolved.      Chronic multilevel cervical spondylosis: CT cervical spine 9/7/23 with moderate to high-grade bilateral neural foraminal stenosis at C3-C4 and C4-C5.  Supportive care.     Acute urinary retention: CT a/p negative for obstruction, hydronephrosis . Gamez catheter removed 9/13. Has been voiding well.      Left heel pressure injury:  WOC care and offload.          Generalized weakness, frequent falls: PT/OT recommends TCU, discharging today    .Patient stable to be discharged to TCU today. Please refer to discharge medications and instructions for more details.         Discharge Medications with Med changes:     Current Discharge Medication List        START taking these medications    Details   cyanocobalamin (VITAMIN B-12) 1000 MCG tablet Take 1 tablet (1,000 mcg) by mouth daily for 30 days  Qty: 30 tablet, Refills: 0    Associated Diagnoses: Anemia due to vitamin B12 deficiency, unspecified B12 deficiency type      folic acid (FOLVITE) 1 MG tablet Take 1 tablet (1 mg) by mouth daily for 30 days  Qty: 30 tablet, Refills: 0    Associated Diagnoses: Anemia due to vitamin B12 deficiency, unspecified B12 deficiency type           CONTINUE these medications which have CHANGED    Details   phenytoin (DILANTIN) 100 MG ER capsule Take 1 capsule (100 mg) by mouth 2 times daily  Qty: 270 capsule, Refills: 3    Associated Diagnoses: Other generalized epilepsy, not intractable, without status epilepticus (H)           CONTINUE these medications which have NOT CHANGED    Details   acetaminophen (TYLENOL) 325 MG tablet Take  650 mg by mouth every 6 hours as needed for mild pain      allopurinol (ZYLOPRIM) 100 MG tablet Take 1 tablet (100 mg total) by mouth daily  Qty: 90 tablet, Refills: 3    Associated Diagnoses: Gout, unspecified cause, unspecified chronicity, unspecified site; Other generalized epilepsy, not intractable, without status epilepticus (H)      carvedilol (COREG) 6.25 MG tablet Take 1 tablet (6.25 mg) by mouth 2 times daily  Qty: 180 tablet, Refills: 3    Associated Diagnoses: Benign essential hypertension; Essential hypertension      losartan (COZAAR) 100 MG tablet Take 1 tablet (100 mg) by mouth daily  Qty: 90 tablet, Refills: 3    Associated Diagnoses: Benign essential hypertension      magnesium oxide (MAG-OX) 400 MG tablet Take 1 tablet (400 mg) by mouth daily.  Qty: 90 tablet, Refills: 3    Associated Diagnoses: Low magnesium level; Encounter for medication refill      Multiple Vitamins-Minerals (CENTRUM SILVER 50+WOMEN PO) Take 1 tablet by mouth daily                   Rationale for medication changes:      See med rec        Consults       CARE MANAGEMENT / SOCIAL WORK IP CONSULT  PHYSICAL THERAPY ADULT IP CONSULT  OCCUPATIONAL THERAPY ADULT IP CONSULT  CARE MANAGEMENT / SOCIAL WORK IP CONSULT  PHARMACY IP CONSULT  SPEECH LANGUAGE PATH ADULT IP CONSULT  NEUROLOGY IP CONSULT  PHARMACY IP CONSULT  LYMPHEDEMA THERAPY IP CONSULT  WOUND OSTOMY CONTINENCE NURSE  IP CONSULT  PHYSICAL THERAPY ADULT IP CONSULT  OCCUPATIONAL THERAPY ADULT IP CONSULT    Immunizations given this encounter     Most Recent Immunizations   Administered Date(s) Administered    COVID-19 Bivalent 18+ (Moderna) 09/26/2022    COVID-19 MONOVALENT 12+ (Pfizer) 05/27/2021    COVID-19 Monovalent 12+ (Pfizer 2022) 02/15/2022    Flu, Unspecified 09/19/2013    Influenza (High Dose) 3 valent vaccine 11/20/2019    Influenza (IIV3) PF 09/19/2013    Influenza Vaccine 65+ (FLUAD) 11/18/2020    Influenza Vaccine 65+ (Fluzone HD) 02/15/2022    Influenza Vaccine,  6+MO IM (QUADRIVALENT W/PRESERVATIVES) 09/19/2013    Pneumo Conj 13-V (2010&after) 10/19/2015    Pneumococcal 23 valent 11/20/2019    TDAP (Adacel,Boostrix) 03/22/2022    Td (Adult), Adsorbed 09/15/2009    Td,adult,historic,unspecified 09/15/2009    Zoster vaccine, live 07/30/2009           Anticoagulation Information      Recent INR results: No results for input(s): INR in the last 168 hours.  Warfarin doses (if applicable) or name of other anticoagulant: N/A      SIGNIFICANT IMAGING FINDINGS     Results for orders placed or performed during the hospital encounter of 09/07/23   Foot XR, G/E 3 views, left    Impression    IMPRESSION: Diffuse demineralization. Hammertoe deformities. Plantar calcaneal spurring. Severe end-stage degenerative change at the tibiotalar joint. No evidence for acute fracture but the significant demineralization reduces the sensitivity this   examination and MRI would be helpful for further evaluation for the presence or absence of bone edema if indicated.   XR Tibia and Fibula Right 2 Views    Impression    IMPRESSION: The right tibia and fibula are negative for fracture. Diffuse demineralization. Degenerative change at the knee joint.   Ankle XR, G/E 3 views, right    Impression    IMPRESSION: Marked diffuse osseous demineralization. No definite fracture. 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 Ankle mortise is intact. Calcaneal enthesophytes.   XR Foot Right G/E 3 Views    Impression    IMPRESSION: Marked diffuse osseous demineralization. No definite fracture. 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 Ankle mortise is intact. Calcaneal enthesophytes.   XR Knee Right 1/2 Views    Impression    IMPRESSION: Degenerative change patellofemoral compartment and medial compartment where there is bone-on-bone contact. No evidence for fracture  or significant effusion. Diffuse demineralization.   CT Head w/o Contrast    Impression    IMPRESSION:  HEAD CT:  1.  No CT evidence for acute intracranial process.  2.  Brain atrophy and presumed chronic microvascular ischemic changes as above.    CERVICAL SPINE CT:  1.  No CT evidence for acute fracture or post traumatic subluxation.  2.  Multilevel cervical spondylosis including moderate to high-grade bilateral neural foraminal stenosis at C3-C4 and C4-C5.     CT Cervical Spine w/o Contrast    Impression    IMPRESSION:  HEAD CT:  1.  No CT evidence for acute intracranial process.  2.  Brain atrophy and presumed chronic microvascular ischemic changes as above.    CERVICAL SPINE CT:  1.  No CT evidence for acute fracture or post traumatic subluxation.  2.  Multilevel cervical spondylosis including moderate to high-grade bilateral neural foraminal stenosis at C3-C4 and C4-C5.     MR Brain w/o & w Contrast    Impression    IMPRESSION:    Moderately motion degraded study without convincing evidence of acute intracranial abnormality. Specifically, there are no definite sites of restricted diffusion or edema signal to suggest acute infarct.   XR Chest Port 1 View    Impression    IMPRESSION: Mild pulmonary edema. Widening of the mediastinum, which may be due to tortuosity or aneurysm of the thoracic aorta. Consider a CT of the chest with contrast for better assessment. No infiltrate or pleural effusion. Normal heart size.       CT Abdomen Pelvis w/o Contrast    Impression    IMPRESSION:   1.  No acute findings in the abdomen and pelvis on noncontrast CT.  2.  No hydronephrosis in either kidney. A 2 mm small calcification at the upper pole of the left kidney could either represent intrarenal arterial vascular calcification or a punctate nonobstructing calculus.  3.  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     CT Chest w Contrast    Impression    IMPRESSION:   1.  No aortic aneurysm. No mediastinal  mass or lymphadenopathy.  2.  Marked arthritic changes involving both shoulders and sternoclavicular joints.   XR Video Swallow with SLP or OT    Impression    IMPRESSION:    1.  Laryngeal penetration to the level of the cords with thin consistency and larger boluses of mildly thick consistency. Consecutive swallows also resulted in greater frequency and depth of laryngeal penetration.  2.  Only mild superficial and transient laryngeal penetration with moderate consistency and the first trial.  3.  No penetration or aspiration with puree consistency.    Please see separately dictated report from speech pathology for additional description, analysis, and management recommendations.   XR Femur Left 2 Views    Impression    IMPRESSION: Diffuse osseous demineralization. No definite acute fracture. Old inferior pubic ramus fracture deformity. Moderate degenerative arthritis of the left hip. Advanced tricompartmental degenerative arthritis of the left knee. Large left knee   joint effusion and/or synovitis. Scattered vascular calcifications.    XR Femur Right 2 Views    Impression    IMPRESSION: Osteopenia. No definite fracture. Moderate degenerative arthritis of the right hip. Advanced tricompartmental degenerative arthritis of the right knee, better characterized on the recent dedicated right knee radiographs. Scattered vascular   calcifications.   XR Tibia and Fibula Left 2 Views    Impression    IMPRESSION:   Anatomic alignment left tibia and fibula. No definitive acute displaced left tibia or fibula fracture. Chronic healed fracture deformities of the distal tibia and likely distal fibula. Chronic cortical thickening medial distal left femoral metaphysis.   Moderate medial compartment predominant, tricompartmental left knee osteoarthritis with chondrocalcinosis. Advanced tibiotalar osteoarthritis. Diffuse bone demineralization. Distal leg and ankle soft tissue swelling.   US Joint Injection Aspiration Major Left     Impression    IMPRESSION:  1.  Status post ultrasound-guided puncture and aspiration of left knee joint effusion.    Reference CPT Code: 90800, 46346   US Lower Extremity Venous Duplex Bilateral    Impression    IMPRESSION:  1.  No deep venous thrombosis in the bilateral lower extremities.   Echocardiogram Complete   Result Value Ref Range    LVEF  65-70%        SIGNIFICANT LABORATORY FINDINGS       Discharge Orders        General info for SNF    Length of Stay Estimate: Short Term Care: Estimated # of Days <30  Condition at Discharge: Improving  Level of care:skilled   Rehabilitation Potential: Fair  Admission H&P remains valid and up-to-date: Yes  Recent Chemotherapy: N/A  Use Nursing Home Standing Orders: Yes     Follow Up and recommended labs and tests    Follow up with Nursing home physician.  No follow up labs or test are needed.     Reason for your hospital stay    Encephalopathy     Activity - Up ad mariah     Physical Therapy Adult Consult    Evaluate and treat as clinically indicated.    Reason:  Weakness     Occupational Therapy Adult Consult    Evaluate and treat as clinically indicated.    Reason:  Weakness     Fall precautions     Diet    Follow this diet upon discharge: Orders Placed This Encounter      Combination Diet Regular Diet; Mildly Thick (level 2)       Examination   Physical Exam   Temp:  [97.9  F (36.6  C)-98.2  F (36.8  C)] 98  F (36.7  C)  Pulse:  [63-73] 63  Resp:  [18] 18  BP: (106-149)/(53-67) 149/67  SpO2:  [96 %-97 %] 97 %  Wt Readings from Last 1 Encounters:   09/17/23 69.1 kg (152 lb 5.4 oz)     General: Pleasant female in NAD  HEENT:EOMI, AT,NC  CVS:RRR, LE edema +  RS:CTAB  Neurology:Awake, alert, speech fluent.   Psy:Approrpiate affect      Please see EMR for more detailed significant labs, imaging, consultant notes etc.    IDionne MD, personally saw the patient today and spent greater than 30 minutes discharging this patient.    Dionne Samano MD  [facility]  [facility]    CC:No Ref-Primary, Physician

## 2023-09-18 NOTE — PLAN OF CARE
Speech Language Therapy Discharge Summary    Reason for therapy discharge:    Discharged to transitional care facility.    Progress towards therapy goal(s). See goals on Care Plan in [facility] electronic health record for goal details.  Goals partially met.  Barriers to achieving goals:   discharge from facility.    Therapy recommendation(s):    Reg/Mildly thick liquids. Repeat VFSS in 1-2 weeks.  Patient may benefit from follow up sessions at TCU to review swallow strategies and assess for repeat VFSS readiness. Patient does have a history of penetration to the vocal cords without cough response.

## 2023-09-18 NOTE — PLAN OF CARE
Problem: Plan of Care - These are the overarching goals to be used throughout the patient stay.    Goal: Optimal Comfort and Wellbeing  Outcome: Progressing     Problem: Risk for Delirium  Goal: Improved Sleep  Outcome: Progressing   Goal Outcome Evaluation:  Alert and disoriented to situation. Two assist to the commode. Denied pain overnight. Potassium protocol recheck this morning. Plan for discharge to TCU this morning.

## 2023-09-21 ENCOUNTER — LAB REQUISITION (OUTPATIENT)
Dept: LAB | Facility: CLINIC | Age: 88
End: 2023-09-21
Payer: COMMERCIAL

## 2023-09-21 ENCOUNTER — TRANSITIONAL CARE UNIT VISIT (OUTPATIENT)
Dept: GERIATRICS | Facility: CLINIC | Age: 88
End: 2023-09-21
Payer: MEDICARE

## 2023-09-21 VITALS
RESPIRATION RATE: 18 BRPM | HEART RATE: 66 BPM | HEIGHT: 60 IN | DIASTOLIC BLOOD PRESSURE: 58 MMHG | SYSTOLIC BLOOD PRESSURE: 127 MMHG | WEIGHT: 137 LBS | OXYGEN SATURATION: 95 % | BODY MASS INDEX: 26.9 KG/M2 | TEMPERATURE: 98.9 F

## 2023-09-21 DIAGNOSIS — M62.81 MUSCLE WEAKNESS (GENERALIZED): Primary | ICD-10-CM

## 2023-09-21 DIAGNOSIS — I10 BENIGN ESSENTIAL HYPERTENSION: ICD-10-CM

## 2023-09-21 DIAGNOSIS — R60.0 BILATERAL LEG EDEMA: ICD-10-CM

## 2023-09-21 DIAGNOSIS — M1A.09X0 CHRONIC GOUT OF MULTIPLE SITES, UNSPECIFIED CAUSE: ICD-10-CM

## 2023-09-21 DIAGNOSIS — Z51.81 ENCOUNTER FOR THERAPEUTIC DRUG LEVEL MONITORING: ICD-10-CM

## 2023-09-21 PROBLEM — D47.2 MONOCLONAL GAMMOPATHY OF UNDETERMINED SIGNIFICANCE: Status: ACTIVE | Noted: 2022-09-26

## 2023-09-21 PROCEDURE — 99309 SBSQ NF CARE MODERATE MDM 30: CPT | Performed by: NURSE PRACTITIONER

## 2023-09-21 NOTE — PROGRESS NOTES
[facility] GERIATRICS    PRIMARY CARE PROVIDER AND CLINIC:  Physician No Ref-Primary, No address on file  Chief Complaint   Patient presents with    Hospital F/U      [facility] Medical Record Number:  [id number]  Place of Service where encounter took place:  [facility] ([facility]) [[id number]]    Peggy De Anda  is a 89 year old  (11/15/1931), admitted to the above facility from  [facility]. Hospital stay 9/7/2023 through 9/18/2023..   HPI:  Peggy has a hx of ataxia, lumbar compression fractures, foraminal stenoses, HTN who fell at home and was brought to ED for evaluation.   She was acutely confused likley s/s to metabolic encephalopathy. Imaging completed without acute findings. Her dilantin dose was decreased and her mental status improved. She has joseph LE swelling that is chronic, doppler negative for dvt. Weights in the TCU stable at 145lbs. Also received prednisone for 7 days for acute psuedogout.   Has a pressure sore to left heel, nursing is aware. Will need offloading and reminders.           She is seen sitting up in her recliner in her room. She has notable cognitive impairment but is able to have conversations. She focuses most of the conversation on when she will be going home. Her pain is well managed. VS have been stable. LE with +2 edema. Will order tubi . Appetite is good.        CODE STATUS/ADVANCE DIRECTIVES DISCUSSION:  Prior FULL CODE  ALLERGIES:   Allergies   Allergen Reactions    Oxycodone Hallucination    Quinolones Rash    Levofloxacin Rash      PAST MEDICAL HISTORY:   Past Medical History:   Diagnosis Date    Anemia     Ataxia     Dermatitis     Created by Conversion     Epilepsy (H)     Essential hypertension     Gout     Monoclonal gammopathy of undetermined significance     Osteoporosis     Peripheral neuropathy     Secondary hyperparathyroidism (H)     non-renal      PAST SURGICAL HISTORY:   has a past surgical history that includes IR  Lumbar Epidural Steroid Injection (10/5/2007); IR Miscellaneous Procedure (10/30/2007); Neuroplasty / Transposition Median Nerve At Carpal Tunnel Bilateral; Cataract Extraction, Bilateral; Tonsillectomy; appendectomy; and Cholecystectomy.  FAMILY HISTORY: family history is not on file.  SOCIAL HISTORY:   reports that she has never smoked. She does not have any smokeless tobacco history on file.  Patient's living condition:  lives alone; family looking into AL    Post Discharge Medication Reconciliation Status:   MED REC REQUIRED{  Post Medication Reconciliation Status:  Discharge medications reconciled, continue medications without change       Current Outpatient Medications   Medication Sig    acetaminophen (TYLENOL) 325 MG tablet Take 650 mg by mouth every 6 hours as needed for mild pain    allopurinol (ZYLOPRIM) 100 MG tablet Take 1 tablet (100 mg total) by mouth daily    carvedilol (COREG) 6.25 MG tablet Take 1 tablet (6.25 mg) by mouth 2 times daily    cyanocobalamin (VITAMIN B-12) 1000 MCG tablet Take 1 tablet (1,000 mcg) by mouth daily for 30 days    folic acid (FOLVITE) 1 MG tablet Take 1 tablet (1 mg) by mouth daily for 30 days    losartan (COZAAR) 100 MG tablet Take 1 tablet (100 mg) by mouth daily    magnesium oxide (MAG-OX) 400 MG tablet Take 1 tablet (400 mg) by mouth daily.    Multiple Vitamins-Minerals (CENTRUM SILVER 50+WOMEN PO) Take 1 tablet by mouth daily    phenytoin (DILANTIN) 100 MG ER capsule Take 1 capsule (100 mg) by mouth 2 times daily     No current facility-administered medications for this visit.       ROS:  4 point ROS including Respiratory, CV, GI and , other than that noted in the HPI,  is negative    Vitals:  /58   Pulse 66   Temp 98.9  F (37.2  C)   Resp 18   Ht 1.524 m (5')   Wt 62.1 kg (137 lb)   SpO2 95%   BMI 26.76 kg/m    Exam:  General appearance: alert, cooperative.   Lungs: respirations unlabored,no wheezing or rales.   Cardiovascular: Regular rate and rhythm.    ABDOMEN: Globular and soft, non tender.    Extremities: extremities normal, atraumatic, +2 edema thiago.  Skin: No rashes or lesions  Neurologic: oriented. No focal deficits.   Psych: interacts well with caregivers,  pleasant    Lab/Diagnostic data:  Recent labs in [facility] reviewed by me today.     ASSESSMENT/PLAN:    1. Muscle weakness (generalized)    2. Bilateral leg edema    3. Chronic gout of multiple sites, unspecified cause    4. Benign essential hypertension      Generalized weakness multifactorial, s/s to acute encephalopathy, back pain. PT and OT.  THIAGO LE edema +2. Start tubi . Daily weights.  Chronic gout, on allopurinol. No current flare. Finished prednisone in hospital.   HTN: on losartan, coreg. Stable <140/90.         Electronically signed by:  Tia Morales CNP

## 2023-09-21 NOTE — LETTER
9/21/2023        RE: Peggy De Anda  [address] [facility] [address]        [facility] GERIATRICS    PRIMARY CARE PROVIDER AND CLINIC:  Physician No Ref-Primary, No address on file  Chief Complaint   Patient presents with     Hospital F/U      [place] Medical Record Number:  [id number]  Place of Service where encounter took place:  [facility] ([facility]) [[id number]]    Peggy De Anda  is a 89 year old  (11/15/1931), admitted to the above facility from  [facility]. Hospital stay 9/7/2023 through 9/18/2023..   HPI:  Peggy has a hx of ataxia, lumbar compression fractures, foraminal stenoses, HTN who fell at home and was brought to ED for evaluation.   She was acutely confused likley s/s to metabolic encephalopathy. Imaging completed without acute findings. Her dilantin dose was decreased and her mental status improved. She has joseph LE swelling that is chronic, doppler negative for dvt. Weights in the TCU stable at 145lbs. Also received prednisone for 7 days for acute psuedogout.   Has a pressure sore to left heel, nursing is aware. Will need offloading and reminders.           She is seen sitting up in her recliner in her room. She has notable cognitive impairment but is able to have conversations. She focuses most of the conversation on when she will be going home. Her pain is well managed. VS have been stable. LE with +2 edema. Will order tubi . Appetite is good.        CODE STATUS/ADVANCE DIRECTIVES DISCUSSION:  Prior FULL CODE  ALLERGIES:   Allergies   Allergen Reactions     Oxycodone Hallucination     Quinolones Rash     Levofloxacin Rash      PAST MEDICAL HISTORY:   Past Medical History:   Diagnosis Date     Anemia      Ataxia      Dermatitis     Created by Conversion      Epilepsy (H)      Essential hypertension      Gout      Monoclonal gammopathy of undetermined significance      Osteoporosis      Peripheral neuropathy      Secondary  hyperparathyroidism (H)     non-renal      PAST SURGICAL HISTORY:   has a past surgical history that includes IR Lumbar Epidural Steroid Injection (10/5/2007); IR Miscellaneous Procedure (10/30/2007); Neuroplasty / Transposition Median Nerve At Carpal Tunnel Bilateral; Cataract Extraction, Bilateral; Tonsillectomy; appendectomy; and Cholecystectomy.  FAMILY HISTORY: family history is not on file.  SOCIAL HISTORY:   reports that she has never smoked. She does not have any smokeless tobacco history on file.  Patient's living condition:  lives alone; family looking into AL    Post Discharge Medication Reconciliation Status:   MED REC REQUIRED{  Post Medication Reconciliation Status:  Discharge medications reconciled, continue medications without change       Current Outpatient Medications   Medication Sig     acetaminophen (TYLENOL) 325 MG tablet Take 650 mg by mouth every 6 hours as needed for mild pain     allopurinol (ZYLOPRIM) 100 MG tablet Take 1 tablet (100 mg total) by mouth daily     carvedilol (COREG) 6.25 MG tablet Take 1 tablet (6.25 mg) by mouth 2 times daily     cyanocobalamin (VITAMIN B-12) 1000 MCG tablet Take 1 tablet (1,000 mcg) by mouth daily for 30 days     folic acid (FOLVITE) 1 MG tablet Take 1 tablet (1 mg) by mouth daily for 30 days     losartan (COZAAR) 100 MG tablet Take 1 tablet (100 mg) by mouth daily     magnesium oxide (MAG-OX) 400 MG tablet Take 1 tablet (400 mg) by mouth daily.     Multiple Vitamins-Minerals (CENTRUM SILVER 50+WOMEN PO) Take 1 tablet by mouth daily     phenytoin (DILANTIN) 100 MG ER capsule Take 1 capsule (100 mg) by mouth 2 times daily     No current facility-administered medications for this visit.       ROS:  4 point ROS including Respiratory, CV, GI and , other than that noted in the HPI,  is negative    Vitals:  /58   Pulse 66   Temp 98.9  F (37.2  C)   Resp 18   Ht 1.524 m (5')   Wt 62.1 kg (137 lb)   SpO2 95%   BMI 26.76 kg/m    Exam:  General  appearance: alert, cooperative.   Lungs: respirations unlabored,no wheezing or rales.   Cardiovascular: Regular rate and rhythm.   ABDOMEN: Globular and soft, non tender.    Extremities: extremities normal, atraumatic, +2 edema thiago.  Skin: No rashes or lesions  Neurologic: oriented. No focal deficits.   Psych: interacts well with caregivers,  pleasant    Lab/Diagnostic data:  Recent labs in [facility] reviewed by me today.     ASSESSMENT/PLAN:    1. Muscle weakness (generalized)    2. Bilateral leg edema    3. Chronic gout of multiple sites, unspecified cause    4. Benign essential hypertension      Generalized weakness multifactorial, s/s to acute encephalopathy, back pain. PT and OT.  THIAGO LE edema +2. Start tubi . Daily weights.  Chronic gout, on allopurinol. No current flare. Finished prednisone in hospital.   HTN: on losartan, coreg. Stable <140/90.         Electronically signed by:  Tia Morales CNP                   Sincerely,        Tia Morales, CNP

## 2023-09-22 LAB — POTASSIUM SERPL-SCNC: 4 MMOL/L (ref 3.4–5.3)

## 2023-09-22 PROCEDURE — P9603 ONE-WAY ALLOW PRORATED MILES: HCPCS | Performed by: NURSE PRACTITIONER

## 2023-09-22 PROCEDURE — 36415 COLL VENOUS BLD VENIPUNCTURE: CPT | Performed by: NURSE PRACTITIONER

## 2023-09-22 PROCEDURE — 84132 ASSAY OF SERUM POTASSIUM: CPT | Performed by: NURSE PRACTITIONER

## 2023-09-25 ENCOUNTER — TRANSITIONAL CARE UNIT VISIT (OUTPATIENT)
Dept: GERIATRICS | Facility: CLINIC | Age: 88
End: 2023-09-25
Payer: MEDICARE

## 2023-09-25 VITALS
WEIGHT: 147.4 LBS | HEIGHT: 60 IN | DIASTOLIC BLOOD PRESSURE: 77 MMHG | BODY MASS INDEX: 28.94 KG/M2 | OXYGEN SATURATION: 99 % | HEART RATE: 68 BPM | RESPIRATION RATE: 18 BRPM | TEMPERATURE: 98.2 F | SYSTOLIC BLOOD PRESSURE: 144 MMHG

## 2023-09-25 DIAGNOSIS — R53.1 GENERALIZED WEAKNESS: ICD-10-CM

## 2023-09-25 DIAGNOSIS — R60.0 BILATERAL LEG EDEMA: Primary | ICD-10-CM

## 2023-09-25 DIAGNOSIS — I10 BENIGN ESSENTIAL HYPERTENSION: ICD-10-CM

## 2023-09-25 PROCEDURE — 99309 SBSQ NF CARE MODERATE MDM 30: CPT | Performed by: NURSE PRACTITIONER

## 2023-09-25 NOTE — LETTER
9/25/2023        RE: Peggy De Anda  [address] [place] [facility] [address]        [facility] GERIATRICS    PRIMARY CARE PROVIDER AND CLINIC:  Physician No Ref-Primary, No address on file  Chief Complaint   Patient presents with     RECHECK      [place] Medical Record Number:  [id number]  Place of Service where encounter took place:  [facility] ([facility]) [[id number]]    HPI: Peggy De Anda is a 89 year old  (11/15/1931), who is being seen today for an episodic care visit at: [facility] () [[id number]]. Today's concern is: bilateral lower extremity edema    Pt and care staff report increased bilateral lower extremity leg swelling and a 10 lb weight gain since admission. Pt has a history of chronic bilateral lower extremity lymphedema and received IV lasix during her recent hospital admission 9/7-9/18. She is currently wearing lymph wraps and reports swelling has decreased slightly since increasing her activity/walking with therapy. She denies pain in her legs. She reports intermittent numbness and tingling in BLE that is baseline. She reports no SOB or CP.     ROS:  4 point ROS including Respiratory, CV, GI and , other than that noted in the HPI,  is negative    Vitals:  BP (!) 144/77   Pulse 68   Temp 98.2  F (36.8  C)   Resp 18   Ht 1.524 m (5')   Wt 66.9 kg (147 lb 6.4 oz)   SpO2 99%   BMI 28.79 kg/m    Exam:  General appearance: Alert, calm, cooperative.   Resp: Lungs clear to auscultation, fine crackles in RLL.  Unlabored, no wheezing.  CV: Regular rate and rhythm   Abdomen: Normal bowel sounds, soft, non tender.   Extremities: 3+ soft, nonpitting BLE edema.  Skin: Ecchymosis present on dorsal hands.  Neuro: Oriented. No focal deficits.   Psych: Pleasant, calm affect.       Lab/Diagnostic data:  Recent labs in [facility] reviewed by me today.     ASSESSMENT/PLAN:    (R60.0) Bilateral leg edema  (primary encounter diagnosis)  Plan: Continue lymph wraps and activity  as tolerated. Start lasix 20 mg PO daily. Recheck BMP 10/2. Last K+ 4.0. Check daily weights.     (R53.1) Generalized weakness  Comment: secondary to comorbitdities, mild cog impairment.   Plan:   -PT and OT.     (I10) Benign essential hypertension  Comment: Blood pressures stable.   Plan:   -Follow Bps with addition of lasix.       Jessica Chowdary, MSN, RN, DNP Student  [facility]  Electronically signed by:  Tia Morales CNP     I was present with the student who particpated in the serivce and documentation of the note. I have verified the history and personally peformed the physical exam and medical decision making. I agree with the assessment and plan of care as documented in the note.           Sincerely,        Tia Morales CNP

## 2023-09-26 NOTE — PROGRESS NOTES
[facility] GERIATRICS    PRIMARY CARE PROVIDER AND CLINIC:  Physician No Ref-Primary, No address on file  Chief Complaint   Patient presents with    RECHECK      [place] Medical Record Number:  [id number]  Place of Service where encounter took place:  [facility] ([facility]) [[id number]]    HPI: Peggy De Anda is a 89 year old  (11/15/1931), who is being seen today for an episodic care visit at: [facility] () [[id number]]. Today's concern is: bilateral lower extremity edema    Pt and care staff report increased bilateral lower extremity leg swelling and a 10 lb weight gain since admission. Pt has a history of chronic bilateral lower extremity lymphedema and received IV lasix during her recent hospital admission 9/7-9/18. She is currently wearing lymph wraps and reports swelling has decreased slightly since increasing her activity/walking with therapy. She denies pain in her legs. She reports intermittent numbness and tingling in BLE that is baseline. She reports no SOB or CP.     ROS:  4 point ROS including Respiratory, CV, GI and , other than that noted in the HPI,  is negative    Vitals:  BP (!) 144/77   Pulse 68   Temp 98.2  F (36.8  C)   Resp 18   Ht 1.524 m (5')   Wt 66.9 kg (147 lb 6.4 oz)   SpO2 99%   BMI 28.79 kg/m    Exam:  General appearance: Alert, calm, cooperative.   Resp: Lungs clear to auscultation, fine crackles in RLL.  Unlabored, no wheezing.  CV: Regular rate and rhythm   Abdomen: Normal bowel sounds, soft, non tender.   Extremities: 3+ soft, nonpitting BLE edema.  Skin: Ecchymosis present on dorsal hands.  Neuro: Oriented. No focal deficits.   Psych: Pleasant, calm affect.       Lab/Diagnostic data:  Recent labs in [facility] reviewed by me today.     ASSESSMENT/PLAN:    (R60.0) Bilateral leg edema  (primary encounter diagnosis)  Plan: Continue lymph wraps and activity as tolerated. Start lasix 20 mg PO daily. Recheck BMP 10/2. Last K+ 4.0. Check daily  weights.     (R53.1) Generalized weakness  Comment: secondary to comorbitdities, mild cog impairment.   Plan:   -PT and OT.     (I10) Benign essential hypertension  Comment: Blood pressures stable.   Plan:   -Follow Bps with addition of lasix.       Jessica Chowdary, MSN, RN, DNP Student  [facility]  Electronically signed by:  Tia Morales CNP     I was present with the student who particpated in the serivce and documentation of the note. I have verified the history and personally peformed the physical exam and medical decision making. I agree with the assessment and plan of care as documented in the note.

## 2023-09-28 ENCOUNTER — LAB REQUISITION (OUTPATIENT)
Dept: LAB | Facility: CLINIC | Age: 88
End: 2023-09-28
Payer: MEDICARE

## 2023-09-28 ENCOUNTER — TRANSITIONAL CARE UNIT VISIT (OUTPATIENT)
Dept: GERIATRICS | Facility: CLINIC | Age: 88
End: 2023-09-28
Payer: MEDICARE

## 2023-09-28 VITALS
RESPIRATION RATE: 18 BRPM | OXYGEN SATURATION: 95 % | HEART RATE: 77 BPM | SYSTOLIC BLOOD PRESSURE: 156 MMHG | HEIGHT: 60 IN | WEIGHT: 145 LBS | TEMPERATURE: 98.7 F | DIASTOLIC BLOOD PRESSURE: 76 MMHG | BODY MASS INDEX: 28.47 KG/M2

## 2023-09-28 DIAGNOSIS — M10.9 GOUT, UNSPECIFIED: ICD-10-CM

## 2023-09-28 DIAGNOSIS — R60.0 BILATERAL LEG EDEMA: Primary | ICD-10-CM

## 2023-09-28 DIAGNOSIS — M11.271 PSEUDOGOUT OF FOOT, RIGHT: ICD-10-CM

## 2023-09-28 DIAGNOSIS — G31.84 MCI (MILD COGNITIVE IMPAIRMENT) WITH MEMORY LOSS: ICD-10-CM

## 2023-09-28 DIAGNOSIS — R53.1 GENERALIZED WEAKNESS: ICD-10-CM

## 2023-09-28 PROCEDURE — 99309 SBSQ NF CARE MODERATE MDM 30: CPT | Performed by: NURSE PRACTITIONER

## 2023-09-28 NOTE — LETTER
9/28/2023        RE: Peggy De Anda  [address] [facility] [address]        [facility] GERIATRICS    PRIMARY CARE PROVIDER AND CLINIC:  Physician No Ref-Primary, No address on file  Chief Complaint   Patient presents with     RECHECK      [place] Medical Record Number:  [id number]  Place of Service where encounter took place:  [facility] ([facility]) [[id number]]    HPI: Peggy De Anda is a 89 year old  (11/15/1931), who is being seen today for an episodic care visit at: [facility] () [[id number]]. Today's concern is: R ankle pain.     Today: Peggy is seen per request of nursing for increase R foot and ankle pain, redness. She has joseph LE edema that is baseline. Recent pseudogout in hospital. Also on allopurinol for hyperuricemia. Pain is inhibiting therapy progress today and began abruptly overnight. VS stable, afebrile.     ROS:  4 point ROS including Respiratory, CV, GI and , other than that noted in the HPI,  is negative    Vitals:  BP (!) 156/76   Pulse 77   Temp 98.7  F (37.1  C)   Resp 18   Ht 1.524 m (5')   Wt 65.8 kg (145 lb)   SpO2 95%   BMI 28.32 kg/m    Exam:  General appearance: Alert, calm, cooperative.   Resp: Lungs clear to auscultation, fine crackles in RLL.  Unlabored, no wheezing.  CV: Regular rate and rhythm   Abdomen: Normal bowel sounds, soft, non tender.   Extremities: 3+ soft, nonpitting BLE edema.  Skin: Ecchymosis present on dorsal hands.  Neuro: Oriented. No focal deficits.   Psych: Pleasant, calm affect.       Lab/Diagnostic data:  Recent labs in [facility] reviewed by me today.     ASSESSMENT/PLAN:    (R60.0) Bilateral leg edema  (primary encounter diagnosis)  Comment: stable.   Plan:   -Continue lasix, ace wraps as tolerated.     (M11.271) Pseudogout of foot, right  Comment: recent hx also on allopurinol. Gout  vs psuedogout.    Plan:   -Start prednisone 40mg po daily x 5 days.   -uric acid, cbc tomorrow.     (R53.1) Generalized  weakness  Comment: multifactorial to above.   Plan:   -PT and OT.     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   Plan:   -Discuss with family appropriate discharge.             Tia Morales CNP          Sincerely,        Tia Morales, CNP

## 2023-09-29 ENCOUNTER — TELEPHONE (OUTPATIENT)
Dept: GERIATRICS | Facility: CLINIC | Age: 88
End: 2023-09-29
Payer: COMMERCIAL

## 2023-09-29 LAB
BASOPHILS # BLD AUTO: 0 10E3/UL (ref 0–0.2)
BASOPHILS NFR BLD AUTO: 0 %
CRP SERPL-MCNC: 109 MG/L
EOSINOPHIL # BLD AUTO: 0.2 10E3/UL (ref 0–0.7)
EOSINOPHIL NFR BLD AUTO: 3 %
ERYTHROCYTE [DISTWIDTH] IN BLOOD BY AUTOMATED COUNT: 13.5 % (ref 10–15)
HCT VFR BLD AUTO: 33.5 % (ref 35–47)
HGB BLD-MCNC: 10.7 G/DL (ref 11.7–15.7)
IMM GRANULOCYTES # BLD: 0.1 10E3/UL
IMM GRANULOCYTES NFR BLD: 1 %
LYMPHOCYTES # BLD AUTO: 1.5 10E3/UL (ref 0.8–5.3)
LYMPHOCYTES NFR BLD AUTO: 22 %
MCH RBC QN AUTO: 32.6 PG (ref 26.5–33)
MCHC RBC AUTO-ENTMCNC: 31.9 G/DL (ref 31.5–36.5)
MCV RBC AUTO: 102 FL (ref 78–100)
MONOCYTES # BLD AUTO: 0.7 10E3/UL (ref 0–1.3)
MONOCYTES NFR BLD AUTO: 10 %
NEUTROPHILS # BLD AUTO: 4.2 10E3/UL (ref 1.6–8.3)
NEUTROPHILS NFR BLD AUTO: 64 %
NRBC # BLD AUTO: 0 10E3/UL
NRBC BLD AUTO-RTO: 0 /100
PLATELET # BLD AUTO: 264 10E3/UL (ref 150–450)
RBC # BLD AUTO: 3.28 10E6/UL (ref 3.8–5.2)
URATE SERPL-MCNC: 3.7 MG/DL (ref 2.4–5.7)
WBC # BLD AUTO: 6.6 10E3/UL (ref 4–11)

## 2023-09-29 PROCEDURE — 86140 C-REACTIVE PROTEIN: CPT | Performed by: NURSE PRACTITIONER

## 2023-09-29 PROCEDURE — 36415 COLL VENOUS BLD VENIPUNCTURE: CPT | Performed by: NURSE PRACTITIONER

## 2023-09-29 PROCEDURE — 84550 ASSAY OF BLOOD/URIC ACID: CPT | Performed by: NURSE PRACTITIONER

## 2023-09-29 PROCEDURE — 85004 AUTOMATED DIFF WBC COUNT: CPT | Performed by: NURSE PRACTITIONER

## 2023-09-29 PROCEDURE — P9603 ONE-WAY ALLOW PRORATED MILES: HCPCS | Performed by: NURSE PRACTITIONER

## 2023-09-29 NOTE — TELEPHONE ENCOUNTER
[facility] Geriatrics Lab Note     Provider: LOIS Hartman  Facility: Merged with [facility] Type:  TCU    Allergies   Allergen Reactions    Oxycodone Hallucination    Quinolones Rash    Levofloxacin Rash       Labs Reviewed by provider: Heme 1, CRP, uric acid     Verbal Order/Direction given by Provider: No new orders.      Provider giving Order:  LOIS Hartman    Verbal Order given to: Fang Mike RN

## 2023-09-30 ENCOUNTER — LAB REQUISITION (OUTPATIENT)
Dept: LAB | Facility: CLINIC | Age: 88
End: 2023-09-30
Payer: MEDICARE

## 2023-09-30 DIAGNOSIS — R60.1 GENERALIZED EDEMA: ICD-10-CM

## 2023-10-02 LAB
ANION GAP SERPL CALCULATED.3IONS-SCNC: 13 MMOL/L (ref 7–15)
BUN SERPL-MCNC: 21.9 MG/DL (ref 8–23)
CALCIUM SERPL-MCNC: 9.1 MG/DL (ref 8.2–9.6)
CHLORIDE SERPL-SCNC: 103 MMOL/L (ref 98–107)
CREAT SERPL-MCNC: 0.58 MG/DL (ref 0.51–0.95)
DEPRECATED HCO3 PLAS-SCNC: 22 MMOL/L (ref 22–29)
EGFRCR SERPLBLD CKD-EPI 2021: 85 ML/MIN/1.73M2
GLUCOSE SERPL-MCNC: 114 MG/DL (ref 70–99)
POTASSIUM SERPL-SCNC: 4.4 MMOL/L (ref 3.4–5.3)
SODIUM SERPL-SCNC: 138 MMOL/L (ref 135–145)

## 2023-10-02 PROCEDURE — P9604 ONE-WAY ALLOW PRORATED TRIP: HCPCS | Performed by: NURSE PRACTITIONER

## 2023-10-02 PROCEDURE — 36415 COLL VENOUS BLD VENIPUNCTURE: CPT | Performed by: NURSE PRACTITIONER

## 2023-10-02 PROCEDURE — 80048 BASIC METABOLIC PNL TOTAL CA: CPT | Performed by: NURSE PRACTITIONER

## 2023-10-04 ENCOUNTER — DISCHARGE SUMMARY NURSING HOME (OUTPATIENT)
Dept: GERIATRICS | Facility: CLINIC | Age: 88
End: 2023-10-04
Payer: MEDICARE

## 2023-10-04 ENCOUNTER — MEDICAL CORRESPONDENCE (OUTPATIENT)
Dept: HEALTH INFORMATION MANAGEMENT | Facility: CLINIC | Age: 88
End: 2023-10-04

## 2023-10-04 VITALS
OXYGEN SATURATION: 100 % | DIASTOLIC BLOOD PRESSURE: 71 MMHG | RESPIRATION RATE: 18 BRPM | SYSTOLIC BLOOD PRESSURE: 158 MMHG | TEMPERATURE: 98.7 F | WEIGHT: 144.4 LBS | BODY MASS INDEX: 28.35 KG/M2 | HEART RATE: 77 BPM | HEIGHT: 60 IN

## 2023-10-04 DIAGNOSIS — M10.9 GOUT: ICD-10-CM

## 2023-10-04 DIAGNOSIS — R60.0 BILATERAL LEG EDEMA: Primary | ICD-10-CM

## 2023-10-04 DIAGNOSIS — G93.40 ENCEPHALOPATHY: ICD-10-CM

## 2023-10-04 DIAGNOSIS — R53.81 PHYSICAL DECONDITIONING: ICD-10-CM

## 2023-10-04 DIAGNOSIS — I10 BENIGN ESSENTIAL HYPERTENSION: ICD-10-CM

## 2023-10-04 DIAGNOSIS — G40.909 EPILEPSY (H): ICD-10-CM

## 2023-10-04 DIAGNOSIS — D64.9 ANEMIA: ICD-10-CM

## 2023-10-04 PROCEDURE — 99316 NF DSCHRG MGMT 30 MIN+: CPT | Performed by: NURSE PRACTITIONER

## 2023-10-04 NOTE — PROGRESS NOTES
"[facility] GERIATRICS DISCHARGE SUMMARY  PATIENT'S NAME: Peggy De Anda  YOB: 1931  MEDICAL RECORD NUMBER:  [id number]  Place of Service where encounter took place:  [facility] ([facility]) [[id number]]    PRIMARY CARE PROVIDER AND CLINIC RESPONSIBLE AFTER TRANSFER:   Physician No Ref-Primary, No address on file       Transferring providers: Tia Morales CNP, Cathy Burton MD  Recent Hospitalization/ED:  [facility] stay 9/18/23 to 9/18/23.  Date of SNF Admission: September 18, 2023  Date of SNF (anticipated) Discharge: October 05, 2023  Discharged to: previous assisted living  Cognitive Scores: SLUMS: 8/30  Physical Function: Wheelchair dependent  DME: Wheelchair    Due to diagnosis of LE edema and weakness my patient will require and benefit from a manual 18 inch x 16inch Randy height wheelchair with a pair of standard foot rests, a pair of full-length armrests, and an 18x16 inch comfort curve cushion for lifetime use.  Patient requires randy height option due to short stature 60\".     Patient has mobility limitation that significantly impairs her ability to participate in mobility related and ADLs such as food prep, dressing, transfers and ambulation.  This limitation cannot be sufficiently and safely resolved by the use of a cane, walker or crutches due to weakness related to weakness and edema.  Patient and caregiver are able to safely use a wheelchair and mobility deficit can be resolved with its use.  Patient's home provides adequate access and maneuvering space.  Patient will use a wheelchair daily and has not expressed an unwillingness to use it within her home.    Discharge date: 10/5/23      CODE STATUS/ADVANCE DIRECTIVES DISCUSSION:  Prior   ALLERGIES: Oxycodone, Quinolones, and Levofloxacin    NURSING FACILITY COURSE   Medication Changes/Rationale:   Lasix 20mg daily for fluid overload     Summary of nursing facility stay:    "   Bilateral leg edema  Physical deconditioning  Encephalopathy  Benign essential hypertension  Anemia  Epilepsy (H)  Gout    jayjay has a hx of ataxia, lumbar compression fractures, foraminal stenoses, HTN who fell at home and was brought to ED for evaluation.   She was acutely confused likley s/s to metabolic encephalopathy. Imaging completed without acute findings. Her dilantin dose was decreased and her mental status improved. She has joseph LE swelling that is chronic, doppler negative for dvt. Weights in the TCU stable at 145lbs. Also received prednisone for 7 days for acute psuedogout.   Has a pressure sore to left heel, nursing is aware. Will need offloading and reminders.    TCU: weights were variable, ultimately resumed lasix with improvement in LE swelling and pain. Stable at 145. She did have a inflamed R ankle and foot that responded well to prednisone, likely pseudogout (uric acid 3.7), remains on allopurinol. She worked well with therapy. Slums 8/30, MOCA 10/30. Family looking for memory care for long term.     Discharge Medications:  MED REC REQUIRED{  Post Medication Reconciliation Status:  Discharge medications reconciled and changed, see notes/orders     Current Outpatient Medications   Medication Sig Dispense Refill    furosemide (LASIX) 20 MG tablet Take 1 tablet (20 mg) by mouth daily      acetaminophen (TYLENOL) 325 MG tablet Take 650 mg by mouth every 6 hours as needed for mild pain      allopurinol (ZYLOPRIM) 100 MG tablet Take 1 tablet (100 mg total) by mouth daily 90 tablet 3    carvedilol (COREG) 6.25 MG tablet Take 1 tablet (6.25 mg) by mouth 2 times daily 180 tablet 3    cyanocobalamin (VITAMIN B-12) 1000 MCG tablet Take 1 tablet (1,000 mcg) by mouth daily for 30 days 30 tablet 0    folic acid (FOLVITE) 1 MG tablet Take 1 tablet (1 mg) by mouth daily for 30 days 30 tablet 0    losartan (COZAAR) 100 MG tablet Take 1 tablet (100 mg) by mouth daily 90 tablet 3    magnesium oxide (MAG-OX) 400 MG  tablet Take 1 tablet (400 mg) by mouth daily. 90 tablet 3    Multiple Vitamins-Minerals (CENTRUM SILVER 50+WOMEN PO) Take 1 tablet by mouth daily      phenytoin (DILANTIN) 100 MG ER capsule Take 1 capsule (100 mg) by mouth 2 times daily 270 capsule 3          Controlled medications:   not applicable/none     Past Medical History:   Past Medical History:   Diagnosis Date    Anemia     Ataxia     Dermatitis     Created by Conversion     Epilepsy (H)     Essential hypertension     Gout     Monoclonal gammopathy of undetermined significance     Osteoporosis     Peripheral neuropathy     Secondary hyperparathyroidism (H)     non-renal     Physical Exam:   Vitals: BP (!) 158/71   Pulse 77   Temp 98.7  F (37.1  C)   Resp 18   Ht 1.524 m (5')   Wt 65.5 kg (144 lb 6.4 oz)   SpO2 100%   BMI 28.20 kg/m    BMI: Body mass index is 28.2 kg/m .  General appearance: alert, cooperative.   Lungs: respirations unlabored,no wheezing or rales.   Cardiovascular: Regular rate and rhythm.   ABDOMEN: Globular and soft, non tender.    Extremities: extremities normal, atraumatic, non pitting edema  Skin: No rashes or lesions  Neurologic: oriented. No focal deficits.   Psych: interacts well with caregivers,  pleasant  SNF labs: Labs done in SNF are in [facility] EPIC. Please refer to them using [employer] Everywhere.    DISCHARGE PLAN:  Follow up labs: bmp, cbc  Medical Follow Up:      Follow up with primary care provider in 1-2 weeks  Current [place] scheduled appointments: n/a  Discharge Services: Home Care:  Occupational Therapy, Physical Therapy, and Registered Nurse  Discharge Instructions Verbalized to Patient at Discharge:   Weigh yourself daily in the morning and keep a record. Call your primary clinic: a) if you are more short of breath, or b) if your weight changes more than 3 pounds in one day or more than 5 pounds in one week.     TOTAL DISCHARGE TIME:   >30 minutes  Electronically signed by:  Tia Morales CNP      Documentation of Face to Face and Certification for Home Health Services    I certify that patient: Peggy is under my care and that I, or a nurse practitioner or physician's assistant working with me, had a face-to-face encounter that meets the physician face-to-face encounter requirements with this patient on: 10/4/23.    This encounter with the patient was in whole, or in part, for the following medical condition, which is the primary reason for home health care: Chronic edema, weakness, psuedogout     I certify that, based on my findings, the following services are medically necessary home health services: Nursing, Occupational Therapy, and Physical Therapy.    My clinical findings support the need for the above services because: RN required for medication management, PT and OT required for continued functional improvment in home setting .     Further, I certify that my clinical findings support that this patient is homebound (i.e. absences from home require considerable and taxing effort and are for medical reasons or [employer] services or infrequently or of short duration when for other reasons) because: Requires assistance of another person or specialized equipment to access medical services because patient: Is unable to exit home safely on own due to: MCI. and Requires supervision of another for safe transfer...    Based on the above findings. I certify that this patient is confined to the home and needs intermittent skilled nursing care, physical therapy and/or speech therapy.  The patient is under my care, and I have initiated the establishment of the plan of care.  This patient will be followed by a physician who will periodically review the plan of care.

## 2023-10-04 NOTE — LETTER
"    10/4/2023        RE: Peggy De Anda  [address] [facility] [address]        [facility] GERIATRICS DISCHARGE SUMMARY  PATIENT'S NAME: Peggy De Anda  YOB: 1931  MEDICAL RECORD NUMBER:  [id number]  Place of Service where encounter took place:  [facility] ([facility]) [[id number]]    PRIMARY CARE PROVIDER AND CLINIC RESPONSIBLE AFTER TRANSFER:   Physician No Ref-Primary, No address on file       Transferring providers: Tia Morales CNP, Cathy Burton MD  Recent Hospitalization/ED:  [facility] stay 9/18/23 to 9/18/23.  Date of SNF Admission: September 18, 2023  Date of SNF (anticipated) Discharge: October 05, 2023  Discharged to: previous assisted living  Cognitive Scores: SLUMS: 8/30  Physical Function: Wheelchair dependent  DME: Wheelchair    Due to diagnosis of LE edema and weakness my patient will require and benefit from a manual 18 inch x 16inch Randy height wheelchair with a pair of standard foot rests, a pair of full-length armrests, and an 18x16 inch comfort curve cushion for lifetime use.  Patient requires randy height option due to short stature 60\".     Patient has mobility limitation that significantly impairs her ability to participate in mobility related and ADLs such as food prep, dressing, transfers and ambulation.  This limitation cannot be sufficiently and safely resolved by the use of a cane, walker or crutches due to weakness related to weakness and edema.  Patient and caregiver are able to safely use a wheelchair and mobility deficit can be resolved with its use.  Patient's home provides adequate access and maneuvering space.  Patient will use a wheelchair daily and has not expressed an unwillingness to use it within her home.    Discharge date: 10/5/23      CODE STATUS/ADVANCE DIRECTIVES DISCUSSION:  Prior   ALLERGIES: Oxycodone, Quinolones, and Levofloxacin    NURSING FACILITY COURSE   Medication " Changes/Rationale:   Lasix 20mg daily for fluid overload     Summary of nursing facility stay:      Bilateral leg edema  Physical deconditioning  Encephalopathy  Benign essential hypertension  Anemia  Epilepsy (H)  Gout    jayjay has a hx of ataxia, lumbar compression fractures, foraminal stenoses, HTN who fell at home and was brought to ED for evaluation.   She was acutely confused likley s/s to metabolic encephalopathy. Imaging completed without acute findings. Her dilantin dose was decreased and her mental status improved. She has joseph LE swelling that is chronic, doppler negative for dvt. Weights in the TCU stable at 145lbs. Also received prednisone for 7 days for acute psuedogout.   Has a pressure sore to left heel, nursing is aware. Will need offloading and reminders.    TCU: weights were variable, ultimately resumed lasix with improvement in LE swelling and pain. Stable at 145. She did have a inflamed R ankle and foot that responded well to prednisone, likely pseudogout (uric acid 3.7), remains on allopurinol. She worked well with therapy. Slums 8/30, MOCA 10/30. Family looking for memory care for long term.     Discharge Medications:  MED REC REQUIRED{  Post Medication Reconciliation Status:  Discharge medications reconciled and changed, see notes/orders     Current Outpatient Medications   Medication Sig Dispense Refill     furosemide (LASIX) 20 MG tablet Take 1 tablet (20 mg) by mouth daily       acetaminophen (TYLENOL) 325 MG tablet Take 650 mg by mouth every 6 hours as needed for mild pain       allopurinol (ZYLOPRIM) 100 MG tablet Take 1 tablet (100 mg total) by mouth daily 90 tablet 3     carvedilol (COREG) 6.25 MG tablet Take 1 tablet (6.25 mg) by mouth 2 times daily 180 tablet 3     cyanocobalamin (VITAMIN B-12) 1000 MCG tablet Take 1 tablet (1,000 mcg) by mouth daily for 30 days 30 tablet 0     folic acid (FOLVITE) 1 MG tablet Take 1 tablet (1 mg) by mouth daily for 30 days 30 tablet 0     losartan  (COZAAR) 100 MG tablet Take 1 tablet (100 mg) by mouth daily 90 tablet 3     magnesium oxide (MAG-OX) 400 MG tablet Take 1 tablet (400 mg) by mouth daily. 90 tablet 3     Multiple Vitamins-Minerals (CENTRUM SILVER 50+WOMEN PO) Take 1 tablet by mouth daily       phenytoin (DILANTIN) 100 MG ER capsule Take 1 capsule (100 mg) by mouth 2 times daily 270 capsule 3          Controlled medications:   not applicable/none     Past Medical History:   Past Medical History:   Diagnosis Date     Anemia      Ataxia      Dermatitis     Created by Conversion      Epilepsy (H)      Essential hypertension      Gout      Monoclonal gammopathy of undetermined significance      Osteoporosis      Peripheral neuropathy      Secondary hyperparathyroidism (H)     non-renal     Physical Exam:   Vitals: BP (!) 158/71   Pulse 77   Temp 98.7  F (37.1  C)   Resp 18   Ht 1.524 m (5')   Wt 65.5 kg (144 lb 6.4 oz)   SpO2 100%   BMI 28.20 kg/m    BMI: Body mass index is 28.2 kg/m .  General appearance: alert, cooperative.   Lungs: respirations unlabored,no wheezing or rales.   Cardiovascular: Regular rate and rhythm.   ABDOMEN: Globular and soft, non tender.    Extremities: extremities normal, atraumatic, non pitting edema  Skin: No rashes or lesions  Neurologic: oriented. No focal deficits.   Psych: interacts well with caregivers,  pleasant  SNF labs: Labs done in SNF are in [facility]. Please refer to them using [employer] Everywhere.    DISCHARGE PLAN:  Follow up labs: bmp, cbc  Medical Follow Up:      Follow up with primary care provider in 1-2 weeks  Current [facility] scheduled appointments: n/a  Discharge Services: Home Care:  Occupational Therapy, Physical Therapy, and Registered Nurse  Discharge Instructions Verbalized to Patient at Discharge:   Weigh yourself daily in the morning and keep a record. Call your primary clinic: a) if you are more short of breath, or b) if your weight changes more than 3 pounds in one day or more than 5  pounds in one week.     TOTAL DISCHARGE TIME:   >30 minutes  Electronically signed by:  Tia Morales CNP     Documentation of Face to Face and Certification for Home Health Services    I certify that patient: Peggy is under my care and that I, or a nurse practitioner or physician's assistant working with me, had a face-to-face encounter that meets the physician face-to-face encounter requirements with this patient on: 10/4/23.    This encounter with the patient was in whole, or in part, for the following medical condition, which is the primary reason for home health care: Chronic edema, weakness, psuedogout     I certify that, based on my findings, the following services are medically necessary home health services: Nursing, Occupational Therapy, and Physical Therapy.    My clinical findings support the need for the above services because: RN required for medication management, PT and OT required for continued functional improvment in home setting .     Further, I certify that my clinical findings support that this patient is homebound (i.e. absences from home require considerable and taxing effort and are for medical reasons or [facility] services or infrequently or of short duration when for other reasons) because: Requires assistance of another person or specialized equipment to access medical services because patient: Is unable to exit home safely on own due to: MCI. and Requires supervision of another for safe transfer...    Based on the above findings. I certify that this patient is confined to the home and needs intermittent skilled nursing care, physical therapy and/or speech therapy.  The patient is under my care, and I have initiated the establishment of the plan of care.  This patient will be followed by a physician who will periodically review the plan of care.                      Sincerely,        Tia Morales CNP

## 2023-10-11 ENCOUNTER — LAB REQUISITION (OUTPATIENT)
Dept: LAB | Facility: CLINIC | Age: 88
End: 2023-10-11
Payer: MEDICARE

## 2023-10-11 DIAGNOSIS — I10 ESSENTIAL (PRIMARY) HYPERTENSION: ICD-10-CM

## 2023-10-11 DIAGNOSIS — E87.1 HYPO-OSMOLALITY AND HYPONATREMIA: ICD-10-CM

## 2023-10-11 DIAGNOSIS — G40.909 EPILEPSY, UNSPECIFIED, NOT INTRACTABLE, WITHOUT STATUS EPILEPTICUS (H): ICD-10-CM

## 2023-10-11 DIAGNOSIS — E21.1 SECONDARY HYPERPARATHYROIDISM, NOT ELSEWHERE CLASSIFIED (H): ICD-10-CM

## 2023-10-11 RX ORDER — FUROSEMIDE 20 MG
20 TABLET ORAL DAILY
COMMUNITY
Start: 2023-08-28

## 2023-10-12 LAB
ALBUMIN SERPL BCG-MCNC: 3.2 G/DL (ref 3.5–5.2)
ALP SERPL-CCNC: 89 U/L (ref 35–104)
ALT SERPL W P-5'-P-CCNC: 12 U/L (ref 0–50)
ANION GAP SERPL CALCULATED.3IONS-SCNC: 15 MMOL/L (ref 7–15)
AST SERPL W P-5'-P-CCNC: 31 U/L (ref 0–45)
BILIRUB SERPL-MCNC: 0.3 MG/DL
BUN SERPL-MCNC: 19.6 MG/DL (ref 8–23)
CALCIUM SERPL-MCNC: 9 MG/DL (ref 8.2–9.6)
CHLORIDE SERPL-SCNC: 103 MMOL/L (ref 98–107)
CREAT SERPL-MCNC: 0.59 MG/DL (ref 0.51–0.95)
DEPRECATED HCO3 PLAS-SCNC: 21 MMOL/L (ref 22–29)
EGFRCR SERPLBLD CKD-EPI 2021: 85 ML/MIN/1.73M2
ERYTHROCYTE [DISTWIDTH] IN BLOOD BY AUTOMATED COUNT: 13.8 % (ref 10–15)
GLUCOSE SERPL-MCNC: 171 MG/DL (ref 70–99)
HCT VFR BLD AUTO: 33.1 % (ref 35–47)
HGB BLD-MCNC: 10.3 G/DL (ref 11.7–15.7)
MCH RBC QN AUTO: 32.4 PG (ref 26.5–33)
MCHC RBC AUTO-ENTMCNC: 31.1 G/DL (ref 31.5–36.5)
MCV RBC AUTO: 104 FL (ref 78–100)
NT-PROBNP SERPL-MCNC: 1136 PG/ML (ref 0–1800)
PHENYTOIN SERPL-MCNC: 2 UG/ML
PLATELET # BLD AUTO: 313 10E3/UL (ref 150–450)
POTASSIUM SERPL-SCNC: 3.2 MMOL/L (ref 3.4–5.3)
PROT SERPL-MCNC: 6.5 G/DL (ref 6.4–8.3)
PTH-INTACT SERPL-MCNC: 62 PG/ML (ref 15–65)
RBC # BLD AUTO: 3.18 10E6/UL (ref 3.8–5.2)
SODIUM SERPL-SCNC: 139 MMOL/L (ref 135–145)
TSH SERPL DL<=0.005 MIU/L-ACNC: 3.3 UIU/ML (ref 0.3–4.2)
URATE SERPL-MCNC: 5.5 MG/DL (ref 2.4–5.7)
WBC # BLD AUTO: 8.1 10E3/UL (ref 4–11)

## 2023-10-12 PROCEDURE — 85027 COMPLETE CBC AUTOMATED: CPT | Mod: ORL | Performed by: PHYSICIAN ASSISTANT

## 2023-10-12 PROCEDURE — 36415 COLL VENOUS BLD VENIPUNCTURE: CPT | Mod: ORL | Performed by: PHYSICIAN ASSISTANT

## 2023-10-12 PROCEDURE — 84550 ASSAY OF BLOOD/URIC ACID: CPT | Mod: ORL | Performed by: PHYSICIAN ASSISTANT

## 2023-10-12 PROCEDURE — 83970 ASSAY OF PARATHORMONE: CPT | Mod: ORL | Performed by: PHYSICIAN ASSISTANT

## 2023-10-12 PROCEDURE — P9603 ONE-WAY ALLOW PRORATED MILES: HCPCS | Mod: ORL | Performed by: PHYSICIAN ASSISTANT

## 2023-10-12 PROCEDURE — 80185 ASSAY OF PHENYTOIN TOTAL: CPT | Mod: ORL | Performed by: PHYSICIAN ASSISTANT

## 2023-10-12 PROCEDURE — 83880 ASSAY OF NATRIURETIC PEPTIDE: CPT | Mod: ORL | Performed by: PHYSICIAN ASSISTANT

## 2023-10-12 PROCEDURE — 80053 COMPREHEN METABOLIC PANEL: CPT | Mod: ORL | Performed by: PHYSICIAN ASSISTANT

## 2023-10-12 PROCEDURE — 84443 ASSAY THYROID STIM HORMONE: CPT | Mod: ORL | Performed by: PHYSICIAN ASSISTANT

## 2023-10-12 NOTE — PROGRESS NOTES
[facility] GERIATRICS    PRIMARY CARE PROVIDER AND CLINIC:  Physician No Ref-Primary, No address on file  Chief Complaint   Patient presents with    RECHECK      [place] Medical Record Number:  [id number]  Place of Service where encounter took place:  [facility] ([facility]) [[id number]]    HPI: Peggy De Anda is a 89 year old  (11/15/1931), who is being seen today for an episodic care visit at: [facility] () [[id number]]. Today's concern is: R ankle pain.     Today: Peggy is seen per request of nursing for increase R foot and ankle pain, redness. She has joseph LE edema that is baseline. Recent pseudogout in hospital. Also on allopurinol for hyperuricemia. Pain is inhibiting therapy progress today and began abruptly overnight. VS stable, afebrile.     ROS:  4 point ROS including Respiratory, CV, GI and , other than that noted in the HPI,  is negative    Vitals:  BP (!) 156/76   Pulse 77   Temp 98.7  F (37.1  C)   Resp 18   Ht 1.524 m (5')   Wt 65.8 kg (145 lb)   SpO2 95%   BMI 28.32 kg/m    Exam:  General appearance: Alert, calm, cooperative.   Resp: Lungs clear to auscultation, fine crackles in RLL.  Unlabored, no wheezing.  CV: Regular rate and rhythm   Abdomen: Normal bowel sounds, soft, non tender.   Extremities: 3+ soft, nonpitting BLE edema.  Skin: Ecchymosis present on dorsal hands.  Neuro: Oriented. No focal deficits.   Psych: Pleasant, calm affect.       Lab/Diagnostic data:  Recent labs in [facility] reviewed by me today.     ASSESSMENT/PLAN:    (R60.0) Bilateral leg edema  (primary encounter diagnosis)  Comment: stable.   Plan:   -Continue lasix, ace wraps as tolerated.     (M11.271) Pseudogout of foot, right  Comment: recent hx also on allopurinol. Gout  vs psuedogout.    Plan:   -Start prednisone 40mg po daily x 5 days.   -uric acid, cbc tomorrow.     (R53.1) Generalized weakness  Comment: multifactorial to above.   Plan:   -PT and OT.     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   Plan:   -Discuss with family appropriate discharge.             Tia Morales, CNP

## 2023-11-06 ENCOUNTER — TELEPHONE (OUTPATIENT)
Dept: FAMILY MEDICINE | Facility: CLINIC | Age: 88
End: 2023-11-06
Payer: COMMERCIAL

## 2023-11-06 NOTE — TELEPHONE ENCOUNTER
Patient Quality Outreach    Patient is due for the following:   Physical Annual Wellness Visit    Next Steps:   Schedule a Annual Wellness Visit    Type of outreach:    Phone, left message for patient/parent to call back.    Next Steps:  Reach out within 90 days via Phone.    Max number of attempts reached: No. Will try again in 90 days if patient still on fail list.    Questions for provider review:    None           Merna Driver MA  Chart routed to Care Team.

## 2023-11-08 ENCOUNTER — LAB REQUISITION (OUTPATIENT)
Dept: LAB | Facility: CLINIC | Age: 88
End: 2023-11-08
Payer: MEDICARE

## 2023-11-08 DIAGNOSIS — E83.42 HYPOMAGNESEMIA: ICD-10-CM

## 2023-11-09 LAB — MAGNESIUM SERPL-MCNC: 1.6 MG/DL (ref 1.7–2.3)

## 2023-11-09 PROCEDURE — 36415 COLL VENOUS BLD VENIPUNCTURE: CPT | Mod: ORL | Performed by: PHYSICIAN ASSISTANT

## 2023-11-09 PROCEDURE — P9604 ONE-WAY ALLOW PRORATED TRIP: HCPCS | Mod: ORL | Performed by: PHYSICIAN ASSISTANT

## 2023-11-09 PROCEDURE — 83735 ASSAY OF MAGNESIUM: CPT | Mod: ORL | Performed by: PHYSICIAN ASSISTANT

## 2023-11-21 NOTE — TELEPHONE ENCOUNTER
Patient Quality Outreach    Patient is due for the following:   Physical Annual Wellness Visit    Next Steps:   Schedule a Annual Wellness Visit    Type of outreach:    Phone, left message for patient/parent to call back.    Next Steps:  Reach out within 90 days via Phone.    Max number of attempts reached: Yes. Will try again in 90 days if patient still on fail list.    Questions for provider review:    None           Merna Driver MA  Chart routed to Care Team.

## 2023-11-29 ENCOUNTER — LAB REQUISITION (OUTPATIENT)
Dept: LAB | Facility: CLINIC | Age: 88
End: 2023-11-29
Payer: MEDICARE

## 2023-11-29 DIAGNOSIS — E83.42 HYPOMAGNESEMIA: ICD-10-CM

## 2023-11-30 LAB — MAGNESIUM SERPL-MCNC: 1.9 MG/DL (ref 1.7–2.3)

## 2023-11-30 PROCEDURE — P9604 ONE-WAY ALLOW PRORATED TRIP: HCPCS | Mod: ORL | Performed by: PHYSICIAN ASSISTANT

## 2023-11-30 PROCEDURE — 83735 ASSAY OF MAGNESIUM: CPT | Mod: ORL | Performed by: PHYSICIAN ASSISTANT

## 2023-11-30 PROCEDURE — 36415 COLL VENOUS BLD VENIPUNCTURE: CPT | Mod: ORL | Performed by: PHYSICIAN ASSISTANT

## 2023-12-13 ENCOUNTER — LAB REQUISITION (OUTPATIENT)
Dept: LAB | Facility: CLINIC | Age: 88
End: 2023-12-13
Payer: MEDICARE

## 2023-12-13 DIAGNOSIS — E87.6 HYPOKALEMIA: ICD-10-CM

## 2023-12-13 DIAGNOSIS — E83.42 HYPOMAGNESEMIA: ICD-10-CM

## 2023-12-14 PROCEDURE — 84132 ASSAY OF SERUM POTASSIUM: CPT | Mod: ORL | Performed by: PHYSICIAN ASSISTANT

## 2023-12-14 PROCEDURE — 36415 COLL VENOUS BLD VENIPUNCTURE: CPT | Mod: ORL | Performed by: PHYSICIAN ASSISTANT

## 2023-12-14 PROCEDURE — 83735 ASSAY OF MAGNESIUM: CPT | Mod: ORL | Performed by: PHYSICIAN ASSISTANT

## 2023-12-14 PROCEDURE — P9603 ONE-WAY ALLOW PRORATED MILES: HCPCS | Mod: ORL | Performed by: PHYSICIAN ASSISTANT

## 2023-12-15 LAB
MAGNESIUM SERPL-MCNC: 1.9 MG/DL (ref 1.7–2.3)
POTASSIUM SERPL-SCNC: 4.6 MMOL/L (ref 3.4–5.3)

## 2024-03-06 ENCOUNTER — LAB REQUISITION (OUTPATIENT)
Dept: LAB | Facility: CLINIC | Age: 89
End: 2024-03-06
Payer: MEDICARE

## 2024-03-06 DIAGNOSIS — F32.1 MAJOR DEPRESSIVE DISORDER, SINGLE EPISODE, MODERATE (H): ICD-10-CM

## 2024-03-07 PROCEDURE — P9604 ONE-WAY ALLOW PRORATED TRIP: HCPCS | Mod: ORL | Performed by: PHYSICIAN ASSISTANT

## 2024-03-07 PROCEDURE — 80048 BASIC METABOLIC PNL TOTAL CA: CPT | Mod: ORL | Performed by: PHYSICIAN ASSISTANT

## 2024-03-07 PROCEDURE — 36415 COLL VENOUS BLD VENIPUNCTURE: CPT | Mod: ORL | Performed by: PHYSICIAN ASSISTANT

## 2024-03-08 LAB
ANION GAP SERPL CALCULATED.3IONS-SCNC: 8 MMOL/L (ref 7–15)
BUN SERPL-MCNC: 25.8 MG/DL (ref 8–23)
CALCIUM SERPL-MCNC: 9.1 MG/DL (ref 8.2–9.6)
CHLORIDE SERPL-SCNC: 106 MMOL/L (ref 98–107)
CREAT SERPL-MCNC: 0.68 MG/DL (ref 0.51–0.95)
DEPRECATED HCO3 PLAS-SCNC: 25 MMOL/L (ref 22–29)
EGFRCR SERPLBLD CKD-EPI 2021: 81 ML/MIN/1.73M2
GLUCOSE SERPL-MCNC: 81 MG/DL (ref 70–99)
POTASSIUM SERPL-SCNC: 4.8 MMOL/L (ref 3.4–5.3)
SODIUM SERPL-SCNC: 139 MMOL/L (ref 135–145)

## 2024-04-24 ENCOUNTER — LAB REQUISITION (OUTPATIENT)
Dept: LAB | Facility: CLINIC | Age: 89
End: 2024-04-24
Payer: COMMERCIAL

## 2024-04-24 DIAGNOSIS — R53.83 OTHER FATIGUE: ICD-10-CM

## 2024-04-24 DIAGNOSIS — R05.1 ACUTE COUGH: ICD-10-CM

## 2024-04-25 LAB
BASOPHILS # BLD AUTO: 0 10E3/UL (ref 0–0.2)
BASOPHILS NFR BLD AUTO: 1 %
EOSINOPHIL # BLD AUTO: 0.1 10E3/UL (ref 0–0.7)
EOSINOPHIL NFR BLD AUTO: 1 %
ERYTHROCYTE [DISTWIDTH] IN BLOOD BY AUTOMATED COUNT: 13.6 % (ref 10–15)
HCT VFR BLD AUTO: 36 % (ref 35–47)
HGB BLD-MCNC: 11.4 G/DL (ref 11.7–15.7)
IMM GRANULOCYTES # BLD: 0.1 10E3/UL
IMM GRANULOCYTES NFR BLD: 1 %
LYMPHOCYTES # BLD AUTO: 2.8 10E3/UL (ref 0.8–5.3)
LYMPHOCYTES NFR BLD AUTO: 43 %
MCH RBC QN AUTO: 33.3 PG (ref 26.5–33)
MCHC RBC AUTO-ENTMCNC: 31.7 G/DL (ref 31.5–36.5)
MCV RBC AUTO: 105 FL (ref 78–100)
MONOCYTES # BLD AUTO: 0.5 10E3/UL (ref 0–1.3)
MONOCYTES NFR BLD AUTO: 8 %
NEUTROPHILS # BLD AUTO: 3 10E3/UL (ref 1.6–8.3)
NEUTROPHILS NFR BLD AUTO: 46 %
NRBC # BLD AUTO: 0 10E3/UL
NRBC BLD AUTO-RTO: 0 /100
PLATELET # BLD AUTO: 198 10E3/UL (ref 150–450)
RBC # BLD AUTO: 3.42 10E6/UL (ref 3.8–5.2)
WBC # BLD AUTO: 6.5 10E3/UL (ref 4–11)

## 2024-04-25 PROCEDURE — P9604 ONE-WAY ALLOW PRORATED TRIP: HCPCS | Mod: ORL | Performed by: PHYSICIAN ASSISTANT

## 2024-04-25 PROCEDURE — 80048 BASIC METABOLIC PNL TOTAL CA: CPT | Mod: ORL | Performed by: PHYSICIAN ASSISTANT

## 2024-04-25 PROCEDURE — 36415 COLL VENOUS BLD VENIPUNCTURE: CPT | Mod: ORL | Performed by: PHYSICIAN ASSISTANT

## 2024-04-25 PROCEDURE — 85025 COMPLETE CBC W/AUTO DIFF WBC: CPT | Mod: ORL | Performed by: PHYSICIAN ASSISTANT

## 2024-04-26 LAB
ANION GAP SERPL CALCULATED.3IONS-SCNC: 11 MMOL/L (ref 7–15)
BUN SERPL-MCNC: 21.5 MG/DL (ref 8–23)
CALCIUM SERPL-MCNC: 8.8 MG/DL (ref 8.2–9.6)
CHLORIDE SERPL-SCNC: 110 MMOL/L (ref 98–107)
CREAT SERPL-MCNC: 0.7 MG/DL (ref 0.51–0.95)
DEPRECATED HCO3 PLAS-SCNC: 21 MMOL/L (ref 22–29)
EGFRCR SERPLBLD CKD-EPI 2021: 81 ML/MIN/1.73M2
GLUCOSE SERPL-MCNC: 159 MG/DL (ref 70–99)
POTASSIUM SERPL-SCNC: 4.3 MMOL/L (ref 3.4–5.3)
SODIUM SERPL-SCNC: 142 MMOL/L (ref 135–145)

## 2024-05-08 ENCOUNTER — LAB REQUISITION (OUTPATIENT)
Dept: LAB | Facility: CLINIC | Age: 89
End: 2024-05-08
Payer: COMMERCIAL

## 2024-05-08 DIAGNOSIS — R05.1 ACUTE COUGH: ICD-10-CM

## 2024-05-08 DIAGNOSIS — R53.83 OTHER FATIGUE: ICD-10-CM

## 2024-05-09 LAB
BASOPHILS # BLD AUTO: 0 10E3/UL (ref 0–0.2)
BASOPHILS NFR BLD AUTO: 1 %
EOSINOPHIL # BLD AUTO: 0.1 10E3/UL (ref 0–0.7)
EOSINOPHIL NFR BLD AUTO: 2 %
ERYTHROCYTE [DISTWIDTH] IN BLOOD BY AUTOMATED COUNT: 14 % (ref 10–15)
HCT VFR BLD AUTO: 37.4 % (ref 35–47)
HGB BLD-MCNC: 11.9 G/DL (ref 11.7–15.7)
IMM GRANULOCYTES # BLD: 0 10E3/UL
IMM GRANULOCYTES NFR BLD: 1 %
LYMPHOCYTES # BLD AUTO: 3.1 10E3/UL (ref 0.8–5.3)
LYMPHOCYTES NFR BLD AUTO: 41 %
MCH RBC QN AUTO: 33.9 PG (ref 26.5–33)
MCHC RBC AUTO-ENTMCNC: 31.8 G/DL (ref 31.5–36.5)
MCV RBC AUTO: 107 FL (ref 78–100)
MONOCYTES # BLD AUTO: 0.7 10E3/UL (ref 0–1.3)
MONOCYTES NFR BLD AUTO: 9 %
NEUTROPHILS # BLD AUTO: 3.5 10E3/UL (ref 1.6–8.3)
NEUTROPHILS NFR BLD AUTO: 46 %
NRBC # BLD AUTO: 0 10E3/UL
NRBC BLD AUTO-RTO: 0 /100
PLATELET # BLD AUTO: 170 10E3/UL (ref 150–450)
RBC # BLD AUTO: 3.51 10E6/UL (ref 3.8–5.2)
WBC # BLD AUTO: 7.5 10E3/UL (ref 4–11)

## 2024-05-09 PROCEDURE — 80048 BASIC METABOLIC PNL TOTAL CA: CPT | Mod: ORL | Performed by: PHYSICIAN ASSISTANT

## 2024-05-09 PROCEDURE — 36415 COLL VENOUS BLD VENIPUNCTURE: CPT | Mod: ORL | Performed by: PHYSICIAN ASSISTANT

## 2024-05-09 PROCEDURE — P9603 ONE-WAY ALLOW PRORATED MILES: HCPCS | Mod: ORL | Performed by: PHYSICIAN ASSISTANT

## 2024-05-09 PROCEDURE — 85025 COMPLETE CBC W/AUTO DIFF WBC: CPT | Mod: ORL | Performed by: PHYSICIAN ASSISTANT

## 2024-05-10 LAB
ANION GAP SERPL CALCULATED.3IONS-SCNC: 11 MMOL/L (ref 7–15)
BUN SERPL-MCNC: 23.6 MG/DL (ref 8–23)
CALCIUM SERPL-MCNC: 9.4 MG/DL (ref 8.2–9.6)
CHLORIDE SERPL-SCNC: 109 MMOL/L (ref 98–107)
CREAT SERPL-MCNC: 0.67 MG/DL (ref 0.51–0.95)
DEPRECATED HCO3 PLAS-SCNC: 23 MMOL/L (ref 22–29)
EGFRCR SERPLBLD CKD-EPI 2021: 82 ML/MIN/1.73M2
GLUCOSE SERPL-MCNC: 71 MG/DL (ref 70–99)
POTASSIUM SERPL-SCNC: 4.4 MMOL/L (ref 3.4–5.3)
SODIUM SERPL-SCNC: 143 MMOL/L (ref 135–145)

## 2024-07-19 ENCOUNTER — LAB REQUISITION (OUTPATIENT)
Dept: LAB | Facility: CLINIC | Age: 89
End: 2024-07-19
Payer: COMMERCIAL

## 2024-07-19 DIAGNOSIS — G40.909 EPILEPSY, UNSPECIFIED, NOT INTRACTABLE, WITHOUT STATUS EPILEPTICUS (H): ICD-10-CM

## 2024-07-22 LAB — T PALLIDUM AB SER QL: NONREACTIVE

## 2024-07-22 PROCEDURE — 86780 TREPONEMA PALLIDUM: CPT | Mod: ORL | Performed by: PHYSICIAN ASSISTANT

## 2024-07-22 PROCEDURE — P9604 ONE-WAY ALLOW PRORATED TRIP: HCPCS | Mod: ORL | Performed by: PHYSICIAN ASSISTANT

## 2024-07-22 PROCEDURE — 36415 COLL VENOUS BLD VENIPUNCTURE: CPT | Mod: ORL | Performed by: PHYSICIAN ASSISTANT

## 2024-07-25 ENCOUNTER — LAB REQUISITION (OUTPATIENT)
Dept: LAB | Facility: CLINIC | Age: 89
End: 2024-07-25
Payer: COMMERCIAL

## 2024-07-25 DIAGNOSIS — G30.1 ALZHEIMER'S DISEASE WITH LATE ONSET (CODE) (H): ICD-10-CM

## 2024-08-01 LAB
ALBUMIN SERPL BCG-MCNC: 3.5 G/DL (ref 3.5–5.2)
ALP SERPL-CCNC: 92 U/L (ref 40–150)
ALT SERPL W P-5'-P-CCNC: <5 U/L (ref 0–50)
AST SERPL W P-5'-P-CCNC: 18 U/L (ref 0–45)
BILIRUB DIRECT SERPL-MCNC: <0.2 MG/DL (ref 0–0.3)
BILIRUB SERPL-MCNC: 0.2 MG/DL
PROT SERPL-MCNC: 6.8 G/DL (ref 6.4–8.3)
VALPROATE SERPL-MCNC: 43.9 UG/ML

## 2024-08-01 PROCEDURE — 36415 COLL VENOUS BLD VENIPUNCTURE: CPT | Mod: ORL | Performed by: PHYSICIAN ASSISTANT

## 2024-08-01 PROCEDURE — P9604 ONE-WAY ALLOW PRORATED TRIP: HCPCS | Mod: ORL | Performed by: PHYSICIAN ASSISTANT

## 2024-08-01 PROCEDURE — 80076 HEPATIC FUNCTION PANEL: CPT | Mod: ORL | Performed by: PHYSICIAN ASSISTANT

## 2024-08-01 PROCEDURE — 80164 ASSAY DIPROPYLACETIC ACD TOT: CPT | Mod: ORL | Performed by: PHYSICIAN ASSISTANT

## 2024-10-29 ENCOUNTER — LAB REQUISITION (OUTPATIENT)
Dept: LAB | Facility: CLINIC | Age: 89
End: 2024-10-29
Payer: COMMERCIAL

## 2024-10-29 DIAGNOSIS — E21.1 SECONDARY HYPERPARATHYROIDISM, NOT ELSEWHERE CLASSIFIED (H): ICD-10-CM

## 2024-10-29 DIAGNOSIS — L89.629 PRESSURE ULCER OF LEFT HEEL, UNSPECIFIED STAGE: ICD-10-CM

## 2024-10-31 PROCEDURE — 83970 ASSAY OF PARATHORMONE: CPT | Mod: ORL | Performed by: PHYSICIAN ASSISTANT

## 2024-10-31 PROCEDURE — 80048 BASIC METABOLIC PNL TOTAL CA: CPT | Mod: ORL | Performed by: PHYSICIAN ASSISTANT

## 2024-10-31 PROCEDURE — P9603 ONE-WAY ALLOW PRORATED MILES: HCPCS | Mod: ORL | Performed by: PHYSICIAN ASSISTANT

## 2024-10-31 PROCEDURE — 36415 COLL VENOUS BLD VENIPUNCTURE: CPT | Mod: ORL | Performed by: PHYSICIAN ASSISTANT

## 2024-11-01 LAB
ANION GAP SERPL CALCULATED.3IONS-SCNC: 16 MMOL/L (ref 7–15)
BUN SERPL-MCNC: 22.2 MG/DL (ref 8–23)
CALCIUM SERPL-MCNC: 9.2 MG/DL (ref 8.8–10.4)
CHLORIDE SERPL-SCNC: 107 MMOL/L (ref 98–107)
CREAT SERPL-MCNC: 0.77 MG/DL (ref 0.51–0.95)
EGFRCR SERPLBLD CKD-EPI 2021: 72 ML/MIN/1.73M2
GLUCOSE SERPL-MCNC: 92 MG/DL (ref 70–99)
HCO3 SERPL-SCNC: 17 MMOL/L (ref 22–29)
POTASSIUM SERPL-SCNC: 4.8 MMOL/L (ref 3.4–5.3)
PTH-INTACT SERPL-MCNC: 38 PG/ML (ref 15–65)
SODIUM SERPL-SCNC: 140 MMOL/L (ref 135–145)

## 2024-11-05 ENCOUNTER — LAB REQUISITION (OUTPATIENT)
Dept: LAB | Facility: CLINIC | Age: 89
End: 2024-11-05
Payer: COMMERCIAL

## 2024-11-05 DIAGNOSIS — M79.672 PAIN IN LEFT FOOT: ICD-10-CM

## 2024-11-07 LAB
BASOPHILS # BLD AUTO: 0 10E3/UL (ref 0–0.2)
BASOPHILS NFR BLD AUTO: 0 %
EOSINOPHIL # BLD AUTO: 0.1 10E3/UL (ref 0–0.7)
EOSINOPHIL NFR BLD AUTO: 2 %
ERYTHROCYTE [DISTWIDTH] IN BLOOD BY AUTOMATED COUNT: 13.4 % (ref 10–15)
ERYTHROCYTE [SEDIMENTATION RATE] IN BLOOD BY WESTERGREN METHOD: 18 MM/HR (ref 0–30)
HCT VFR BLD AUTO: 38.6 % (ref 35–47)
HGB BLD-MCNC: 11.6 G/DL (ref 11.7–15.7)
IMM GRANULOCYTES # BLD: 0.1 10E3/UL
IMM GRANULOCYTES NFR BLD: 1 %
LYMPHOCYTES # BLD AUTO: 3.6 10E3/UL (ref 0.8–5.3)
LYMPHOCYTES NFR BLD AUTO: 44 %
MCH RBC QN AUTO: 33 PG (ref 26.5–33)
MCHC RBC AUTO-ENTMCNC: 30.1 G/DL (ref 31.5–36.5)
MCV RBC AUTO: 110 FL (ref 78–100)
MONOCYTES # BLD AUTO: 0.8 10E3/UL (ref 0–1.3)
MONOCYTES NFR BLD AUTO: 9 %
NEUTROPHILS # BLD AUTO: 3.5 10E3/UL (ref 1.6–8.3)
NEUTROPHILS NFR BLD AUTO: 44 %
NRBC # BLD AUTO: 0 10E3/UL
NRBC BLD AUTO-RTO: 0 /100
PLATELET # BLD AUTO: 169 10E3/UL (ref 150–450)
RBC # BLD AUTO: 3.52 10E6/UL (ref 3.8–5.2)
URATE SERPL-MCNC: 7.1 MG/DL (ref 2.4–5.7)
WBC # BLD AUTO: 8.1 10E3/UL (ref 4–11)

## 2024-11-07 PROCEDURE — 84550 ASSAY OF BLOOD/URIC ACID: CPT | Mod: ORL | Performed by: PHYSICIAN ASSISTANT

## 2024-11-07 PROCEDURE — 36415 COLL VENOUS BLD VENIPUNCTURE: CPT | Mod: ORL | Performed by: PHYSICIAN ASSISTANT

## 2024-11-07 PROCEDURE — P9603 ONE-WAY ALLOW PRORATED MILES: HCPCS | Mod: ORL | Performed by: PHYSICIAN ASSISTANT

## 2024-11-07 PROCEDURE — 85025 COMPLETE CBC W/AUTO DIFF WBC: CPT | Mod: ORL | Performed by: PHYSICIAN ASSISTANT

## 2024-11-07 PROCEDURE — 85652 RBC SED RATE AUTOMATED: CPT | Mod: ORL | Performed by: PHYSICIAN ASSISTANT

## 2024-11-14 ENCOUNTER — LAB REQUISITION (OUTPATIENT)
Dept: LAB | Facility: CLINIC | Age: 89
End: 2024-11-14
Payer: COMMERCIAL

## 2024-11-14 DIAGNOSIS — R39.15 URGENCY OF URINATION: ICD-10-CM

## 2024-11-18 ENCOUNTER — LAB REQUISITION (OUTPATIENT)
Dept: LAB | Facility: CLINIC | Age: 89
End: 2024-11-18
Payer: COMMERCIAL

## 2024-11-18 DIAGNOSIS — R39.15 URGENCY OF URINATION: ICD-10-CM

## 2024-11-18 LAB
ALBUMIN UR-MCNC: 10 MG/DL
APPEARANCE UR: ABNORMAL
BACTERIA #/AREA URNS HPF: ABNORMAL /HPF
BILIRUB UR QL STRIP: NEGATIVE
COLOR UR AUTO: ABNORMAL
GLUCOSE UR STRIP-MCNC: NEGATIVE MG/DL
HGB UR QL STRIP: NEGATIVE
KETONES UR STRIP-MCNC: NEGATIVE MG/DL
LEUKOCYTE ESTERASE UR QL STRIP: ABNORMAL
MUCOUS THREADS #/AREA URNS LPF: PRESENT /LPF
NITRATE UR QL: POSITIVE
PH UR STRIP: 5.5 [PH] (ref 5–7)
RBC URINE: 2 /HPF
SP GR UR STRIP: 1.02 (ref 1–1.03)
SQUAMOUS EPITHELIAL: 1 /HPF
UROBILINOGEN UR STRIP-MCNC: NORMAL MG/DL
WBC CLUMPS #/AREA URNS HPF: PRESENT /HPF
WBC URINE: 129 /HPF

## 2024-11-18 PROCEDURE — 87186 SC STD MICRODIL/AGAR DIL: CPT | Mod: ORL | Performed by: PHYSICIAN ASSISTANT

## 2024-11-18 PROCEDURE — 81001 URINALYSIS AUTO W/SCOPE: CPT | Mod: ORL | Performed by: PHYSICIAN ASSISTANT

## 2024-11-20 LAB — BACTERIA UR CULT: ABNORMAL

## 2025-02-04 ENCOUNTER — LAB REQUISITION (OUTPATIENT)
Dept: LAB | Facility: CLINIC | Age: OVER 89
End: 2025-02-04
Payer: COMMERCIAL

## 2025-02-04 DIAGNOSIS — N39.0 URINARY TRACT INFECTION, SITE NOT SPECIFIED: ICD-10-CM

## 2025-02-04 LAB
ALBUMIN UR-MCNC: 10 MG/DL
APPEARANCE UR: ABNORMAL
BACTERIA #/AREA URNS HPF: ABNORMAL /HPF
BILIRUB UR QL STRIP: NEGATIVE
COLOR UR AUTO: ABNORMAL
GLUCOSE UR STRIP-MCNC: NEGATIVE MG/DL
HGB UR QL STRIP: NEGATIVE
KETONES UR STRIP-MCNC: NEGATIVE MG/DL
LEUKOCYTE ESTERASE UR QL STRIP: ABNORMAL
NITRATE UR QL: POSITIVE
PH UR STRIP: 5.5 [PH] (ref 5–7)
RBC URINE: 3 /HPF
SP GR UR STRIP: 1.02 (ref 1–1.03)
SQUAMOUS EPITHELIAL: 15 /HPF
UROBILINOGEN UR STRIP-MCNC: NORMAL MG/DL
WBC CLUMPS #/AREA URNS HPF: PRESENT /HPF
WBC URINE: 180 /HPF

## 2025-02-06 LAB — BACTERIA UR CULT: NORMAL

## 2025-03-24 ENCOUNTER — LAB REQUISITION (OUTPATIENT)
Dept: LAB | Facility: CLINIC | Age: OVER 89
End: 2025-03-24
Payer: COMMERCIAL

## 2025-03-24 DIAGNOSIS — Z79.899 OTHER LONG TERM (CURRENT) DRUG THERAPY: ICD-10-CM

## 2025-03-26 LAB
ANION GAP SERPL CALCULATED.3IONS-SCNC: 12 MMOL/L (ref 7–15)
BUN SERPL-MCNC: 30.9 MG/DL (ref 8–23)
CALCIUM SERPL-MCNC: 9 MG/DL (ref 8.8–10.4)
CHLORIDE SERPL-SCNC: 108 MMOL/L (ref 98–107)
CREAT SERPL-MCNC: 0.96 MG/DL (ref 0.51–0.95)
EGFRCR SERPLBLD CKD-EPI 2021: 55 ML/MIN/1.73M2
ERYTHROCYTE [DISTWIDTH] IN BLOOD BY AUTOMATED COUNT: 12.8 % (ref 10–15)
GLUCOSE SERPL-MCNC: 112 MG/DL (ref 70–99)
HCO3 SERPL-SCNC: 22 MMOL/L (ref 22–29)
HCT VFR BLD AUTO: 35.8 % (ref 35–47)
HGB BLD-MCNC: 11 G/DL (ref 11.7–15.7)
MCH RBC QN AUTO: 32.5 PG (ref 26.5–33)
MCHC RBC AUTO-ENTMCNC: 30.7 G/DL (ref 31.5–36.5)
MCV RBC AUTO: 106 FL (ref 78–100)
PLATELET # BLD AUTO: 208 10E3/UL (ref 150–450)
POTASSIUM SERPL-SCNC: 4.3 MMOL/L (ref 3.4–5.3)
RBC # BLD AUTO: 3.38 10E6/UL (ref 3.8–5.2)
SODIUM SERPL-SCNC: 142 MMOL/L (ref 135–145)
WBC # BLD AUTO: 5.3 10E3/UL (ref 4–11)

## 2025-03-26 PROCEDURE — 85027 COMPLETE CBC AUTOMATED: CPT | Mod: ORL | Performed by: FAMILY MEDICINE

## 2025-03-26 PROCEDURE — 36415 COLL VENOUS BLD VENIPUNCTURE: CPT | Mod: ORL | Performed by: FAMILY MEDICINE

## 2025-03-26 PROCEDURE — P9604 ONE-WAY ALLOW PRORATED TRIP: HCPCS | Mod: ORL | Performed by: FAMILY MEDICINE

## 2025-03-26 PROCEDURE — 80048 BASIC METABOLIC PNL TOTAL CA: CPT | Mod: ORL | Performed by: FAMILY MEDICINE

## 2025-05-03 ENCOUNTER — APPOINTMENT (OUTPATIENT)
Dept: RADIOLOGY | Facility: HOSPITAL | Age: OVER 89
End: 2025-05-03
Attending: EMERGENCY MEDICINE
Payer: COMMERCIAL

## 2025-05-03 ENCOUNTER — APPOINTMENT (OUTPATIENT)
Dept: CT IMAGING | Facility: HOSPITAL | Age: OVER 89
End: 2025-05-03
Attending: STUDENT IN AN ORGANIZED HEALTH CARE EDUCATION/TRAINING PROGRAM
Payer: COMMERCIAL

## 2025-05-03 ENCOUNTER — APPOINTMENT (OUTPATIENT)
Dept: CT IMAGING | Facility: HOSPITAL | Age: OVER 89
End: 2025-05-03
Attending: EMERGENCY MEDICINE
Payer: COMMERCIAL

## 2025-05-03 ENCOUNTER — HOSPITAL ENCOUNTER (INPATIENT)
Facility: HOSPITAL | Age: OVER 89
End: 2025-05-03
Attending: EMERGENCY MEDICINE | Admitting: FAMILY MEDICINE
Payer: COMMERCIAL

## 2025-05-03 DIAGNOSIS — S82.191A OTHER CLOSED FRACTURE OF PROXIMAL END OF RIGHT TIBIA, INITIAL ENCOUNTER: ICD-10-CM

## 2025-05-03 DIAGNOSIS — Z86.59 HISTORY OF DEMENTIA: ICD-10-CM

## 2025-05-03 DIAGNOSIS — N30.00 ACUTE CYSTITIS WITHOUT HEMATURIA: ICD-10-CM

## 2025-05-03 DIAGNOSIS — G92.8 TOXIC METABOLIC ENCEPHALOPATHY: ICD-10-CM

## 2025-05-03 DIAGNOSIS — S82.101A CLOSED FRACTURE OF PROXIMAL END OF RIGHT TIBIA, UNSPECIFIED FRACTURE MORPHOLOGY, INITIAL ENCOUNTER: Primary | ICD-10-CM

## 2025-05-03 DIAGNOSIS — Z51.5 HOSPICE CARE PATIENT: ICD-10-CM

## 2025-05-03 DIAGNOSIS — R19.7 DIARRHEA, UNSPECIFIED TYPE: ICD-10-CM

## 2025-05-03 PROBLEM — S82.209A TIBIA FRACTURE: Status: ACTIVE | Noted: 2025-05-03

## 2025-05-03 LAB
ALBUMIN SERPL BCG-MCNC: 3.5 G/DL (ref 3.5–5.2)
ALBUMIN UR-MCNC: 10 MG/DL
ALP SERPL-CCNC: 163 U/L (ref 40–150)
ALT SERPL W P-5'-P-CCNC: 17 U/L (ref 0–50)
ANION GAP SERPL CALCULATED.3IONS-SCNC: 10 MMOL/L (ref 7–15)
APPEARANCE UR: ABNORMAL
AST SERPL W P-5'-P-CCNC: 27 U/L (ref 0–45)
BACTERIA #/AREA URNS HPF: ABNORMAL /HPF
BASOPHILS # BLD AUTO: 0 10E3/UL (ref 0–0.2)
BASOPHILS NFR BLD AUTO: 0 %
BILIRUB DIRECT SERPL-MCNC: 0.13 MG/DL (ref 0–0.3)
BILIRUB SERPL-MCNC: 0.4 MG/DL
BILIRUB UR QL STRIP: NEGATIVE
BUN SERPL-MCNC: 27.9 MG/DL (ref 8–23)
CALCIUM SERPL-MCNC: 8.7 MG/DL (ref 8.8–10.4)
CHLORIDE SERPL-SCNC: 110 MMOL/L (ref 98–107)
COLOR UR AUTO: YELLOW
CREAT SERPL-MCNC: 0.82 MG/DL (ref 0.51–0.95)
EGFRCR SERPLBLD CKD-EPI 2021: 66 ML/MIN/1.73M2
EOSINOPHIL # BLD AUTO: 0 10E3/UL (ref 0–0.7)
EOSINOPHIL NFR BLD AUTO: 0 %
ERYTHROCYTE [DISTWIDTH] IN BLOOD BY AUTOMATED COUNT: 13.2 % (ref 10–15)
FLUAV RNA SPEC QL NAA+PROBE: NEGATIVE
FLUBV RNA RESP QL NAA+PROBE: NEGATIVE
GLUCOSE SERPL-MCNC: 122 MG/DL (ref 70–99)
GLUCOSE UR STRIP-MCNC: NEGATIVE MG/DL
HCO3 SERPL-SCNC: 18 MMOL/L (ref 22–29)
HCT VFR BLD AUTO: 32.5 % (ref 35–47)
HGB BLD-MCNC: 10.4 G/DL (ref 11.7–15.7)
HGB UR QL STRIP: ABNORMAL
IMM GRANULOCYTES # BLD: 0.1 10E3/UL
IMM GRANULOCYTES NFR BLD: 1 %
KETONES UR STRIP-MCNC: NEGATIVE MG/DL
LEUKOCYTE ESTERASE UR QL STRIP: ABNORMAL
LIPASE SERPL-CCNC: 15 U/L (ref 13–60)
LYMPHOCYTES # BLD AUTO: 1.4 10E3/UL (ref 0.8–5.3)
LYMPHOCYTES NFR BLD AUTO: 18 %
MAGNESIUM SERPL-MCNC: 1.8 MG/DL (ref 1.7–2.3)
MCH RBC QN AUTO: 32.6 PG (ref 26.5–33)
MCHC RBC AUTO-ENTMCNC: 32 G/DL (ref 31.5–36.5)
MCV RBC AUTO: 102 FL (ref 78–100)
MONOCYTES # BLD AUTO: 0.8 10E3/UL (ref 0–1.3)
MONOCYTES NFR BLD AUTO: 10 %
NEUTROPHILS # BLD AUTO: 5.4 10E3/UL (ref 1.6–8.3)
NEUTROPHILS NFR BLD AUTO: 70 %
NITRATE UR QL: POSITIVE
NRBC # BLD AUTO: 0 10E3/UL
NRBC BLD AUTO-RTO: 0 /100
PH UR STRIP: 5.5 [PH] (ref 5–7)
PLATELET # BLD AUTO: 185 10E3/UL (ref 150–450)
POTASSIUM SERPL-SCNC: 4.3 MMOL/L (ref 3.4–5.3)
PROT SERPL-MCNC: 6.7 G/DL (ref 6.4–8.3)
RBC # BLD AUTO: 3.19 10E6/UL (ref 3.8–5.2)
RBC URINE: 14 /HPF
RSV RNA SPEC NAA+PROBE: NEGATIVE
SARS-COV-2 RNA RESP QL NAA+PROBE: NEGATIVE
SODIUM SERPL-SCNC: 138 MMOL/L (ref 135–145)
SP GR UR STRIP: 1.01 (ref 1–1.03)
SQUAMOUS EPITHELIAL: 1 /HPF
UROBILINOGEN UR STRIP-MCNC: NORMAL MG/DL
WBC # BLD AUTO: 7.8 10E3/UL (ref 4–11)
WBC URINE: >182 /HPF

## 2025-05-03 PROCEDURE — 36415 COLL VENOUS BLD VENIPUNCTURE: CPT | Performed by: EMERGENCY MEDICINE

## 2025-05-03 PROCEDURE — 73700 CT LOWER EXTREMITY W/O DYE: CPT | Mod: RT

## 2025-05-03 PROCEDURE — 80048 BASIC METABOLIC PNL TOTAL CA: CPT | Performed by: EMERGENCY MEDICINE

## 2025-05-03 PROCEDURE — 74177 CT ABD & PELVIS W/CONTRAST: CPT

## 2025-05-03 PROCEDURE — 81001 URINALYSIS AUTO W/SCOPE: CPT | Performed by: EMERGENCY MEDICINE

## 2025-05-03 PROCEDURE — 99285 EMERGENCY DEPT VISIT HI MDM: CPT | Mod: 25

## 2025-05-03 PROCEDURE — 85004 AUTOMATED DIFF WBC COUNT: CPT | Performed by: EMERGENCY MEDICINE

## 2025-05-03 PROCEDURE — 120N000001 HC R&B MED SURG/OB

## 2025-05-03 PROCEDURE — 87637 SARSCOV2&INF A&B&RSV AMP PRB: CPT | Performed by: EMERGENCY MEDICINE

## 2025-05-03 PROCEDURE — 96375 TX/PRO/DX INJ NEW DRUG ADDON: CPT

## 2025-05-03 PROCEDURE — 70450 CT HEAD/BRAIN W/O DYE: CPT

## 2025-05-03 PROCEDURE — 5A09357 ASSISTANCE WITH RESPIRATORY VENTILATION, LESS THAN 24 CONSECUTIVE HOURS, CONTINUOUS POSITIVE AIRWAY PRESSURE: ICD-10-PCS | Performed by: EMERGENCY MEDICINE

## 2025-05-03 PROCEDURE — 250N000011 HC RX IP 250 OP 636: Performed by: EMERGENCY MEDICINE

## 2025-05-03 PROCEDURE — 258N000003 HC RX IP 258 OP 636: Performed by: EMERGENCY MEDICINE

## 2025-05-03 PROCEDURE — 83690 ASSAY OF LIPASE: CPT | Performed by: EMERGENCY MEDICINE

## 2025-05-03 PROCEDURE — 84155 ASSAY OF PROTEIN SERUM: CPT | Performed by: EMERGENCY MEDICINE

## 2025-05-03 PROCEDURE — 87507 IADNA-DNA/RNA PROBE TQ 12-25: CPT | Performed by: EMERGENCY MEDICINE

## 2025-05-03 PROCEDURE — 29515 APPLICATION SHORT LEG SPLINT: CPT | Mod: RT

## 2025-05-03 PROCEDURE — 250N000011 HC RX IP 250 OP 636: Mod: JW | Performed by: EMERGENCY MEDICINE

## 2025-05-03 PROCEDURE — 99223 1ST HOSP IP/OBS HIGH 75: CPT | Performed by: INTERNAL MEDICINE

## 2025-05-03 PROCEDURE — 87493 C DIFF AMPLIFIED PROBE: CPT | Performed by: EMERGENCY MEDICINE

## 2025-05-03 PROCEDURE — 2W3LX1Z IMMOBILIZATION OF RIGHT LOWER EXTREMITY USING SPLINT: ICD-10-PCS | Performed by: EMERGENCY MEDICINE

## 2025-05-03 PROCEDURE — 250N000013 HC RX MED GY IP 250 OP 250 PS 637: Performed by: INTERNAL MEDICINE

## 2025-05-03 PROCEDURE — 258N000003 HC RX IP 258 OP 636: Performed by: INTERNAL MEDICINE

## 2025-05-03 PROCEDURE — 96361 HYDRATE IV INFUSION ADD-ON: CPT

## 2025-05-03 PROCEDURE — 73590 X-RAY EXAM OF LOWER LEG: CPT | Mod: 50

## 2025-05-03 PROCEDURE — 250N000011 HC RX IP 250 OP 636: Mod: JZ | Performed by: INTERNAL MEDICINE

## 2025-05-03 PROCEDURE — 71045 X-RAY EXAM CHEST 1 VIEW: CPT

## 2025-05-03 PROCEDURE — 96365 THER/PROPH/DIAG IV INF INIT: CPT | Mod: 59

## 2025-05-03 PROCEDURE — 83735 ASSAY OF MAGNESIUM: CPT | Performed by: EMERGENCY MEDICINE

## 2025-05-03 PROCEDURE — 87186 SC STD MICRODIL/AGAR DIL: CPT | Performed by: EMERGENCY MEDICINE

## 2025-05-03 RX ORDER — ACETAMINOPHEN 325 MG/1
975 TABLET ORAL 3 TIMES DAILY
Status: DISPENSED | OUTPATIENT
Start: 2025-05-03

## 2025-05-03 RX ORDER — ACETAMINOPHEN 500 MG
1000 TABLET ORAL 3 TIMES DAILY
Status: ON HOLD | COMMUNITY

## 2025-05-03 RX ORDER — IOPAMIDOL 755 MG/ML
70 INJECTION, SOLUTION INTRAVASCULAR ONCE
Status: COMPLETED | OUTPATIENT
Start: 2025-05-03 | End: 2025-05-03

## 2025-05-03 RX ORDER — ONDANSETRON 4 MG/1
4 TABLET, ORALLY DISINTEGRATING ORAL EVERY 6 HOURS PRN
Status: DISCONTINUED | OUTPATIENT
Start: 2025-05-03 | End: 2025-05-07

## 2025-05-03 RX ORDER — CEFTRIAXONE 1 G/1
1 INJECTION, POWDER, FOR SOLUTION INTRAMUSCULAR; INTRAVENOUS EVERY 24 HOURS
Status: DISCONTINUED | OUTPATIENT
Start: 2025-05-03 | End: 2025-05-03

## 2025-05-03 RX ORDER — CARVEDILOL 3.12 MG/1
6.25 TABLET ORAL 2 TIMES DAILY
Status: DISPENSED | OUTPATIENT
Start: 2025-05-03

## 2025-05-03 RX ORDER — DIVALPROEX SODIUM 125 MG/1
375 CAPSULE, COATED PELLETS ORAL 2 TIMES DAILY
Status: DISPENSED | OUTPATIENT
Start: 2025-05-03

## 2025-05-03 RX ORDER — OLANZAPINE 10 MG/2ML
5 INJECTION, POWDER, FOR SOLUTION INTRAMUSCULAR EVERY 8 HOURS PRN
Status: DISCONTINUED | OUTPATIENT
Start: 2025-05-03 | End: 2025-05-07

## 2025-05-03 RX ORDER — POLYETHYLENE GLYCOL 3350 17 G/17G
17 POWDER, FOR SOLUTION ORAL 2 TIMES DAILY PRN
Status: ACTIVE | OUTPATIENT
Start: 2025-05-03

## 2025-05-03 RX ORDER — HYDRALAZINE HYDROCHLORIDE 20 MG/ML
10 INJECTION INTRAMUSCULAR; INTRAVENOUS EVERY 4 HOURS PRN
Status: DISCONTINUED | OUTPATIENT
Start: 2025-05-03 | End: 2025-05-06

## 2025-05-03 RX ORDER — DIVALPROEX SODIUM 125 MG/1
3 CAPSULE, COATED PELLETS ORAL 2 TIMES DAILY
Status: ON HOLD | COMMUNITY

## 2025-05-03 RX ORDER — NALOXONE HYDROCHLORIDE 0.4 MG/ML
0.2 INJECTION, SOLUTION INTRAMUSCULAR; INTRAVENOUS; SUBCUTANEOUS
Status: DISCONTINUED | OUTPATIENT
Start: 2025-05-03 | End: 2025-05-07

## 2025-05-03 RX ORDER — ONDANSETRON 2 MG/ML
4 INJECTION INTRAMUSCULAR; INTRAVENOUS EVERY 6 HOURS PRN
Status: DISCONTINUED | OUTPATIENT
Start: 2025-05-03 | End: 2025-05-07

## 2025-05-03 RX ORDER — SODIUM CHLORIDE 0.9 %
AEROSOL, SPRAY (ML) TOPICAL DAILY PRN
Status: ON HOLD | COMMUNITY

## 2025-05-03 RX ORDER — HYDROMORPHONE HYDROCHLORIDE 1 MG/ML
0.3 INJECTION, SOLUTION INTRAMUSCULAR; INTRAVENOUS; SUBCUTANEOUS ONCE
Status: COMPLETED | OUTPATIENT
Start: 2025-05-03 | End: 2025-05-03

## 2025-05-03 RX ORDER — CEFTRIAXONE 1 G/1
1 INJECTION, POWDER, FOR SOLUTION INTRAMUSCULAR; INTRAVENOUS ONCE
Status: COMPLETED | OUTPATIENT
Start: 2025-05-03 | End: 2025-05-03

## 2025-05-03 RX ORDER — NALOXONE HYDROCHLORIDE 0.4 MG/ML
0.4 INJECTION, SOLUTION INTRAMUSCULAR; INTRAVENOUS; SUBCUTANEOUS
Status: DISCONTINUED | OUTPATIENT
Start: 2025-05-03 | End: 2025-05-07

## 2025-05-03 RX ORDER — CEFTRIAXONE 1 G/1
1 INJECTION, POWDER, FOR SOLUTION INTRAMUSCULAR; INTRAVENOUS EVERY 24 HOURS
Status: DISPENSED | OUTPATIENT
Start: 2025-05-04

## 2025-05-03 RX ORDER — MIRTAZAPINE 15 MG/1
15 TABLET, FILM COATED ORAL AT BEDTIME
Status: ON HOLD | COMMUNITY

## 2025-05-03 RX ORDER — AMOXICILLIN 250 MG
2 CAPSULE ORAL 2 TIMES DAILY PRN
Status: DISCONTINUED | OUTPATIENT
Start: 2025-05-03 | End: 2025-05-07

## 2025-05-03 RX ORDER — MIRTAZAPINE 7.5 MG/1
15 TABLET, FILM COATED ORAL AT BEDTIME
Status: DISPENSED | OUTPATIENT
Start: 2025-05-03

## 2025-05-03 RX ORDER — HYDROMORPHONE HCL IN WATER/PF 6 MG/30 ML
0.2 PATIENT CONTROLLED ANALGESIA SYRINGE INTRAVENOUS
Status: DISCONTINUED | OUTPATIENT
Start: 2025-05-03 | End: 2025-05-06

## 2025-05-03 RX ORDER — SODIUM CHLORIDE 9 MG/ML
INJECTION, SOLUTION INTRAVENOUS CONTINUOUS
Status: DISCONTINUED | OUTPATIENT
Start: 2025-05-03 | End: 2025-05-06

## 2025-05-03 RX ORDER — NYSTATIN 100000 U/G
CREAM TOPICAL 2 TIMES DAILY
Status: ON HOLD | COMMUNITY

## 2025-05-03 RX ORDER — LOPERAMIDE HYDROCHLORIDE 2 MG/1
4 CAPSULE ORAL PRN
Status: ON HOLD | COMMUNITY

## 2025-05-03 RX ORDER — PROCHLORPERAZINE MALEATE 5 MG/1
5 TABLET ORAL EVERY 6 HOURS PRN
Status: ACTIVE | OUTPATIENT
Start: 2025-05-03

## 2025-05-03 RX ORDER — AMOXICILLIN 250 MG
1 CAPSULE ORAL 2 TIMES DAILY PRN
Status: DISCONTINUED | OUTPATIENT
Start: 2025-05-03 | End: 2025-05-07

## 2025-05-03 RX ORDER — LOPERAMIDE HYDROCHLORIDE 2 MG/1
2 CAPSULE ORAL PRN
Status: ON HOLD | COMMUNITY

## 2025-05-03 RX ORDER — NYSTATIN 100000 U/G
CREAM TOPICAL 2 TIMES DAILY
Status: DISPENSED | OUTPATIENT
Start: 2025-05-03

## 2025-05-03 RX ORDER — CALCIUM CARBONATE 500 MG/1
1000 TABLET, CHEWABLE ORAL 4 TIMES DAILY PRN
Status: ACTIVE | OUTPATIENT
Start: 2025-05-03

## 2025-05-03 RX ORDER — POTASSIUM CHLORIDE 1500 MG/1
20 TABLET, EXTENDED RELEASE ORAL DAILY
Status: ON HOLD | COMMUNITY

## 2025-05-03 RX ADMIN — NYSTATIN: 100000 CREAM TOPICAL at 19:56

## 2025-05-03 RX ADMIN — SODIUM CHLORIDE 500 ML: 0.9 INJECTION, SOLUTION INTRAVENOUS at 15:55

## 2025-05-03 RX ADMIN — HYDROMORPHONE HYDROCHLORIDE 0.2 MG: 0.2 INJECTION, SOLUTION INTRAMUSCULAR; INTRAVENOUS; SUBCUTANEOUS at 23:15

## 2025-05-03 RX ADMIN — CEFTRIAXONE SODIUM 1 G: 1 INJECTION, POWDER, FOR SOLUTION INTRAMUSCULAR; INTRAVENOUS at 16:40

## 2025-05-03 RX ADMIN — HYDROMORPHONE HYDROCHLORIDE 0.3 MG: 1 INJECTION, SOLUTION INTRAMUSCULAR; INTRAVENOUS; SUBCUTANEOUS at 15:52

## 2025-05-03 RX ADMIN — SODIUM CHLORIDE: 0.9 INJECTION, SOLUTION INTRAVENOUS at 17:20

## 2025-05-03 RX ADMIN — IOPAMIDOL 70 ML: 755 INJECTION, SOLUTION INTRAVENOUS at 14:48

## 2025-05-03 RX ADMIN — QUETIAPINE FUMARATE 12.5 MG: 25 TABLET ORAL at 19:33

## 2025-05-03 RX ADMIN — SODIUM CHLORIDE 500 ML: 0.9 INJECTION, SOLUTION INTRAVENOUS at 13:16

## 2025-05-03 RX ADMIN — CARVEDILOL 6.25 MG: 3.12 TABLET, FILM COATED ORAL at 19:37

## 2025-05-03 RX ADMIN — ACETAMINOPHEN 975 MG: 325 TABLET, FILM COATED ORAL at 19:37

## 2025-05-03 RX ADMIN — DIVALPROEX SODIUM 375 MG: 125 CAPSULE, COATED PELLETS ORAL at 19:38

## 2025-05-03 RX ADMIN — DICLOFENAC SODIUM 4 G: 10 GEL TOPICAL at 19:56

## 2025-05-03 ASSESSMENT — ACTIVITIES OF DAILY LIVING (ADL)
TOILETING_ASSISTANCE: TOILETING DIFFICULTY, ASSISTANCE 1 PERSON
ADLS_ACUITY_SCORE: 58
ADLS_ACUITY_SCORE: 58
ADLS_ACUITY_SCORE: 87
FALL_HISTORY_WITHIN_LAST_SIX_MONTHS: YES
CONCENTRATING,_REMEMBERING_OR_MAKING_DECISIONS_DIFFICULTY: YES
TOILETING: 2-->COMPLETELY DEPENDENT (NOT DEVELOPMENTALLY APPROPRIATE)
ADLS_ACUITY_SCORE: 89
DRESSING/BATHING_DIFFICULTY: YES
ADLS_ACUITY_SCORE: 58
TOILETING_ISSUES: YES
ADLS_ACUITY_SCORE: 64
WEAR_GLASSES_OR_BLIND: NO
DIFFICULTY_EATING/SWALLOWING: NO
COMMUNICATION: DIFFICULTY UNDERSTANDING
DIFFICULTY_COMMUNICATING: YES
DESCRIBE_HEARING_LOSS: BILATERAL HEARING LOSS
DRESSING/BATHING: BATHING DIFFICULTY, DEPENDENT
TOILETING: 2-->COMPLETELY DEPENDENT
NUMBER_OF_TIMES_PATIENT_HAS_FALLEN_WITHIN_LAST_SIX_MONTHS: 1
ADLS_ACUITY_SCORE: 87
THE_FOLLOWING_AIDS_WERE_PROVIDED;: PATIENT DECLINED OFFER OF AUXILIARY AIDS
CHANGE_IN_FUNCTIONAL_STATUS_SINCE_ONSET_OF_CURRENT_ILLNESS/INJURY: YES
DEPENDENT_IADLS:: CLEANING;COOKING;LAUNDRY;SHOPPING;MEAL PREPARATION;MEDICATION MANAGEMENT;MONEY MANAGEMENT;TRANSPORTATION
HEARING_DIFFICULTY_OR_DEAF: YES
ADLS_ACUITY_SCORE: 58
PATIENT'S_PREFERRED_MEANS_OF_COMMUNICATION: ENGLISH SPEAKER WITH HEARING LOSS, NO SPEECH PROBLEMS.
DOING_ERRANDS_INDEPENDENTLY_DIFFICULTY: YES
ADLS_ACUITY_SCORE: 86
WERE_AUXILIARY_AIDS_OFFERED?: YES
EQUIPMENT_CURRENTLY_USED_AT_HOME: WALKER, ROLLING
WALKING_OR_CLIMBING_STAIRS: AMBULATION DIFFICULTY, REQUIRES EQUIPMENT
ADLS_ACUITY_SCORE: 62
WALKING_OR_CLIMBING_STAIRS_DIFFICULTY: YES
ADLS_ACUITY_SCORE: 58

## 2025-05-03 NOTE — ED PROVIDER NOTES
EMERGENCY DEPARTMENT ENCOUNTER      NAME: Peggy De Anda  AGE: 89 year old female  YOB: 1931  MRN: [id number]  EVALUATION DATE & TIME: 5/3/2025 12:47 PM    PCP: No Ref-Primary, Physician    ED PROVIDER: Tiera Weiss M.D.      CHIEF COMPLAINT     Chief Complaint   Patient presents with    Generalized Weakness    Diarrhea    Vomiting         FINAL IMPRESSION:     1. Acute cystitis without hematuria    2. Other closed fracture of proximal end of right tibia, initial encounter    3. Diarrhea, unspecified type    4. History of dementia    5. Toxic metabolic encephalopathy          MEDICAL DECISION MAKING:     ED Course as of 05/03/25 2011   Sat May 03, 2025   1248 Ms. De Anda is a 89 yo female with PMH significant for dementia, anemia, hypertension, osteoporosis, muscle wasting, gout    Patient presents via EMS from her nursing home.  History provided by EMS by patient and son by daughter.  Patient has been having diarrhea.  They noticed that she has been more confused.  For the last month or so she has been complaining of leg pain.  Per the daughter they had an ultrasound that has been negative.  They denied any trauma.  No vomiting.  Per daughter always distended.  They also noticed that she was hallucinating today.    Vitals  Afebrile normotensive    Exam  Elderly dry mucus membranes  Oriented to self recognizes daughter   Ecchymoses right lower leg    I considered the following diagnosis based on the patient's symptoms included but no limited to electrolyte abnormalities, sepsis , ich, trauma among others.      1417 Normal white blood cell count hemoglobin 10.4 with a normal differential.  Anemia appears chronic over the last year hemoglobin has ranged from 10.3-11.6.  No melena on patient's adult diapers.   1418 Normal sodium and potassium.  Slightly low bicarb at 18 with a normal anion gap will continue hydration normal creatinine.   1418 Patient and daughter updated.   1506 Re evaluated no  diarrhea since she has been in the emergency department.  Getting a cath UA.   2003 Nurse unable to obtain UA.  Lilia wick UA concerning for infection. Started on rocephin.  RLE xray independently  by me proximal tibia fracture.  Discussed with on call ortho.  Placed on posterior long leg.   2005 Daughter updated.  Admitted to hospitalist.     2006 Clinical Impression and Decision making  89 female history of dementia. Arrives to the ED for diarrhea/dehydration and hallucinating today.  Clinical dehydrated.  No stool in the ED  CT head for hallucinations no ICH  Ua concerning for infection started on Rocephin  Ecchymoses right lower leg proximal tibia fracture. Splinted  Admitted for encephalopathy dehydration. And tibia fracture.         Medical Decision Making    History   Supplemental history obtained from EMS, daughter   External EMR Record(s) reviewed: I reviewed geriatrics on 10-Apr-2023 wheelchair dependent, leg edema and deconditioning, gout, epilepsy, anemia, hypertension.     Complicating Factors   Care Impacted by Chronic illnesses: epileptic seizures, gout, hypertension, osteoporosis, anemia     Work Up   I considered additional work up, including MRI but deferred currently UA concerning for infection. No focal deficits except confusion acute on chronic   I independently reviewed and interpreted leg xray and note tibia fracture .See full radiology report for final interpretation.     External Discussion   I discussed the care with another health care provider: hospitalist, orthopedics    Disposition Considerations   I considered admission ADMIT       MIPS (CTPE, Dental pain, Gamez, Sinusitis, Asthma/COPD, Head Trauma): Not Applicable    SEPSIS: None            ED COURSE     12:55 PM I met the patient and performed my initial interview and exam. Additional history from EMS and patient's daughter.  3:06 PM Rechecked and updated patient and daughter on labs and imaging.   3:25 PM I rechecked the  patient again.  4:12 PM I spoke to Dr. Tripathi, [facility], regarding patient plan of care.  4:14 PM I updated the patient and family.  4:29 PM I placed the patient in a long-leg cast.  4:57 PM Spoke with Dr. Izquierdo, Hospitalist, regarding plan for admission.    At the conclusion of the encounter I discussed the results of all of the tests and the disposition. The questions were answered. The patient and family acknowledged understanding and was agreeable with the care plan.         MEDICATIONS GIVEN IN THE EMERGENCY:     Medications   QUEtiapine (SEROquel) half-tab 12.5 mg (12.5 mg Oral $Given 5/3/25 1933)   OLANZapine (zyPREXA) injection 5 mg (has no administration in time range)   sodium chloride 0.9 % infusion ( Intravenous Rate/Dose Change 5/3/25 1836)   cefTRIAXone (ROCEPHIN) 1 g vial to attach to  mL bag for ADULTS or NS 50 mL bag for PEDS (has no administration in time range)   acetaminophen (TYLENOL) tablet 975 mg (975 mg Oral $Given 5/3/25 1937)   HYDROmorphone (DILAUDID) injection 0.2 mg (has no administration in time range)   hydrALAZINE (APRESOLINE) injection 10 mg (has no administration in time range)   carvedilol (COREG) tablet 6.25 mg (6.25 mg Oral $Given 5/3/25 1937)   diclofenac (VOLTAREN) 1 % topical gel 4 g (4 g Topical $Given 5/3/25 1956)   diclofenac (VOLTAREN) 1 % topical gel 2 g (has no administration in time range)   divalproex sodium delayed-release (DEPAKOTE SPRINKLE) DR capsule 375 mg (375 mg Oral $Given 5/3/25 1938)   mirtazapine (REMERON) tablet TABS 15 mg (has no administration in time range)   nystatin (MYCOSTATIN) cream ( Topical $Given 5/3/25 1956)   melatonin tablet 3 mg (has no administration in time range)   senna-docusate (SENOKOT-S/PERICOLACE) 8.6-50 MG per tablet 1 tablet (has no administration in time range)     Or   senna-docusate (SENOKOT-S/PERICOLACE) 8.6-50 MG per tablet 2 tablet (has no administration in time range)   polyethylene glycol (MIRALAX) Packet  17 g (has no administration in time range)   ondansetron (ZOFRAN ODT) ODT tab 4 mg (has no administration in time range)     Or   ondansetron (ZOFRAN) injection 4 mg (has no administration in time range)   prochlorperazine (COMPAZINE) injection 5 mg (has no administration in time range)     Or   prochlorperazine (COMPAZINE) tablet 5 mg (has no administration in time range)   calcium carbonate (TUMS) chewable tablet 1,000 mg (has no administration in time range)   sodium chloride 0.9% BOLUS 500 mL (0 mLs Intravenous Stopped 5/3/25 1345)   iopamidol (ISOVUE-370) solution 70 mL (70 mLs Intravenous $Given 5/3/25 1448)   HYDROmorphone (PF) (DILAUDID) injection 0.3 mg (0.3 mg Intravenous $Given 5/3/25 1552)   sodium chloride 0.9% BOLUS 500 mL (0 mLs Intravenous Stopped 5/3/25 1641)   cefTRIAXone (ROCEPHIN) 1 g vial to attach to  mL bag for ADULTS or NS 50 mL bag for PEDS (0 g Intravenous Stopped 5/3/25 1715)       NEW PRESCRIPTIONS STARTED AT TODAY'S ER VISIT     Current Discharge Medication List             =================================================================    HPI     Patient information was obtained from: patient, EMS    Use of : N/A       Peggy De Anda is a 89 year old female who presents by EMS for evaluation of weakness, diarrhea, vomiting.    Per the daughter:  At baseline the patient can walk with a walker, as she has leg weakness and some pains. However over the last month or so the patient has had increased leg pain and swelling that have caused her to stop walking from the pain, with no DVT on imaging a few weeks ago. Then the patient started to have increased confusion and has 1.5 weeks of on-and-off vomiting and diarrhea thought to be from a GI bug going around her care facility. Today, the patient was especially concerning in regards to her confusion, and endorsed some hallucinations that just started today. With these concerns the patient was brought to the ER by EMS.    The  daughter does not know of any recent falls since Sep-2023, but states that the care facility doesn't communicate well with her at baseline and so she is unsure on this count.    The patient has abdominal distension at baseline.     Per the patient:  She is endorsing severe lower leg pain, particularly to the right leg.     Per EMS:  The patient comes from [facility] and is DNR with a history of dementia.       REVIEW OF SYSTEMS   Review of Systems   SEE HPI    PAST MEDICAL HISTORY:     Past Medical History:   Diagnosis Date    Anemia     Ataxia     Dermatitis     Created by Conversion     Epilepsy (H)     Essential hypertension     Gout     Monoclonal gammopathy of undetermined significance     Osteoporosis     Peripheral neuropathy     Secondary hyperparathyroidism     non-renal       PAST SURGICAL HISTORY:     Past Surgical History:   Procedure Laterality Date    APPENDECTOMY      AGE 13    CATARACT EXTRACTION, BILATERAL      CHOLECYSTECTOMY      IR LUMBAR EPIDURAL STEROID INJECTION  10/5/2007    IR MISCELLANEOUS PROCEDURE  10/30/2007    NEUROPLASTY / TRANSPOSITION MEDIAN NERVE AT CARPAL TUNNEL BILATERAL      TONSILLECTOMY           CURRENT MEDICATIONS:   No current outpatient medications on file.       ALLERGIES:     Allergies   Allergen Reactions    Oxycodone Hallucination    Quinolones Rash    Levofloxacin Rash       FAMILY HISTORY:   History reviewed. No pertinent family history.    SOCIAL HISTORY:     Social History     Socioeconomic History    Marital status:    Tobacco Use    Smoking status: Never     Social Drivers of Health      Received from [employer] [place]    Social Connections       VITALS:   /69 (Patient Position: Semi-Rubalcava's, Cuff Size: Adult Regular)   Pulse 77   Temp 97.9  F (36.6  C) (Axillary)   Resp 22   Wt 64.7 kg (142 lb 10.2 oz)   SpO2 96%   BMI 27.86 kg/m      PHYSICAL EXAM      Physical Exam    Physical Exam   Constitutional: elderly non toxic    Head: Atraumatic.     Nose: Nose normal.     Mouth/Throat: Oropharynx is clear. Slightly dry mucous membranes.    Eyes: EOM are normal. Pupils are equal, round, and reactive to light. No hyphema    Ears: Bilateral pearly white tympanic membranes. No hemotympanum    Neck: Normal range of motion. Neck supple.     Cardiovascular: Normal rate, regular rhythm and normal heart sounds.  2+ femoral pulses/radial/DP pulses B    Pulmonary/Chest: Normal effort  and breath sounds normal.     Abdominal: soft. Tender to palpation. Surgical scar present.     Musculoskeletal: ecchymoses rle    Neurological: oriented to self recognizes daughter.    Lymphatics: no calves pain, no palpable cords. Bilateral ankle edema.     : NA    Skin: Skin is warm and dry. Ecchymosis to the right lower leg    Psychiatric: agitated at times      LAB:     All pertinent labs reviewed and interpreted.  Labs Ordered and Resulted from Time of ED Arrival to Time of ED Departure   BASIC METABOLIC PANEL - Abnormal       Result Value    Sodium 138      Potassium 4.3      Chloride 110 (*)     Carbon Dioxide (CO2) 18 (*)     Anion Gap 10      Urea Nitrogen 27.9 (*)     Creatinine 0.82      GFR Estimate 66      Calcium 8.7 (*)     Glucose 122 (*)    HEPATIC FUNCTION PANEL - Abnormal    Protein Total 6.7      Albumin 3.5      Bilirubin Total 0.4      Alkaline Phosphatase 163 (*)     AST 27      ALT 17      Bilirubin Direct 0.13     ROUTINE UA WITH MICROSCOPIC REFLEX TO CULTURE - Abnormal    Color Urine Yellow      Appearance Urine Turbid (*)     Glucose Urine Negative      Bilirubin Urine Negative      Ketones Urine Negative      Specific Gravity Urine 1.015      Blood Urine 0.1 mg/dL (*)     pH Urine 5.5      Protein Albumin Urine 10 (*)     Urobilinogen Urine Normal      Nitrite Urine Positive (*)     Leukocyte Esterase Urine 500 Evelyn/uL (*)     Bacteria Urine Many (*)     RBC Urine 14  (*)     WBC Urine >182 (*)     Squamous Epithelials Urine 1     CBC WITH PLATELETS AND DIFFERENTIAL - Abnormal    WBC Count 7.8      RBC Count 3.19 (*)     Hemoglobin 10.4 (*)     Hematocrit 32.5 (*)      (*)     MCH 32.6      MCHC 32.0      RDW 13.2      Platelet Count 185      % Neutrophils 70      % Lymphocytes 18      % Monocytes 10      % Eosinophils 0      % Basophils 0      % Immature Granulocytes 1      NRBCs per 100 WBC 0      Absolute Neutrophils 5.4      Absolute Lymphocytes 1.4      Absolute Monocytes 0.8      Absolute Eosinophils 0.0      Absolute Basophils 0.0      Absolute Immature Granulocytes 0.1      Absolute NRBCs 0.0     LIPASE - Normal    Lipase 15     MAGNESIUM - Normal    Magnesium 1.8     ENTERIC BACTERIA AND VIRUS PANEL BY PCR   C. DIFFICILE TOXIN B PCR WITH REFLEX TO C. DIFFICILE EIA   URINE CULTURE        RADIOLOGY:     Reviewed all pertinent imaging. Please see official radiology report.  XR Chest Port 1 View   Final Result   IMPRESSION: Heart is normal in size. Mild elevation of the left hemidiaphragm. Lungs otherwise clear. Degenerative changes of the shoulders.      XR Tibia and Fibula Bilateral 2 Views   Final Result   IMPRESSION:       Right lower extremity: There is a transverse fracture through the proximal tibial metaphysis. No significant angulation deformity. The fibular head is negative for fracture but there is severe demineralization making this difficult to evaluate.    Degenerative change at the knee joint. The ankle mortise appears intact. Plantar calcaneal spurring. Degenerative change at the subtalar joints and talonavicular joint. Vascular calcifications.      Left lower extremity: The tibiotalar negative for acute fracture. There is severe end-stage degenerative change at the tibiotalar joint. Degenerative change medial compartment of the knee joint. Again, severe and diffuse demineralization.      CT Abdomen Pelvis w Contrast   Final Result   IMPRESSION:   1.   Air-fluid levels in the colon and rectum can be seen with liquid stool. No bowel obstruction.   2.  The urinary bladder is distended.   3.  Atherosclerotic disease including coronary artery disease.   4.  Osteopenia. No acute fracture.         Head CT w/o contrast   Final Result   IMPRESSION:   1.  No acute intracranial findings.   2.  Generalized cerebral volume loss and presumed chronic microvascular ischemic changes of the white matter.           EKG:       I have independently reviewed and interpreted the EKG(s) documented above.      PROCEDURES:     Procedures      PROCEDURE: Splint Placement   INDICATIONS: right proximal tibial fracture   PROCEDURE PROVIDER: Dr Tiera Weiss   NOTE:  A Long leg splint made of  Fiberglass  was applied to the Right lower extremity by the above provider. As noted in the physical exam, distal CMS was intact prior to placement. The splint was checked and the fit was adjusted to ensure proper positioning after placement. Sensation and circulation, as well as motor function, are unchanged after splint placement and the patient is more comfortable with the splint in place.           I, Tha Small, am serving as a scribe to document services personally performed by Dr. Weiss based on my observation and the provider's statements to me. I, Tiera Weiss MD attest that Tha Small is acting in a scribe capacity, has observed my performance of the services and has documented them in accordance with my direction.    Tiera Weiss M.D.  Emergency Medicine  [facility] EMERGENCY DEPARTMENT  [facility] [facility] [address]  [phone]  Dept: [phone]       Tiera Weiss MD  05/03/25 2011

## 2025-05-03 NOTE — PHARMACY-ADMISSION MEDICATION HISTORY
Pharmacist Admission Medication History    Admission medication history is complete. The information provided in this note is only as accurate as the sources available at the time of the update.    Information Source(s): Facility ([facility] medication list/MAR via  paperwork sent with patient from memory care facility.    Pertinent Information: None    Changes made to PTA medication list:  Added: acetaminophen, Loperamide, Diclofenac gel, Divalproex capsules, Nystatin, Potassium Chloride, Wound Wash  Deleted: Phenytoin  Changed: Acetaminophen, Furosemide    Allergies reviewed with patient and updates made in EHR: yes    Medication History Completed By: Zohra Mahoney formerly [facility] 5/3/2025 2:22 PM    PTA Med List   Medication Sig Last Dose/Taking    acetaminophen (TYLENOL) 500 MG tablet Take 1,000 mg by mouth 3 times daily. 5/2/2025 Evening    carvedilol (COREG) 6.25 MG tablet Take 1 tablet (6.25 mg) by mouth 2 times daily 5/2/2025 Evening    diclofenac (VOLTAREN) 1 % topical gel Apply 4 g topically 2 times daily. Apply to right knee 5/2/2025 Evening    diclofenac (VOLTAREN) 1 % topical gel Apply 2 g topically 3 times daily as needed for moderate pain. Apply to neck Past Week    divalproex sodium delayed-release (DEPAKOTE SPRINKLE) 125 MG DR capsule Take 3 capsules by mouth 2 times daily. 5/2/2025 Evening    furosemide (LASIX) 40 MG tablet Take 40 mg by mouth every morning. 5/2/2025 Morning    loperamide (IMODIUM) 2 MG capsule Take 4 mg by mouth as needed for diarrhea. Take with first loose stool 5/1/2025 Noon    loperamide (IMODIUM) 2 MG capsule Take 2 mg by mouth as needed for diarrhea. After each subsequent loose stool Past Week    losartan (COZAAR) 100 MG tablet Take 1 tablet (100 mg) by mouth daily 5/2/2025 Morning    magnesium oxide (MAG-OX) 400 MG tablet Take 1 tablet (400 mg) by mouth daily. 5/2/2025 Morning    mirtazapine (REMERON) 15 MG tablet Take 15 mg by mouth at bedtime. 5/2/2025 Evening    nystatin  (MYCOSTATIN) 204703 UNIT/GM external cream Apply topically 2 times daily. Apply to groin area 5/2/2025 Evening    potassium chloride dorothy ER (KLOR-CON M20) 20 MEQ CR tablet Take 20 mEq by mouth daily. 5/2/2025 Morning    sodium chloride (WOUND WASH SALINE) 0.9 % SOLN Externally apply topically daily as needed. Use to clean affected area and cover with dressing on Mondays, Wednesdays, and Fridays 5/2/2025 Morning

## 2025-05-03 NOTE — CONSULTS
"Care Management Initial Consult    General Information  Assessment completed with: Patient, Children, Peggy unable to answer questions with her confusion and hallucinations. Daughter Ernestine answering questions.  Type of CM/SW Visit: Initial Assessment    Primary Care Provider verified and updated as needed: Yes (\"She currently is [facility] by [facility] in her facility, but on 5/15/25 she will see a new doctor from [facility] and he will become her new primary doctor\".)   Readmission within the last 30 days: no previous admission in last 30 days      Reason for Consult: discharge planning  Advance Care Planning: Advance Care Planning Reviewed: no concerns identified, questions answered, other (see comments) (daughter informed she can bring it in and give it to Care Management so we can get a copy into her chart. She will \"try to bring it in tomorrow\".)          Communication Assessment  Patient's communication style: spoken language (English or Bilingual)           Cognitive  Cognitive/Neuro/Behavioral: dementia diagnosis at baseline. Daughter states, \"hallucinations are new for her today. She normally still knows who I am, but not today. She was telling me about me and my twin like I was someone else. At baseline with her dementia she is very forgetful. She probably calls me 30-40 times a day and often repeats the same things to me over and over\".    Living Environment:   People in home: facility resident     Current living Arrangements: assisted living  Name of Facility: [facility]   Able to return to prior arrangements: other (see comments) (unknown at this time)       Family/Social Support:  Care provided by: self, other (see comments) (Memory Care staff)  Provides care for: no one, unable/limited ability to care for self  Marital Status:   Support system:            Description of Support System: Supportive, Involved    Support Assessment: Adequate family " "and caregiver support, Adequate social supports    Current Resources:   Patient receiving home care services: No        Community Resources: [facility]  Equipment currently used at home: walker, rolling, grab bar, toilet, grab bar, tub/shower, raised toilet seat, shower chair (FWW)  Supplies currently used at home: Incontinence Supplies (\"She wears a depends because the facility makes everyone wear it. She is normally not incontinent, but now with her diarrhea she needs it\".)    Employment/Financial:  Employment Status: retired     Employment/ Comments: \"no  history\"  Financial Concerns: none   Referral to Financial Worker: No       Does the patient's insurance plan have a 3 day qualifying hospital stay waiver?  No    Lifestyle & Psychosocial Needs:  Social Drivers of Health     Food Insecurity: Not on file   Depression: Not at risk (2/15/2022)    PHQ-2     PHQ-2 Score: 0   Housing Stability: Not on file   Tobacco Use: Unknown (5/3/2025)    Patient History     Smoking Tobacco Use: Never     Smokeless Tobacco Use: Unknown     Passive Exposure: Not on file   Financial Resource Strain: Not on file   Alcohol Use: Not on file   Transportation Needs: Not on file   Physical Activity: Not on file   Interpersonal Safety: Not on file   Stress: Not on file   Social Connections: Unknown (9/26/2022)    [facility]    Social Connections     Frequency of Communication with Friends and Family: Not on file   Health Literacy: Not on file       Functional Status:  Prior to admission patient needed assistance:   Dependent ADLs:: Ambulation-walker, Bathing, Dressing, Grooming, Toileting  Dependent IADLs:: Cleaning, Cooking, Laundry, Shopping, Meal Preparation, Medication Management, Money Management, Transportation  Assesssment of Functional Status: Not at baseline with ADL " Functioning, Not at baseline with mobility, Not at  functional baseline    Mental Health Status:  Mental Health Status: No Current Concerns       Chemical Dependency Status:  Chemical Dependency Status: No Current Concerns             Values/Beliefs:  Spiritual, Cultural Beliefs, Zoroastrianism Practices, Values that affect care: no       Cultural/Zoroastrianism Practices Patient Routinely Participates In: ceremony, prayer  Values/Beliefs Comment: Kelley    Discussed  Partnership in Safe Discharge Planning  document with patient/family: No    Additional Information:  Peggy lives at [facility]. She gets assistance with all ADLs and all IADLs. She uses a FWW for mobility.    Ride: Family    CM to follow for medical progression of care, discharge recommendations, and final discharge plan. Writer verified patient demographics and updated any changes needed in the patient chart.    Next Steps:   Medical plan/delay: PT/OT recommendations.    Dispo: likely able to return to [facility]    CM to do: awaiting PT/OT recommendations for discharge needs.    Virginia Smiley RN

## 2025-05-03 NOTE — PROGRESS NOTES
Peggy is a [facility] patient admitted by Dr. Izquierdo. [facility] [facility] resident service will plan to take over care in the AM.     Chanelle Mike MD

## 2025-05-03 NOTE — ED TRIAGE NOTES
Pt arrives via [facility] from [facility]. Hx of dementia, poor historian. EMS reports diarrhea, vomiting, increased weakness and confusion. Stomach bug going around the facility. Abdomen appears distended, daughter reports this is not unusual for the pt. Daughter reports hallucinations started this morning. Pt also reporting bilateral leg pain for the past week.      Triage Assessment (Adult)       Row Name 05/03/25 1248          Triage Assessment    Airway WDL WDL        Respiratory WDL    Respiratory WDL WDL        Skin Circulation/Temperature WDL    Skin Circulation/Temperature WDL WDL        Cardiac WDL    Cardiac WDL WDL        Peripheral/Neurovascular WDL    Peripheral Neurovascular WDL WDL        Cognitive/Neuro/Behavioral WDL    Cognitive/Neuro/Behavioral WDL X     Level of Consciousness confused        Summit Coma Scale    Best Eye Response 4-->(E4) spontaneous     Best Motor Response 6-->(M6) obeys commands     Best Verbal Response 4-->(V4) confused     Summit Coma Scale Score 14

## 2025-05-03 NOTE — H&P
[facility]    History and Physical - Hospitalist Service       Date of Admission:  5/3/2025    Assessment & Plan   Active Problems:    Acute cystitis without hematuria    History of dementia    Other closed fracture of proximal end of right tibia, initial encounter    Diarrhea, unspecified type    Tibia fracture       Peggy De Anda is a 89 year old female with history of dementia, epilepsy, ataxia, chronic lumbar compression fractures, foraminal stenoses, HTN, and gout admitted on 5/3/2025 with concern for toxic encephalopathy, UTI and tibula fracture.       Encephalopathy, concern for toxic or metabolic etiology related to recent N/V/D, UTI and dehydration from recent poor po intake  H/O epilepsy and dementia, resides in memory care  CT head negative for anything acute, and shows generalized cerebral volume loss and presumed chronic microvascular ischemic changes of the white matter   -- follow up UC  -- continue CTX  -- continue home Depakote and Remeron  -- check stool studies  -- IVF  -- have as needed seroquel and zyprexa available   -- PT/OT      Recent N/V/D:  CT A/P negative, WBC normal  -- check stool studies and covid screen  -- IVF for dehydration and recent poor po intake      Right Transverse fracture through the proximal tibial metaphysis  -- ortho consult  -- supportive cares      Chronic bilateral lower extremity lymphedema   -- holding home lasix given recent poor po intake  -- lymphedema wraps      HTN: BP soft  -- continue home coreg as BP allows  -- hold home lasix and losartan for now           Diet: Combination Diet Regular Diet Adult    DVT Prophylaxis: Pneumatic Compression Devices  Gamez Catheter: Not present  Lines: None     Cardiac Monitoring: None  Code Status: No CPR- Do NOT Intubate      Clinically Significant Risk Factors Present on Admission          # Hyperchloremia: Highest Cl = 110 mmol/L in last 2 days, will monitor as appropriate              #  Hypertension: Noted on problem list      # Anemia: based on hgb <11                  Disposition Plan      Expected Discharge Date: 05/05/2025             Medically Ready for Discharge: Anticipated in 2-4 Days      Vinayak Izquierdo DO  Hospitalist Service  [facility]  Securely message with [employer] (more info)  Text page via [employer] Paging/Directory     ______________________________________________________________________    Chief Complaint   Encephalopathy    History is obtained from the patient and patient's daughter    History of Present Illness   Peggy De Anda is a 89 year old female who presents with increasing confusion, recent nasuea, vomting, diarrhea, poor po intake and fatigue.       Past Medical History    Past Medical History:   Diagnosis Date    Anemia     Ataxia     Dermatitis     Created by Conversion     Epilepsy (H)     Essential hypertension     Gout     Monoclonal gammopathy of undetermined significance     Osteoporosis     Peripheral neuropathy     Secondary hyperparathyroidism     non-renal       Past Surgical History   Past Surgical History:   Procedure Laterality Date    APPENDECTOMY      AGE 13    CATARACT EXTRACTION, BILATERAL      CHOLECYSTECTOMY      IR LUMBAR EPIDURAL STEROID INJECTION  10/5/2007    IR MISCELLANEOUS PROCEDURE  10/30/2007    NEUROPLASTY / TRANSPOSITION MEDIAN NERVE AT CARPAL TUNNEL BILATERAL      TONSILLECTOMY         Prior to Admission Medications   Prior to Admission Medications   Prescriptions Last Dose Informant Patient Reported? Taking?   acetaminophen (TYLENOL) 500 MG tablet 5/2/2025 Evening  Yes Yes   Sig: Take 1,000 mg by mouth 3 times daily.   carvedilol (COREG) 6.25 MG tablet 5/2/2025 Evening  No Yes   Sig: Take 1 tablet (6.25 mg) by mouth 2 times daily   diclofenac (VOLTAREN) 1 % topical gel 5/2/2025 Evening  Yes Yes   Sig: Apply 4 g topically 2 times daily. Apply to right knee   diclofenac (VOLTAREN) 1 % topical gel Past Week  Yes Yes    Sig: Apply 2 g topically 3 times daily as needed for moderate pain. Apply to neck   divalproex sodium delayed-release (DEPAKOTE SPRINKLE) 125 MG DR capsule 5/2/2025 Evening  Yes Yes   Sig: Take 3 capsules by mouth 2 times daily.   furosemide (LASIX) 40 MG tablet 5/2/2025 Morning  Yes Yes   Sig: Take 40 mg by mouth every morning.   loperamide (IMODIUM) 2 MG capsule 5/1/2025 Noon  Yes Yes   Sig: Take 4 mg by mouth as needed for diarrhea. Take with first loose stool   loperamide (IMODIUM) 2 MG capsule Past Week  Yes Yes   Sig: Take 2 mg by mouth as needed for diarrhea. After each subsequent loose stool   losartan (COZAAR) 100 MG tablet 5/2/2025 Morning  No Yes   Sig: Take 1 tablet (100 mg) by mouth daily   magnesium oxide (MAG-OX) 400 MG tablet 5/2/2025 Morning  No Yes   Sig: Take 1 tablet (400 mg) by mouth daily.   mirtazapine (REMERON) 15 MG tablet 5/2/2025 Evening  Yes Yes   Sig: Take 15 mg by mouth at bedtime.   nystatin (MYCOSTATIN) 138150 UNIT/GM external cream 5/2/2025 Evening  Yes Yes   Sig: Apply topically 2 times daily. Apply to groin area   potassium chloride dorothy ER (KLOR-CON M20) 20 MEQ CR tablet 5/2/2025 Morning  Yes Yes   Sig: Take 20 mEq by mouth daily.   sodium chloride (WOUND WASH SALINE) 0.9 % SOLN 5/2/2025 Morning  Yes Yes   Sig: Externally apply topically daily as needed. Use to clean affected area and cover with dressing on Mondays, Wednesdays, and Fridays      Facility-Administered Medications: None           Physical Exam   Vital Signs: Temp: 98.4  F (36.9  C) Temp src: Oral BP: 110/58 Pulse: 64   Resp: 18 SpO2: 95 % O2 Device: None (Room air)    Weight: 0 lbs 0 oz    General Appearance: In acute emotional distress  RESPIRATORY: respirations nonlabored  CARDIOVASCULAR: No le edema bilat.  SKIN: RLE is dressed and wrapped  NEUROLOGIC: No focal arm or leg  weakness, speech is clear, confused about place and time, oriented to self only      Medical Decision Making       >75 MINUTES SPENT BY ME on  the date of service doing chart review, history, exam, documentation & further activities per the note.      Data

## 2025-05-03 NOTE — ED NOTES
Bed: [place] [address]  Expected date:   Expected time:   Means of arrival:   Comments:  89 female - weakness/confusion\diarrhea/vomiting MAHT

## 2025-05-04 ENCOUNTER — ANESTHESIA (OUTPATIENT)
Dept: SURGERY | Facility: HOSPITAL | Age: OVER 89
End: 2025-05-04

## 2025-05-04 ENCOUNTER — APPOINTMENT (OUTPATIENT)
Dept: PHYSICAL THERAPY | Facility: HOSPITAL | Age: OVER 89
End: 2025-05-04
Attending: INTERNAL MEDICINE
Payer: COMMERCIAL

## 2025-05-04 ENCOUNTER — ANESTHESIA EVENT (OUTPATIENT)
Dept: SURGERY | Facility: HOSPITAL | Age: OVER 89
End: 2025-05-04

## 2025-05-04 ENCOUNTER — APPOINTMENT (OUTPATIENT)
Dept: OCCUPATIONAL THERAPY | Facility: HOSPITAL | Age: OVER 89
End: 2025-05-04
Attending: INTERNAL MEDICINE
Payer: COMMERCIAL

## 2025-05-04 LAB
ANION GAP SERPL CALCULATED.3IONS-SCNC: 14 MMOL/L (ref 7–15)
BUN SERPL-MCNC: 24.7 MG/DL (ref 8–23)
C DIFF TOX B STL QL: NEGATIVE
CALCIUM SERPL-MCNC: 8.7 MG/DL (ref 8.8–10.4)
CHLORIDE SERPL-SCNC: 112 MMOL/L (ref 98–107)
CREAT SERPL-MCNC: 0.79 MG/DL (ref 0.51–0.95)
EGFRCR SERPLBLD CKD-EPI 2021: 69 ML/MIN/1.73M2
ERYTHROCYTE [DISTWIDTH] IN BLOOD BY AUTOMATED COUNT: 13.3 % (ref 10–15)
GLUCOSE SERPL-MCNC: 96 MG/DL (ref 70–99)
HCO3 SERPL-SCNC: 16 MMOL/L (ref 22–29)
HCT VFR BLD AUTO: 32.5 % (ref 35–47)
HGB BLD-MCNC: 10 G/DL (ref 11.7–15.7)
MCH RBC QN AUTO: 32.2 PG (ref 26.5–33)
MCHC RBC AUTO-ENTMCNC: 30.8 G/DL (ref 31.5–36.5)
MCV RBC AUTO: 105 FL (ref 78–100)
PLATELET # BLD AUTO: 209 10E3/UL (ref 150–450)
POTASSIUM SERPL-SCNC: 3.7 MMOL/L (ref 3.4–5.3)
RBC # BLD AUTO: 3.11 10E6/UL (ref 3.8–5.2)
SODIUM SERPL-SCNC: 142 MMOL/L (ref 135–145)
WBC # BLD AUTO: 9.7 10E3/UL (ref 4–11)

## 2025-05-04 PROCEDURE — 97161 PT EVAL LOW COMPLEX 20 MIN: CPT | Mod: GP

## 2025-05-04 PROCEDURE — 97535 SELF CARE MNGMENT TRAINING: CPT | Mod: GO

## 2025-05-04 PROCEDURE — 97530 THERAPEUTIC ACTIVITIES: CPT | Mod: GP

## 2025-05-04 PROCEDURE — 36415 COLL VENOUS BLD VENIPUNCTURE: CPT | Performed by: INTERNAL MEDICINE

## 2025-05-04 PROCEDURE — 258N000003 HC RX IP 258 OP 636: Performed by: INTERNAL MEDICINE

## 2025-05-04 PROCEDURE — 250N000011 HC RX IP 250 OP 636: Performed by: INTERNAL MEDICINE

## 2025-05-04 PROCEDURE — 97166 OT EVAL MOD COMPLEX 45 MIN: CPT | Mod: GO

## 2025-05-04 PROCEDURE — 250N000013 HC RX MED GY IP 250 OP 250 PS 637: Performed by: INTERNAL MEDICINE

## 2025-05-04 PROCEDURE — 120N000001 HC R&B MED SURG/OB

## 2025-05-04 PROCEDURE — 85018 HEMOGLOBIN: CPT | Performed by: INTERNAL MEDICINE

## 2025-05-04 PROCEDURE — 86900 BLOOD TYPING SEROLOGIC ABO: CPT | Performed by: PHYSICIAN ASSISTANT

## 2025-05-04 PROCEDURE — 99232 SBSQ HOSP IP/OBS MODERATE 35: CPT | Mod: GC

## 2025-05-04 PROCEDURE — 80048 BASIC METABOLIC PNL TOTAL CA: CPT | Performed by: INTERNAL MEDICINE

## 2025-05-04 PROCEDURE — 84443 ASSAY THYROID STIM HORMONE: CPT | Performed by: FAMILY MEDICINE

## 2025-05-04 RX ORDER — OLANZAPINE 10 MG/2ML
2.5 INJECTION, POWDER, FOR SOLUTION INTRAMUSCULAR EVERY 6 HOURS PRN
Status: DISCONTINUED | OUTPATIENT
Start: 2025-05-04 | End: 2025-05-07

## 2025-05-04 RX ADMIN — CARVEDILOL 6.25 MG: 3.12 TABLET, FILM COATED ORAL at 21:37

## 2025-05-04 RX ADMIN — ACETAMINOPHEN 975 MG: 325 TABLET, FILM COATED ORAL at 15:27

## 2025-05-04 RX ADMIN — ACETAMINOPHEN 975 MG: 325 TABLET, FILM COATED ORAL at 21:38

## 2025-05-04 RX ADMIN — QUETIAPINE FUMARATE 12.5 MG: 25 TABLET ORAL at 01:20

## 2025-05-04 RX ADMIN — NYSTATIN: 100000 CREAM TOPICAL at 21:41

## 2025-05-04 RX ADMIN — MIRTAZAPINE 15 MG: 7.5 TABLET, FILM COATED ORAL at 21:37

## 2025-05-04 RX ADMIN — DICLOFENAC SODIUM 4 G: 10 GEL TOPICAL at 08:25

## 2025-05-04 RX ADMIN — ANORECTAL OINTMENT: 15.7; .44; 24; 20.6 OINTMENT TOPICAL at 08:24

## 2025-05-04 RX ADMIN — SODIUM CHLORIDE: 0.9 INJECTION, SOLUTION INTRAVENOUS at 07:10

## 2025-05-04 RX ADMIN — DICLOFENAC SODIUM 4 G: 10 GEL TOPICAL at 21:36

## 2025-05-04 RX ADMIN — DIVALPROEX SODIUM 375 MG: 125 CAPSULE, COATED PELLETS ORAL at 08:24

## 2025-05-04 RX ADMIN — DIVALPROEX SODIUM 375 MG: 125 CAPSULE, COATED PELLETS ORAL at 21:38

## 2025-05-04 RX ADMIN — NYSTATIN: 100000 CREAM TOPICAL at 08:25

## 2025-05-04 RX ADMIN — CEFTRIAXONE SODIUM 1 G: 1 INJECTION, POWDER, FOR SOLUTION INTRAMUSCULAR; INTRAVENOUS at 15:28

## 2025-05-04 RX ADMIN — ANORECTAL OINTMENT: 15.7; .44; 24; 20.6 OINTMENT TOPICAL at 21:35

## 2025-05-04 RX ADMIN — HYDROMORPHONE HYDROCHLORIDE 0.2 MG: 0.2 INJECTION, SOLUTION INTRAMUSCULAR; INTRAVENOUS; SUBCUTANEOUS at 01:42

## 2025-05-04 RX ADMIN — ACETAMINOPHEN 975 MG: 325 TABLET, FILM COATED ORAL at 08:23

## 2025-05-04 ASSESSMENT — ACTIVITIES OF DAILY LIVING (ADL)
ADLS_ACUITY_SCORE: 88
ADLS_ACUITY_SCORE: 89
ADLS_ACUITY_SCORE: 93
ADLS_ACUITY_SCORE: 88
ADLS_ACUITY_SCORE: 89
ADLS_ACUITY_SCORE: 88
ADLS_ACUITY_SCORE: 89
ADLS_ACUITY_SCORE: 88
ADLS_ACUITY_SCORE: 88
ADLS_ACUITY_SCORE: 89
ADLS_ACUITY_SCORE: 88
ADLS_ACUITY_SCORE: 89
ADLS_ACUITY_SCORE: 93
ADLS_ACUITY_SCORE: 89
ADLS_ACUITY_SCORE: 93
ADLS_ACUITY_SCORE: 89
ADLS_ACUITY_SCORE: 88
ADLS_ACUITY_SCORE: 88
ADLS_ACUITY_SCORE: 93

## 2025-05-04 ASSESSMENT — ENCOUNTER SYMPTOMS: SEIZURES: 1

## 2025-05-04 NOTE — PLAN OF CARE
Problem: Adult Inpatient Plan of Care  Goal: Absence of Hospital-Acquired Illness or Injury  Intervention: Prevent Skin Injury  Recent Flowsheet Documentation  Taken 5/4/2025 0423 by Anabela Glover RN  Body Position: heels elevated  Intervention: Prevent Infection  Recent Flowsheet Documentation  Taken 5/4/2025 0300 by Anabela Glover RN  Infection Prevention:   cohorting utilized   environmental surveillance performed   equipment surfaces disinfected   hand hygiene promoted   personal protective equipment utilized   rest/sleep promoted   single patient room provided   visitors restricted/screened  Goal: Optimal Comfort and Wellbeing  Intervention: Provide Person-Centered Care  Recent Flowsheet Documentation  Taken 5/4/2025 0300 by Anabela Glover RN  Trust Relationship/Rapport:   care explained   choices provided   emotional support provided     Problem: Delirium  Goal: Improved Behavioral Control  Intervention: Minimize Safety Risk  Recent Flowsheet Documentation  Taken 5/4/2025 0300 by Anabela Glover RN  Trust Relationship/Rapport:   care explained   choices provided   emotional support provided  Goal: Improved Attention and Thought Clarity  Intervention: Maximize Cognitive Function  Recent Flowsheet Documentation  Taken 5/4/2025 0300 by Anabela Glover RN  Sensory Stimulation Regulation:   television on   lighting decreased   quiet environment promoted  Reorientation Measures:   calendar in view   clock in view   Goal Outcome Evaluation:       Pt presenting with intermittent confusion and agitation, pulling on lines, trying to get out of bed, prn Seroquel administered with minimal effect, pain in RLE was control with iv dilaudid. NPO in place for possible ORIF this AM, IVF running 75ml/hr,  perineal area redness, clear and Calmoseptine cream applied.

## 2025-05-04 NOTE — CONSULTS
ORTHOPEDIC CONSULTATION    Consultation  Peggy De Anda,  11/15/1931, MRN [id number]    [facility]  Tibia fracture [S82.209A]  Acute cystitis without hematuria [N30.00]  History of dementia [Z86.59]  Other closed fracture of proximal end of right tibia, initial encounter [S82.191A]  Diarrhea, unspecified type [R19.7]    PCP: Courtney Acosta Physician, None   Code status:  No CPR- Do NOT Intubate       Extended Emergency Contact Information  Primary Emergency Contact: Ernestine Scott   [facility]  Home Phone: [phone]  Mobile Phone: [phone]  Relation: Daughter  Secondary Emergency Contact: Theo Scott  Mobile Phone: [phone]  Relation: Son-in-Law         IMPRESSION:  89-year-old female with PMHx significant for dementia and currently admitted with acute cystitis and encephalopathy who sustained acute mildly displaced right proximal tibia shaft fracture.     PLAN:  This patient was discussed with Dr. Tripathi, on-call surgeon for [facility] and they are in agreement with the following plan.    - NWB RLE  - Knee immobilizer recommended and ordered  - Tentatively plan for surgical intervention tomorrow ORIF right tibia with intramedullary nailing  - Medical optimization  - Pain control per primary team  - NPO at MN  - Ortho to see in AM to discuss plan    Thank you for including [facility] in the care of Peggy De Anda. It has been a pleasure participating in [facility] care.        CHIEF COMPLAINT: <principal problem not specified>    HISTORY OF PRESENT ILLNESS:  The patient is seen in orthopedic consultation at the request of Dr. Weiss.  The patient is a 89 year old female who presented to the ED today with concern for toxic encephalopathy, UTI and found to have tibia fracture. Patient history limited given degree of confusion and difficulty with following commands. Supplemental history provided by nurse at bedside who states she lives at a memory care facility and had used  a four-wheeled walker per her family up until a few weeks ago when she stopped moving around due to pain at the leg. No reports of mechanical falls or mechanism of injury. No recent falls since Sept 2023 per daughter (ED documentation).     PAST MEDICAL HISTORY:  Active Ambulatory Problems     Diagnosis Date Noted    Epilepsy And Recurrent Seizures     Nonrenal Secondary Hyperparathyroidism     Vitamin D Deficiency     Monoclonal Gammopathy Of Undetermined Significance     Anemia     Peripheral Neuropathy     Benign essential hypertension     Osteoporosis     Fatigue     Obesity     Ataxia     Gout 10/31/2015    Muscle weakness (generalized) 08/05/2021    Hip pain, left 08/06/2021    Dizziness 04/26/2022    Bilateral leg edema 04/26/2022    Callus of foot 04/26/2022    Generalized weakness 09/07/2023    Monoclonal gammopathy of undetermined significance 09/26/2022     Resolved Ambulatory Problems     Diagnosis Date Noted    Joint Pain, Localized In The Hip     Convulsive Disorder     Dermatitis      Past Medical History:   Diagnosis Date    Ataxia     Essential hypertension     Peripheral neuropathy     Secondary hyperparathyroidism           ALLERGIES:   Review of patient's allergies indicates   Allergies   Allergen Reactions    Oxycodone Hallucination    Quinolones Rash    Levofloxacin Rash         MEDICATIONS UPON ADMISSION:  Medications were reviewed.  They include:   Medications Prior to Admission   Medication Sig Dispense Refill Last Dose/Taking    acetaminophen (TYLENOL) 500 MG tablet Take 1,000 mg by mouth 3 times daily.   5/2/2025 Evening    carvedilol (COREG) 6.25 MG tablet Take 1 tablet (6.25 mg) by mouth 2 times daily 180 tablet 3 5/2/2025 Evening    diclofenac (VOLTAREN) 1 % topical gel Apply 4 g topically 2 times daily. Apply to right knee   5/2/2025 Evening    diclofenac (VOLTAREN) 1 % topical gel Apply 2 g topically 3 times daily as needed for moderate pain. Apply to neck   Past Week    divalproex  sodium delayed-release (DEPAKOTE SPRINKLE) 125 MG DR capsule Take 3 capsules by mouth 2 times daily.   5/2/2025 Evening    furosemide (LASIX) 40 MG tablet Take 40 mg by mouth every morning.   5/2/2025 Morning    loperamide (IMODIUM) 2 MG capsule Take 4 mg by mouth as needed for diarrhea. Take with first loose stool   5/1/2025 Noon    loperamide (IMODIUM) 2 MG capsule Take 2 mg by mouth as needed for diarrhea. After each subsequent loose stool   Past Week    losartan (COZAAR) 100 MG tablet Take 1 tablet (100 mg) by mouth daily 90 tablet 3 5/2/2025 Morning    magnesium oxide (MAG-OX) 400 MG tablet Take 1 tablet (400 mg) by mouth daily. 90 tablet 3 5/2/2025 Morning    mirtazapine (REMERON) 15 MG tablet Take 15 mg by mouth at bedtime.   5/2/2025 Evening    nystatin (MYCOSTATIN) 719689 UNIT/GM external cream Apply topically 2 times daily. Apply to groin area   5/2/2025 Evening    potassium chloride dorothy ER (KLOR-CON M20) 20 MEQ CR tablet Take 20 mEq by mouth daily.   5/2/2025 Morning    sodium chloride (WOUND WASH SALINE) 0.9 % SOLN Externally apply topically daily as needed. Use to clean affected area and cover with dressing on Mondays, Wednesdays, and Fridays 5/2/2025 Morning         SOCIAL HISTORY:   she  reports that she has never smoked. She does not have any smokeless tobacco history on file.    FAMILY HISTORY:  family history is not on file.      REVIEW OF SYSTEMS:   Reviewed with patient. See HPI, otherwise negative      PHYSICAL EXAMINATION:  Vitals: Temp:  [97.7  F (36.5  C)-98.4  F (36.9  C)] 97.9  F (36.6  C)  Pulse:  [60-95] 77  Resp:  [18-24] 22  BP: (110-141)/(58-93) 132/69  SpO2:  [95 %-100 %] 96 %  General: On examination, the patient is confused. Difficulty following commands.   SKIN: There is no evidence of skin breakthrough. Bruising at proximal tibia. Compartments soft and compressible.   Pulses:  dorsalis pedis pulse is intact   Tenderness: tender to palpation at the bruised area of proxmial  tibia. Difficult to assess further due to noncompliance.   ROM: Deferred with splint in place. Wiggles toes.   Motor: Deferred with splint in place.      RADIOGRAPHIC EVALUATION:  Personally reviewed    EXAM: CT TIBIA FIBULA LOWER LEG RIGHT WO CONTRAST  LOCATION: [facility]  DATE: 5/3/2025     INDICATION: Tibia fracture.  COMPARISON: Same-day radiograph.  TECHNIQUE: Noncontrast. Axial, sagittal and coronal thin-section reconstruction. Dose reduction techniques were used.      FINDINGS:      Bones are markedly demineralized. Again seen is an acute transverse fracture through the proximal tibial shaft with up to 6 mm of medial to lateral displacement.     The fracture extends through inferior base of the tibial tubercle.     In addition, there is a nondisplaced area of curvilinear sclerosis along the tibial metadiaphysis approximately 1.5 cm from the articular surface of the knee in keeping with an age-indeterminate nondisplaced stress fracture, favor subacute to chronic.     Acute minimally displaced and impacted fracture centered at the fibular neck. Age-indeterminate nondisplaced stress fracture of the calcaneus where there is curvilinear sclerosis.     No evidence of an acute displaced distal tibia or fibular fracture.     Scattered soft tissue swelling and poorly defined blood products most pronounced near the proximal tibial fracture site. No large soft tissue hematoma.     Arthritic changes both knees with left greater than right joint effusions.     Extensive atherosclerotic vascular calcifications.                                                                      IMPRESSION:  1.  Acute mildly displaced proximal tibial shaft fracture as described.  2.  Acute minimally displaced and impacted fibular neck fracture.  3.  Age-indeterminate nondisplaced stress fracture of the proximal tibial metadiaphysis, superior to the tibial acute fracture, favor subacute to chronic.  4.   Age-indeterminate nondisplaced calcaneal stress fracture, favor subacute to chronic although correlation with tenderness is recommend.  5.  Bones are markedly demineralized.        PERTINENT LABS:  Lab Results: personally reviewed.   Lab Results   Component Value Date    WBC 7.8 05/03/2025    HGB 10.4 05/03/2025    HCT 32.5 05/03/2025     05/03/2025     05/03/2025         PERLITA VILLA PA-C  Date: 5/3/2025  Time: 10:37 PM    CC1:   Vinayak Izquierdo DO    CC2:   Services, [facility]

## 2025-05-04 NOTE — PLAN OF CARE
Problem: Delirium  Goal: Improved Attention and Thought Clarity  Intervention: Maximize Cognitive Function  Recent Flowsheet Documentation  Taken 5/4/2025 1700 by Kyra Garcia RN  Sensory Stimulation Regulation: television on  Reorientation Measures: calendar in view  Taken 5/4/2025 0733 by Kyra Garcia RN  Sensory Stimulation Regulation: television on  Reorientation Measures: calendar in view     Problem: Orthopaedic Fracture  Goal: Fracture Stability  Outcome: Progressing     Problem: Delirium  Goal: Improved Attention and Thought Clarity  Outcome: Not Progressing     Problem: Delirium  Goal: Improved Behavioral Control  Outcome: Not Progressing   Goal Outcome Evaluation:         Patient continues to be very impulsive and is alert to only self.  Patient is unaware of where or why she is in the hospital.  Patient was retaining urine on the day shift.  And was straight cathed following a PVR >900ml.  Patient was unable to verbalize where her pain was but was very agitated and pulling at lines and squirming in the bed.  Patient calling out in pain when she is repostitioned to change.  Patient groin is red and excoriated.  Calmoseptine paste was applied to the areas of concern.  Patient has a brace in place to keep tibial fx stable.  No surgical interventions planned at this time. No bleeding noted on this shift.  Patient's daughter and daughter's  at bedside this evening.

## 2025-05-04 NOTE — PROGRESS NOTES
[facility]    Medicine Progress Note - Hospitalist Service    Date of Admission:  5/3/2025    Assessment & Plan   Peggy De Anda is a 89 year old female with history of dementia, epilepsy, ataxia, chronic lumbar compression fractures, foraminal stenoses, HTN, and gout admitted on 5/3/2025 with concern for toxic encephalopathy, UTI and tibula fracture.     Metabolic encephalopathy, improving  Severe sepsis, improved  UTI  Presented from memory care w/ NVD and AMS. Initially w/o fever or leukocytosis on labs in the ED. Head CT negative. UC/BCs collected. Started on IV Ceftriaxone in the ED for dirty UA and suspected UTI. Did ultimately test positive for ETEC, norovirus, see below. Suspect primary  of pt's AMS on arrival was metabolic encephalopathy +/- volume depletion from GI illness. Seems to be much nearer to mental status baseline 5/4 AM.   - UC/BC pending  - Daily CBC  - Tylenol scheduled for px/fevers  - Continue IV Ceftriaxone for now  - PT/OT consulted for discharge planning  - Delirium precautions ordered, cluster cares overnight and try to avoid unnecessary lab draws     Frequent non bloody diarrhea  Norovirus infection  ETEC infection  One unmeasured stool overnight, able to collect enteric panel which resulted positive for ETEC and norovirus. Certainly could explain the above symptoms. Given no leukocytosis, fever, or bloody stools will continue w/ supportive cares only.   - mIVFs w/ NS @ 75 ml/h, transition to PO only as soon as tolerated/able    Right Transverse fracture through the proximal tibial metaphysis  On admission found to have mildly displaced proximal tibial shaft fracture. Unclear what mechanism of injury was to cause this, though suspect there was a fall sometime in recent days w/ pt hx of advanced dementia.   - ortho consulted, appreciate recs   > Will treat non operatively   > NWB on RLE. Well padded knee immobilizer for now, transition to hinged knee brace  locked in full extension   > PT/OT  - Scheduled tylenol  - PRN Dilaudid 0.2 mg IV q3h PRN for severe pain    Chronic conditions:  Epilepsy- PTA Depakote  Chronic venous stasis- holding PTA lasix. Lymphedema wraps ordered.   HTN- Holding PTA coreg, lasix          Diet: Regular Diet Adult    DVT Prophylaxis: Pneumatic Compression Devices  Gamez Catheter: Not present  Lines: None     Cardiac Monitoring: None  Code Status: No CPR- Do NOT Intubate      Clinically Significant Risk Factors Present on Admission          # Hyperchloremia: Highest Cl = 112 mmol/L in last 2 days, will monitor as appropriate              # Hypertension: Noted on problem list      # Anemia: based on hgb <11       # Overweight: Estimated body mass index is 27.86 kg/m  as calculated from the following:    Height as of this encounter: 1.524 m (5').    Weight as of this encounter: 64.7 kg (142 lb 10.2 oz).       # Financial/Environmental Concerns: none         Social Drivers of Health    Tobacco Use: Unknown (5/3/2025)    Patient History     Smoking Tobacco Use: Never     Smokeless Tobacco Use: Unknown   Financial Resource Strain: Unknown (5/3/2025)    Financial Resource Strain     Within the past 12 months, have you or your family members you live with been unable to get utilities (heat, electricity) when it was really needed?: Patient unable to answer   Interpersonal Safety: Unknown (5/3/2025)    Interpersonal Safety     Do you feel physically and emotionally safe where you currently live?: Patient unable to answer     Within the past 12 months, have you been hit, slapped, kicked or otherwise physically hurt by someone?: Patient unable to answer     Within the past 12 months, have you been humiliated or emotionally abused in other ways by your partner or ex-partner?: Patient unable to answer    [facility] [employer] [facility]    Social Connections          Disposition Plan    Medically Ready for Discharge: Anticipated in 2-4 Days         The patient's care was discussed with the Attending Physician, Dr. Crews .    Jasson Gray MD  Hospitalist Service  [facility]  Securely message with [employer] (more info)  Text page via [employer] Paging/Directory   ______________________________________________________________________    Interval History   NAEON. Pt in bed this AM, denies any specific concerns. Frequently stating she needs to use the bathroom, reminded she has garcia in place.     Physical Exam   Vital Signs: Temp: 99.2  F (37.3  C) Temp src: Axillary BP: 114/58 Pulse: 64   Resp: 20 SpO2: 97 % O2 Device: None (Room air)    Weight: 142 lbs 10.2 oz    General Appearance: Lying in bed, restless, NAD  RESPIRATORY: respirations nonlabored  CARDIOVASCULAR: No le edema bilat.  SKIN: RLE is dressed and wrapped  NEUROLOGIC: No focal arm or leg  weakness, speech is clear, confused about place and time, oriented to self only    Data   ------------------------- PAST 24 HR DATA REVIEWED -----------------------------------------------    I have personally reviewed the following data over the past 24 hrs:    9.7  \   10.0 (L)   / 209     142 112 (H) 24.7 (H) /  96   3.7 16 (L) 0.79 \     ALT: 17 AST: 27 AP: 163 (H) TBILI: 0.4   ALB: 3.5 TOT PROTEIN: 6.7 LIPASE: 15       Imaging results reviewed over the past 24 hrs:   Recent Results (from the past 24 hours)   Head CT w/o contrast    Narrative    EXAM: CT HEAD W/O CONTRAST  LOCATION: [facility]  DATE: 5/3/2025    INDICATION: confusion hallucination  COMPARISON: Head CT 09/07/2023. Brain MRI 09/08/2023.  TECHNIQUE: Routine CT Head without IV contrast. Multiplanar reformats. Dose reduction techniques were used.    FINDINGS:  INTRACRANIAL CONTENTS: No intracranial hemorrhage, extraaxial collection, or mass effect.  No CT evidence of acute infarct. Mild presumed chronic small vessel ischemic changes.  Moderate generalized volume loss. No hydrocephalus.     VISUALIZED ORBITS/SINUSES/MASTOIDS: Prior bilateral cataract surgery. Visualized portions of the orbits are otherwise unremarkable. No paranasal sinus mucosal disease. No middle ear or mastoid effusion.    BONES/SOFT TISSUES: No acute abnormality.      Impression    IMPRESSION:  1.  No acute intracranial findings.  2.  Generalized cerebral volume loss and presumed chronic microvascular ischemic changes of the white matter.   CT Abdomen Pelvis w Contrast    Narrative    EXAM: CT ABDOMEN PELVIS W CONTRAST  LOCATION: [facility]  DATE: 5/3/2025    INDICATION: Abdominal pain but also diarrhea evaluate for any obstruction or inflammation.  History of dementia.  Please also comment on bony pelvis patient complains of pain  COMPARISON: 9/9/2023  TECHNIQUE: CT scan of the abdomen and pelvis was performed following injection of IV contrast. Multiplanar reformats were obtained. Dose reduction techniques were used.  CONTRAST: 70ml [id number]    FINDINGS:   LOWER CHEST: Mildly elevated left hemidiaphragm. Coronary artery disease.    HEPATOBILIARY: Cholecystectomy. Bile duct dilatation may be from cholecystectomy.    PANCREAS: Normal.    SPLEEN: Normal.    ADRENAL GLANDS: Fullness of the adrenal glands without definite nodularity.    KIDNEYS/BLADDER: Simple renal cysts do not require follow-up. No hydronephrosis. There is a nonobstructing left renal calculus. Left renal cortical thinning is again seen. The urinary bladder is distended.    BOWEL: Normal stomach. Normal caliber of the small bowel. There are air-fluid levels in the colon and rectum.    LYMPH NODES: Normal.    VASCULATURE: Atherosclerotic disease. 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    PELVIC ORGANS: Normal.    MUSCULOSKELETAL: Osteopenia. Vertebroplasty at L4 and L5. There is inferior endplate compression of L2  which has not changed. There are degenerative changes in the posterior elements of the lumbar spine. Old left pubic tubercle and inferior pubic rami   fractures are noted. An old sacral fracture is again seen. No acute fracture.      Impression    IMPRESSION:  1.  Air-fluid levels in the colon and rectum can be seen with liquid stool. No bowel obstruction.  2.  The urinary bladder is distended.  3.  Atherosclerotic disease including coronary artery disease.  4.  Osteopenia. No acute fracture.     XR Tibia and Fibula Bilateral 2 Views    Narrative    EXAM: XR TIBIA AND FIBULA BILATERAL 2 VIEWS  LOCATION: [facility]  DATE: 5/3/2025    INDICATION: pain ecchymoses  COMPARISON: None.      Impression    IMPRESSION:     Right lower extremity: There is a transverse fracture through the proximal tibial metaphysis. No significant angulation deformity. The fibular head is negative for fracture but there is severe demineralization making this difficult to evaluate.   Degenerative change at the knee joint. The ankle mortise appears intact. Plantar calcaneal spurring. Degenerative change at the subtalar joints and talonavicular joint. Vascular calcifications.    Left lower extremity: The tibiotalar negative for acute fracture. There is severe end-stage degenerative change at the tibiotalar joint. Degenerative change medial compartment of the knee joint. Again, severe and diffuse demineralization.   XR Chest Port 1 View    Narrative    EXAM: XR CHEST PORT 1 VIEW  LOCATION: [facility]  DATE: 5/3/2025    INDICATION: weakness  COMPARISON: None.      Impression    IMPRESSION: Heart is normal in size. Mild elevation of the left hemidiaphragm. Lungs otherwise clear. Degenerative changes of the shoulders.   CT Tibia Fibula Lower Leg Right wo Contrast    Narrative    EXAM: CT TIBIA FIBULA LOWER LEG RIGHT WO CONTRAST  LOCATION: [facility]  DATE:  5/3/2025    INDICATION: Tibia fracture.  COMPARISON: Same-day radiograph.  TECHNIQUE: Noncontrast. Axial, sagittal and coronal thin-section reconstruction. Dose reduction techniques were used.     FINDINGS:     Bones are markedly demineralized. Again seen is an acute transverse fracture through the proximal tibial shaft with up to 6 mm of medial to lateral displacement.    The fracture extends through inferior base of the tibial tubercle.    In addition, there is a nondisplaced area of curvilinear sclerosis along the tibial metadiaphysis approximately 1.5 cm from the articular surface of the knee in keeping with an age-indeterminate nondisplaced stress fracture, favor subacute to chronic.    Acute minimally displaced and impacted fracture centered at the fibular neck. Age-indeterminate nondisplaced stress fracture of the calcaneus where there is curvilinear sclerosis.    No evidence of an acute displaced distal tibia or fibular fracture.    Scattered soft tissue swelling and poorly defined blood products most pronounced near the proximal tibial fracture site. No large soft tissue hematoma.    Arthritic changes both knees with left greater than right joint effusions.    Extensive atherosclerotic vascular calcifications.      Impression    IMPRESSION:  1.  Acute mildly displaced proximal tibial shaft fracture as described.  2.  Acute minimally displaced and impacted fibular neck fracture.  3.  Age-indeterminate nondisplaced stress fracture of the proximal tibial metadiaphysis, superior to the tibial acute fracture, favor subacute to chronic.  4.  Age-indeterminate nondisplaced calcaneal stress fracture, favor subacute to chronic although correlation with tenderness is recommend.  5.  Bones are markedly demineralized.    NOTE: ABNORMAL REPORT    THE DICTATION ABOVE DESCRIBES AN ABNORMALITY FOR WHICH FOLLOW-UP IS NEEDED.        POC US Guidance Needle Placement    Narrative    Ultrasound was performed as guidance to an  "anesthesia procedure.  Click   \"PACS images\" hyperlink below to view any stored images.  For specific   procedure details, view procedure note authored by anesthesia.     "

## 2025-05-04 NOTE — PROGRESS NOTES
05/04/25 5842   Appointment Info   Signing Clinician's Name / Credentials (PT) Jasson Engle, PT, DPT   Rehab Comments (PT) Patient left lying supine with HOB elevated, BLE elevated on pillows, 1:1 NA present.   Living Environment   People in Home facility resident   Current Living Arrangements   (memory care)   Self-Care   Equipment Currently Used at Home walker, rolling;wheelchair, manual   Activity/Exercise/Self-Care Comment Patient unable to provide any PLOF. Dtr present at end of session and reports prior to 2-3 weeks ago, patient was ambulatory with a walker and performing her own bADL. In the last 2-3 weeks patient's function rapidly declined and she was needing assist to the bathroom via wheelchair.   General Information   Onset of Illness/Injury or Date of Surgery 05/03/25   Referring Physician Vinayak Izquierdo DO   Pertinent History of Current Problem (include personal factors and/or comorbidities that impact the POC) Metabolic encephalopathy, Severe sepsis, UTI, Norovirus infection, ETEC infection, Right Transverse fracture through the proximal tibial metaphysis - nonoperative treatment   Existing Precautions/Restrictions fall;weight bearing   Weight-Bearing Status - RLE (S)  nonweight-bearing  (per chart review: NWB on RLE. Well padded knee immobilizer for now, transition to hinged knee brace locked in full extension)   Pain Assessment   Patient Currently in Pain Yes, see Vital Sign flowsheet   Range of Motion (ROM)   Range of Motion ROM deficits secondary to pain;ROM deficits secondary to weakness   Strength (Manual Muscle Testing)   Strength (Manual Muscle Testing) Deficits observed during functional mobility   Bed Mobility   Bed Mobility supine-sit;sit-supine   Supine-Sit [place] (Bed Mobility) dependent (less than 25% patient effort);2 person assist;verbal cues;nonverbal cues (demo/gesture)   Sit-Supine [place] (Bed Mobility) dependent (less than 25% patient effort);2 person assist;verbal  cues;nonverbal cues (demo/gesture)   Assistive Device (Bed Mobility) draw sheet;bed rails  (HOB elevated)   Transfers   Comment, (Transfers) not appropriate to trial transfers today   Balance   Balance Comments posterior lean in sitting   Sensory Examination   Sensory Perception Comments hypersensitive to touch BLE   Clinical Impression   Criteria for Skilled Therapeutic Intervention Yes, treatment indicated   PT Diagnosis (PT) impaired functional mobility   Influenced by the following impairments pain, impaired cognition, impaired balance, impaired strength, NWB RLE, impaired vision, fear of pain/falling, impaired ROM, impaired activity tolerance   Functional limitations due to impairments impaired bed mobility, impaired transfers   Clinical Presentation (PT Evaluation Complexity) evolving   Clinical Presentation Rationale clinical judgement   Clinical Decision Making (Complexity) low complexity   Planned Therapy Interventions (PT) balance training;bed mobility training;gait training;neuromuscular re-education;patient/family education;strengthening;transfer training;wheelchair management/propulsion training;progressive activity/exercise   PT Total Evaluation Time   PT Eval, Low Complexity Minutes (23654) 5   Physical Therapy Goals   PT Frequency 5x/week   PT Predicted Duration/Target Date for Goal Attainment 05/11/25   PT Goals Bed Mobility;Transfers;Wheelchair Mobility   PT: Bed Mobility Minimal assist;Supine to/from sit;Within precautions   PT: Transfers Moderate assist;Bed to/from chair;Assistive device;Within precautions  (scoot pivot, modAx2)   PT: Wheelchair Mobility 10 feet;Caregiver Min assist;manual wheelchair   Interventions   Interventions Quick Adds Therapeutic Activity   Therapeutic Activity   Therapeutic Activities: dynamic activities to improve functional performance Minutes (59127) 24   Symptoms Noted During/After Treatment Increased pain;Fatigue   Treatment Detail/Skilled Intervention Patient  required repeated encouragement and reassurance regarding progressive mobility. Patient's RLE knee immobilizer had slid down to her ankle, two person assist to fix positioning with one person managing RLE and the second person sliding the knee immobilizer up toward her groin. Patient demonstrating significant fear of pain and fear of falling throughout session, although by end of session she was able to slow her breathing, interact with family and PT/OT, and sit EOB without assist. Patient initially presenting with heavy posterior lean and keeping her eyes closed when sitting. Gradually, PT and OT able to facilitate anterior weight shift to reduce assist required posteriorly. Facilitated increased anterior shift while holding patient's water glass in front of her, with patient leaning anteriorly to begin to take sips. Patient able to tolerated sitting EOB for approx 15 minutes, once dtr and JIMBO came her respiratory rate decreased and she became more comfortable. Patient required assist for positioning in bed. Visualized patient's LLE with no edema present left lower leg, nonpitting edema L ankle and hypersensitive to touch. Did not further evaluate edema at this time due to patient's current presentation. RLE in splint and knee immobilizer thus unable to visualize.   PT Discharge Planning   PT Plan PT: bed mobility, co treat?, sitting balance, sitting tolerance, initiate scooting EOB when appropriate, anticipate scoot pivot transfers eventually due to NWB RLE   PT Discharge Recommendation (DC Rec) Transitional Care Facility   PT Rationale for DC Rec Patient is currently requiring two person assist for coming to sit EOB and due to NWB RLE restrictions and pain, do not anticipate patient will be able to safely transfer to wheelchair or toilet with staff at [facility] care at this time. Currently recommend TCU, though may not be ideal for patient's cognition deficits.   PT Brief overview of current status depAx2 bed  mobility, sitting balance maxA>SBA, sat EOB ~15mins   PT Total Distance Amb During Session (feet) 0   Physical Therapy Time and Intention   Timed Code Treatment Minutes 24   Total Session Time (sum of timed and untimed services) 29

## 2025-05-04 NOTE — ANESTHESIA PREPROCEDURE EVALUATION
Anesthesia Pre-Procedure Evaluation    Patient: Peggy De Anda   MRN: [id number] : 11/15/1931        Procedure : Procedure(s):  OPEN REDUCTION INTERNAL FIXATION, FRACTURE, TIBIA, USING INTRAMEDULLARY JORDI          Past Medical History:   Diagnosis Date    Anemia     Ataxia     Dermatitis     Created by Conversion     Epilepsy (H)     Essential hypertension     Gout     Monoclonal gammopathy of undetermined significance     Osteoporosis     Peripheral neuropathy     Secondary hyperparathyroidism     non-renal      Past Surgical History:   Procedure Laterality Date    APPENDECTOMY      AGE 13    CATARACT EXTRACTION, BILATERAL      CHOLECYSTECTOMY      IR LUMBAR EPIDURAL STEROID INJECTION  10/5/2007    IR MISCELLANEOUS PROCEDURE  10/30/2007    NEUROPLASTY / TRANSPOSITION MEDIAN NERVE AT CARPAL TUNNEL BILATERAL      TONSILLECTOMY        Allergies   Allergen Reactions    Oxycodone Hallucination    Quinolones Rash    Levofloxacin Rash      Social History     Tobacco Use    Smoking status: Never    Smokeless tobacco: Not on file   Substance Use Topics    Alcohol use: Not on file      Wt Readings from Last 1 Encounters:   25 64.7 kg (142 lb 10.2 oz)        Anesthesia Evaluation            ROS/MED HX  ENT/Pulmonary:       Neurologic:     (+)    peripheral neuropathy,   seizures,                         Cardiovascular:     (+)  hypertension- -   -  - -                                      METS/Exercise Tolerance:     Hematologic:     (+)      anemia,          Musculoskeletal:       GI/Hepatic:       Renal/Genitourinary:       Endo:       Psychiatric/Substance Use:       Infectious Disease:       Malignancy:       Other:               OUTSIDE LABS:  CBC:   Lab Results   Component Value Date    WBC 9.7 2025    WBC 7.8 2025    HGB 10.0 (L) 2025    HGB 10.4 (L) 2025    HCT 32.5 (L) 2025    HCT 32.5 (L) 2025     2025     2025     BMP:   Lab Results   Component  "Value Date     05/04/2025     05/03/2025    POTASSIUM 3.7 05/04/2025    POTASSIUM 4.3 05/03/2025    CHLORIDE 112 (H) 05/04/2025    CHLORIDE 110 (H) 05/03/2025    CO2 16 (L) 05/04/2025    CO2 18 (L) 05/03/2025    BUN 24.7 (H) 05/04/2025    BUN 27.9 (H) 05/03/2025    CR 0.79 05/04/2025    CR 0.82 05/03/2025    GLC 96 05/04/2025     (H) 05/03/2025     COAGS: No results found for: \"PTT\", \"INR\", \"FIBR\"  POC: No results found for: \"BGM\", \"HCG\", \"HCGS\"  HEPATIC:   Lab Results   Component Value Date    ALBUMIN 3.5 05/03/2025    PROTTOTAL 6.7 05/03/2025    ALT 17 05/03/2025    AST 27 05/03/2025    ALKPHOS 163 (H) 05/03/2025    BILITOTAL 0.4 05/03/2025    JEANINE 26 09/09/2023     OTHER:   Lab Results   Component Value Date    LACT 1.1 09/09/2023    A1C 4.9 08/09/2021    OBED 8.7 (L) 05/04/2025    PHOS 3.4 09/07/2023    MAG 1.8 05/03/2025    LIPASE 15 05/03/2025    TSH 3.30 10/12/2023    CRP 1.2 (H) 08/11/2021    SED 18 11/07/2024              Myles Kramer MD    Clinically Significant Risk Factors Present on Admission          # Hyperchloremia: Highest Cl = 112 mmol/L in last 2 days, will monitor as appropriate              # Hypertension: Noted on problem list      # Anemia: based on hgb <11       # Overweight: Estimated body mass index is 27.86 kg/m  as calculated from the following:    Height as of this encounter: 1.524 m (5').    Weight as of this encounter: 64.7 kg (142 lb 10.2 oz).       # Financial/Environmental Concerns: none           "

## 2025-05-04 NOTE — PROGRESS NOTES
Orthopedic Surgery Progress Note    Subjective: No acute events overnight.  Pleasantly demented.  Resting in bed.    Exam:  /58 (BP Location: Left arm)   Pulse 64   Temp 99.2  F (37.3  C) (Axillary)   Resp 20   Ht 1.524 m (5')   Wt 64.7 kg (142 lb 10.2 oz)   SpO2 97%   BMI 27.86 kg/m    Gen: Awake, alert, NAD  Resp: breathing equal and non-labored  Extremities:    RLE: Padding with Ace wrap and a well-fitting knee immobilizer.  Skin intact without sign of open injury.  Fires EHL, FHL, wiggles lesser digits.  Nods her head to sensation with light touch in exposed digits.  1+ DP pulse.    Labs:    Recent Labs   Lab 05/04/25  0445 05/03/25  1346   WBC 9.7 7.8   HGB 10.0* 10.4*    185     Recent Labs   Lab 05/04/25  0445 05/03/25  1315    138   POTASSIUM 3.7 4.3   CHLORIDE 112* 110*   CO2 16* 18*   BUN 24.7* 27.9*   CR 0.79 0.82   GLC 96 122*   MAG  --  1.8     No lab results found in last 7 days.    Imaging:  CT tib-fib (5/3/2025)-personally reviewed today.  Reveals mildly displaced proximal tibial shaft fracture, primarily transverse, with up to 6 mm of medial to lateral displacement.  Relatively maintained alignment in the sagittal view.    Assessment:   89 year old female with chronic past medical history significant for dementia, bilateral lower extremity lymphedema, hypertension in the setting of acute encephalopathy and diagnosis of norovirus who presents with proximal right tibia transverse fracture with modest displacement    Today I  had a thorough discussion with the patient's  daughter and POA, Ernestine.  We discussed treatment options including both nonoperative and operative modalities.  Based on her medical complexity, current medical management and attempts at optimization, and modest displacement on CT scan,  with shared decision making we elected to proceed with nonoperative management.  The patient's POA expressed interest in potentially managing this without surgical  intervention if possible.  We explained that the primary risk with nonoperative management includes nonunion, malunion, prolonged nonweightbearing, risks of bedrest such as bedsores, blood clots, pneumonias.  She also understands the risks of surgical management include risk with anesthesia, wound healing issues, infection, blood loss, nonunion, malunion, loss of fixation,  additional surgery.  Ernestine expressed understanding of the above and all questions were answered.    Plan:  Hospitalist Primary  Activity: Assistance at all times when up.  High falls risk.  Weight bearing status: Nonweightbearing right lower extremity  Bracing/Splinting: Well-padded knee immobilizer right lower extremity.  Transition to hinged knee brace locked in full extension when able.  Avoid range of motion at the knee.  Okay to remove for hygiene.  Elevation: Elevate leg to combat swelling when in bed  Physical Therapy: transfers, ADLs  Occupational Therapy: ADL's.    Disposition: Pending medical optimization, stability,  Progress with therapies.  Orthopedics to continue following for fracture surveillance and potential need of surgical intervention during this hospitalization, versus outpatient follow-up in clinic.    Future Appointments   Date Time Provider Department Center   5/4/2025  3:00 PM Jasson Engle PT [facility]        Simon Fernandez MD  Orthopedic Surgery  [facility]

## 2025-05-04 NOTE — PROGRESS NOTES
Notified by nurse that patient is positive for Norovirus and ETEC. Enteric precautions in place.     Chanelle Mike MD

## 2025-05-04 NOTE — PROGRESS NOTES
05/04/25 9717   Appointment Info   Signing Clinician's Name / Credentials (OT) Yusra CASTELLANOS alexander PHILLIPSTR/L   Living Environment   People in Home facility resident   Current Living Arrangements other (see comments)  (Memory Care)   Home Accessibility no concerns   Living Environment Comments per family, pt was using walker until a few weeks ago and was able to do toileting and G/H   Self-Care   Current Activity Tolerance fair   Equipment Currently Used at Home walker, rolling   Activity/Exercise/Self-Care Comment Pt is unable to give history   General Information   Onset of Illness/Injury or Date of Surgery 05/03/25   Referring Physician Felecia   Patient/Family Therapy Goal Statement (OT) none stated   Additional Occupational Profile Info/Pertinent History of Current Problem Pt admitted with R tibia fx, h/o dementia, Noro Virus   Existing Precautions/Restrictions fall;weight bearing   Right Lower Extremity (Weight-bearing Status) non weight-bearing (NWB)   General Observations and Info Pt has KI   Cognitive Status Examination   Cognitive Status Comments Impaired at baseline.  Pt needs simple directions.   Visual Perception   Impact of Vision Impairment on Function (Vision) per pt, she has poor vision   Range of Motion Comprehensive   Comment, General Range of Motion WFLs for ADLs   Strength Comprehensive (MMT)   Comment, General Manual Muscle Testing (MMT) Assessment WFLs for ADLs   Coordination   Upper Extremity Coordination No deficits were identified   Bed Mobility   Comment (Bed Mobility) Max A of 2   Balance   Balance Comments CGA/Mod A 1-2 for sitting balance   Activities of Daily Living   BADL Assessment/Intervention lower body dressing   Lower Body Dressing Assessment/Training   Comment, (Lower Body Dressing) Pt will need assist as she is not able to reach forward or maintain sitting balance without assist   Clinical Impression   Criteria for Skilled Therapeutic Interventions Met (OT) Yes, treatment  indicated   OT Diagnosis Impaired ADL independence   OT Problem List-Impairments impacting ADL problems related to;activity tolerance impaired;balance;cognition;mobility   Assessment of Occupational Performance 3-5 Performance Deficits   Planned Therapy Interventions (OT) ADL retraining;balance training;transfer training   Clinical Decision Making Complexity (OT) detailed assessment/moderate complexity   Risk & Benefits of therapy have been explained evaluation/treatment results reviewed;participants included;patient   Clinical Impression Comments Pt seen bedside for OT eval and treatment.  Pt demonstrates impaired independence with ADLs and mobility as well as impaired cognition.  OT to continue to address.  Pt will likely need TCU.  Pending progress, pt may need higher level of care long term.   OT Total Evaluation Time   OT Eval, Moderate Complexity Minutes (63735) 5   OT Goals   Therapy Frequency (OT) 4 times/week   OT Predicted Duration/Target Date for Goal Attainment 05/11/25   OT Goals Hygiene/Grooming;Transfers;Toilet Transfer/Toileting   OT: Hygiene/Grooming minimal assist   OT: Transfer Moderate assist  (A of 2)   OT: Toilet Transfer/Toileting Moderate assist   OT Discharge Planning   OT Plan cotx for mobility, EOB sitting, G/H, NWB, KI   OT Discharge Recommendation (DC Rec) Transitional Care Facility   OT Rationale for DC Rec Recommend TCU as pt needs A of 2 for all ADLs and mobility currently.   OT Brief overview of current status Max A of2 for bed mobility, EOB sitting with CGA/Mod A

## 2025-05-05 ENCOUNTER — DOCUMENTATION ONLY (OUTPATIENT)
Dept: OTHER | Facility: CLINIC | Age: OVER 89
End: 2025-05-05
Payer: COMMERCIAL

## 2025-05-05 LAB
BACTERIA UR CULT: ABNORMAL
BACTERIA UR CULT: ABNORMAL

## 2025-05-05 PROCEDURE — 99232 SBSQ HOSP IP/OBS MODERATE 35: CPT | Mod: GC

## 2025-05-05 PROCEDURE — 250N000011 HC RX IP 250 OP 636: Performed by: INTERNAL MEDICINE

## 2025-05-05 PROCEDURE — 258N000003 HC RX IP 258 OP 636: Performed by: INTERNAL MEDICINE

## 2025-05-05 PROCEDURE — 120N000001 HC R&B MED SURG/OB

## 2025-05-05 PROCEDURE — 250N000013 HC RX MED GY IP 250 OP 250 PS 637: Performed by: INTERNAL MEDICINE

## 2025-05-05 RX ADMIN — ACETAMINOPHEN 975 MG: 325 TABLET, FILM COATED ORAL at 21:06

## 2025-05-05 RX ADMIN — DICLOFENAC SODIUM 4 G: 10 GEL TOPICAL at 21:19

## 2025-05-05 RX ADMIN — Medication 3 MG: at 21:06

## 2025-05-05 RX ADMIN — MIRTAZAPINE 15 MG: 7.5 TABLET, FILM COATED ORAL at 21:06

## 2025-05-05 RX ADMIN — NYSTATIN: 100000 CREAM TOPICAL at 09:23

## 2025-05-05 RX ADMIN — DIVALPROEX SODIUM 375 MG: 125 CAPSULE, COATED PELLETS ORAL at 09:41

## 2025-05-05 RX ADMIN — SODIUM CHLORIDE: 0.9 INJECTION, SOLUTION INTRAVENOUS at 19:00

## 2025-05-05 RX ADMIN — CARVEDILOL 6.25 MG: 3.12 TABLET, FILM COATED ORAL at 21:05

## 2025-05-05 RX ADMIN — NYSTATIN: 100000 CREAM TOPICAL at 21:19

## 2025-05-05 RX ADMIN — DICLOFENAC SODIUM 4 G: 10 GEL TOPICAL at 09:23

## 2025-05-05 RX ADMIN — DIVALPROEX SODIUM 375 MG: 125 CAPSULE, COATED PELLETS ORAL at 21:09

## 2025-05-05 RX ADMIN — CARVEDILOL 6.25 MG: 3.12 TABLET, FILM COATED ORAL at 09:22

## 2025-05-05 RX ADMIN — ACETAMINOPHEN 975 MG: 325 TABLET, FILM COATED ORAL at 09:22

## 2025-05-05 RX ADMIN — CEFTRIAXONE SODIUM 1 G: 1 INJECTION, POWDER, FOR SOLUTION INTRAMUSCULAR; INTRAVENOUS at 15:33

## 2025-05-05 RX ADMIN — QUETIAPINE FUMARATE 12.5 MG: 25 TABLET ORAL at 22:59

## 2025-05-05 RX ADMIN — ACETAMINOPHEN 975 MG: 325 TABLET, FILM COATED ORAL at 15:33

## 2025-05-05 ASSESSMENT — ACTIVITIES OF DAILY LIVING (ADL)
ADLS_ACUITY_SCORE: 94
ADLS_ACUITY_SCORE: 94
ADLS_ACUITY_SCORE: 93
ADLS_ACUITY_SCORE: 94
ADLS_ACUITY_SCORE: 98
ADLS_ACUITY_SCORE: 88
ADLS_ACUITY_SCORE: 88
ADLS_ACUITY_SCORE: 94
ADLS_ACUITY_SCORE: 98
ADLS_ACUITY_SCORE: 98
ADLS_ACUITY_SCORE: 93
ADLS_ACUITY_SCORE: 94
ADLS_ACUITY_SCORE: 88
ADLS_ACUITY_SCORE: 98
ADLS_ACUITY_SCORE: 93
ADLS_ACUITY_SCORE: 88
ADLS_ACUITY_SCORE: 98
ADLS_ACUITY_SCORE: 94
ADLS_ACUITY_SCORE: 93
ADLS_ACUITY_SCORE: 98
ADLS_ACUITY_SCORE: 88
ADLS_ACUITY_SCORE: 93
ADLS_ACUITY_SCORE: 94

## 2025-05-05 NOTE — PROGRESS NOTES
[facility]    Medicine Progress Note - Hospitalist Service    Date of Admission:  5/3/2025    Assessment & Plan   Pgegy De Anda is a 89 year old female with history of dementia, epilepsy, ataxia, chronic lumbar compression fractures, foraminal stenoses, HTN, and gout admitted on 5/3/2025 with concern for toxic encephalopathy, UTI and tibula fracture.     Metabolic encephalopathy, improving  Severe sepsis, improved  UTI  Presented from memory care w/ NVD and AMS. Initially w/o fever or leukocytosis on labs in the ED. Head CT negative. UC/BCs collected. Started on IV Ceftriaxone in the ED for dirty UA and suspected UTI. Did ultimately test positive for ETEC, norovirus, see below. Suspect primary  of pt's AMS on arrival was metabolic encephalopathy +/- volume depletion from GI illness. Remains confused, unsure of baseline .  - UC growing gram-negative bacilli and Citrobacter  - Daily CBC  - Tylenol scheduled for px/fevers  - Continue IV Ceftriaxone for now  - PT/OT consulted for discharge planning  - Delirium precautions ordered, cluster cares overnight and try to avoid unnecessary lab draws     Frequent non bloody diarrhea  Norovirus infection  ETEC infection  One unmeasured stool overnight, able to collect enteric panel which resulted positive for ETEC and norovirus. Certainly could explain the above symptoms. Given no leukocytosis, fever, or bloody stools will continue w/ supportive cares only. Diarrhea improving we will continue to monitor  - mIVFs w/ NS @ 75 ml/h, transition to PO only as soon as tolerated/able    Right Transverse fracture through the proximal tibial metaphysis  On admission found to have mildly displaced proximal tibial shaft fracture. Unclear what mechanism of injury was to cause this, though suspect there was a fall sometime in recent days w/ pt hx of advanced dementia.   - ortho consulted, appreciate recs   > Will treat non operatively   > NWB on RLE. Well  padded knee immobilizer for now, transition to hinged knee brace locked in full extension   > PT/OT  - Scheduled tylenol  - PRN Dilaudid 0.2 mg IV q3h PRN for severe pain    Chronic conditions:  Epilepsy- PTA Depakote  Chronic venous stasis- holding PTA lasix. Lymphedema wraps ordered.   HTN- restarted PTA coreg, hold lasix for now          Diet: Regular Diet Adult    DVT Prophylaxis: Pneumatic Compression Devices  Gamez Catheter: Not present  Lines: None     Cardiac Monitoring: None  Code Status: No CPR- Do NOT Intubate      Clinically Significant Risk Factors          # Hyperchloremia: Highest Cl = 112 mmol/L in last 2 days, will monitor as appropriate              # Hypertension: Noted on problem list              # Overweight: Estimated body mass index is 27.86 kg/m  as calculated from the following:    Height as of this encounter: 1.524 m (5').    Weight as of this encounter: 64.7 kg (142 lb 10.2 oz)., PRESENT ON ADMISSION       # Financial/Environmental Concerns: none         Social Drivers of Health    Tobacco Use: Unknown (5/3/2025)    Patient History     Smoking Tobacco Use: Never     Smokeless Tobacco Use: Unknown   Financial Resource Strain: Unknown (5/3/2025)    Financial Resource Strain     Within the past 12 months, have you or your family members you live with been unable to get utilities (heat, electricity) when it was really needed?: Patient unable to answer   Interpersonal Safety: Unknown (5/3/2025)    Interpersonal Safety     Do you feel physically and emotionally safe where you currently live?: Patient unable to answer     Within the past 12 months, have you been hit, slapped, kicked or otherwise physically hurt by someone?: Patient unable to answer     Within the past 12 months, have you been humiliated or emotionally abused in other ways by your partner or ex-partner?: Patient unable to answer    [facility]    Social Connections          Disposition Plan   Medically Ready for Discharge: Anticipated in 2-4 Days         The patient's care was discussed with the Attending Physician, Dr. Crews .    Carolyn Field MD  Hospitalist Service  [facility]  Securely message with [employer] (more info)  Text page via [employer] Paging/Directory   ______________________________________________________________________    Interval History   No acute events overnight.  Patient is confused does not appear uncomfortable and is currently one-to-one.        Physical Exam   Vital Signs: Temp: 97.6  F (36.4  C) Temp src: Axillary BP: (!) 157/74 Pulse: 74   Resp: 22 SpO2: 94 % O2 Device: None (Room air)    Weight: 142 lbs 10.2 oz    General Appearance: Lying in bed, confused, NAD  RESPIRATORY: Normal work of breathing, clear breath sounds bilaterally, no wheezing  CARDIOVASCULAR: Normal S1, S2, no murmur  SKIN: RLE is dressed and wrapped  NEUROLOGIC: alert, but confused, speech is mostly incoherent, unable to provide meaningful responses.  Data   ------------------------- PAST 24 HR DATA REVIEWED -----------------------------------------------        Imaging results reviewed over the past 24 hrs:   No results found for this or any previous visit (from the past 24 hours).

## 2025-05-05 NOTE — PLAN OF CARE
Goal Outcome Evaluation:      Problem: Adult Inpatient Plan of Care  Goal: Optimal Comfort and Wellbeing  Outcome: Progressing  Intervention: Provide Person-Centered Care  Recent Flowsheet Documentation  Taken 5/5/2025 0915 by Amarilys Ma RN  Trust Relationship/Rapport:   care explained   questions answered     Problem: Delirium  Goal: Improved Behavioral Control  Outcome: Progressing  Intervention: Minimize Safety Risk  Recent Flowsheet Documentation  Taken 5/5/2025 0915 by Amarilys Ma RN  Enhanced Safety Measures:   pain management   patient/family teach back on injury risk   review medications for side effects with activity    at bedside  Trust Relationship/Rapport:   care explained   questions answered     Problem: Orthopaedic Fracture  Goal: Fracture Stability  Intervention: Promote Fracture Stability and Healing  Recent Flowsheet Documentation  Taken 5/5/2025 0915 by Amarilys Ma RN  Fracture Immobilization:   immobilization device maintained   supported during position changes   supported with pillows     Patient is intermittently calm and restless.  Alert to self only.  Illogical verbalizations, follows some commands.  Splint and knee immobilizer in place on right leg.  Removed to check skin, significant bruising and edema, voltaren applied, replaced. Continues on 1:1 for safety.  Turned and repositioned q 2 hours.  PT attempted to work with patient and patient was not in any condition to participate.  Refused food, drank very minimal amounts water.  Meds with applesauce.  No bowel movements today.  No void.  Bladder scanned for 129 ml.  Continuing with bladder protocol.  Enteric precautions maintained for norovirus.  Continue with plan of care.

## 2025-05-05 NOTE — PROGRESS NOTES
Mayra Mccall [facility] Coordinator [phone] called for updates and reach out for discharge planning needs, call her if needed and keep her in the loop

## 2025-05-05 NOTE — PROGRESS NOTES
Care Management Follow Up    Length of Stay (days): 2    Expected Discharge Date: 05/06/2025    Anticipated Discharge Plan:  Assisted Living    Transportation: TBD    PT Recommendations: Transitional Care Facility  OT Recommendations:  Transitional Care Facility     Barriers to Discharge: medical stability    Prior Living Situation: assisted living with facility resident    Discussed  Partnership in Safe Discharge Planning  document with patient/family: No     Handoff Completed: No, handoff not indicated or clinically appropriate    Patient/Spokesperson Updated: No    Additional Information:  Patient is currently on a 1:1. Discussed updates with PT; with patient's current cognition, unclear if it would be appropriate to go to transitional care. Per PT, if returning to [facility], she would need a roxanna lift.     SW called [facility] (ph: [phone]) and spoke with Du RN in memory care. He reports they can do a max assist of one, if a patient is on hospice, then they can use a mechanical lift. Du reports patient is not currently at her baseline cognitively and feels that once she is clear she would be appropriate to go to transitional care and participate in therapy.     Next Steps: CM will follow for updated therapy recommendations and cognition to assist with appropriate discharge plan when medically ready.     AMINAH Winslow

## 2025-05-05 NOTE — PLAN OF CARE
Problem: Adult Inpatient Plan of Care  Goal: Absence of Hospital-Acquired Illness or Injury  Intervention: Identify and Manage Fall Risk  Recent Flowsheet Documentation  Taken 5/5/2025 0100 by Viktoriya Marie RN  Safety Promotion/Fall Prevention: activity supervised  Taken 5/4/2025 2100 by Viktoriya Marie RN  Safety Promotion/Fall Prevention: activity supervised  Intervention: Prevent Skin Injury  Recent Flowsheet Documentation  Taken 5/5/2025 0100 by Viktoriya Marie RN  Body Position:   supine   supine, legs elevated   supine, head elevated  Taken 5/4/2025 2151 by Viktoriya Marie RN  Body Position:   turned   heels elevated  Taken 5/4/2025 2100 by Viktoriya Marie RN  Body Position:   supine   supine, legs elevated   supine, head elevated  Intervention: Prevent Infection  Recent Flowsheet Documentation  Taken 5/5/2025 0100 by Viktoriya Marie RN  Infection Prevention:   rest/sleep promoted   single patient room provided  Taken 5/4/2025 2100 by Viktoriya Marie RN  Infection Prevention:   rest/sleep promoted   single patient room provided  Goal: Optimal Comfort and Wellbeing  Intervention: Provide Person-Centered Care  Recent Flowsheet Documentation  Taken 5/5/2025 0100 by Viktoriya Marie RN  Trust Relationship/Rapport:   care explained   choices provided  Taken 5/4/2025 2100 by Viktoriya Marie RN  Trust Relationship/Rapport:   care explained   choices provided   Goal Outcome Evaluation:  Patient confused, restlessness, and agitated during cares. VSS and afebrile. He denies pain. Patient talks to herself and hallucination. Incontinent of bowel and bladder. Assist of two with cares. Scanned for 427cc this morning. Patient pulled PIV out. Right LE on splint and knee brace in place. The leg is swollen but, she can move the feet and toes, capillary refill slow.

## 2025-05-05 NOTE — PROGRESS NOTES
Orthopedic Surgery Progress Note    Subjective: No acute events overnight.  Pleasantly demented.  Resting in bed.    Exam:  BP (!) 157/74 (BP Location: Right arm)   Pulse 74   Temp 97.6  F (36.4  C) (Axillary)   Resp 22   Ht 1.524 m (5')   Wt 64.7 kg (142 lb 10.2 oz)   SpO2 94%   BMI 27.86 kg/m    Gen: Awake, alert, NAD  Resp: breathing equal and non-labored  Extremities:    RLE: Padding with Ace wrap and a well-fitting knee immobilizer.  Skin intact without sign of open injury.  Fires EHL, FHL, wiggles lesser digits.  Nods her head to sensation with light touch in exposed digits.  1+ DP pulse.    Labs:    Recent Labs   Lab 05/04/25 0445 05/03/25  1346   WBC 9.7 7.8   HGB 10.0* 10.4*    185     Recent Labs   Lab 05/04/25 0445 05/03/25  1315    138   POTASSIUM 3.7 4.3   CHLORIDE 112* 110*   CO2 16* 18*   BUN 24.7* 27.9*   CR 0.79 0.82   GLC 96 122*   MAG  --  1.8     No lab results found in last 7 days.    Imaging:  CT tib-fib (5/3/2025)-personally reviewed today.  Reveals mildly displaced proximal tibial shaft fracture, primarily transverse, with up to 6 mm of medial to lateral displacement.  Relatively maintained alignment in the sagittal view.    Assessment:   89 year old female with chronic past medical history significant for dementia, bilateral lower extremity lymphedema, hypertension in the setting of acute encephalopathy and diagnosis of norovirus who presents with proximal right tibia transverse fracture with modest displacement    Plan:  Hospitalist Primary  Activity: Assistance at all times when up.  High falls risk.  Weight bearing status: Nonweightbearing right lower extremity  Bracing/Splinting: Well-padded knee immobilizer right lower extremity.  Transition to hinged knee brace locked in full extension when able.  Avoid range of motion at the knee.  Okay to remove for hygiene.  Elevation: Elevate leg to combat swelling when in bed  Physical Therapy: transfers, ADLs  Occupational  Therapy: ADL's.    Disposition: Pending medical optimization, stability,  Progress with therapies.  Orthopedics to continue following for fracture surveillance and potential need of surgical intervention during this hospitalization, versus outpatient follow-up in clinic.  Stable today    Future Appointments   Date Time Provider Department Center   5/4/2025  3:00 PM Jasson Engle PT [facility]        GRAYSON MCNAMARA PA-C  Orthopedic Surgery  [facility]

## 2025-05-06 ENCOUNTER — APPOINTMENT (OUTPATIENT)
Dept: RADIOLOGY | Facility: HOSPITAL | Age: OVER 89
End: 2025-05-06
Attending: STUDENT IN AN ORGANIZED HEALTH CARE EDUCATION/TRAINING PROGRAM
Payer: COMMERCIAL

## 2025-05-06 LAB
ABO + RH BLD: NORMAL
BLD GP AB SCN SERPL QL: NEGATIVE
SPECIMEN EXP DATE BLD: NORMAL
TSH SERPL DL<=0.005 MIU/L-ACNC: 2.45 UIU/ML (ref 0.3–4.2)

## 2025-05-06 PROCEDURE — 999N000248 HC STATISTIC IV INSERT WITH US BY RN

## 2025-05-06 PROCEDURE — 82607 VITAMIN B-12: CPT | Performed by: FAMILY MEDICINE

## 2025-05-06 PROCEDURE — 36415 COLL VENOUS BLD VENIPUNCTURE: CPT | Performed by: FAMILY MEDICINE

## 2025-05-06 PROCEDURE — 99232 SBSQ HOSP IP/OBS MODERATE 35: CPT | Mod: GC

## 2025-05-06 PROCEDURE — 250N000011 HC RX IP 250 OP 636: Performed by: INTERNAL MEDICINE

## 2025-05-06 PROCEDURE — 73590 X-RAY EXAM OF LOWER LEG: CPT | Mod: RT

## 2025-05-06 PROCEDURE — 250N000013 HC RX MED GY IP 250 OP 250 PS 637

## 2025-05-06 PROCEDURE — L1832 KO ADJ JNT POS R SUP PRE CST: HCPCS

## 2025-05-06 PROCEDURE — 250N000011 HC RX IP 250 OP 636: Mod: JZ

## 2025-05-06 PROCEDURE — 250N000013 HC RX MED GY IP 250 OP 250 PS 637: Performed by: INTERNAL MEDICINE

## 2025-05-06 PROCEDURE — 120N000001 HC R&B MED SURG/OB

## 2025-05-06 RX ORDER — HYDROXYZINE HYDROCHLORIDE 10 MG/1
10 TABLET, FILM COATED ORAL
Status: COMPLETED | OUTPATIENT
Start: 2025-05-06 | End: 2025-05-06

## 2025-05-06 RX ORDER — ENOXAPARIN SODIUM 100 MG/ML
40 INJECTION SUBCUTANEOUS EVERY 24 HOURS
Status: ACTIVE | OUTPATIENT
Start: 2025-05-06

## 2025-05-06 RX ADMIN — MIRTAZAPINE 15 MG: 7.5 TABLET, FILM COATED ORAL at 21:15

## 2025-05-06 RX ADMIN — HYDROMORPHONE HYDROCHLORIDE 0.2 MG: 0.2 INJECTION, SOLUTION INTRAMUSCULAR; INTRAVENOUS; SUBCUTANEOUS at 00:42

## 2025-05-06 RX ADMIN — ACETAMINOPHEN 975 MG: 325 TABLET, FILM COATED ORAL at 13:19

## 2025-05-06 RX ADMIN — ACETAMINOPHEN 975 MG: 325 TABLET, FILM COATED ORAL at 08:17

## 2025-05-06 RX ADMIN — Medication 2 G: at 01:37

## 2025-05-06 RX ADMIN — DIVALPROEX SODIUM 375 MG: 125 CAPSULE, COATED PELLETS ORAL at 21:15

## 2025-05-06 RX ADMIN — ACETAMINOPHEN 975 MG: 325 TABLET, FILM COATED ORAL at 21:15

## 2025-05-06 RX ADMIN — DICLOFENAC SODIUM 4 G: 10 GEL TOPICAL at 21:15

## 2025-05-06 RX ADMIN — CARVEDILOL 6.25 MG: 3.12 TABLET, FILM COATED ORAL at 21:15

## 2025-05-06 RX ADMIN — NYSTATIN: 100000 CREAM TOPICAL at 21:16

## 2025-05-06 RX ADMIN — DIVALPROEX SODIUM 375 MG: 125 CAPSULE, COATED PELLETS ORAL at 08:17

## 2025-05-06 RX ADMIN — HYDROXYZINE HYDROCHLORIDE 10 MG: 10 TABLET, FILM COATED ORAL at 02:13

## 2025-05-06 RX ADMIN — CEFTRIAXONE SODIUM 1 G: 1 INJECTION, POWDER, FOR SOLUTION INTRAMUSCULAR; INTRAVENOUS at 13:19

## 2025-05-06 RX ADMIN — QUETIAPINE FUMARATE 12.5 MG: 25 TABLET ORAL at 08:17

## 2025-05-06 RX ADMIN — DICLOFENAC SODIUM 4 G: 10 GEL TOPICAL at 08:18

## 2025-05-06 RX ADMIN — NYSTATIN: 100000 CREAM TOPICAL at 11:01

## 2025-05-06 ASSESSMENT — ACTIVITIES OF DAILY LIVING (ADL)
ADLS_ACUITY_SCORE: 98
ADLS_ACUITY_SCORE: 89
ADLS_ACUITY_SCORE: 98
ADLS_ACUITY_SCORE: 88
ADLS_ACUITY_SCORE: 98
ADLS_ACUITY_SCORE: 89
ADLS_ACUITY_SCORE: 98
ADLS_ACUITY_SCORE: 88
ADLS_ACUITY_SCORE: 98
ADLS_ACUITY_SCORE: 88
ADLS_ACUITY_SCORE: 98

## 2025-05-06 NOTE — PROGRESS NOTES
[facility]    Medicine Progress Note - Hospitalist Service    Date of Admission:  5/3/2025    Assessment & Plan   Peggy De Anda is a 89 year old female with history of dementia, epilepsy, ataxia, chronic lumbar compression fractures, foraminal stenoses, HTN, and gout admitted on 5/3/2025 with concern for toxic encephalopathy, UTI and tibula fracture.     Metabolic encephalopathy, improving  Severe sepsis, improved  UTI  Presented from memory care w/ NVD and AMS. Initially w/o fever or leukocytosis on labs in the ED. Head CT negative. UC/BCs collected. Started on IV Ceftriaxone in the ED for dirty UA and suspected UTI. Did ultimately test positive for ETEC, norovirus, see below. Suspect primary  of pt's AMS on arrival was metabolic encephalopathy +/- volume depletion from GI illness. Remains confused, unsure of baseline .  - UC growing gram-negative bacilli and Citrobacter  - Daily CBC  - Tylenol scheduled for px/fevers  - Continue IV Ceftriaxone for now, plan to switch to oral abx 5/7  - PT/OT consulted for discharge planning  - Delirium precautions ordered, cluster cares overnight and try to avoid unnecessary lab draws     Frequent non bloody diarrhea- improving  Norovirus infection  ETEC infection  One unmeasured stool overnight, able to collect enteric panel which resulted positive for ETEC and norovirus. Certainly could explain the above symptoms. Given no leukocytosis, fever, or bloody stools will continue w/ supportive cares only. Diarrhea improving we will continue to monitor  - encourage po intake    Right Transverse fracture through the proximal tibial metaphysis  On admission found to have mildly displaced proximal tibial shaft fracture. Unclear what mechanism of injury was to cause this, though suspect there was a fall sometime in recent days w/ pt hx of advanced dementia.   - ortho consulted, appreciate recs   > Will treat non operatively   > NWB on RLE. Well padded knee  immobilizer for now, transition to hinged knee brace locked in full extension   > PT/OT  - Scheduled tylenol  - PRN Dilaudid 0.2 mg IV q3h PRN for severe pain    Chronic conditions:  Epilepsy- PTA Depakote  Chronic venous stasis- holding PTA lasix. Lymphedema wraps ordered.   HTN- restarted PTA coreg, hold lasix for now          Diet: Regular Diet Adult    DVT Prophylaxis: Pneumatic Compression Devices  Gamez Catheter: Not present  Lines: None     Cardiac Monitoring: None  Code Status: No CPR- Do NOT Intubate      Clinically Significant Risk Factors                   # Hypertension: Noted on problem list              # Overweight: Estimated body mass index is 27.86 kg/m  as calculated from the following:    Height as of this encounter: 1.524 m (5').    Weight as of this encounter: 64.7 kg (142 lb 10.2 oz)., PRESENT ON ADMISSION       # Financial/Environmental Concerns: none         Social Drivers of Health    Tobacco Use: Unknown (5/3/2025)    Patient History     Smoking Tobacco Use: Never     Smokeless Tobacco Use: Unknown   Financial Resource Strain: Unknown (5/3/2025)    Financial Resource Strain     Within the past 12 months, have you or your family members you live with been unable to get utilities (heat, electricity) when it was really needed?: Patient unable to answer   Interpersonal Safety: Unknown (5/3/2025)    Interpersonal Safety     Do you feel physically and emotionally safe where you currently live?: Patient unable to answer     Within the past 12 months, have you been hit, slapped, kicked or otherwise physically hurt by someone?: Patient unable to answer     Within the past 12 months, have you been humiliated or emotionally abused in other ways by your partner or ex-partner?: Patient unable to answer    Received from [facility], [facility]    Social Connections          Disposition Plan   Medically Ready for Discharge: Anticipated in  2-4 Days         The patient's care was discussed with the Attending Physician, Dr. Crews .    Carolyn Field MD  Hospitalist Service  [facility]  Securely message with [employer] (more info)  Text page via [employer] Paging/Directory   ______________________________________________________________________    Interval History   Agitated overnight, sleeping this morning. Still 1:1      Physical Exam   Vital Signs: Temp: 98  F (36.7  C) Temp src: Axillary BP: 117/58 Pulse: 54   Resp: 18 SpO2: 96 % O2 Device: None (Room air)    Weight: 142 lbs 10.2 oz    General Appearance: Lying in bed, sleeping, NAD  RESPIRATORY: Normal work of breathing, clear breath sounds bilaterally, no wheezing  CARDIOVASCULAR: Normal S1, S2, no murmur  SKIN: RLE is dressed and wrapped  NEUROLOGIC: sleeping, confused at baseline.    Data   ------------------------- PAST 24 HR DATA REVIEWED -----------------------------------------------        Imaging results reviewed over the past 24 hrs:   No results found for this or any previous visit (from the past 24 hours).

## 2025-05-06 NOTE — PROGRESS NOTES
Orthopedic Surgery Progress Note    Subjective: No acute events overnight.  Pleasantly demented.  Resting in bed.    Exam:  /49 (BP Location: Right arm)   Pulse 54   Temp 97.9  F (36.6  C) (Axillary)   Resp 20   Ht 1.524 m (5')   Wt 64.7 kg (142 lb 10.2 oz)   SpO2 97%   BMI 27.86 kg/m    Gen: Awake, alert, NAD  Resp: breathing equal and non-labored  Extremities:    RLE: Padding with Ace wrap and a well-fitting knee immobilizer.  Skin intact without sign of open injury.  Fires EHL, FHL, wiggles lesser digits.  Nods her head to sensation with light touch in exposed digits.  1+ DP pulse.    Labs:    Recent Labs   Lab 05/04/25  0445 05/03/25  1346   WBC 9.7 7.8   HGB 10.0* 10.4*    185     Recent Labs   Lab 05/04/25  0445 05/03/25  1315    138   POTASSIUM 3.7 4.3   CHLORIDE 112* 110*   CO2 16* 18*   BUN 24.7* 27.9*   CR 0.79 0.82   GLC 96 122*   MAG  --  1.8     No lab results found in last 7 days.    Imaging:  CT tib-fib (5/3/2025)-personally reviewed today.  Reveals mildly displaced proximal tibial shaft fracture, primarily transverse, with up to 6 mm of medial to lateral displacement.  Relatively maintained alignment in the sagittal view.    Assessment:   89 year old female with chronic past medical history significant for dementia, bilateral lower extremity lymphedema, hypertension in the setting of acute encephalopathy and diagnosis of norovirus who presents with proximal right tibia transverse fracture with modest displacement    Plan:  Hospitalist Primary  Imaging: Repeat xrays to further eval for displacement  Activity: Assistance at all times when up.  High falls risk.  Weight bearing status: Nonweightbearing right lower extremity  Bracing/Splinting: Well-padded knee immobilizer right lower extremity.  Transition to hinged knee brace locked in full extension when able.  Brace ordered today to be placed by orthotics team. Avoid range of motion at the knee.  Okay to remove for  hygiene.  Elevation: Elevate leg to combat swelling when in bed  Physical Therapy: transfers, ADLs  Occupational Therapy: ADL's.    Disposition: Pending medical optimization, stability,  Progress with therapies.  Orthopedics to continue following for fracture surveillance and potential need of surgical intervention during this hospitalization, versus outpatient follow-up in clinic.  Stable today    Addendum 5/6 1515  - Imaging was reviewed with Dr. Fernandez, unfortunately patient does have some slightly worsening displacement of her fracture and we do think that she is at high risk for nonunion and chronic pain, instability.  We discussed with patient's daughter Ernestine who is POA and reviewed these findings.  She is in agreement with proceeding with ORIF of the proximal tibial plateau fracture tomorrow  - Patient will need medical clearance to undergo surgery tomorrow. hemoglobin 10.0.  UTI, norovirus infection  - Hold anticoagulation  - Continue use of brace until surgery.  Nonweightbearing right lower extremity  - Type and screen    Future Appointments   Date Time Provider Department Center   5/4/2025  3:00 PM Jasson Engle PT [id number]        GRAYSON MCNAMARA PA-C  Orthopedic Surgery  [facility]

## 2025-05-06 NOTE — PLAN OF CARE
Problem: Adult Inpatient Plan of Care  Goal: Absence of Hospital-Acquired Illness or Injury  Intervention: Identify and Manage Fall Risk  Recent Flowsheet Documentation  Taken 5/6/2025 0000 by Kady Keith RN  Safety Promotion/Fall Prevention:   activity supervised   assistive device/personal items within reach   bedside attendant   nonskid shoes/slippers when out of bed   room near nurse's station   safety round/check completed  Taken 5/5/2025 2100 by Kady Keith RN  Safety Promotion/Fall Prevention:   activity supervised   assistive device/personal items within reach   bedside attendant   nonskid shoes/slippers when out of bed   room near nurse's station   safety round/check completed  Intervention: Prevent Skin Injury  Recent Flowsheet Documentation  Taken 5/5/2025 2302 by Kady Keith RN  Body Position:   supine, head elevated   legs elevated   refuses positioning  Taken 5/5/2025 2100 by Kady Keith RN  Body Position:   supine, head elevated   legs elevated   refuses positioning  Goal: Optimal Comfort and Wellbeing  Intervention: Provide Person-Centered Care  Recent Flowsheet Documentation  Taken 5/5/2025 2302 by Kady Keith RN  Trust Relationship/Rapport:   care explained   reassurance provided   thoughts/feelings acknowledged  Taken 5/5/2025 2100 by Kady Keith RN  Trust Relationship/Rapport:   care explained   thoughts/feelings acknowledged   reassurance provided     Problem: Delirium  Goal: Improved Behavioral Control  Intervention: Minimize Safety Risk  Recent Flowsheet Documentation  Taken 5/6/2025 0000 by Kady Keith RN  Enhanced Safety Measures:   assistive devices when indicated   pain management   review medications for side effects with activity   room near unit station    at bedside  Taken 5/5/2025 2302 by Kady Keith RN  Trust Relationship/Rapport:   care explained   reassurance provided   thoughts/feelings acknowledged  Taken 5/5/2025 2100 by  Sagar, Kady, RN  Enhanced Safety Measures:   assistive devices when indicated   pain management   review medications for side effects with activity   room near unit station    at bedside  Trust Relationship/Rapport:   care explained   thoughts/feelings acknowledged   reassurance provided  Goal: Improved Attention and Thought Clarity  Intervention: Maximize Cognitive Function  Recent Flowsheet Documentation  Taken 5/6/2025 0000 by Kady Keith RN  Reorientation Measures:   calendar in view   clock in view   reorientation provided  Taken 5/5/2025 2100 by Kady Keith RN  Reorientation Measures:   calendar in view   clock in view   reorientation provided     Problem: Orthopaedic Fracture  Goal: Optimal Functional Ability  Intervention: Optimize Functional Ability  Recent Flowsheet Documentation  Taken 5/5/2025 2302 by Kady Keith RN  Activity Management: activity adjusted per tolerance  Positioning/Transfer Devices:   pillows   in use  Taken 5/5/2025 2100 by Kady Keith RN  Activity Management: activity adjusted per tolerance  Positioning/Transfer Devices:   pillows   in use  Goal: Absence of Infection Signs and Symptoms  Intervention: Prevent or Manage Infection  Recent Flowsheet Documentation  Taken 5/6/2025 0000 by Kady Keith RN  Isolation Precautions: enteric precautions maintained  Taken 5/5/2025 2100 by Kady Keith RN  Isolation Precautions: enteric precautions maintained  Goal: Effective Oxygenation and Ventilation  Intervention: Promote Airway Secretion Clearance  Recent Flowsheet Documentation  Taken 5/5/2025 2302 by Kady Keith RN  Activity Management: activity adjusted per tolerance  Taken 5/5/2025 2100 by Kady Keith RN  Activity Management: activity adjusted per tolerance  Intervention: Optimize Oxygenation and Ventilation  Recent Flowsheet Documentation  Taken 5/6/2025 0000 by Kady Keith RN  Fluid/Electrolyte Management: fluids  provided  Taken 5/5/2025 2302 by Kady Keith RN  Head of Bed (HOB) Positioning: HOB at 20-30 degrees  Taken 5/5/2025 2100 by Kady Keith RN  Fluid/Electrolyte Management: fluids provided  Head of Bed (HOB) Positioning: HOB at 20-30 degrees     Problem: Pain Acute  Goal: Optimal Pain Control and Function  Intervention: Prevent or Manage Pain  Recent Flowsheet Documentation  Taken 5/6/2025 0000 by Kady Keith RN  Medication Review/Management: medications reviewed  Taken 5/5/2025 2100 by Kady Keith RN  Medication Review/Management: medications reviewed     Problem: Comorbidity Management  Goal: Blood Pressure in Desired Range  Intervention: Maintain Blood Pressure Management  Recent Flowsheet Documentation  Taken 5/6/2025 0000 by Kady Keith RN  Medication Review/Management: medications reviewed  Taken 5/5/2025 2100 by Kady Keith RN  Medication Review/Management: medications reviewed     Problem: Mobility Impairment  Goal: Optimal Mobility  Intervention: Optimize Mobility  Recent Flowsheet Documentation  Taken 5/5/2025 2302 by Kady Keith RN  Activity Management: activity adjusted per tolerance  Positioning/Transfer Devices:   pillows   in use  Taken 5/5/2025 2100 by Kady Keith RN  Activity Management: activity adjusted per tolerance  Positioning/Transfer Devices:   pillows   in use     Problem: Diarrhea  Goal: Effective Diarrhea Management  Intervention: Manage Diarrhea  Recent Flowsheet Documentation  Taken 5/6/2025 0000 by Kady Keith RN  Fluid/Electrolyte Management: fluids provided  Isolation Precautions: enteric precautions maintained  Medication Review/Management: medications reviewed  Taken 5/5/2025 2100 by Kady Keith RN  Fluid/Electrolyte Management: fluids provided  Isolation Precautions: enteric precautions maintained  Medication Review/Management: medications reviewed     Problem: Infection  Goal: Absence of Infection Signs and Symptoms  Intervention:  Prevent or Manage Infection  Recent Flowsheet Documentation  Taken 5/6/2025 0000 by Kady Keith RN  Isolation Precautions: enteric precautions maintained  Taken 5/5/2025 2100 by Kady Keith RN  Isolation Precautions: enteric precautions maintained     Problem: Fall Injury Risk  Goal: Absence of Fall and Fall-Related Injury  Intervention: Identify and Manage Contributors  Recent Flowsheet Documentation  Taken 5/6/2025 0000 by Kady Keith RN  Medication Review/Management: medications reviewed  Taken 5/5/2025 2100 by Kady Keith RN  Medication Review/Management: medications reviewed  Intervention: Promote Injury-Free Environment  Recent Flowsheet Documentation  Taken 5/6/2025 0000 by Kady Keith RN  Safety Promotion/Fall Prevention:   activity supervised   assistive device/personal items within reach   bedside attendant   nonskid shoes/slippers when out of bed   room near nurse's station   safety round/check completed  Taken 5/5/2025 2100 by Kady Keith RN  Safety Promotion/Fall Prevention:   activity supervised   assistive device/personal items within reach   bedside attendant   nonskid shoes/slippers when out of bed   room near nurse's station   safety round/check completed   Goal Outcome Evaluation:       Aox1- person, very anxious and restless overnight. Pain in right knee and neck, meds and comfort measures provided including IV dilaudid and PO seroquel. MD notified, atarax one time dose ordered and given, pt has calmed down and slept since. Unable to tolerate repositioning, use of low air loss mattress, sacral mepilex placed. Voided small amount x1, bladder scans in 300s, continue to monitor. Received IV fluids overnight, IV found infiltrated this morning. Writer unable to start new PIV, SWAT nurse called but unable to come, to pass on to upcoming shift, encourage po intake till PIV placed.

## 2025-05-06 NOTE — PLAN OF CARE
"  Problem: Adult Inpatient Plan of Care  Goal: Plan of Care Review  Description: The Plan of Care Review/Shift note should be completed every shift.  The Outcome Evaluation is a brief statement about your assessment that the patient is improving, declining, or no change.  This information will be displayed automatically on your shiftnote.  Outcome: Progressing  Flowsheets (Taken 5/6/2025 6988)  Plan of Care Reviewed With: patient  Goal: Patient-Specific Goal (Individualized)  Description: You can add care plan individualizations to a care plan. Examples of Individualization might be:  \"Parent requests to be called daily at 9am for status\", \"I have a hard time hearing out of my right ear\", or \"Do not touch me to wake me up as it startlesme\".  Outcome: Progressing  Goal: Absence of Hospital-Acquired Illness or Injury  Outcome: Progressing  Intervention: Identify and Manage Fall Risk  Recent Flowsheet Documentation  Taken 5/6/2025 1616 by Umesh Cox RN  Safety Promotion/Fall Prevention:   activity supervised   clutter free environment maintained   lighting adjusted  Intervention: Prevent Skin Injury  Recent Flowsheet Documentation  Taken 5/6/2025 1616 by Umesh Cox RN  Body Position:   left   right   turned   heels elevated   supine  Goal: Optimal Comfort and Wellbeing  Outcome: Progressing  Intervention: Provide Person-Centered Care  Recent Flowsheet Documentation  Taken 5/6/2025 1616 by Umesh Cox RN  Trust Relationship/Rapport:   care explained   emotional support provided   reassurance provided  Goal: Readiness for Transition of Care  Outcome: Progressing     Problem: Delirium  Goal: Optimal Coping  Outcome: Progressing  Goal: Improved Behavioral Control  Outcome: Progressing  Intervention: Minimize Safety Risk  Recent Flowsheet Documentation  Taken 5/6/2025 1616 by Umesh Cox RN  Trust Relationship/Rapport:   care explained   emotional support provided   reassurance provided  Goal: Improved " Attention and Thought Clarity  Outcome: Progressing  Goal: Improved Sleep  Outcome: Progressing     Problem: Orthopaedic Fracture  Goal: Absence of Bleeding  Outcome: Progressing  Goal: Bowel Elimination  Outcome: Progressing  Goal: Absence of Embolism Signs and Symptoms  Outcome: Progressing  Goal: Fracture Stability  Outcome: Progressing  Goal: Optimal Functional Ability  Outcome: Progressing  Intervention: Optimize Functional Ability  Recent Flowsheet Documentation  Taken 5/6/2025 1700 by Umesh Cox RN  Activity Management: bedrest  Taken 5/6/2025 1616 by Umesh Cox RN  Activity Management: bedrest  Positioning/Transfer Devices:   in use   pillows  Goal: Absence of Infection Signs and Symptoms  Outcome: Progressing  Intervention: Prevent or Manage Infection  Recent Flowsheet Documentation  Taken 5/6/2025 1616 by Umesh Cox RN  Isolation Precautions: enteric precautions maintained  Goal: Effective Tissue Perfusion  Outcome: Progressing  Goal: Optimal Pain Control and Function  Outcome: Progressing  Goal: Effective Oxygenation and Ventilation  Outcome: Progressing  Intervention: Promote Airway Secretion Clearance  Recent Flowsheet Documentation  Taken 5/6/2025 1700 by Umesh Cox RN  Activity Management: bedrest  Taken 5/6/2025 1616 by Umesh Cox RN  Activity Management: bedrest  Intervention: Optimize Oxygenation and Ventilation  Recent Flowsheet Documentation  Taken 5/6/2025 1616 by Umesh Cox RN  Head of Bed (HOB) Positioning: HOB at 20-30 degrees     Problem: Pain Acute  Goal: Optimal Pain Control and Function  Outcome: Progressing  Intervention: Prevent or Manage Pain  Recent Flowsheet Documentation  Taken 5/6/2025 1616 by Umesh Cox RN  Medication Review/Management: medications reviewed     Problem: Comorbidity Management  Goal: Blood Pressure in Desired Range  Outcome: Progressing  Intervention: Maintain Blood Pressure Management  Recent Flowsheet Documentation  Taken 5/6/2025 1616  by Umesh Cox RN  Medication Review/Management: medications reviewed     Problem: Mobility Impairment  Goal: Optimal Mobility  Outcome: Progressing  Intervention: Optimize Mobility  Recent Flowsheet Documentation  Taken 5/6/2025 1700 by Umesh Cox RN  Assistive Device Utilized: lift device  Activity Management: bedrest  Taken 5/6/2025 1616 by Umesh Cox RN  Activity Management: bedrest  Positioning/Transfer Devices:   in use   pillows     Problem: Cognitive Impairment  Goal: Optimal Cognitive Function  Outcome: Progressing     Problem: Diarrhea  Goal: Effective Diarrhea Management  Outcome: Progressing  Intervention: Manage Diarrhea  Recent Flowsheet Documentation  Taken 5/6/2025 1616 by Umesh Cox RN  Isolation Precautions: enteric precautions maintained  Medication Review/Management: medications reviewed     Problem: Infection  Goal: Absence of Infection Signs and Symptoms  Outcome: Progressing  Intervention: Prevent or Manage Infection  Recent Flowsheet Documentation  Taken 5/6/2025 1616 by mUesh Cox RN  Isolation Precautions: enteric precautions maintained     Problem: Fall Injury Risk  Goal: Absence of Fall and Fall-Related Injury  Outcome: Progressing  Intervention: Identify and Manage Contributors  Recent Flowsheet Documentation  Taken 5/6/2025 1616 by Umesh Cox RN  Medication Review/Management: medications reviewed  Intervention: Promote Injury-Free Environment  Recent Flowsheet Documentation  Taken 5/6/2025 1616 by Umesh Cox RN  Safety Promotion/Fall Prevention:   activity supervised   clutter free environment maintained   lighting adjusted   Goal Outcome Evaluation:      Plan of Care Reviewed With: patient

## 2025-05-06 NOTE — PROGRESS NOTES
Peggy was seen for fit and delivery of her right knee ranger orthosis per order of her physician. She was asleep upon arrival.     The T-scope was custom fit by contouring the uprights to her anatomy and trimming all straps to appropriate length.     The goal of the orthosis is to provide tri-planar support and immobilization of the knee joint to reduce pain and promote healing following tibial fracture.     A card was left with her for contact after discharge and I plan to see her PRN.     Electronically signed by Judah Sarah CPO, [facility]  [place] [facility]  [phone]

## 2025-05-07 ENCOUNTER — ANESTHESIA (OUTPATIENT)
Dept: SURGERY | Facility: HOSPITAL | Age: OVER 89
End: 2025-05-07
Payer: COMMERCIAL

## 2025-05-07 ENCOUNTER — ANESTHESIA EVENT (OUTPATIENT)
Dept: SURGERY | Facility: HOSPITAL | Age: OVER 89
End: 2025-05-07
Payer: COMMERCIAL

## 2025-05-07 LAB
ADV 40+41 DNA STL QL NAA+NON-PROBE: NEGATIVE
ASTRO TYP 1-8 RNA STL QL NAA+NON-PROBE: NEGATIVE
C CAYETANENSIS DNA STL QL NAA+NON-PROBE: NEGATIVE
CAMPYLOBACTER DNA SPEC NAA+PROBE: NEGATIVE
CRYPTOSP DNA STL QL NAA+NON-PROBE: NEGATIVE
E COLI O157 DNA STL QL NAA+NON-PROBE: ABNORMAL
E HISTOLYT DNA STL QL NAA+NON-PROBE: NEGATIVE
EAEC ASTA GENE ISLT QL NAA+PROBE: NEGATIVE
EC STX1+STX2 GENES STL QL NAA+NON-PROBE: NEGATIVE
EPEC EAE GENE STL QL NAA+NON-PROBE: NEGATIVE
ETEC LTA+ST1A+ST1B TOX ST NAA+NON-PROBE: POSITIVE
G LAMBLIA DNA STL QL NAA+NON-PROBE: NEGATIVE
NOROVIRUS GI+II RNA STL QL NAA+NON-PROBE: POSITIVE
P SHIGELLOIDES DNA STL QL NAA+NON-PROBE: NEGATIVE
RVA RNA STL QL NAA+NON-PROBE: NEGATIVE
SALMONELLA SP RPOD STL QL NAA+PROBE: NEGATIVE
SAPO I+II+IV+V RNA STL QL NAA+NON-PROBE: NEGATIVE
SHIGELLA SP+EIEC IPAH ST NAA+NON-PROBE: NEGATIVE
V CHOLERAE DNA SPEC QL NAA+PROBE: NEGATIVE
VIBRIO DNA SPEC NAA+PROBE: NEGATIVE
VIT B12 SERPL-MCNC: 649 PG/ML (ref 232–1245)
Y ENTEROCOL DNA STL QL NAA+PROBE: NEGATIVE

## 2025-05-07 PROCEDURE — P9045 ALBUMIN (HUMAN), 5%, 250 ML: HCPCS | Performed by: NURSE ANESTHETIST, CERTIFIED REGISTERED

## 2025-05-07 PROCEDURE — 999N000157 HC STATISTIC RCP TIME EA 10 MIN

## 2025-05-07 PROCEDURE — 250N000009 HC RX 250: Performed by: NURSE ANESTHETIST, CERTIFIED REGISTERED

## 2025-05-07 PROCEDURE — 99255 IP/OBS CONSLTJ NEW/EST HI 80: CPT | Performed by: CLINICAL NURSE SPECIALIST

## 2025-05-07 PROCEDURE — 250N000009 HC RX 250: Performed by: ORTHOPAEDIC SURGERY

## 2025-05-07 PROCEDURE — 120N000001 HC R&B MED SURG/OB

## 2025-05-07 PROCEDURE — 99232 SBSQ HOSP IP/OBS MODERATE 35: CPT | Mod: GC

## 2025-05-07 PROCEDURE — 3E033XZ INTRODUCTION OF VASOPRESSOR INTO PERIPHERAL VEIN, PERCUTANEOUS APPROACH: ICD-10-PCS | Performed by: ANESTHESIOLOGY

## 2025-05-07 PROCEDURE — 360N000077 HC SURGERY LEVEL 4, PER MIN: Performed by: ORTHOPAEDIC SURGERY

## 2025-05-07 PROCEDURE — 250N000013 HC RX MED GY IP 250 OP 250 PS 637

## 2025-05-07 PROCEDURE — 94660 CPAP INITIATION&MGMT: CPT

## 2025-05-07 PROCEDURE — 258N000003 HC RX IP 258 OP 636: Performed by: NURSE ANESTHETIST, CERTIFIED REGISTERED

## 2025-05-07 PROCEDURE — 250N000011 HC RX IP 250 OP 636: Mod: JZ | Performed by: CLINICAL NURSE SPECIALIST

## 2025-05-07 PROCEDURE — 250N000011 HC RX IP 250 OP 636: Performed by: NURSE ANESTHETIST, CERTIFIED REGISTERED

## 2025-05-07 PROCEDURE — 258N000003 HC RX IP 258 OP 636: Performed by: ANESTHESIOLOGY

## 2025-05-07 PROCEDURE — 250N000013 HC RX MED GY IP 250 OP 250 PS 637: Performed by: INTERNAL MEDICINE

## 2025-05-07 PROCEDURE — 999N000141 HC STATISTIC PRE-PROCEDURE NURSING ASSESSMENT: Performed by: ORTHOPAEDIC SURGERY

## 2025-05-07 PROCEDURE — 370N000017 HC ANESTHESIA TECHNICAL FEE, PER MIN: Performed by: ORTHOPAEDIC SURGERY

## 2025-05-07 RX ORDER — FENTANYL CITRATE 50 UG/ML
50 INJECTION, SOLUTION INTRAMUSCULAR; INTRAVENOUS EVERY 5 MIN PRN
Status: DISCONTINUED | OUTPATIENT
Start: 2025-05-07 | End: 2025-05-07 | Stop reason: HOSPADM

## 2025-05-07 RX ORDER — GLYCOPYRROLATE 0.2 MG/ML
0.2 INJECTION, SOLUTION INTRAMUSCULAR; INTRAVENOUS EVERY 4 HOURS PRN
Status: ACTIVE | OUTPATIENT
Start: 2025-05-07

## 2025-05-07 RX ORDER — ATROPINE SULFATE 0.4 MG/ML
AMPUL (ML) INJECTION PRN
Status: DISCONTINUED | OUTPATIENT
Start: 2025-05-07 | End: 2025-05-07

## 2025-05-07 RX ORDER — MINERAL OIL/HYDROPHIL PETROLAT
OINTMENT (GRAM) TOPICAL
Status: DISCONTINUED | OUTPATIENT
Start: 2025-05-07 | End: 2025-05-08

## 2025-05-07 RX ORDER — NALOXONE HYDROCHLORIDE 1 MG/ML
0.1 INJECTION INTRAMUSCULAR; INTRAVENOUS; SUBCUTANEOUS
Status: DISCONTINUED | OUTPATIENT
Start: 2025-05-07 | End: 2025-05-07 | Stop reason: HOSPADM

## 2025-05-07 RX ORDER — FENTANYL CITRATE 50 UG/ML
INJECTION, SOLUTION INTRAMUSCULAR; INTRAVENOUS PRN
Status: DISCONTINUED | OUTPATIENT
Start: 2025-05-07 | End: 2025-05-07

## 2025-05-07 RX ORDER — MORPHINE SULFATE 20 MG/ML
5 SOLUTION ORAL
Refills: 0 | Status: ACTIVE | OUTPATIENT
Start: 2025-05-07

## 2025-05-07 RX ORDER — MORPHINE SULFATE 20 MG/ML
10 SOLUTION ORAL
Refills: 0 | Status: ACTIVE | OUTPATIENT
Start: 2025-05-07

## 2025-05-07 RX ORDER — BISACODYL 10 MG
10 SUPPOSITORY, RECTAL RECTAL
Status: DISCONTINUED | OUTPATIENT
Start: 2025-05-10 | End: 2025-05-08

## 2025-05-07 RX ORDER — VASOPRESSIN IN 0.9 % NACL 2 UNIT/2ML
SYRINGE (ML) INTRAVENOUS PRN
Status: DISCONTINUED | OUTPATIENT
Start: 2025-05-07 | End: 2025-05-07

## 2025-05-07 RX ORDER — SODIUM CHLORIDE, SODIUM LACTATE, POTASSIUM CHLORIDE, CALCIUM CHLORIDE 600; 310; 30; 20 MG/100ML; MG/100ML; MG/100ML; MG/100ML
INJECTION, SOLUTION INTRAVENOUS CONTINUOUS
Status: DISCONTINUED | OUTPATIENT
Start: 2025-05-07 | End: 2025-05-07 | Stop reason: HOSPADM

## 2025-05-07 RX ORDER — GLYCOPYRROLATE 0.2 MG/ML
INJECTION, SOLUTION INTRAMUSCULAR; INTRAVENOUS PRN
Status: DISCONTINUED | OUTPATIENT
Start: 2025-05-07 | End: 2025-05-07

## 2025-05-07 RX ORDER — SALIVA STIMULANT COMB. NO.3
1 SPRAY, NON-AEROSOL (ML) MUCOUS MEMBRANE
Status: ACTIVE | OUTPATIENT
Start: 2025-05-07

## 2025-05-07 RX ORDER — FENTANYL CITRATE 50 UG/ML
25 INJECTION, SOLUTION INTRAMUSCULAR; INTRAVENOUS EVERY 5 MIN PRN
Status: DISCONTINUED | OUTPATIENT
Start: 2025-05-07 | End: 2025-05-07 | Stop reason: HOSPADM

## 2025-05-07 RX ORDER — MORPHINE SULFATE 2 MG/ML
2 INJECTION, SOLUTION INTRAMUSCULAR; INTRAVENOUS
Refills: 0 | Status: DISPENSED | OUTPATIENT
Start: 2025-05-07

## 2025-05-07 RX ORDER — CARBOXYMETHYLCELLULOSE SODIUM 5 MG/ML
1-2 SOLUTION/ DROPS OPHTHALMIC
Status: ACTIVE | OUTPATIENT
Start: 2025-05-07

## 2025-05-07 RX ORDER — NALOXONE HYDROCHLORIDE 0.4 MG/ML
0.1 INJECTION, SOLUTION INTRAMUSCULAR; INTRAVENOUS; SUBCUTANEOUS
Status: ACTIVE | OUTPATIENT
Start: 2025-05-07

## 2025-05-07 RX ORDER — LIDOCAINE 40 MG/G
CREAM TOPICAL
Status: DISCONTINUED | OUTPATIENT
Start: 2025-05-07 | End: 2025-05-07 | Stop reason: HOSPADM

## 2025-05-07 RX ORDER — NALOXONE HYDROCHLORIDE 0.4 MG/ML
0.2 INJECTION, SOLUTION INTRAMUSCULAR; INTRAVENOUS; SUBCUTANEOUS
Status: ACTIVE | OUTPATIENT
Start: 2025-05-07

## 2025-05-07 RX ORDER — ONDANSETRON 2 MG/ML
4 INJECTION INTRAMUSCULAR; INTRAVENOUS EVERY 6 HOURS PRN
Status: ACTIVE | OUTPATIENT
Start: 2025-05-07

## 2025-05-07 RX ORDER — PROPOFOL 10 MG/ML
INJECTION, EMULSION INTRAVENOUS PRN
Status: DISCONTINUED | OUTPATIENT
Start: 2025-05-07 | End: 2025-05-07

## 2025-05-07 RX ORDER — ONDANSETRON 2 MG/ML
4 INJECTION INTRAMUSCULAR; INTRAVENOUS EVERY 30 MIN PRN
Status: DISCONTINUED | OUTPATIENT
Start: 2025-05-07 | End: 2025-05-07 | Stop reason: HOSPADM

## 2025-05-07 RX ORDER — SENNOSIDES 8.6 MG
1 TABLET ORAL 2 TIMES DAILY PRN
Status: ACTIVE | OUTPATIENT
Start: 2025-05-07

## 2025-05-07 RX ORDER — CARBOXYMETHYLCELLULOSE SODIUM 5 MG/ML
1-2 SOLUTION/ DROPS OPHTHALMIC
Status: DISCONTINUED | OUTPATIENT
Start: 2025-05-07 | End: 2025-05-07

## 2025-05-07 RX ORDER — OLANZAPINE 5 MG/1
5 TABLET, ORALLY DISINTEGRATING ORAL EVERY 6 HOURS PRN
Status: ACTIVE | OUTPATIENT
Start: 2025-05-07

## 2025-05-07 RX ORDER — LIDOCAINE HYDROCHLORIDE 10 MG/ML
INJECTION, SOLUTION INFILTRATION; PERINEURAL PRN
Status: DISCONTINUED | OUTPATIENT
Start: 2025-05-07 | End: 2025-05-07

## 2025-05-07 RX ORDER — ONDANSETRON 2 MG/ML
INJECTION INTRAMUSCULAR; INTRAVENOUS PRN
Status: DISCONTINUED | OUTPATIENT
Start: 2025-05-07 | End: 2025-05-07

## 2025-05-07 RX ORDER — MINERAL OIL/HYDROPHIL PETROLAT
OINTMENT (GRAM) TOPICAL
Status: ACTIVE | OUTPATIENT
Start: 2025-05-07

## 2025-05-07 RX ORDER — MAGNESIUM HYDROXIDE 1200 MG/15ML
LIQUID ORAL PRN
Status: DISCONTINUED | OUTPATIENT
Start: 2025-05-07 | End: 2025-05-07 | Stop reason: HOSPADM

## 2025-05-07 RX ORDER — BISACODYL 10 MG
10 SUPPOSITORY, RECTAL RECTAL
Status: ACTIVE | OUTPATIENT
Start: 2025-05-10

## 2025-05-07 RX ORDER — ONDANSETRON 4 MG/1
4 TABLET, ORALLY DISINTEGRATING ORAL EVERY 30 MIN PRN
Status: DISCONTINUED | OUTPATIENT
Start: 2025-05-07 | End: 2025-05-07 | Stop reason: HOSPADM

## 2025-05-07 RX ORDER — DEXAMETHASONE SODIUM PHOSPHATE 4 MG/ML
4 INJECTION, SOLUTION INTRA-ARTICULAR; INTRALESIONAL; INTRAMUSCULAR; INTRAVENOUS; SOFT TISSUE
Status: DISCONTINUED | OUTPATIENT
Start: 2025-05-07 | End: 2025-05-07 | Stop reason: HOSPADM

## 2025-05-07 RX ORDER — MORPHINE SULFATE 2 MG/ML
1 INJECTION, SOLUTION INTRAMUSCULAR; INTRAVENOUS
Refills: 0 | Status: ACTIVE | OUTPATIENT
Start: 2025-05-07

## 2025-05-07 RX ORDER — ONDANSETRON 4 MG/1
4 TABLET, ORALLY DISINTEGRATING ORAL EVERY 6 HOURS PRN
Status: ACTIVE | OUTPATIENT
Start: 2025-05-07

## 2025-05-07 RX ADMIN — SUGAMMADEX 200 MG: 100 INJECTION, SOLUTION INTRAVENOUS at 13:34

## 2025-05-07 RX ADMIN — Medication 2 UNITS: at 13:31

## 2025-05-07 RX ADMIN — Medication 12.5 MG: at 20:50

## 2025-05-07 RX ADMIN — DIVALPROEX SODIUM 375 MG: 125 CAPSULE, COATED PELLETS ORAL at 09:21

## 2025-05-07 RX ADMIN — PHENYLEPHRINE HYDROCHLORIDE 100 MCG: 10 INJECTION INTRAVENOUS at 13:21

## 2025-05-07 RX ADMIN — LIDOCAINE HYDROCHLORIDE 5 ML: 10 INJECTION, SOLUTION INFILTRATION; PERINEURAL at 13:17

## 2025-05-07 RX ADMIN — ALBUMIN HUMAN: 0.05 INJECTION, SOLUTION INTRAVENOUS at 13:50

## 2025-05-07 RX ADMIN — ACETAMINOPHEN 975 MG: 325 TABLET, FILM COATED ORAL at 20:49

## 2025-05-07 RX ADMIN — DIVALPROEX SODIUM 375 MG: 125 CAPSULE, COATED PELLETS ORAL at 20:49

## 2025-05-07 RX ADMIN — NYSTATIN: 100000 CREAM TOPICAL at 20:50

## 2025-05-07 RX ADMIN — PHENYLEPHRINE HYDROCHLORIDE 0.2 MCG/KG/MIN: 10 INJECTION INTRAVENOUS at 13:21

## 2025-05-07 RX ADMIN — CARVEDILOL 6.25 MG: 3.12 TABLET, FILM COATED ORAL at 09:21

## 2025-05-07 RX ADMIN — GLYCOPYRROLATE 0.2 MG: 0.2 INJECTION INTRAMUSCULAR; INTRAVENOUS at 13:36

## 2025-05-07 RX ADMIN — ATROPINE SULFATE 0.4 MG: 0.4 INJECTION INTRAMUSCULAR; INTRAVENOUS; SUBCUTANEOUS at 13:38

## 2025-05-07 RX ADMIN — PHENYLEPHRINE HYDROCHLORIDE 100 MCG: 10 INJECTION INTRAVENOUS at 13:25

## 2025-05-07 RX ADMIN — FENTANYL CITRATE 50 MCG: 50 INJECTION, SOLUTION INTRAMUSCULAR; INTRAVENOUS at 13:17

## 2025-05-07 RX ADMIN — DICLOFENAC SODIUM 4 G: 10 GEL TOPICAL at 09:22

## 2025-05-07 RX ADMIN — CARVEDILOL 6.25 MG: 3.12 TABLET, FILM COATED ORAL at 20:48

## 2025-05-07 RX ADMIN — MORPHINE SULFATE 2 MG: 2 INJECTION, SOLUTION INTRAMUSCULAR; INTRAVENOUS at 16:38

## 2025-05-07 RX ADMIN — MIRTAZAPINE 15 MG: 7.5 TABLET, FILM COATED ORAL at 20:48

## 2025-05-07 RX ADMIN — DICLOFENAC SODIUM 4 G: 10 GEL TOPICAL at 20:49

## 2025-05-07 RX ADMIN — ONDANSETRON 4 MG: 2 INJECTION INTRAMUSCULAR; INTRAVENOUS at 13:33

## 2025-05-07 RX ADMIN — FENTANYL CITRATE 25 MCG: 50 INJECTION, SOLUTION INTRAMUSCULAR; INTRAVENOUS at 13:08

## 2025-05-07 RX ADMIN — SODIUM CHLORIDE, SODIUM LACTATE, POTASSIUM CHLORIDE, AND CALCIUM CHLORIDE: .6; .31; .03; .02 INJECTION, SOLUTION INTRAVENOUS at 12:52

## 2025-05-07 RX ADMIN — ACETAMINOPHEN 975 MG: 325 TABLET, FILM COATED ORAL at 09:21

## 2025-05-07 RX ADMIN — ATROPINE SULFATE 0.4 MG: 0.4 INJECTION INTRAMUSCULAR; INTRAVENOUS; SUBCUTANEOUS at 13:39

## 2025-05-07 RX ADMIN — Medication 2 UNITS: at 13:29

## 2025-05-07 RX ADMIN — ROCURONIUM 40 MG: 50 INJECTION, SOLUTION INTRAVENOUS at 13:18

## 2025-05-07 RX ADMIN — PROPOFOL 100 MG: 10 INJECTION, EMULSION INTRAVENOUS at 13:17

## 2025-05-07 RX ADMIN — PHENYLEPHRINE HYDROCHLORIDE 100 MCG: 10 INJECTION INTRAVENOUS at 13:26

## 2025-05-07 ASSESSMENT — ACTIVITIES OF DAILY LIVING (ADL)
ADLS_ACUITY_SCORE: 88
ADLS_ACUITY_SCORE: 88
ADLS_ACUITY_SCORE: 93
ADLS_ACUITY_SCORE: 88
ADLS_ACUITY_SCORE: 94
ADLS_ACUITY_SCORE: 93
ADLS_ACUITY_SCORE: 94
ADLS_ACUITY_SCORE: 94
ADLS_ACUITY_SCORE: 93
ADLS_ACUITY_SCORE: 88
ADLS_ACUITY_SCORE: 93
ADLS_ACUITY_SCORE: 93
ADLS_ACUITY_SCORE: 88
ADLS_ACUITY_SCORE: 93
ADLS_ACUITY_SCORE: 88
ADLS_ACUITY_SCORE: 94
ADLS_ACUITY_SCORE: 88
ADLS_ACUITY_SCORE: 94
ADLS_ACUITY_SCORE: 93

## 2025-05-07 NOTE — PROGRESS NOTES
Transported patient from PACU to [facility] on BiPap without incident. BiPap settings of 10/5, 16, 30% FiO2. Patient tolerated transport well with no adverse reaction.

## 2025-05-07 NOTE — PROGRESS NOTES
Orthopedic Surgery Brief Progress Note    Peggy De Anda is a 89-year-old female with a past medical history significant for hypertension, dementia residing in memory care, epilepsy, ataxia, and spinal stenosis, who presented to the ER over the weekend with acute delirium, diarrhea, UTI, and failure to thrive.  Radiographs of the right tib-fib were obtained after her admission due to overlying ecchymoses and demonstrated a mildly displaced proximal tib/fib fracture.  Subsequent CT redemonstrated this fracture and an ipsilateral nondisplaced healing subacute calcaneus fracture.  Orthopedics was consulted and the patient was initially managed by one of my partners, who recommended a trial of nonsurgical management using a knee immobilizer brace and nonweightbearing restrictions.  The patient to baseline walks with a walker per family, but is unable to provide any history due to her persistent delirium.  Over the last few days, she has been in persistent significant pain despite compliance with brace wear and weightbearing restrictions.  Repeat radiographs obtained yesterday demonstrates some interval fracture displacement.  After lengthy discussion with the family, the decision was made to proceed with surgical fixation in an effort to allow for early weightbearing and improve mobilization as well as improved pain control.  In our detailed discussion, I explained to the patient's daughter and son-in-law that she may not regain her preop mobility and ability to ambulate independently, even after surgery.  Given all of her other acute medical complications, she is a high risk surgical candidate.  The family understood, but felt that surgical intervention was preferable, even if just for palliative purposes.  The above risks were also discussed thoroughly with anesthesia and the primary medicine team, who feel that all things considered she is reasonably optimized for surgery and do not recommend further  delay.    Unfortunately, after induction of general anesthesia and intubation, the patient became severely hypotensive and bradycardic.  After a quick discussion between myself and the anesthesiology attending, we elected to abort further surgical care.  Anesthesia was reversed, blood pressure stabilized with pressors, and the patient was successfully extubated.  Femoral pulse was able to be palpated throughout.  I also kept the family informed via phone call and communicated my expectations for future care.    Moving forward, I recommend continued nonweightbearing to the right lower extremity with a T scope brace locked in extension to be worn at all times except hygiene and skin checks.  Recommend bilateral heel-offloading boots and frequent turns by nursing staff to prevent pressure sores.  We will consult the palliative care team for further recommendations and facilitate goals of care discussions.

## 2025-05-07 NOTE — ANESTHESIA POSTPROCEDURE EVALUATION
Patient: Peggy De Anda    Procedure: Procedure(s):  OPEN REDUCTION INTERNAL FIXATION, FRACTURE, TIBIA, PLATEAU       Anesthesia Type:  General    Note:  Disposition: Inpatient   Postop Pain Control: Uneventful            Sign Out: Well controlled pain   PONV: No   Neuro/Psych:             Sign Out: Acceptable/Baseline neuro status   Airway/Respiratory:             Sign Out: ABNORMAL respiratory status (Pt requiring BiPap for respiratory support)   CV/Hemodynamics:             Events: Refractory hypOtension   Other NRE:    DID A NON-ROUTINE EVENT OCCUR? YES    Event details/Postop Comments:  Pt with complex hospital course, very high risk for surgery and anesthesia. Decided with family and surgeon to proceed to the OR. Pt with severe hypotension after induction of general anesthesia. Responsive to phenylephrine and vasopressin, but developed bradycardia, resolved with atropine. Decision made by surgeon and myself to not proceed with surgery, but to wake up the patient and extubate. The patient met extubation criteria in the operating room, and was extubated to 02 mask and brought to the PACU. Pt is requiring bipap for respiratory support, back to baseline responsiveness. Family, primary service and palliative care are aware of these events and are discussing next best steps for the patient. She is being transferred back to her floor room now.           Last vitals:  Vitals Value Taken Time   /57 05/07/25 15:00   Temp 36  C (96.8  F) 05/07/25 14:15   Pulse 60 05/07/25 15:00   Resp 30 05/07/25 15:00   SpO2 100 % 05/07/25 15:00   Vitals shown include unfiled device data.    Electronically Signed By: Shellie Brantley MD  May 7, 2025  3:02 PM

## 2025-05-07 NOTE — PROGRESS NOTES
[facility]    Medicine Progress Note - Hospitalist Service    Date of Admission:  5/3/2025    Assessment & Plan   Peggy De Anda is a 89 year old female with history of dementia, epilepsy, ataxia, chronic lumbar compression fractures, foraminal stenoses, HTN, and gout admitted on 5/3/2025 with concern for toxic encephalopathy, UTI and tibula fracture. 5/6 imaging shows slightly worsening tibial fracture plan is for ORIF with ortho today.     Metabolic encephalopathy, improving  Severe sepsis, improved  UTI  Presented from memory care w/ NVD and AMS. Initially w/o fever or leukocytosis on labs in the ED. Head CT negative. UC/BCs collected. Started on IV Ceftriaxone in the ED for dirty UA and suspected UTI. Did ultimately test positive for ETEC, norovirus, see below. Suspect primary  of pt's AMS on arrival was metabolic encephalopathy +/- volume depletion from GI illness. Remains confused, unsure of baseline.   - UC growing gram-negative bacilli and Citrobacter  - Daily CBC  - Tylenol scheduled for px/fevers  - Continue IV Ceftriaxone for now, plan to switch to oral abx 5/8  - PT/OT consulted for discharge planning  - Delirium precautions ordered, cluster cares overnight and try to avoid unnecessary lab draws     Frequent non bloody diarrhea- improving  Norovirus infection  ETEC infection  One unmeasured stool overnight, able to collect enteric panel which resulted positive for ETEC and norovirus. Certainly could explain the above symptoms. Given no leukocytosis, fever, or bloody stools will continue w/ supportive cares only. Diarrhea improving we will continue to monitor  - encourage po intake    Right Transverse fracture through the proximal tibial metaphysis  On admission found to have mildly displaced proximal tibial shaft fracture. Unclear what mechanism of injury was to cause this, though suspect there was a fall sometime in recent days w/ pt hx of advanced dementia. Recent  imaging shows more displacement plan for ORIF 5/7.   - ortho consulted, appreciate recs   > Will treat non operatively   > NWB on RLE. Well padded knee immobilizer for now, transition to hinged knee brace locked in full extension   > PT/OT  - Scheduled tylenol  - PRN Dilaudid 0.2 mg IV q3h PRN for severe pain    Surgical clearance   Patient with no prior history of MI or stroke. Patient appears comfortable denies chest pain or SOB. [place] perioperative calculator predicts 0.8% risk of MI.      Chronic conditions:  Epilepsy- PTA Depakote  Chronic venous stasis- holding PTA lasix. Lymphedema wraps ordered.   HTN- restarted PTA coreg, hold lasix for now          Diet: NPO for Procedure/Surgery per Anesthesia Guidelines Except for: Meds; Clear liquids before procedure/surgery: ADULT (Age GREATER than or Equal to 18 years) - Clear liquids 2 hours before procedure/surgery    DVT Prophylaxis: Pneumatic Compression Devices  Gamez Catheter: Not present  Lines: None     Cardiac Monitoring: None  Code Status: No CPR- Do NOT Intubate      Clinically Significant Risk Factors                   # Hypertension: Noted on problem list              # Overweight: Estimated body mass index is 27.86 kg/m  as calculated from the following:    Height as of this encounter: 1.524 m (5').    Weight as of this encounter: 64.7 kg (142 lb 10.2 oz)., PRESENT ON ADMISSION       # Financial/Environmental Concerns: none         Social Drivers of Health    Tobacco Use: Unknown (5/3/2025)    Patient History     Smoking Tobacco Use: Never     Smokeless Tobacco Use: Unknown   Financial Resource Strain: Unknown (5/3/2025)    Financial Resource Strain     Within the past 12 months, have you or your family members you live with been unable to get utilities (heat, electricity) when it was really needed?: Patient unable to answer   Interpersonal Safety: Unknown (5/3/2025)    Interpersonal Safety     Do you feel physically and emotionally safe where you  currently live?: Patient unable to answer     Within the past 12 months, have you been hit, slapped, kicked or otherwise physically hurt by someone?: Patient unable to answer     Within the past 12 months, have you been humiliated or emotionally abused in other ways by your partner or ex-partner?: Patient unable to answer    Received from [facility]    Social Connections          Disposition Plan   Medically Ready for Discharge: Anticipated in 2-4 Days         The patient's care was discussed with the Attending Physician, Dr. Crews.    Carolyn Field MD  Hospitalist Service  [facility]  Securely message with [employer] (more info)  Text page via [employer] Paging/Directory   ______________________________________________________________________    Interval History   Calm, appears comfortable. Still 1:1     Physical Exam   Vital Signs: Temp: 97.4  F (36.3  C) Temp src: Axillary BP: 109/53 Pulse: 50   Resp: 16 SpO2: 95 % O2 Device: None (Room air)    Weight: 142 lbs 10.2 oz    General Appearance: Lying in bed, calm, NAD  RESPIRATORY: Normal work of breathing, clear breath sounds bilaterally, no wheezing  CARDIOVASCULAR: Normal S1, S2, no murmur  SKIN: RLE is dressed and wrapped  NEUROLOGIC: sleeping, confused at baseline.    Data   ------------------------- PAST 24 HR DATA REVIEWED -----------------------------------------------        Imaging results reviewed over the past 24 hrs:   Recent Results (from the past 24 hours)   XR Tibia and Fibula Right 2 Views    Narrative    EXAM: XR TIBIA AND FIBULA RIGHT 2 VIEWS  LOCATION: [facility]  DATE: 5/6/2025    INDICATION: assess for fracture displacement  COMPARISON: CT 05/30/2025      Impression    IMPRESSION:   Acute displaced transverse fracture of the proximal tibial metadiaphysis is again seen. Tibial shaft is medially displaced relative to the tibial plateau. This appears to have slightly  increased compared to 05/03/2025 when accounting for differences in   technique.    Acute impacted displaced fracture of the fibular neck. Displacement of these fracture fragments also appear increased compared to the prior study.    Unchanged linear sclerosis in the medial tibial plateau consistent with age-indeterminate stress fracture.    Nondisplaced age-indeterminate stress fracture of the calcaneus.    Posterior splinting material which obscures fine bony detail. Severe diffuse osseous demineralization which limits evaluation for nondisplaced fractures and subtle osseous lesions. Knee and tibiotalar joints appear grossly normal alignment.

## 2025-05-07 NOTE — PROGRESS NOTES
BiPAP discontinued by palliative care PA, placed on 2 lpm/NC. Okay to remove for pt intolerance. RT available as needed.     /57 (BP Location: Right arm)   Pulse 59   Temp 97.7  F (36.5  C) (Oral)   Resp 18   Ht 1.524 m (5')   Wt 64.7 kg (142 lb 10.2 oz)   SpO2 100%   BMI 27.86 kg/m

## 2025-05-07 NOTE — SIGNIFICANT EVENT
Significant Event Note    Time of event: 2:56 PM May 7, 2025    Description of event:  Paged regarding patient having procedure aborted due to bradycardia, hypotension and hypoxia.    Plan:  Acute hypoxic respiratory failure  Aborted procedure  Ortho surgery aborted due to bradycardia, hypotension and hypoxia responsive to atropine and now on BIPAP  - family on the way to discuss goals  - patient appears vitally stable now    Discussed with: bedside nurse    Armando Marroquin MD

## 2025-05-07 NOTE — CONSULTS
Palliative Care Consultation Note  [facility]      Patient: Peggy De Anda  Date of Admission:  5/3/2025    Requesting Clinician / Team:   Reason for consult: Goals of care  Patient and family support       Recommendations & Counseling     GOALS OF CARE:   Comfort focused   Transition to comfort cares today following acute event during surgery of bradycardia and profound hypotension.  Daughter agrees with comfort focused and symptom management.  Should patient be medically stable for discharge, daughter would want her to return to [facility] with [facility] services.    ADVANCE CARE PLANNING:  Patient has an advance directive dated 11/28/2023.  Primary Health Care Agent daughter, Ernestine.  Alternate(s) son-in-law, Theo.   There is no POLST form on file, defer to patient and/or next of kin for decisions   Code status: No CPR- Do NOT Intubate    MEDICAL MANAGEMENT:   Comfort Care   Below are common symptoms routinely seen in patients with comfort focused goals and at the end of life. This patient may not currently exhibit all of these symptoms; however, if these were to occur our recommendations are as follows:    #Pain  1st choice: Morphine PO/SL 5-10 mg q 2 hour as needed.   2nd choice: Morphine IV 1-2 mg q 15 minutes as needed     #Air hunger/Dyspnea   1st choice: Morphine PO/SL 5-10 mg q 2 hour as needed.   2nd choice: Morphine IV 1-2 mg q 15 minutes as needed     #Secretion burden   Robinul 0.2 mg (PO/IV) q 4 hours as needed. If ineffective, increase up to 0.3 mg   AVOID atropine in patient with advanced dementia as can increase agitation.     #Nausea   Zofran 4 mg q 6 hours as needed. Can increase to 8 mg   Zyprexa 5 mg q 6 hours as needed     #Agitation  Aggressive control of other symptoms first, then  1st choice: Zyprexa 5 mg ODT or IM   2nd choice: Increase dose of Zyprexa to 10 mg or consider switch to Haldol   3rd choice: Lorazepam as above     PSYCHOSOCIAL/SPIRITUAL  SUPPORT:  Family daughterErnestine, son-in-law, Theo and son who lives in [facility]: [place]   Would appreciate Spiritual Health Services, Consult has been placed for support     Palliative Care will continue to follow. Thank you for the consult and allowing us to aid in the care of Peggy De Anda.    These recommendations have been discussed with [place] team, PACU staff, Umesh Cox RN and RTDARIO Montano, CNS  [facility], Palliative Care  Securely message with the Vocera Web Console (learn more here) or  Text page via [facility] Paging/Directory       Assessment      Peggy De Anda is a 89 year old female with a past medical history of Advanced dementia (lives in memory care) epilepsy, ataxia, spinal stenosis and lumbar compression fractures, gout and HTN who presented on 5/3/2025 with increased delirium, diarrhea, UTI and failure to thrive.  Patient being treated with IV ceftriaxone. imaging on admission demonstrated a right tibia fracture, not responsive to conservative treatment with knee immobilizer. 5/6 CT showed worsening tibial fracture and orthopedic surgery dicussed benefit - risk of ORIF right tibial fracture with Ernestine gardiner. While high risk for surgery and anesthesia, decision was to pursue right ORIF right tibia plateau fracture. During surgery, patient experienced bradycardia and severe hypotension after induction of anesthesia, given vasopressin and phenylephrine with good response and surgery aborted, patient extubated and placed on BiPap.    Today, the patient was seen for:  Introduction to palliative medicine specialty, establish rapport, goals of care discussion, symptom management (right leg pain)    History of Present Illness   Met with Ernestine gardiner.   Palliative care team helps patients and families dealing with serious, potentially life-limiting illnesses, navigate their care while focusing on the whole person; providing emotional, social and  "spiritual support.  Palliative care often assists with symptom management, information sharing about what to expect from the illness, available treatment options and what effect those options may have on the disease course, and provide effective communication and caring support.    Prognosis, Goals, & Planning:   Functional Status just prior to this current hospitalization:  Outpatient Palliative Performance Score (PPS) 50%  Extensive disease. Normal or reduced intake; normal LOC or confusion; little ambulation (mainly sit/lie), ADLs w/much assistance, unable to do any work.      Prognosis, Goals, and/or Advance Care Planning:  We discussed general treatment options (full/restorative, selective/conservatives, and comfort only/hospice). We then discussed how these specifically apply to patient.    Ernestine describes patient having a significant physical decline over the last year.  She moved into memory care unit a few years ago and hit her head 1 year ago and has had a subsequent decline since that time.  Dementia and forgetfulness has worsened, increased intermittent agitation, now walking with walker where previously independent in her own townhome and cooking meals for family.  Feels that patient with worsening medical problems, UTIs and diarrhea as of most recent inquired to her daughter \"is this how I am going to die?\"  Ernestine tearfully admits that she feels that her mom has been suffering for the last several months especially.  Reviewed events and answered her questions to better her understanding of the events that took place during surgery.  Reviewed that patient currently on BiPAP to keep saturations up and that BiPAP is a form of mechanical ventilation.  Reviewed different treatment options as continuing down current treatment path with antibiotics, monitoring, BiPAP, weaning of BiPAP to oxygen versus perhaps transition or consideration of transition to comfort focused cares with focus on quality of life.  "   Reviewed that with ongoing fracture patient will be bedbound and nonambulatory going forward and that this likely will not heal.  Daughter elects for focus on quality of life.  Education provided on transition to comfort-focused goals of care would be including discontinuation of IV fluids, cardiac monitoring, labs and other interventions that do not directly promote comfort.  Anticipatory guidance was given regarding feeding, hunger, fluids at end of life. We discussed utilization of medications to ease air hunger, agitation and restlessness.  Education provided regarding hospice philosophy, prognostic,and eligibility criteria. Discussed what services are provided and those that are not.  Ernestine indicates that she had met with [facility] hospice at [facility].  Should patient be medically stable from discharge from the hospital she would like to utilize [facility] hospice.  Also briefly reviewed general inpatient hospice as an option; however patient without acute unmanaged symptoms presently and do not believe that patient would meet criteria at this time.    Code Status was addressed today:   DNR/DNI confirmed        Patient lacks decision-making capacity today for complex decisions.          Coping, Meaning, & Spirituality:   Mood, coping, and/or meaning in the context of serious illness were addressed today: Yes    Social:   Living situation: resides in a nursing home [facility]  Important relationships/caregivers: daughter, Ernestine and son-in-law, Theo  Areas of fulfillment/santiago: time with family. Loves to golf in [place] with her .  Loves to cook for her family.   34 years ago. Moved to [place] from [place] 8 years ago to be closer to her daughter.    Medications:  Reviewed this patient's medication profile and medications from this hospitalization.   [place] Board of Pharmacy Data Base Reviewed: Yes:   reviewed - no record of controlled substances prescribed..    ROS:  Comprehensive  ROS is reviewed and is negative except as here & per HPI:       Physical Exam   Vital Signs with Ranges  Temp:  [96.3  F (35.7  C)-97.7  F (36.5  C)] 97.7  F (36.5  C)  Pulse:  [46-63] 59  Resp:  [12-48] 18  BP: (109-154)/(53-70) 119/57  Cuff Mean (mmHg):  [96] 96  FiO2 (%):  [30 %] 30 %  SpO2:  [81 %-100 %] 100 %  Wt Readings from Last 10 Encounters:   05/03/25 64.7 kg (142 lb 10.2 oz)   10/04/23 65.5 kg (144 lb 6.4 oz)   10/04/23 65.5 kg (144 lb 6.4 oz)   09/28/23 65.8 kg (145 lb)   09/25/23 66.9 kg (147 lb 6.4 oz)   09/21/23 62.1 kg (137 lb)   09/17/23 69.1 kg (152 lb 5.4 oz)   04/26/22 67.2 kg (148 lb 4 oz)   03/22/22 66.9 kg (147 lb 6.4 oz)   02/15/22 65.8 kg (145 lb)     142 lbs 10.2 oz    PHYSICAL EXAM:  Temp:  [96.3  F (35.7  C)-97.7  F (36.5  C)] 97.7  F (36.5  C)  Pulse:  [46-63] 59  Resp:  [12-48] 18  BP: (109-154)/(53-70) 119/57  Cuff Mean (mmHg):  [96] 96  FiO2 (%):  [30 %] 30 %  SpO2:  [81 %-100 %] 100 %  142 lbs 10.2 oz    Physical Exam  Vitals and nursing note reviewed.   Constitutional:       General: She is not in acute distress.     Appearance: She is ill-appearing.   HENT:      Head: Normocephalic and atraumatic.      Nose: Nose normal.      Mouth/Throat:      Mouth: Mucous membranes are dry.   Eyes:      Conjunctiva/sclera: Conjunctivae normal.   Cardiovascular:      Rate and Rhythm: Normal rate.   Pulmonary:      Effort: Pulmonary effort is normal. No respiratory distress.      Breath sounds: Normal breath sounds.   Abdominal:      General: Bowel sounds are normal.      Palpations: Abdomen is soft.   Skin:     General: Skin is warm.   Neurological:      Mental Status: She is disoriented.       Data reviewed:  No results found for this or any previous visit (from the past 24 hours).    80 MINUTES SPENT BY ME on the date of service doing chart review, history, exam, documentation & further activities per the note.

## 2025-05-07 NOTE — ANESTHESIA PREPROCEDURE EVALUATION
Anesthesia Pre-Procedure Evaluation    Patient: Peggy De Anda   MRN: [id number] : 11/15/1931          Procedure : Procedure(s):  OPEN REDUCTION INTERNAL FIXATION, FRACTURE, TIBIA, PLATEAU         Past Medical History:   Diagnosis Date    Anemia     Ataxia     Dermatitis     Created by Conversion     Epilepsy (H)     Essential hypertension     Gout     Monoclonal gammopathy of undetermined significance     Osteoporosis     Peripheral neuropathy     Secondary hyperparathyroidism     non-renal      Past Surgical History:   Procedure Laterality Date    APPENDECTOMY      AGE 13    CATARACT EXTRACTION, BILATERAL      CHOLECYSTECTOMY      IR LUMBAR EPIDURAL STEROID INJECTION  10/5/2007    IR MISCELLANEOUS PROCEDURE  10/30/2007    NEUROPLASTY / TRANSPOSITION MEDIAN NERVE AT CARPAL TUNNEL BILATERAL      TONSILLECTOMY        Allergies   Allergen Reactions    Oxycodone Hallucination    Quinolones Rash    Levofloxacin Rash      Social History     Tobacco Use    Smoking status: Never    Smokeless tobacco: Not on file   Substance Use Topics    Alcohol use: Not on file      Wt Readings from Last 1 Encounters:   25 64.7 kg (142 lb 10.2 oz)             Physical Exam  Airway  Mallampati: II  TM distance: >3 FB  Neck ROM: full  Mouth opening: >= 4 cm    Cardiovascular Rate: bradycardia     Dental Comments: wnl      Pulmonary Breath sounds clear to auscultation        Neurological   She appears lethargic and agitated.    Other Findings       OUTSIDE LABS:  CBC:   Lab Results   Component Value Date    WBC 9.7 2025    WBC 7.8 2025    HGB 10.0 (L) 2025    HGB 10.4 (L) 2025    HCT 32.5 (L) 2025    HCT 32.5 (L) 2025     2025     2025     BMP:   Lab Results   Component Value Date     2025     2025    POTASSIUM 3.7 2025    POTASSIUM 4.3 2025    CHLORIDE 112 (H) 2025    CHLORIDE 110 (H) 2025    CO2 16 (L) 2025    CO2  "18 (L) 05/03/2025    BUN 24.7 (H) 05/04/2025    BUN 27.9 (H) 05/03/2025    CR 0.79 05/04/2025    CR 0.82 05/03/2025    GLC 96 05/04/2025     (H) 05/03/2025     COAGS: No results found for: \"PTT\", \"INR\", \"FIBR\"  POC: No results found for: \"BGM\", \"HCG\", \"HCGS\"  HEPATIC:   Lab Results   Component Value Date    ALBUMIN 3.5 05/03/2025    PROTTOTAL 6.7 05/03/2025    ALT 17 05/03/2025    AST 27 05/03/2025    ALKPHOS 163 (H) 05/03/2025    BILITOTAL 0.4 05/03/2025    JEANIEN 26 09/09/2023     OTHER:   Lab Results   Component Value Date    LACT 1.1 09/09/2023    A1C 4.9 08/09/2021    OBED 8.7 (L) 05/04/2025    PHOS 3.4 09/07/2023    MAG 1.8 05/03/2025    LIPASE 15 05/03/2025    TSH 2.45 05/04/2025    CRP 1.2 (H) 08/11/2021    SED 18 11/07/2024       Anesthesia Plan    ASA Status:  3    Anesthesia Type: general.   Induction: intravenous.   Techniques and Equipment:       - Monitoring Plan: standard ASA monitoring.     Consents    Anesthesia Plan(s) and associated risks, benefits, and realistic alternatives discussed. Questions answered and patient/representative(s) expressed understanding.     - Discussed:     - Discussed with:  Family, patient            Postoperative Care    Pain management: multimodal analgesia.        Comments:    Other Comments: Complex history, pt with advanced dementia, multiple infections and tibial plateau fracture. Pt has been very somnolent the last few days, now with a cough, 02 sat 90% on room air. I discussed the risks and benefits of proceeding, surgery is pt's best chance for mobility and pain control. Pt is high risk for intra-op and postop complications. We will plan for GETA, and will extubate at the end of surgery. Pt will be full code for operative related complications while in the operating room, but back to DNR/DNI in the PACU.              Shellie Brantley MD    I have reviewed the pertinent notes and labs in the chart from the past 30 days and (re)examined the patient.  Any updates " or changes from those notes are reflected in this note.    Clinically Significant Risk Factors                   # Hypertension: Noted on problem list            # Overweight: Estimated body mass index is 27.86 kg/m  as calculated from the following:    Height as of this encounter: 1.524 m (5').    Weight as of this encounter: 64.7 kg (142 lb 10.2 oz)., PRESENT ON ADMISSION     # Financial/Environmental Concerns: none

## 2025-05-07 NOTE — PROGRESS NOTES
Received pt from PACU on BiPAP 10/5, 16, .30, Pt asleep, small face mask. SpO2 97 %, RT to monitor    /58 (BP Location: Right arm, Patient Position: Semi-Rubalcava's, Cuff Size: Adult Regular)   Pulse 60   Temp 97  F (36.1  C) (Core)   Resp 30   Ht 1.524 m (5')   Wt 64.7 kg (142 lb 10.2 oz)   SpO2 99%   BMI 27.86 kg/m

## 2025-05-07 NOTE — ANESTHESIA PROCEDURE NOTES
Airway       Patient location during procedure: OR       Procedure Start/Stop Times: 5/7/2025 1:20 PM  Staff -        CRNA: Feng Hall APRN CRNA       Performed By: CRNA  Consent for Airway        Urgency: elective  Indications and Patient Condition       Indications for airway management: pedrito-procedural       Induction type:intravenous       Mask difficulty assessment: 2 - vent by mask + OA or adjuvant +/- NMBA    Final Airway Details       Final airway type: endotracheal airway       Successful airway: ETT - single  Endotracheal Airway Details        ETT size (mm): 7.0       Cuffed: yes       Successful intubation technique: direct laryngoscopy       DL Blade Type: MAC 3       Grade View of Cords: 1       Adjucts: stylet       Position: Right       Measured from: lips       Secured at (cm): 21       Bite block used: None    Post intubation assessment        Placement verified by: capnometry and equal breath sounds        Number of attempts at approach: 1       Number of other approaches attempted: 0       Secured with: tape       Ease of procedure: easy       Dentition: Intact       Dental guard used and removed. Dental Guard Type: Standard White.    Medication(s) Administered   Medication Administration Time: 5/7/2025 1:20 PM

## 2025-05-07 NOTE — ANESTHESIA CARE TRANSFER NOTE
Patient: Peggy De Anda    Procedure: Procedure(s):  OPEN REDUCTION INTERNAL FIXATION, FRACTURE, TIBIA, PLATEAU       Diagnosis: Closed fracture of proximal end of right tibia, unspecified fracture morphology, initial encounter [S82.101A]  Diagnosis Additional Information: No value filed.    Anesthesia Type:   No value filed.     Note:    Oropharynx: oropharynx clear of all foreign objects  Level of Consciousness: awake  Oxygen Supplementation: face mask  Level of Supplemental Oxygen (L/min / FiO2): 6l  Independent Airway: airway patency satisfactory and stable  Dentition: dentition unchanged  Vital Signs Stable: post-procedure vital signs reviewed and stable  Report to RN Given: handoff report given  Patient transferred to: PACU    Handoff Report: Identifed the Patient, Identified the Reponsible Provider, Reviewed the pertinent medical history, Discussed the surgical course, Reviewed Intra-OP anesthesia mangement and issues during anesthesia, Set expectations for post-procedure period and Allowed opportunity for questions and acknowledgement of understanding      Vitals:  Vitals Value Taken Time   /65 05/07/25 14:15   Temp 36.3c    Pulse 59 05/07/25 14:15   Resp 20 05/07/25 14:15   SpO2 100 % 05/07/25 14:15   Vitals shown include unfiled device data.    Electronically Signed By: DARIO Daly CRNA  May 7, 2025  2:16 PM

## 2025-05-07 NOTE — CONSULTS
ORTHOPEDIC CONSULTATION    Consultation  Peggy De Anda,  11/15/1931, MRN [id number]    [unfilled]  Tibia fracture [S82.209A]  Acute cystitis without hematuria [N30.00]  History of dementia [Z86.59]  Other closed fracture of proximal end of right tibia, initial encounter [S82.191A]  Diarrhea, unspecified type [R19.7]    PCP: Courtney Acosta Physician, None   Code status:  No CPR- Do NOT Intubate       Extended Emergency Contact Information  Primary Emergency Contact: Ernestine Scott  Address: [address] [place] [address] [facility]  Home Phone: [phone]  Relation: Daughter  Secondary Emergency Contact: Teodoro Scott  Address: [address] [place] [address] [facility]  Home Phone: [phone]  Mobile Phone: [phone]  Relation: Son-in-Law         IMPRESSION:  89-year-old female with a history of dementia who presented with a displaced proximal tib/fib fracture, failed initial trial of nonoperative management, now planning for open reduction internal fixation with an intramedullary nail this afternoon.     PLAN:  N.p.o. since midnight  Holding anticoagulation  Planning for right proximal tibia open versus closed reduction and internal fixation.      CHIEF COMPLAINT: Right proximal tibia pain    HISTORY OF PRESENT ILLNESS:  Peggy De Anda is a 89-year-old female with a past medical history significant for hypertension, dementia residing in memory care, epilepsy, ataxia, and spinal stenosis, who presented to the ER over the weekend with acute delirium, diarrhea, UTI, and failure to thrive.  Radiographs of the right tib-fib were obtained after her admission due to overlying ecchymoses and demonstrated a mildly displaced proximal tib/fib fracture.  Subsequent CT redemonstrated this fracture and an ipsilateral nondisplaced healing subacute calcaneus fracture.  Orthopedics was consulted and the patient was initially managed by one of my partners, who  recommended a trial of nonsurgical management using a knee immobilizer brace and nonweightbearing restrictions.  The patient to baseline walks with a walker per family, but is unable to provide any history due to her persistent delirium.  Over the last few days, she has been in persistent significant pain despite compliance with brace wear and weightbearing restrictions.  Repeat radiographs obtained yesterday demonstrates some interval fracture displacement.  After lengthy discussion with the family, the decision was made to proceed with surgical fixation in an effort to allow for early weightbearing and improve mobilization as well as improved pain control.    PAST MEDICAL HISTORY:  Past Medical History:   Diagnosis Date    Anemia     Ataxia     Dermatitis     Created by Conversion     Epilepsy (H)     Essential hypertension     Gout     Monoclonal gammopathy of undetermined significance     Osteoporosis     Peripheral neuropathy     Secondary hyperparathyroidism     non-renal       ALLERGIES:   Review of patient's allergies indicates   Allergies   Allergen Reactions    Oxycodone Hallucination    Quinolones Rash    Levofloxacin Rash       MEDICATIONS UPON ADMISSION:  Medications Prior to Admission   Medication Sig Dispense Refill Last Dose/Taking    acetaminophen (TYLENOL) 500 MG tablet Take 1,000 mg by mouth 3 times daily.   5/2/2025 Evening    carvedilol (COREG) 6.25 MG tablet Take 1 tablet (6.25 mg) by mouth 2 times daily 180 tablet 3 5/2/2025 Evening    diclofenac (VOLTAREN) 1 % topical gel Apply 4 g topically 2 times daily. Apply to right knee   5/2/2025 Evening    diclofenac (VOLTAREN) 1 % topical gel Apply 2 g topically 3 times daily as needed for moderate pain. Apply to neck   Past Week    divalproex sodium delayed-release (DEPAKOTE SPRINKLE) 125 MG DR capsule Take 3 capsules by mouth 2 times daily.   5/2/2025 Evening    furosemide (LASIX) 40 MG tablet Take 40 mg by mouth every morning.   5/2/2025 Morning     loperamide (IMODIUM) 2 MG capsule Take 4 mg by mouth as needed for diarrhea. Take with first loose stool   5/1/2025 Noon    loperamide (IMODIUM) 2 MG capsule Take 2 mg by mouth as needed for diarrhea. After each subsequent loose stool   Past Week    losartan (COZAAR) 100 MG tablet Take 1 tablet (100 mg) by mouth daily 90 tablet 3 5/2/2025 Morning    magnesium oxide (MAG-OX) 400 MG tablet Take 1 tablet (400 mg) by mouth daily. 90 tablet 3 5/2/2025 Morning    mirtazapine (REMERON) 15 MG tablet Take 15 mg by mouth at bedtime.   5/2/2025 Evening    nystatin (MYCOSTATIN) 521048 UNIT/GM external cream Apply topically 2 times daily. Apply to groin area   5/2/2025 Evening    potassium chloride dorothy ER (KLOR-CON M20) 20 MEQ CR tablet Take 20 mEq by mouth daily.   5/2/2025 Morning    sodium chloride (WOUND WASH SALINE) 0.9 % SOLN Externally apply topically daily as needed. Use to clean affected area and cover with dressing on Mondays, Wednesdays, and Fridays 5/2/2025 Morning       SOCIAL HISTORY:   she  reports that she has never smoked. She does not have any smokeless tobacco history on file.    FAMILY HISTORY:  family history is not on file.      REVIEW OF SYSTEMS:   Reviewed with patient. See HPI, otherwise negative.    PHYSICAL EXAMINATION:  Vitals: Temp:  [96.3  F (35.7  C)-97.9  F (36.6  C)] 96.4  F (35.8  C)  Pulse:  [46-53] 49  Resp:  [12-18] 13  BP: (109-149)/(53-70) 136/60  SpO2:  [91 %-99 %] 91 %    RLE:  No deformity  Skin intact but large area of ecchymoses over proimal anterior shin  Patient is awake but only somewhat arousable.  Does follow basic commands, but unable to assess sensory exam  Fires EHL, FHL, tib ant, gastrocsoleus complex on command  DP pulse is palpable, toes are warm and well perfused with brisk capillary refill      RADIOGRAPHIC EVALUATION:  Plain radiographs and CT of the tibia/fib demonstrate a moderately displaced transverse fracture of the proximal tibial metadiaphysis without  apparent intra-articular extension.  There is also a subacute-appearing fracture of the lateral calcaneus without any significant displacement, and with evidence of bony healing.    PERTINENT LABS:  Lab Results   Component Value Date    WBC 9.7 05/04/2025     Lab Results   Component Value Date    RBC 3.11 05/04/2025     Lab Results   Component Value Date    HGB 10.0 05/04/2025     Lab Results   Component Value Date    HCT 32.5 05/04/2025     Lab Results   Component Value Date     05/04/2025     Lab Results   Component Value Date    MCH 32.2 05/04/2025     Lab Results   Component Value Date    MCHC 30.8 05/04/2025     Lab Results   Component Value Date    RDW 13.3 05/04/2025     Lab Results   Component Value Date     05/04/2025     Last Comprehensive Metabolic Panel:  Lab Results   Component Value Date     05/04/2025    POTASSIUM 3.7 05/04/2025    CHLORIDE 112 (H) 05/04/2025    CO2 16 (L) 05/04/2025    ANIONGAP 14 05/04/2025    GLC 96 05/04/2025    BUN 24.7 (H) 05/04/2025    CR 0.79 05/04/2025    GFRESTIMATED 69 05/04/2025    OBDE 8.7 (L) 05/04/2025             Yo Leslie MD   [facility]  5/7/2025

## 2025-05-07 NOTE — PLAN OF CARE
Problem: Adult Inpatient Plan of Care  Goal: Plan of Care Review  Description: The Plan of Care Review/Shift note should be completed every shift.  The Outcome Evaluation is a brief statement about your assessment that the patient is improving, declining, or no change.  This information will be displayed automatically on your shiftnote.  Outcome: Progressing  Goal: Absence of Hospital-Acquired Illness or Injury  Intervention: Identify and Manage Fall Risk  Recent Flowsheet Documentation  Taken 5/7/2025 0011 by Eugenio Wyatt RN  Safety Promotion/Fall Prevention:   clutter free environment maintained   increased rounding and observation   increase visualization of patient  Intervention: Prevent Skin Injury  Recent Flowsheet Documentation  Taken 5/7/2025 0011 by Eugenio Wyatt RN  Body Position:   supine, head elevated   weight shifting  Goal: Optimal Comfort and Wellbeing  Intervention: Provide Person-Centered Care  Recent Flowsheet Documentation  Taken 5/7/2025 0011 by Eugenio Wyatt RN  Trust Relationship/Rapport:   care explained   reassurance provided     Problem: Orthopaedic Fracture  Goal: Absence of Bleeding  Outcome: Progressing  Goal: Optimal Functional Ability  Intervention: Optimize Functional Ability  Recent Flowsheet Documentation  Taken 5/7/2025 0011 by Eugenio Wyatt RN  Activity Management: activity adjusted per tolerance  Positioning/Transfer Devices:   pillows   in use  Goal: Absence of Infection Signs and Symptoms  Outcome: Progressing  Goal: Effective Oxygenation and Ventilation  Outcome: Progressing  Intervention: Promote Airway Secretion Clearance  Recent Flowsheet Documentation  Taken 5/7/2025 0011 by Eugenio Wyatt RN  Activity Management: activity adjusted per tolerance  Intervention: Optimize Oxygenation and Ventilation  Recent Flowsheet Documentation  Taken 5/7/2025 0011 by Eugenio Wyatt RN  Head of Bed (HOB) Positioning: HOB at  20-30 degrees     Problem: Orthopaedic Fracture  Goal: Absence of Infection Signs and Symptoms  Outcome: Progressing     Problem: Orthopaedic Fracture  Goal: Effective Oxygenation and Ventilation  Outcome: Progressing  Intervention: Promote Airway Secretion Clearance  Recent Flowsheet Documentation  Taken 5/7/2025 0011 by Eugenio Wyatt RN  Activity Management: activity adjusted per tolerance  Intervention: Optimize Oxygenation and Ventilation  Recent Flowsheet Documentation  Taken 5/7/2025 0011 by Eugenio Wyatt RN  Head of Bed (HOB) Positioning: HOB at 20-30 degrees     Problem: Pain Acute  Goal: Optimal Pain Control and Function  Outcome: Progressing  Intervention: Prevent or Manage Pain  Recent Flowsheet Documentation  Taken 5/7/2025 0011 by Eugenio Wyatt RN  Medication Review/Management: medications reviewed     Problem: Comorbidity Management  Goal: Blood Pressure in Desired Range  Outcome: Progressing  Intervention: Maintain Blood Pressure Management  Recent Flowsheet Documentation  Taken 5/7/2025 0011 by Eugenio Wyatt RN  Medication Review/Management: medications reviewed   Goal Outcome Evaluation:       Pt 1:1 for safety. Alert to self. Reorientation provided. Cooperative with cares. Pt was bladder scan previous shift at 2125 with volume of 550. Recheck bladder scan at 0021 with volume of 649. Straight cath was done. No verbal or non verbal signs of pain. Slept well. On room air, Vital Signs WNL. Will continue to monitor.

## 2025-05-08 PROCEDURE — 99232 SBSQ HOSP IP/OBS MODERATE 35: CPT | Performed by: CLINICAL NURSE SPECIALIST

## 2025-05-08 PROCEDURE — 250N000011 HC RX IP 250 OP 636: Performed by: INTERNAL MEDICINE

## 2025-05-08 PROCEDURE — 99232 SBSQ HOSP IP/OBS MODERATE 35: CPT | Mod: GC

## 2025-05-08 PROCEDURE — 120N000001 HC R&B MED SURG/OB

## 2025-05-08 PROCEDURE — 250N000013 HC RX MED GY IP 250 OP 250 PS 637: Performed by: INTERNAL MEDICINE

## 2025-05-08 PROCEDURE — 250N000013 HC RX MED GY IP 250 OP 250 PS 637

## 2025-05-08 RX ADMIN — DICLOFENAC SODIUM 4 G: 10 GEL TOPICAL at 09:41

## 2025-05-08 RX ADMIN — ACETAMINOPHEN 975 MG: 325 TABLET, FILM COATED ORAL at 09:40

## 2025-05-08 RX ADMIN — Medication 12.5 MG: at 21:41

## 2025-05-08 RX ADMIN — DIVALPROEX SODIUM 375 MG: 125 CAPSULE, COATED PELLETS ORAL at 21:41

## 2025-05-08 RX ADMIN — CARVEDILOL 6.25 MG: 3.12 TABLET, FILM COATED ORAL at 09:40

## 2025-05-08 RX ADMIN — ACETAMINOPHEN 975 MG: 325 TABLET, FILM COATED ORAL at 21:42

## 2025-05-08 RX ADMIN — CEFTRIAXONE SODIUM 1 G: 1 INJECTION, POWDER, FOR SOLUTION INTRAMUSCULAR; INTRAVENOUS at 20:06

## 2025-05-08 RX ADMIN — MIRTAZAPINE 15 MG: 7.5 TABLET, FILM COATED ORAL at 21:42

## 2025-05-08 RX ADMIN — NYSTATIN: 100000 CREAM TOPICAL at 21:43

## 2025-05-08 RX ADMIN — DIVALPROEX SODIUM 375 MG: 125 CAPSULE, COATED PELLETS ORAL at 09:41

## 2025-05-08 RX ADMIN — ACETAMINOPHEN 975 MG: 325 TABLET, FILM COATED ORAL at 14:54

## 2025-05-08 RX ADMIN — DICLOFENAC SODIUM 4 G: 10 GEL TOPICAL at 21:43

## 2025-05-08 RX ADMIN — Medication 12.5 MG: at 09:40

## 2025-05-08 RX ADMIN — NYSTATIN: 100000 CREAM TOPICAL at 09:41

## 2025-05-08 RX ADMIN — CARVEDILOL 6.25 MG: 3.12 TABLET, FILM COATED ORAL at 21:41

## 2025-05-08 ASSESSMENT — ACTIVITIES OF DAILY LIVING (ADL)
ADLS_ACUITY_SCORE: 94
ADLS_ACUITY_SCORE: 91
ADLS_ACUITY_SCORE: 88
ADLS_ACUITY_SCORE: 94
ADLS_ACUITY_SCORE: 88
ADLS_ACUITY_SCORE: 88
ADLS_ACUITY_SCORE: 91
ADLS_ACUITY_SCORE: 98
ADLS_ACUITY_SCORE: 88
ADLS_ACUITY_SCORE: 88
ADLS_ACUITY_SCORE: 91
ADLS_ACUITY_SCORE: 94
ADLS_ACUITY_SCORE: 88
ADLS_ACUITY_SCORE: 94
ADLS_ACUITY_SCORE: 88
ADLS_ACUITY_SCORE: 91
ADLS_ACUITY_SCORE: 88
ADLS_ACUITY_SCORE: 94
ADLS_ACUITY_SCORE: 89
ADLS_ACUITY_SCORE: 94

## 2025-05-08 NOTE — PLAN OF CARE
Problem: Delirium  Goal: Optimal Coping  Intervention: Optimize Psychosocial Adjustment to Delirium  Recent Flowsheet Documentation  Taken 5/8/2025 0000 by Madi Pastrana RN  Supportive Measures: active listening utilized  Goal: Improved Behavioral Control  Intervention: Minimize Safety Risk  Recent Flowsheet Documentation  Taken 5/8/2025 0000 by Madi Pastrana, RN  Enhanced Safety Measures:  at bedside  Trust Relationship/Rapport: care explained  Goal: Improved Attention and Thought Clarity  Outcome: Not Progressing  Goal: Improved Sleep  Outcome: Not Progressing     Problem: Pain Acute  Goal: Optimal Pain Control and Function  Outcome: Progressing  Intervention: Optimize Psychosocial Wellbeing  Recent Flowsheet Documentation  Taken 5/8/2025 0000 by Madi Pastrana, RN  Supportive Measures: active listening utilized     Problem: Cognitive Impairment  Goal: Optimal Cognitive Function  Outcome: Not Progressing   Goal Outcome Evaluation:       Patient remains confused and refuses to take redirection from staff. Patient unpredictable and somewhat combative at times. Patient sleeping in short spurts throughout the night. Remains on 1:1.

## 2025-05-08 NOTE — PROGRESS NOTES
[facility]    Medicine Progress Note - Hospitalist Service    Date of Admission:  5/3/2025    Assessment & Plan   Peggy De Anda is a 89 year old female with history of dementia, epilepsy, ataxia, chronic lumbar compression fractures, foraminal stenoses, HTN, and gout admitted on 5/3/2025 with concern for toxic encephalopathy, UTI and tibula fracture. 5/7 Worsening displacement of R proximal tibia fracture noted and patient had surgical fixation planned; case aborted due to rapid instability during anaesthesia induction. Palliative consulted for [facility] and patient is now comfort care.     Metabolic encephalopathy  Severe sepsis, improved  UTI  Presented from memory care w/ NVD and AMS. Initially w/o fever or leukocytosis on labs in the ED. Head CT negative. UC/BCs collected. Started on IV Ceftriaxone in the ED for dirty UA and suspected UTI. Did ultimately test positive for ETEC, norovirus, see below. Suspect primary  of pt's AMS on arrival was metabolic encephalopathy +/- volume depletion from GI illness. Remains confused, unsure of baseline.   - UC growing gram-negative bacilli and Citrobacter  - Daily CBC  - Tylenol scheduled for px/fevers  - Continue IV Ceftriaxone for now  - PT/OT consulted for discharge planning  - Delirium precautions ordered, cluster cares overnight and try to avoid unnecessary lab draws     Frequent non bloody diarrhea- improving  Norovirus infection  ETEC infection  One unmeasured stool overnight, able to collect enteric panel which resulted positive for ETEC and norovirus. Certainly could explain the above symptoms. Given no leukocytosis, fever, or bloody stools will continue w/ supportive cares only. Diarrhea improving we will continue to monitor  - encourage po intake    Right Transverse fracture through the proximal tibial metaphysis  On admission found to have mildly displaced proximal tibial shaft fracture. Unclear what mechanism of injury was to cause  this, though suspect there was a fall sometime in recent days w/ pt hx of advanced dementia. Recent imaging shows more displacement plan for ORIF 5/7. Surgery aborted due to rapid instability during anaesthesia induction. Palliative consulted for [facility] and patient is now comfort care.   - Ortho consulted, appreciate recs   > Will treat non operatively   > NWB on RLE. Well padded knee immobilizer for now, transition to hinged knee brace locked in full extension  - Scheduled tylenol  - PRN Dilaudid 0.2 mg IV q3h PRN for severe pain    Urinary retention  Gamez catheter in place plan for TOV within 24 hours.    Chronic conditions:  Epilepsy- PTA Depakote  Chronic venous stasis- holding PTA lasix. Lymphedema wraps ordered.   HTN- restarted PTA coreg, hold lasix for now          Diet: Regular Diet Adult    DVT Prophylaxis: Pneumatic Compression Devices  Gamez Catheter: PRESENT, indication:    Lines: None     Cardiac Monitoring: None  Code Status: No CPR- Do NOT Intubate      Clinically Significant Risk Factors                   # Hypertension: Noted on problem list              # Overweight: Estimated body mass index is 27.86 kg/m  as calculated from the following:    Height as of this encounter: 1.524 m (5').    Weight as of this encounter: 64.7 kg (142 lb 10.2 oz).        # Financial/Environmental Concerns: none         Social Drivers of Health    Food Insecurity: Unknown (5/7/2025)    Food Insecurity     Within the past 12 months, did you worry that your food would run out before you got money to buy more?: Patient unable to answer     Within the past 12 months, did the food you bought just not last and you didn t have money to get more?: Patient unable to answer   Housing Stability: Unknown (5/7/2025)    Housing Stability     Do you have housing? : Patient unable to answer     Are you worried about losing your housing?: Patient unable to answer   Tobacco Use: Unknown (5/7/2025)    Patient History     Smoking Tobacco  Use: Never     Smokeless Tobacco Use: Unknown   Financial Resource Strain: Unknown (5/7/2025)    Financial Resource Strain     Within the past 12 months, have you or your family members you live with been unable to get utilities (heat, electricity) when it was really needed?: Patient unable to answer   Transportation Needs: Unknown (5/7/2025)    Transportation Needs     Within the past 12 months, has lack of transportation kept you from medical appointments, getting your medicines, non-medical meetings or appointments, work, or from getting things that you need?: Patient unable to answer   Interpersonal Safety: Unknown (5/7/2025)    Interpersonal Safety     Do you feel physically and emotionally safe where you currently live?: Patient unable to answer     Within the past 12 months, have you been hit, slapped, kicked or otherwise physically hurt by someone?: Patient unable to answer     Within the past 12 months, have you been humiliated or emotionally abused in other ways by your partner or ex-partner?: Patient unable to answer    [facility]    Social Connections          Disposition Plan   Medically Ready for Discharge: Anticipated in 2-4 Days         The patient's care was discussed with the Attending Physician, Dr. Crews.    Carolyn Field MD  Hospitalist Service  [facility]  Securely message with [employer] (more info)  Text page via [employer] app Paging/Directory   ______________________________________________________________________    Interval History   Calm, appears comfortable. Still 1:1     Physical Exam   Vital Signs: Temp: 97.7  F (36.5  C) Temp src: Oral BP: 119/57 Pulse: 59   Resp: 18 SpO2: 100 % O2 Device: BiPAP/CPAP Oxygen Delivery: 12 LPM  Weight: 142 lbs 10.2 oz    General Appearance: Lying in bed, calm, NAD  RESPIRATORY: Normal work of breathing, clear breath sounds bilaterally, no wheezing  CARDIOVASCULAR: Normal S1, S2, no  murmur  SKIN: RLE is in a brace, bruising.   NEUROLOGIC: sleeping, confused at baseline.    Data   ------------------------- PAST 24 HR DATA REVIEWED -----------------------------------------------        Imaging results reviewed over the past 24 hrs:   No results found for this or any previous visit (from the past 24 hours).

## 2025-05-08 NOTE — PROGRESS NOTES
PALLIATIVE CARE PROGRESS NOTE  [facility]       Patient Name: Peggy De Anda  Date of Admission: 5/3/2025   Today the patient was seen for: Follow-up on condition, dyspnea, pain, goals of care     Recommendations & Counseling     GOALS OF CARE:   Comfort focused   Transition to comfort cares today following acute event during surgery of bradycardia and profound hypotension.  Daughter agrees with comfort focused and symptom management.  Should patient be medically stable for discharge, daughter would want her to return to [facility] with [facility] services.     ADVANCE CARE PLANNING:  Patient has an advance directive dated 11/28/2023.  Primary Health Care Agent daughter, Ernestine.  Alternate(s) son-in-law, Theo.   There is no POLST form on file, defer to patient and/or next of kin for decisions   Code status: No CPR- Do NOT Intubate     MEDICAL MANAGEMENT:   Comfort Care   Below are common symptoms routinely seen in patients with comfort focused goals and at the end of life. This patient may not currently exhibit all of these symptoms; however, if these were to occur our recommendations are as follows:     #Pain -right tibial fracture  1st choice: Morphine PO/SL 5-10 mg q 2 hour as needed - none given  2nd choice: Morphine IV 1-2 mg q 15 minutes as needed -one 2 mg IV dose given  Continue scheduled Tylenol 975 every 8 hours continue   Voltaren to right knee 3 times daily    #Air hunger/Dyspnea   1st choice: Morphine PO/SL 5-10 mg q 2 hour as needed.   2nd choice: Morphine IV 1-2 mg q 15 minutes as needed      #Secretion burden   Robinul 0.2 mg (PO/IV) q 4 hours as needed. If ineffective, increase up to 0.3 mg   AVOID atropine in patient with advanced dementia as can increase agitation.     #Nausea   Zofran 4 mg q 6 hours as needed. Can increase to 8 mg   Zyprexa 5 mg q 6 hours as needed      #Agitation  Aggressive control of other symptoms first, then  1st choice: Zyprexa 5 mg  ODT or IM   2nd choice: Increase dose of Zyprexa to 10 mg or consider switch to Haldol   3rd choice: Lorazepam as above      PSYCHOSOCIAL/SPIRITUAL SUPPORT:  Family daughterErnestine, son-in-law, Theo and son who lives in [facility]: [place]   Would appreciate Spiritual Health Services, Consult has been placed for support       Palliative Care will continue to follow. Thank you for the consult and allowing us to aid in the care of Peggy De Anda.    These recommendations have been discussed with team.    DARIO Moreno, CNS  [facility], Palliative Care  Securely message with the [employer] (learn more here) or  Text page via [facility] Paging/Directory       Interval History:     Multidisciplinary collaboration:  Overnight notes and chart reviewed.  Remains on 1: 1 for safety.  No acute distress.  Off oxygen.  Continuing comfort focused cares.    Patient/family narrative  Continue comfort cares and medication adjustment as needed for symptom management.    Assessment          Peggy De Anda is a 89 year old female with a past medical history of Advanced dementia (lives in memory care) epilepsy, ataxia, spinal stenosis and lumbar compression fractures, gout and HTN who presented on 5/3/2025 with increased delirium, diarrhea, UTI and failure to thrive.  Patient being treated with IV ceftriaxone. imaging on admission demonstrated a right tibia fracture, not responsive to conservative treatment with knee immobilizer. 5/6 CT showed worsening tibial fracture and orthopedic surgery dicussed benefit - risk of ORIF right tibial fracture with daughterErnestine. While high risk for surgery and anesthesia, decision was to pursue right ORIF right tibia plateau fracture. During surgery, patient experienced bradycardia and severe hypotension after induction of anesthesia, given vasopressin and phenylephrine with good response and surgery aborted, patient extubated and placed on BiPap.        Review  "of Systems:     Besides above, ROS was reviewed and is unremarkable  Difficult to obtain adequate ROS due to advanced dementia.  Looks peaceful and comfortable and in no acute distress.  Nodded head negatively when asked if painful.  Moaned with boosting in bed and asked caregivers \"be careful\" when positioning.      Physical Exam:   Temp:  [96.3  F (35.7  C)-97.7  F (36.5  C)] 97.7  F (36.5  C)  Pulse:  [46-63] 59  Resp:  [12-48] 18  BP: (109-154)/(53-67) 119/57  Cuff Mean (mmHg):  [96] 96  FiO2 (%):  [30 %] 30 %  SpO2:  [81 %-100 %] 100 %  142 lbs 10.2 oz    Physical Exam  Vitals and nursing note reviewed.   Constitutional:       General: She is sleeping.      Appearance: She is ill-appearing.   Cardiovascular:      Rate and Rhythm: Normal rate.   Pulmonary:      Effort: Pulmonary effort is normal.      Breath sounds: Examination of the right-lower field reveals decreased breath sounds. Examination of the left-lower field reveals decreased breath sounds. Decreased breath sounds present.   Abdominal:      General: Bowel sounds are normal.      Palpations: Abdomen is soft.   Musculoskeletal:      Comments: Right lower extremity immobilizer in place   Neurological:      Mental Status: She is easily aroused.      Comments: Opens eyes to name.  Disoriented.           Data Reviewed:     No results found for this or any previous visit (from the past 24 hours).    40 MINUTES SPENT BY ME on the date of service doing chart review, history, exam, documentation & further activities per the note.      "

## 2025-05-08 NOTE — PLAN OF CARE
Occupational Therapy Discharge Summary    Reason for therapy discharge:    Discharged to comfort care    Progress towards therapy goal(s). See goals on Care Plan in [facility] electronic health record for goal details.  Transition to comfort care    Therapy recommendation(s):    No further therapy is recommended.    Goal Outcome Evaluation:       Mary Reyes, OTR/L  5/8/2025

## 2025-05-08 NOTE — PLAN OF CARE
Goal Outcome Evaluation:  Pt is A&O to self, remained in bed this shift, NWB LLE, brace in place. 2-3+ edema in LLE, pulse is present. Pt c/o pain in LLE with repositioning, received scheduled tylenol which was effective. Large bruising in LLE. Pt has small amount of food for lunch, taking sips. Took small meds while with water. Bladder scanned this am for 468, garcia placed per order, over 700ml out with clear yellow urine, small bm today. VSS  Problem: Adult Inpatient Plan of Care  Goal: Optimal Comfort and Wellbeing  Outcome: Progressing  Intervention: Monitor Pain and Promote Comfort  Recent Flowsheet Documentation  Taken 5/8/2025 0855 by Areli Cazares RN  Pain Management Interventions:   medication (see MAR)   breathing exercises   distraction   emotional support   pillow support provided   repositioned   relaxation techniques promoted     Problem: Delirium  Goal: Improved Attention and Thought Clarity  Outcome: Progressing     Problem: Orthopaedic Fracture  Goal: Absence of Bleeding  Outcome: Progressing  Intervention: Monitor and Manage Fracture Bleeding  Recent Flowsheet Documentation  Taken 5/8/2025 0855 by Areli Cazares RN  Fracture Immobilization: immobilization device maintained  Goal: Bowel Elimination  Outcome: Progressing  Goal: Fracture Stability  Outcome: Progressing  Intervention: Promote Fracture Stability and Healing  Recent Flowsheet Documentation  Taken 5/8/2025 0855 by Areli Cazares RN  Fracture Immobilization: immobilization device maintained  Goal: Optimal Functional Ability  Outcome: Progressing  Goal: Absence of Infection Signs and Symptoms  Outcome: Progressing  Intervention: Prevent or Manage Infection  Recent Flowsheet Documentation  Taken 5/8/2025 0855 by Areli Cazares RN  Isolation Precautions: enteric precautions maintained  Goal: Effective Tissue Perfusion  Outcome: Progressing  Goal: Optimal Pain Control and Function  Outcome: Progressing  Intervention: Manage Acute  Orthopaedic-Related Pain  Recent Flowsheet Documentation  Taken 5/8/2025 0855 by Areli Cazares RN  Pain Management Interventions:   medication (see MAR)   breathing exercises   distraction   emotional support   pillow support provided   repositioned   relaxation techniques promoted  Goal: Effective Oxygenation and Ventilation  Outcome: Progressing     Problem: Pain Acute  Goal: Optimal Pain Control and Function  Outcome: Progressing  Intervention: Develop Pain Management Plan  Recent Flowsheet Documentation  Taken 5/8/2025 0855 by Areli Cazares RN  Pain Management Interventions:   medication (see MAR)   breathing exercises   distraction   emotional support   pillow support provided   repositioned   relaxation techniques promoted     Problem: Comorbidity Management  Goal: Blood Pressure in Desired Range  Outcome: Progressing     Problem: Mobility Impairment  Goal: Optimal Mobility  Outcome: Progressing     Problem: Cognitive Impairment  Goal: Optimal Cognitive Function  Outcome: Progressing     Problem: Diarrhea  Goal: Effective Diarrhea Management  Outcome: Progressing  Intervention: Manage Diarrhea  Recent Flowsheet Documentation  Taken 5/8/2025 0855 by Areli Cazares RN  Isolation Precautions: enteric precautions maintained     Problem: Infection  Goal: Absence of Infection Signs and Symptoms  Outcome: Progressing  Intervention: Prevent or Manage Infection  Recent Flowsheet Documentation  Taken 5/8/2025 0855 by Areli Cazares RN  Isolation Precautions: enteric precautions maintained     Problem: Fall Injury Risk  Goal: Absence of Fall and Fall-Related Injury  Outcome: Progressing  Intervention: Promote Injury-Free Environment  Recent Flowsheet Documentation  Taken 5/8/2025 0855 by Areli Cazares RN  Safety Promotion/Fall Prevention:   activity supervised   patient and family education     Problem: Urinary Retention  Goal: Effective Urinary Elimination  Outcome: Progressing     Problem: Palliative  Care  Goal: Enhanced Quality of Life  Outcome: Progressing  Intervention: Maximize Comfort  Recent Flowsheet Documentation  Taken 5/8/2025 0855 by Areli Cazares RN  Pain Management Interventions:   medication (see MAR)   breathing exercises   distraction   emotional support   pillow support provided   repositioned   relaxation techniques promoted     Problem: Adult Inpatient Plan of Care  Goal: Absence of Hospital-Acquired Illness or Injury  Intervention: Identify and Manage Fall Risk  Recent Flowsheet Documentation  Taken 5/8/2025 0855 by Areli Cazares RN  Safety Promotion/Fall Prevention:   activity supervised   patient and family education  Intervention: Prevent Infection  Recent Flowsheet Documentation  Taken 5/8/2025 0855 by Areli Cazares RN  Infection Prevention:   environmental surveillance performed   equipment surfaces disinfected   hand hygiene promoted   personal protective equipment utilized   rest/sleep promoted   single patient room provided     Problem: Delirium  Goal: Improved Behavioral Control  Intervention: Minimize Safety Risk  Recent Flowsheet Documentation  Taken 5/8/2025 0855 by Areli Cazares RN  Enhanced Safety Measures:  at bedside

## 2025-05-08 NOTE — PROGRESS NOTES
Orthopedic Surgery Progress Note    Subjective: No acute events overnight.  Pleasantly demented.  Resting in bed.  Alert but not oriented.  Notes pain in left lower leg    Exam:  /57 (BP Location: Right arm)   Pulse 59   Temp 97.7  F (36.5  C) (Oral)   Resp 18   Ht 1.524 m (5')   Wt 64.7 kg (142 lb 10.2 oz)   SpO2 100%   BMI 27.86 kg/m    Gen: Awake, alert, NAD  Resp: breathing equal and non-labored  Extremities:    RLE: Lower extremity edema, ecchymosis.  Knee is stable in T-Scope brace locked in extension.  Wiggles toes, SILT in all dermatomes of lower leg.  2+ DP pulse.    Labs:    Recent Labs   Lab 05/04/25  0445 05/03/25  1346   WBC 9.7 7.8   HGB 10.0* 10.4*    185     Recent Labs   Lab 05/04/25  0445 05/03/25  1315    138   POTASSIUM 3.7 4.3   CHLORIDE 112* 110*   CO2 16* 18*   BUN 24.7* 27.9*   CR 0.79 0.82   GLC 96 122*   MAG  --  1.8     No lab results found in last 7 days.    Imaging:  EXAM: CT TIBIA FIBULA LOWER LEG RIGHT WO CONTRAST  LOCATION: [facility]  DATE: 5/3/2025     INDICATION: Tibia fracture.  COMPARISON: Same-day radiograph.  TECHNIQUE: Noncontrast. Axial, sagittal and coronal thin-section reconstruction. Dose reduction techniques were used.      FINDINGS:      Bones are markedly demineralized. Again seen is an acute transverse fracture through the proximal tibial shaft with up to 6 mm of medial to lateral displacement.     The fracture extends through inferior base of the tibial tubercle.     In addition, there is a nondisplaced area of curvilinear sclerosis along the tibial metadiaphysis approximately 1.5 cm from the articular surface of the knee in keeping with an age-indeterminate nondisplaced stress fracture, favor subacute to chronic.     Acute minimally displaced and impacted fracture centered at the fibular neck. Age-indeterminate nondisplaced stress fracture of the calcaneus where there is curvilinear sclerosis.     No evidence of an  acute displaced distal tibia or fibular fracture.     Scattered soft tissue swelling and poorly defined blood products most pronounced near the proximal tibial fracture site. No large soft tissue hematoma.     Arthritic changes both knees with left greater than right joint effusions.     Extensive atherosclerotic vascular calcifications.                                                                      IMPRESSION:  1.  Acute mildly displaced proximal tibial shaft fracture as described.  2.  Acute minimally displaced and impacted fibular neck fracture.  3.  Age-indeterminate nondisplaced stress fracture of the proximal tibial metadiaphysis, superior to the tibial acute fracture, favor subacute to chronic.  4.  Age-indeterminate nondisplaced calcaneal stress fracture, favor subacute to chronic although correlation with tenderness is recommend.  5.  Bones are markedly demineralized.      EXAM: XR TIBIA AND FIBULA RIGHT 2 VIEWS  LOCATION: [facility]  DATE: 5/6/2025     INDICATION: assess for fracture displacement  COMPARISON: CT 05/30/2025                                                                      IMPRESSION:   Acute displaced transverse fracture of the proximal tibial metadiaphysis is again seen. Tibial shaft is medially displaced relative to the tibial plateau. This appears to have slightly increased compared to 05/03/2025 when accounting for differences in   technique.     Acute impacted displaced fracture of the fibular neck. Displacement of these fracture fragments also appear increased compared to the prior study.     Unchanged linear sclerosis in the medial tibial plateau consistent with age-indeterminate stress fracture.     Nondisplaced age-indeterminate stress fracture of the calcaneus.     Posterior splinting material which obscures fine bony detail. Severe diffuse osseous demineralization which limits evaluation for nondisplaced fractures and subtle osseous lesions. Knee  and tibiotalar joints appear grossly normal alignment.    Assessment:   89 year old female with chronic past medical history significant for dementia, bilateral lower extremity lymphedema, hypertension in the setting of acute encephalopathy and diagnosis of norovirus who presents with proximal right tibia transverse fracture with modest displacement, displacement worsening on repeat imaging however attempted surgical fixation of the proximal tibia fracture was aborted on 5/7/2025 due to rapid decompensation with onset of anesthesia. will pursue nonoperative treatment going forward    Plan:  Hospitalist Primary  Activity: Assistance at all times when up.  High falls risk.  Weight bearing status: Nonweightbearing right lower extremity  Bracing/Splinting: Hinged T scope knee brace to be worn at all times besides hygiene cares and skin checks.  Brace to be locked in extension at all times.  Elevation: Elevate leg to combat swelling when in bed  Physical Therapy: transfers, ADLs  Occupational Therapy: ADL's.  We would recommend a palliative care consult to discuss goals of care going forward    Disposition: Pending medical optimization, stability,  Progress with therapies.  Orthopedically stable today    Future Appointments   Date Time Provider Department Center   5/4/2025  3:00 PM Jasson Engle PT JNPTHR [facility] PA-C  Orthopedic Surgery  [facility]

## 2025-05-08 NOTE — PLAN OF CARE
Physical Therapy Discharge Summary    Reason for therapy discharge:    Pt transitioning to comfort cares.    Progress towards therapy goal(s). See goals on Care Plan in Epic electronic health record for goal details.  Goals partially met.  Barriers to achieving goals:   transition to comfort cares.    Therapy recommendation(s):    No further therapy is recommended.    Micaela Hanna, PT  5/8/2025

## 2025-05-08 NOTE — PLAN OF CARE
"  Problem: Adult Inpatient Plan of Care  Goal: Plan of Care Review  Description: The Plan of Care Review/Shift note should be completed every shift.  The Outcome Evaluation is a brief statement about your assessment that the patient is improving, declining, or no change.  This information will be displayed automatically on your shiftnote.  Outcome: Progressing  Flowsheets (Taken 5/7/2025 1903)  Plan of Care Reviewed With: patient  Goal: Patient-Specific Goal (Individualized)  Description: You can add care plan individualizations to a care plan. Examples of Individualization might be:  \"Parent requests to be called daily at 9am for status\", \"I have a hard time hearing out of my right ear\", or \"Do not touch me to wake me up as it startlesme\".  Outcome: Progressing  Goal: Absence of Hospital-Acquired Illness or Injury  Outcome: Progressing  Intervention: Identify and Manage Fall Risk  Recent Flowsheet Documentation  Taken 5/7/2025 1700 by Umesh Cox RN  Safety Promotion/Fall Prevention:   activity supervised   clutter free environment maintained   lighting adjusted  Intervention: Prevent Skin Injury  Recent Flowsheet Documentation  Taken 5/7/2025 1700 by Umesh Cox RN  Body Position:   left   right   turned   heels elevated   supine  Taken 5/7/2025 1650 by Umesh Cox RN  Body Position:   position maintained   log-rolled  Taken 5/7/2025 1623 by Umesh Cox RN  Body Position:   heels elevated   turned   supine  Intervention: Prevent and Manage VTE (Venous Thromboembolism) Risk  Recent Flowsheet Documentation  Taken 5/7/2025 1700 by Umesh Cox RN  VTE Prevention/Management: SCDs off (sequential compression devices)  Goal: Optimal Comfort and Wellbeing  Outcome: Progressing  Intervention: Provide Person-Centered Care  Recent Flowsheet Documentation  Taken 5/7/2025 1700 by Umesh Cox RN  Trust Relationship/Rapport:   care explained   emotional support provided   reassurance provided  Goal: Readiness " for Transition of Care  Outcome: Progressing     Problem: Delirium  Goal: Improved Attention and Thought Clarity  Outcome: Progressing  Goal: Improved Sleep  Outcome: Progressing     Problem: Orthopaedic Fracture  Goal: Absence of Bleeding  Outcome: Progressing  Goal: Bowel Elimination  Outcome: Progressing  Goal: Fracture Stability  Outcome: Progressing  Goal: Optimal Functional Ability  Outcome: Progressing  Intervention: Optimize Functional Ability  Recent Flowsheet Documentation  Taken 5/7/2025 1700 by Umesh Cox RN  Activity Management: bedrest  Positioning/Transfer Devices:   in use   pillows  Taken 5/7/2025 1650 by Umesh Cox RN  Activity Management: bedrest  Positioning/Transfer Devices:   pillows   in use  Taken 5/7/2025 1623 by Umesh Cox RN  Positioning/Transfer Devices:   in use   pillows  Goal: Absence of Infection Signs and Symptoms  Outcome: Progressing  Intervention: Prevent or Manage Infection  Recent Flowsheet Documentation  Taken 5/7/2025 1700 by Umesh Cox RN  Isolation Precautions: enteric precautions maintained  Goal: Effective Tissue Perfusion  Outcome: Progressing  Goal: Optimal Pain Control and Function  Outcome: Progressing  Goal: Effective Oxygenation and Ventilation  Outcome: Progressing  Intervention: Promote Airway Secretion Clearance  Recent Flowsheet Documentation  Taken 5/7/2025 1700 by Umesh Cox RN  Activity Management: bedrest  Taken 5/7/2025 1650 by Umesh Cox RN  Activity Management: bedrest  Intervention: Optimize Oxygenation and Ventilation  Recent Flowsheet Documentation  Taken 5/7/2025 1700 by Umesh Cox RN  Head of Bed (HOB) Positioning: HOB at 20-30 degrees  Taken 5/7/2025 1650 by Umesh Cox RN  Head of Bed (HOB) Positioning: HOB at 20-30 degrees  Taken 5/7/2025 1623 by Umesh Cox RN  Head of Bed (HOB) Positioning: HOB at 20-30 degrees     Problem: Pain Acute  Goal: Optimal Pain Control and Function  Outcome: Progressing  Intervention:  Optimize Psychosocial Wellbeing  Recent Flowsheet Documentation  Taken 5/7/2025 1700 by Umesh Cox RN  Supportive Measures: active listening utilized  Intervention: Prevent or Manage Pain  Recent Flowsheet Documentation  Taken 5/7/2025 1700 by Umesh Cox RN  Medication Review/Management: medications reviewed     Problem: Comorbidity Management  Goal: Blood Pressure in Desired Range  Outcome: Progressing  Intervention: Maintain Blood Pressure Management  Recent Flowsheet Documentation  Taken 5/7/2025 1700 by Umesh Cox RN  Medication Review/Management: medications reviewed     Problem: Mobility Impairment  Goal: Optimal Mobility  Outcome: Progressing  Intervention: Optimize Mobility  Recent Flowsheet Documentation  Taken 5/7/2025 1700 by Umesh Cox RN  Activity Management: bedrest  Positioning/Transfer Devices:   in use   pillows  Taken 5/7/2025 1650 by Umesh Cox RN  Activity Management: bedrest  Positioning/Transfer Devices:   pillows   in use  Taken 5/7/2025 1623 by Umesh Cox RN  Positioning/Transfer Devices:   in use   pillows     Problem: Cognitive Impairment  Goal: Optimal Cognitive Function  Outcome: Progressing  Intervention: Optimize Cognitive Function  Recent Flowsheet Documentation  Taken 5/7/2025 1700 by Umesh Cox RN  Environment Familiarity/Consistency: daily routine followed     Problem: Diarrhea  Goal: Effective Diarrhea Management  Outcome: Progressing  Intervention: Manage Diarrhea  Recent Flowsheet Documentation  Taken 5/7/2025 1700 by Umesh Cox RN  Isolation Precautions: enteric precautions maintained  Medication Review/Management: medications reviewed     Problem: Infection  Goal: Absence of Infection Signs and Symptoms  Outcome: Progressing  Intervention: Prevent or Manage Infection  Recent Flowsheet Documentation  Taken 5/7/2025 1700 by Umesh Cox RN  Isolation Precautions: enteric precautions maintained     Problem: Fall Injury Risk  Goal: Absence of Fall  and Fall-Related Injury  Outcome: Progressing  Intervention: Identify and Manage Contributors  Recent Flowsheet Documentation  Taken 5/7/2025 1700 by Umesh Cox RN  Medication Review/Management: medications reviewed  Intervention: Promote Injury-Free Environment  Recent Flowsheet Documentation  Taken 5/7/2025 1700 by Umesh Cox RN  Safety Promotion/Fall Prevention:   activity supervised   clutter free environment maintained   lighting adjusted     Problem: Urinary Retention  Goal: Effective Urinary Elimination  Outcome: Progressing     Problem: Palliative Care  Goal: Enhanced Quality of Life  Outcome: Progressing  Intervention: Optimize Psychosocial Wellbeing  Recent Flowsheet Documentation  Taken 5/7/2025 1700 by Umesh Cox RN  Supportive Measures: active listening utilized   Goal Outcome Evaluation:      Plan of Care Reviewed With: patient

## 2025-05-08 NOTE — PROGRESS NOTES
Care Management Follow Up    Length of Stay (days): 5    Expected Discharge Date: Pending status of 1:1    Anticipated Discharge Plan:  Assisted Living    Transportation: TBD    PT Recommendations: Transitional Care Facility  OT Recommendations:  Transitional Care Facility     Barriers to Discharge: medical stability, IV antibiotics, 1:1, immobility; Unable to be seen by therapy (not seen since 5/4/25)    Prior Living Situation: assisted living with facility resident    Discussed  Partnership in Safe Discharge Planning  document with patient/family: No     Handoff Completed: No, handoff not indicated or clinically appropriate    Patient/Spokesperson Updated: No    Additional Information:  Patient is currently on a 1:1. Per PT, with patient's current cognition, unclear if it would be appropriate to go to transitional care. Per PT, if returning to [facility] memory care, she would need a roxanna lift.   The previous SW called [facility] (ph: [phone]) and spoke with [facility] Sandy in memory care. He reported that they can do a max assist of one. If a patient is on hospice, then they can use a mechanical lift. Du believed that patient is not currently at her baseline cognitively and felt that, once she is clear, she would be appropriate to go to transitional care and participate in therapy.   No plan for surgery at this time.     Contacts: Du at [phone]    Next Steps: CM will follow for updated therapy recommendations and cognition to assist with appropriate discharge plan when medically ready.     Ivy Arriaza RN

## 2025-05-08 NOTE — PROGRESS NOTES
SPIRITUAL HEALTH SERVICES Progress Note  [facility], P4    Multiple attempts to visit Peggy and/or family due to consult order and consultation with palliative care, but each time Peggy was unavailable with no visitors present.     Plan of Care - A  will continue to visit as able or per request by patient/family/staff.      Macario Otoole MDiv, [facility]  /Manager [facility]  [phone]       Spiritual Health Services is available 24/7 for emergent requests and consults, either by paging the on-call  or by entering an ASAP/STAT consult in [employer], which will also page the on-call .

## 2025-05-09 VITALS
BODY MASS INDEX: 28 KG/M2 | DIASTOLIC BLOOD PRESSURE: 65 MMHG | TEMPERATURE: 98.8 F | HEART RATE: 56 BPM | WEIGHT: 142.64 LBS | HEIGHT: 60 IN | SYSTOLIC BLOOD PRESSURE: 146 MMHG | RESPIRATION RATE: 18 BRPM | OXYGEN SATURATION: 96 %

## 2025-05-09 PROCEDURE — 250N000013 HC RX MED GY IP 250 OP 250 PS 637

## 2025-05-09 PROCEDURE — 120N000001 HC R&B MED SURG/OB

## 2025-05-09 PROCEDURE — 99232 SBSQ HOSP IP/OBS MODERATE 35: CPT | Mod: GC

## 2025-05-09 PROCEDURE — 99233 SBSQ HOSP IP/OBS HIGH 50: CPT | Performed by: CLINICAL NURSE SPECIALIST

## 2025-05-09 PROCEDURE — 250N000013 HC RX MED GY IP 250 OP 250 PS 637: Performed by: INTERNAL MEDICINE

## 2025-05-09 PROCEDURE — 250N000011 HC RX IP 250 OP 636: Performed by: INTERNAL MEDICINE

## 2025-05-09 RX ADMIN — DICLOFENAC SODIUM 4 G: 10 GEL TOPICAL at 10:42

## 2025-05-09 RX ADMIN — Medication 12.5 MG: at 10:32

## 2025-05-09 RX ADMIN — DICLOFENAC SODIUM 4 G: 10 GEL TOPICAL at 20:15

## 2025-05-09 RX ADMIN — DIVALPROEX SODIUM 375 MG: 125 CAPSULE, COATED PELLETS ORAL at 10:32

## 2025-05-09 RX ADMIN — CEFTRIAXONE SODIUM 1 G: 1 INJECTION, POWDER, FOR SOLUTION INTRAMUSCULAR; INTRAVENOUS at 14:24

## 2025-05-09 RX ADMIN — NYSTATIN: 100000 CREAM TOPICAL at 10:42

## 2025-05-09 RX ADMIN — ACETAMINOPHEN 975 MG: 325 TABLET, FILM COATED ORAL at 14:11

## 2025-05-09 RX ADMIN — NYSTATIN: 100000 CREAM TOPICAL at 22:33

## 2025-05-09 RX ADMIN — ACETAMINOPHEN 975 MG: 325 TABLET, FILM COATED ORAL at 10:32

## 2025-05-09 ASSESSMENT — ACTIVITIES OF DAILY LIVING (ADL)
ADLS_ACUITY_SCORE: 93
ADLS_ACUITY_SCORE: 96
ADLS_ACUITY_SCORE: 91
ADLS_ACUITY_SCORE: 96
ADLS_ACUITY_SCORE: 96
ADLS_ACUITY_SCORE: 91
ADLS_ACUITY_SCORE: 93
ADLS_ACUITY_SCORE: 96
ADLS_ACUITY_SCORE: 96
ADLS_ACUITY_SCORE: 93
ADLS_ACUITY_SCORE: 91
ADLS_ACUITY_SCORE: 93
ADLS_ACUITY_SCORE: 93
ADLS_ACUITY_SCORE: 96
ADLS_ACUITY_SCORE: 93
ADLS_ACUITY_SCORE: 91
ADLS_ACUITY_SCORE: 96
ADLS_ACUITY_SCORE: 93
ADLS_ACUITY_SCORE: 96
ADLS_ACUITY_SCORE: 93
ADLS_ACUITY_SCORE: 93

## 2025-05-09 NOTE — PROGRESS NOTES
[facility]    Medicine Progress Note - Hospitalist Service    Date of Admission:  5/3/2025    Assessment & Plan   Peggy De Anda is a 89 year old female with history of dementia, epilepsy, ataxia, chronic lumbar compression fractures, foraminal stenoses, HTN, and gout admitted on 5/3/2025 with concern for toxic encephalopathy, UTI and tibula fracture. 5/7 Worsening displacement of R proximal tibia fracture noted and patient had surgical fixation planned; case aborted due to rapid instability during anaesthesia induction. Palliative consulted for [facility] and patient is now comfort care.     Metabolic encephalopathy  Severe sepsis, improved  UTI  Presented from memory care w/ NVD and AMS. Initially w/o fever or leukocytosis on labs in the ED. Head CT negative. UC/BCs collected. Started on IV Ceftriaxone in the ED for dirty UA and suspected UTI. Did ultimately test positive for ETEC, norovirus, see below. Suspect primary  of pt's AMS on arrival was metabolic encephalopathy +/- volume depletion from GI illness. Remains confused, unsure of baseline.   - UC growing gram-negative bacilli and Citrobacter  - Continue IV Ceftriaxone (5/4-5/9) last dose today.  - Delirium precautions ordered    Frequent non bloody diarrhea- improved  Norovirus infection  ETEC infection  One unmeasured stool overnight, able to collect enteric panel which resulted positive for ETEC and norovirus. Certainly could explain the above symptoms. Given no leukocytosis, fever, or bloody stools will continue w/ supportive cares only. Diarrhea improving we will continue to monitor.  - encourage po intake    Right Transverse fracture through the proximal tibial metaphysis  On admission found to have mildly displaced proximal tibial shaft fracture. Unclear what mechanism of injury was to cause this, though suspect there was a fall sometime in recent days w/ pt hx of advanced dementia. Recent imaging shows more displacement plan  for ORIF 5/7. Surgery aborted due to rapid instability during anaesthesia induction. Palliative consulted for [facility] and patient is now comfort care.   - Ortho consulted, appreciate recs   > Will treat non operatively   > NWB on RLE. Well padded knee immobilizer for now, transition to hinged knee brace locked in full extension  - palliative consulted, appreciate    - comfort cares    Urinary retention  Gamez catheter in place plan for TOV within 24 hours if able.     Chronic conditions:  Epilepsy- PTA Depakote  Chronic venous stasis- holding PTA lasix. Lymphedema wraps ordered.   HTN- restarted PTA coreg, hold lasix for now          Diet: Regular Diet Adult    DVT Prophylaxis: Pneumatic Compression Devices  Gamez Catheter: PRESENT, indication: Acute retention or obstruction  Lines: None     Cardiac Monitoring: None  Code Status: No CPR- Do NOT Intubate      Clinically Significant Risk Factors                   # Hypertension: Noted on problem list              # Overweight: Estimated body mass index is 27.86 kg/m  as calculated from the following:    Height as of this encounter: 1.524 m (5').    Weight as of this encounter: 64.7 kg (142 lb 10.2 oz).        # Financial/Environmental Concerns: none         Social Drivers of Health    Food Insecurity: Unknown (5/7/2025)    Food Insecurity     Within the past 12 months, did you worry that your food would run out before you got money to buy more?: Patient unable to answer     Within the past 12 months, did the food you bought just not last and you didn t have money to get more?: Patient unable to answer   Housing Stability: Unknown (5/7/2025)    Housing Stability     Do you have housing? : Patient unable to answer     Are you worried about losing your housing?: Patient unable to answer   Tobacco Use: Unknown (5/7/2025)    Patient History     Smoking Tobacco Use: Never     Smokeless Tobacco Use: Unknown   Financial Resource Strain: Unknown (5/7/2025)    Financial Resource  Strain     Within the past 12 months, have you or your family members you live with been unable to get utilities (heat, electricity) when it was really needed?: Patient unable to answer   Transportation Needs: Unknown (5/7/2025)    Transportation Needs     Within the past 12 months, has lack of transportation kept you from medical appointments, getting your medicines, non-medical meetings or appointments, work, or from getting things that you need?: Patient unable to answer   Interpersonal Safety: Unknown (5/7/2025)    Interpersonal Safety     Do you feel physically and emotionally safe where you currently live?: Patient unable to answer     Within the past 12 months, have you been hit, slapped, kicked or otherwise physically hurt by someone?: Patient unable to answer     Within the past 12 months, have you been humiliated or emotionally abused in other ways by your partner or ex-partner?: Patient unable to answer    Received from [facility]    Social Connections          Disposition Plan   Medically Ready for Discharge: Anticipated in 2-4 Days         The patient's care was discussed with the Attending Physician, Dr. Crews.    Carolyn Field MD  Hospitalist Service  [facility]  Securely message with [facility] (more info)  Text page via [facility] Paging/Directory   ______________________________________________________________________    Interval History   Calm, appears comfortable, remains 1:1    Physical Exam   Vital Signs: Temp: 97.3  F (36.3  C) Temp src: Oral BP: (!) 146/57 Pulse: 56   Resp: 18 SpO2: 100 % O2 Device: None (Room air)    Weight: 142 lbs 10.2 oz    General Appearance: Lying in bed, calm, NAD  RESPIRATORY: Normal work of breathing, clear breath sounds bilaterally, no wheezing  CARDIOVASCULAR: Normal S1, S2, no murmur  SKIN: RLE is in a brace, bruising.   NEUROLOGIC: sleeping, confused at baseline.    Data   ------------------------- PAST 24  HR DATA REVIEWED -----------------------------------------------        Imaging results reviewed over the past 24 hrs:   No results found for this or any previous visit (from the past 24 hours).

## 2025-05-09 NOTE — PROGRESS NOTES
SPIRITUAL HEALTH SERVICES ([facility])  SPIRITUAL ASSESSMENT Progress Note  [facility]. Unit P4    REFERRAL SOURCE: Followup      Attempted to visit again this week to no avail.      PLAN: [facility] remain available for support as needed.      Kay Linder, Ph.D., [facility]      Securely Message with [employer] or [facility] Pager.    Non-urgent needs:  Phone  to leave a message

## 2025-05-09 NOTE — PLAN OF CARE
"  Problem: Adult Inpatient Plan of Care  Goal: Plan of Care Review  Description: The Plan of Care Review/Shift note should be completed every shift.  The Outcome Evaluation is a brief statement about your assessment that the patient is improving, declining, or no change.  This information will be displayed automatically on your shiftnote.  Outcome: Progressing     Problem: Adult Inpatient Plan of Care  Goal: Optimal Comfort and Wellbeing  Outcome: Progressing  Intervention: Provide Person-Centered Care  Recent Flowsheet Documentation  Taken 5/9/2025 0020 by Marbella Caba RN  Trust Relationship/Rapport:   care explained   emotional support provided     Problem: Orthopaedic Fracture  Goal: Bowel Elimination  Outcome: Progressing     Problem: Pain Acute  Goal: Optimal Pain Control and Function  Outcome: Progressing  Intervention: Optimize Psychosocial Wellbeing  Recent Flowsheet Documentation  Taken 5/9/2025 0020 by Marbella Caba RN  Supportive Measures: active listening utilized     Problem: Fall Injury Risk  Goal: Absence of Fall and Fall-Related Injury  5/9/2025 0410 by Marbella Caba RN  Outcome: Progressing  5/9/2025 0409 by Marbella Caba RN  Outcome: Progressing  5/9/2025 0407 by Marbella Caba RN  Outcome: Progressing   Goal Outcome Evaluation:  Patient is alert and oriented to self only. However, she was able to verbalize a need this morning stating, \"Do you think I can have some cold orange juice?\"  She drank juice without any problems. Turned and repositioned every 2 hours. Winces with some pain during repositioning but feels okay right after. Immobilizer on RLE, elevated on pillow. Gamez patent. Patient has been cooperative with cares and has not made any attempts to get out of bed and we will therefore discontinue 1:1 supervision and monitor patient on IPAD. Bed alarm is on. Plan of Care reviewed.                          "

## 2025-05-09 NOTE — PROGRESS NOTES
Infection Prevention Note:  Patient on Enteric Precautions for positive Norovirus result (5/3/25). Patient to remain on Enteric Precautions until at least 48 hours after resolution of symptoms. Ensure hand washing on room exit with soap and water and use bleach wipes to clean/disinfect equipment.     If patient meets isolation discontinuation criteria during admission, request EVS to terminally clean patient room, if possible.     Thanks, Clair Dowling, Infection Prevention on 5/9/2025 at 7:21 AM

## 2025-05-09 NOTE — PROGRESS NOTES
PALLIATIVE CARE PROGRESS NOTE  [facility]       Patient Name: Peggy De Anda  Date of Admission: 5/3/2025   Today the patient was seen for: follow up on goals of care, plan, pain, overall condition     Recommendations & Counseling     GOALS OF CARE:   Comfort focused   Continue comfort cares   Patient not discharging to TCU as not a candidate at this time nor does it meet family goals.   Daughter little concerned about her returning to [facility] care as hoping they can meet her care needs now that she is bed bound. Plan is to discharge  with [facility] hospice services.   FROM Palliative Medicine Perspective, patient is stable for discharge as symptoms are managed and goals above are clearly established.     ADVANCE CARE PLANNING:  Patient has an advance directive dated 11/28/2023.  Primary Health Care Agent daughter, Ernestine.  Alternate(s) son-in-law, Theo.   There is no POLST form on file, defer to patient and/or next of kin for decisions   Code status: No CPR- Do NOT Intubate     MEDICAL MANAGEMENT:   Comfort Care   Below are common symptoms routinely seen in patients with comfort focused goals and at the end of life. This patient may not currently exhibit all of these symptoms; however, if these were to occur our recommendations are as follows:     #Pain -right tibial fracture  1st choice: Morphine PO/SL 5-10 mg q 2 hour as needed - none given in 24 hours  2nd choice: Morphine IV 1-2 mg q 15 minutes as needed - none given in 24 hours  Continue scheduled Tylenol 975 every 8 hours continue   Voltaren to right knee 3 times daily     #Air hunger/Dyspnea   1st choice: Morphine PO/SL 5-10 mg q 2 hour as needed.   2nd choice: Morphine IV 1-2 mg q 15 minutes as needed      #Secretion burden   Robinul 0.2 mg (PO/IV) q 4 hours as needed. If ineffective, increase up to 0.3 mg   AVOID atropine in patient with advanced dementia as can increase agitation.     #Nausea   Zofran 4 mg q 6 hours as  needed. Can increase to 8 mg   Zyprexa 5 mg q 6 hours as needed      #Agitation  Aggressive control of other symptoms first, then  1st choice: Zyprexa 5 mg ODT or IM   2nd choice: Increase dose of Zyprexa to 10 mg or consider switch to Haldol   3rd choice: Lorazepam as above      PSYCHOSOCIAL/SPIRITUAL SUPPORT:  Family daughter, Ernestine, son-in-law, Theo and son who lives in [facility] community: [place]   Would appreciate Spiritual Health Services, Consult has been placed for support     Palliative Care will sign off as goals established and symptoms managed.   Thank you for the consult and allowing us to aid in the care of Peggy De Anda.    These recommendations have been discussed with hospital medicine and care management team.    DARIO Moreno, CNS  [facility], Palliative Care  Securely message with the [employer] Web Console (learn more here) or  Text page via [facility] Paging/Directory       Interval History:     Multidisciplinary collaboration:  Notes and chart reviewed. Gamez placed for urinary retention. Calm. No distress.    Patient/family narrative  Spoke with patient's daughter, Pedro.  She expressed some concerns about ability to care for her mother as she felt they delayed getting hospital with increasing diarrhea.  Support provided.  Reviewed that [facility] memory care would provide 24-hour physical care for patient and addition of hospice provide added attention.    TCU not appropriate dinh for this patient who remains non-weight bearing. DTR< Ernestine desires QOL focus with [facility] hospice.    Assessment          Peggy De Anda is a 89 year old female with a past medical history of Advanced dementia (lives in memory care) epilepsy, ataxia, spinal stenosis and lumbar compression fractures, gout and HTN who presented on 5/3/2025 with increased delirium, diarrhea, UTI and failure to thrive. Patient being treated with IV ceftriaxone. imaging on admission demonstrated a right tibia fracture,  not responsive to conservative treatment with knee immobilizer. 5/6 CT showed worsening tibial fracture and orthopedic surgery dicussed benefit - risk of ORIF right tibial fracture with daughter, Ernestine. While high risk for surgery and anesthesia, decision was to pursue right ORIF right tibia plateau fracture. During surgery, patient experienced bradycardia and severe hypotension after induction of anesthesia, given vasopressin and phenylephrine with good response and surgery aborted, patient extubated and placed on BiPap.  5/7 transitioned to comfort cares following this event with ongoing medication titration for symptom management,       Review of Systems:     Besides above, ROS was reviewed and is unremarkable  Denied pain. Unable to obtain further ROS with advanced dementia.      Physical Exam:   Temp:  [97.3  F (36.3  C)-98.8  F (37.1  C)] 97.3  F (36.3  C)  Pulse:  [56-58] 58  Resp:  [18] 18  BP: (136-146)/(65-66) 136/66  SpO2:  [96 %-100 %] 100 %  142 lbs 10.2 oz    Physical Exam  Constitutional:       General: She is sleeping. She is not in acute distress.     Appearance: She is ill-appearing.   HENT:      Head: Normocephalic and atraumatic.   Eyes:      General: Lids are normal.   Pulmonary:      Effort: Pulmonary effort is normal.      Breath sounds: Normal breath sounds.   Abdominal:      General: Bowel sounds are normal. There is no distension.      Palpations: Abdomen is soft.   Genitourinary:     Comments: Gamez place for retention- straw colored clear urine  Musculoskeletal:      Left knee: Swelling and ecchymosis present. Tenderness present.   Neurological:      Mental Status: She is easily aroused. She is disoriented.      Comments: Oriented to self only.           Data Reviewed:     No results found for this or any previous visit (from the past 24 hours).    50 MINUTES SPENT BY ME on the date of service doing chart review, history, exam, documentation & further activities per the note.

## 2025-05-09 NOTE — PROGRESS NOTES
Orthopedic Surgery Progress Note    Subjective: No acute events overnight.  Pleasantly demented.  Resting in bed.  Alert but not oriented.  Notes pain in left lower leg    Exam:  BP (!) 146/57 (BP Location: Right arm)   Pulse 56   Temp 97.3  F (36.3  C) (Oral)   Resp 18   Ht 1.524 m (5')   Wt 64.7 kg (142 lb 10.2 oz)   SpO2 100%   BMI 27.86 kg/m    Gen: Awake, alert, NAD  Resp: breathing equal and non-labored  Extremities:    RLE: Lower extremity edema, ecchymosis.  Knee is stable in T-Scope brace locked in extension.  Wiggles toes, SILT in all dermatomes of lower leg.  2+ DP pulse.    Labs:    Recent Labs   Lab 05/04/25 0445 05/03/25  1346   WBC 9.7 7.8   HGB 10.0* 10.4*    185     Recent Labs   Lab 05/04/25 0445 05/03/25  1315    138   POTASSIUM 3.7 4.3   CHLORIDE 112* 110*   CO2 16* 18*   BUN 24.7* 27.9*   CR 0.79 0.82   GLC 96 122*   MAG  --  1.8     No lab results found in last 7 days.    Imaging:  EXAM: CT TIBIA FIBULA LOWER LEG RIGHT WO CONTRAST  LOCATION: [facility]  DATE: 5/3/2025     INDICATION: Tibia fracture.  COMPARISON: Same-day radiograph.  TECHNIQUE: Noncontrast. Axial, sagittal and coronal thin-section reconstruction. Dose reduction techniques were used.      FINDINGS:      Bones are markedly demineralized. Again seen is an acute transverse fracture through the proximal tibial shaft with up to 6 mm of medial to lateral displacement.     The fracture extends through inferior base of the tibial tubercle.     In addition, there is a nondisplaced area of curvilinear sclerosis along the tibial metadiaphysis approximately 1.5 cm from the articular surface of the knee in keeping with an age-indeterminate nondisplaced stress fracture, favor subacute to chronic.     Acute minimally displaced and impacted fracture centered at the fibular neck. Age-indeterminate nondisplaced stress fracture of the calcaneus where there is curvilinear sclerosis.     No evidence of  an acute displaced distal tibia or fibular fracture.     Scattered soft tissue swelling and poorly defined blood products most pronounced near the proximal tibial fracture site. No large soft tissue hematoma.     Arthritic changes both knees with left greater than right joint effusions.     Extensive atherosclerotic vascular calcifications.                                                                      IMPRESSION:  1.  Acute mildly displaced proximal tibial shaft fracture as described.  2.  Acute minimally displaced and impacted fibular neck fracture.  3.  Age-indeterminate nondisplaced stress fracture of the proximal tibial metadiaphysis, superior to the tibial acute fracture, favor subacute to chronic.  4.  Age-indeterminate nondisplaced calcaneal stress fracture, favor subacute to chronic although correlation with tenderness is recommend.  5.  Bones are markedly demineralized.      EXAM: XR TIBIA AND FIBULA RIGHT 2 VIEWS  LOCATION: [facility]  DATE: 5/6/2025     INDICATION: assess for fracture displacement  COMPARISON: CT 05/30/2025                                                                      IMPRESSION:   Acute displaced transverse fracture of the proximal tibial metadiaphysis is again seen. Tibial shaft is medially displaced relative to the tibial plateau. This appears to have slightly increased compared to 05/03/2025 when accounting for differences in   technique.     Acute impacted displaced fracture of the fibular neck. Displacement of these fracture fragments also appear increased compared to the prior study.     Unchanged linear sclerosis in the medial tibial plateau consistent with age-indeterminate stress fracture.     Nondisplaced age-indeterminate stress fracture of the calcaneus.     Posterior splinting material which obscures fine bony detail. Severe diffuse osseous demineralization which limits evaluation for nondisplaced fractures and subtle osseous lesions.  Knee and tibiotalar joints appear grossly normal alignment.    Assessment:   89 year old female with chronic past medical history significant for dementia, bilateral lower extremity lymphedema, hypertension in the setting of acute encephalopathy and diagnosis of norovirus who presents with proximal right tibia transverse fracture with modest displacement, displacement worsening on repeat imaging however attempted surgical fixation of the proximal tibia fracture was aborted on 5/7/2025 due to rapid decompensation with onset of anesthesia. will pursue nonoperative treatment going forward    Plan:  Hospitalist Primary  Activity: Assistance at all times when up.  High falls risk.  Weight bearing status: Nonweightbearing right lower extremity  Bracing/Splinting: Hinged T scope knee brace to be worn at all times besides hygiene cares and skin checks.  Brace to be locked in extension at all times.  Elevation: Elevate leg to combat swelling when in bed  Physical Therapy: transfers, ADLs  Occupational Therapy: ADL's.  We would recommend a palliative care consult to discuss goals of care going forward    Disposition: Pending medical optimization, stability,  Progress with therapies.  Orthopedically stable today    Future Appointments   Date Time Provider Department Center   5/4/2025  3:00 PM Jasson Engle PT JNPTHR [facility] PA-C  Orthopedic Surgery  [facility]

## 2025-05-09 NOTE — PLAN OF CARE
"  Problem: Adult Inpatient Plan of Care  Goal: Plan of Care Review  Description: The Plan of Care Review/Shift note should be completed every shift.  The Outcome Evaluation is a brief statement about your assessment that the patient is improving, declining, or no change.  This information will be displayed automatically on your shiftnote.  Outcome: Progressing  Flowsheets (Taken 5/8/2025 1920)  Plan of Care Reviewed With: patient  Goal: Patient-Specific Goal (Individualized)  Description: You can add care plan individualizations to a care plan. Examples of Individualization might be:  \"Parent requests to be called daily at 9am for status\", \"I have a hard time hearing out of my right ear\", or \"Do not touch me to wake me up as it startlesme\".  Outcome: Progressing  Goal: Absence of Hospital-Acquired Illness or Injury  Outcome: Progressing  Intervention: Identify and Manage Fall Risk  Recent Flowsheet Documentation  Taken 5/8/2025 1600 by Umesh Cox RN  Safety Promotion/Fall Prevention:   bedside attendant   clutter free environment maintained   lighting adjusted  Intervention: Prevent Skin Injury  Recent Flowsheet Documentation  Taken 5/8/2025 1803 by Umesh Cox RN  Body Position:   heels elevated   supine  Taken 5/8/2025 1600 by Umesh Cox RN  Body Position:   position maintained   heels elevated   log-rolled   lower extremity elevated  Intervention: Prevent Infection  Recent Flowsheet Documentation  Taken 5/8/2025 1600 by Umesh Cox RN  Infection Prevention:   personal protective equipment utilized   rest/sleep promoted   single patient room provided  Goal: Optimal Comfort and Wellbeing  Outcome: Progressing  Intervention: Provide Person-Centered Care  Recent Flowsheet Documentation  Taken 5/8/2025 1600 by Umesh Cox RN  Trust Relationship/Rapport:   care explained   questions answered  Goal: Readiness for Transition of Care  Outcome: Progressing     Problem: Delirium  Goal: Improved Attention " and Thought Clarity  Outcome: Progressing  Goal: Improved Sleep  Outcome: Progressing     Problem: Orthopaedic Fracture  Goal: Absence of Bleeding  Outcome: Progressing  Intervention: Monitor and Manage Fracture Bleeding  Recent Flowsheet Documentation  Taken 5/8/2025 1600 by Umesh Cox RN  Fracture Immobilization: immobilization device maintained  Goal: Bowel Elimination  Outcome: Progressing  Goal: Fracture Stability  Outcome: Progressing  Intervention: Promote Fracture Stability and Healing  Recent Flowsheet Documentation  Taken 5/8/2025 1600 by Umesh Cox RN  Fracture Immobilization: immobilization device maintained  Goal: Optimal Functional Ability  Outcome: Progressing  Intervention: Optimize Functional Ability  Recent Flowsheet Documentation  Taken 5/8/2025 1803 by Umesh Cox RN  Positioning/Transfer Devices:   in use   pillows  Taken 5/8/2025 1600 by Umesh Cox RN  Activity Management: bedrest  Goal: Absence of Infection Signs and Symptoms  Outcome: Progressing  Intervention: Prevent or Manage Infection  Recent Flowsheet Documentation  Taken 5/8/2025 1600 by Umesh Cox RN  Isolation Precautions: enteric precautions maintained  Goal: Effective Tissue Perfusion  Outcome: Progressing  Goal: Optimal Pain Control and Function  Outcome: Progressing  Goal: Effective Oxygenation and Ventilation  Outcome: Progressing  Intervention: Promote Airway Secretion Clearance  Recent Flowsheet Documentation  Taken 5/8/2025 1600 by Umesh Cox RN  Activity Management: bedrest  Intervention: Optimize Oxygenation and Ventilation  Recent Flowsheet Documentation  Taken 5/8/2025 1803 by Umesh Cox RN  Head of Bed (HOB) Positioning: HOB at 20-30 degrees  Taken 5/8/2025 1600 by Umesh Cox RN  Head of Bed (HOB) Positioning: HOB at 20-30 degrees     Problem: Pain Acute  Goal: Optimal Pain Control and Function  Outcome: Progressing  Intervention: Optimize Psychosocial Wellbeing  Recent Flowsheet  Documentation  Taken 5/8/2025 1600 by Umesh Cox RN  Supportive Measures: active listening utilized  Intervention: Prevent or Manage Pain  Recent Flowsheet Documentation  Taken 5/8/2025 1600 by Umesh Cox RN  Medication Review/Management: medications reviewed     Problem: Comorbidity Management  Goal: Blood Pressure in Desired Range  Outcome: Progressing  Intervention: Maintain Blood Pressure Management  Recent Flowsheet Documentation  Taken 5/8/2025 1600 by Umesh Cox RN  Medication Review/Management: medications reviewed     Problem: Mobility Impairment  Goal: Optimal Mobility  Outcome: Progressing  Intervention: Optimize Mobility  Recent Flowsheet Documentation  Taken 5/8/2025 1803 by Umesh Cox RN  Positioning/Transfer Devices:   in use   pillows  Taken 5/8/2025 1600 by Umesh Cox RN  Activity Management: bedrest     Problem: Cognitive Impairment  Goal: Optimal Cognitive Function  Outcome: Progressing     Problem: Diarrhea  Goal: Effective Diarrhea Management  Outcome: Progressing  Intervention: Manage Diarrhea  Recent Flowsheet Documentation  Taken 5/8/2025 1600 by Umesh Cox RN  Isolation Precautions: enteric precautions maintained  Medication Review/Management: medications reviewed     Problem: Infection  Goal: Absence of Infection Signs and Symptoms  Outcome: Progressing  Intervention: Prevent or Manage Infection  Recent Flowsheet Documentation  Taken 5/8/2025 1600 by Umesh Cox RN  Isolation Precautions: enteric precautions maintained     Problem: Fall Injury Risk  Goal: Absence of Fall and Fall-Related Injury  Outcome: Progressing  Intervention: Identify and Manage Contributors  Recent Flowsheet Documentation  Taken 5/8/2025 1600 by Umesh Cox RN  Medication Review/Management: medications reviewed  Intervention: Promote Injury-Free Environment  Recent Flowsheet Documentation  Taken 5/8/2025 1600 by Umesh Cox RN  Safety Promotion/Fall Prevention:   bedside attendant    clutter free environment maintained   lighting adjusted     Problem: Urinary Retention  Goal: Effective Urinary Elimination  Outcome: Progressing     Problem: Palliative Care  Goal: Enhanced Quality of Life  Outcome: Progressing  Intervention: Optimize Psychosocial Wellbeing  Recent Flowsheet Documentation  Taken 5/8/2025 1600 by Umesh Cox RN  Supportive Measures: active listening utilized   Goal Outcome Evaluation:      Plan of Care Reviewed With: patient

## 2025-05-09 NOTE — PROGRESS NOTES
Care Management Follow Up    Length of Stay (days): 6    Expected Discharge Date: 05/12/2025    Anticipated Discharge Plan:  Assisted Living    Transportation: Confirmed Stretcher. Transportation costs discussed? Yes. Discussed with Ernestine thompson    PT Recommendations: Transitional Care Facility  OT Recommendations:  Transitional Care Facility     Barriers to Discharge: placement    Prior Living Situation: assisted living with facility resident    Discussed  Partnership in Safe Discharge Planning  document with patient/family: No     Handoff Completed: No, handoff not indicated or clinically appropriate    Patient/Spokesperson Updated: Yes. Who? Ernestine Thompson, [facility], [facility]    Additional Information:  RNCM coordinated pt's discharge with family and care team. Pt had been living at [facility] in [place], unfortunately they are unable to accept pt back. They have a limit of how many bed bound residents they can accept. The max is 3 and those spots are filled. DANIEL Sandy did make arrangements for pt to transfer to [facility] location: [facility] at [place] [address]. Main: [phone]    Family are going to work with [facility] over the weekend to get pt transferred to [facility] and move her belongings.     Discharged scheduled for Monday 5/12/25 going to [address] [place] [address] via stretcher arriving between [id number]    [facility] updated and will meet pt at [facility] on [address] early afternoon.     Gisele Sinha updated and agrees to discharge plan.     Next Steps: PCS form, send orders    Betsy Gilbert RN

## 2025-05-09 NOTE — PLAN OF CARE
"  Problem: Adult Inpatient Plan of Care  Goal: Plan of Care Review  Description: The Plan of Care Review/Shift note should be completed every shift.  The Outcome Evaluation is a brief statement about your assessment that the patient is improving, declining, or no change.  This information will be displayed automatically on your shiftnote.  Outcome: Progressing  Goal: Patient-Specific Goal (Individualized)  Description: You can add care plan individualizations to a care plan. Examples of Individualization might be:  \"Parent requests to be called daily at 9am for status\", \"I have a hard time hearing out of my right ear\", or \"Do not touch me to wake me up as it startlesme\".  Outcome: Progressing  Goal: Absence of Hospital-Acquired Illness or Injury  Outcome: Progressing  Intervention: Identify and Manage Fall Risk  Recent Flowsheet Documentation  Taken 5/9/2025 1000 by Tori Molina RN  Safety Promotion/Fall Prevention:   activity supervised   assistive device/personal items within reach   increase visualization of patient   increased rounding and observation  Intervention: Prevent Skin Injury  Recent Flowsheet Documentation  Taken 5/9/2025 1000 by Tori Molina RN  Body Position:   turned   heels elevated  Intervention: Prevent Infection  Recent Flowsheet Documentation  Taken 5/9/2025 1000 by Tori Molina RN  Infection Prevention: hand hygiene promoted  Goal: Optimal Comfort and Wellbeing  Outcome: Progressing  Intervention: Provide Person-Centered Care  Recent Flowsheet Documentation  Taken 5/9/2025 1000 by Tori Molina RN  Trust Relationship/Rapport:   care explained   choices provided   emotional support provided  Goal: Readiness for Transition of Care  Outcome: Progressing     Problem: Delirium  Goal: Optimal Coping  Intervention: Optimize Psychosocial Adjustment to Delirium  Recent Flowsheet Documentation  Taken 5/9/2025 1000 by Tori Molina RN  Supportive Measures: active listening utilized  Goal: Improved " Behavioral Control  Intervention: Minimize Safety Risk  Recent Flowsheet Documentation  Taken 5/9/2025 1000 by Tori Molina RN  Enhanced Safety Measures: assistive devices when indicated  Trust Relationship/Rapport:   care explained   choices provided   emotional support provided  Goal: Improved Attention and Thought Clarity  Outcome: Progressing  Intervention: Maximize Cognitive Function  Recent Flowsheet Documentation  Taken 5/9/2025 1000 by Tori Molina RN  Reorientation Measures: clock in view  Goal: Improved Sleep  Outcome: Progressing     Problem: Pain Acute  Goal: Optimal Pain Control and Function  Outcome: Progressing  Intervention: Optimize Psychosocial Wellbeing  Recent Flowsheet Documentation  Taken 5/9/2025 1000 by Tori Molina RN  Supportive Measures: active listening utilized  Intervention: Prevent or Manage Pain  Recent Flowsheet Documentation  Taken 5/9/2025 1000 by Tori Molina RN  Bowel Elimination Promotion: adequate fluid intake promoted  Medication Review/Management: medications reviewed     Problem: Comorbidity Management  Goal: Blood Pressure in Desired Range  Outcome: Progressing  Intervention: Maintain Blood Pressure Management  Recent Flowsheet Documentation  Taken 5/9/2025 1000 by Tori Molina RN  Medication Review/Management: medications reviewed     Problem: Mobility Impairment  Goal: Optimal Mobility  Outcome: Progressing  Intervention: Optimize Mobility  Recent Flowsheet Documentation  Taken 5/9/2025 1000 by Tori Molina RN  Activity Management: activity adjusted per tolerance  Positioning/Transfer Devices:   immobilization device   pillows     Problem: Cognitive Impairment  Goal: Optimal Cognitive Function  Outcome: Progressing     Problem: Diarrhea  Goal: Effective Diarrhea Management  Outcome: Progressing  Intervention: Manage Diarrhea  Recent Flowsheet Documentation  Taken 5/9/2025 1000 by Tori Molina RN  Isolation Precautions: enteric precautions maintained  Medication  Review/Management: medications reviewed     Problem: Infection  Goal: Absence of Infection Signs and Symptoms  Outcome: Progressing  Intervention: Prevent or Manage Infection  Recent Flowsheet Documentation  Taken 5/9/2025 1000 by Tori Molina RN  Isolation Precautions: enteric precautions maintained     Problem: Fall Injury Risk  Goal: Absence of Fall and Fall-Related Injury  Outcome: Progressing  Intervention: Identify and Manage Contributors  Recent Flowsheet Documentation  Taken 5/9/2025 1000 by Tori Molina RN  Medication Review/Management: medications reviewed  Intervention: Promote Injury-Free Environment  Recent Flowsheet Documentation  Taken 5/9/2025 1000 by Tori Molina RN  Safety Promotion/Fall Prevention:   activity supervised   assistive device/personal items within reach   increase visualization of patient   increased rounding and observation     Problem: Urinary Retention  Goal: Effective Urinary Elimination  Outcome: Progressing     Problem: Palliative Care  Goal: Enhanced Quality of Life  Outcome: Progressing  Intervention: Optimize Psychosocial Wellbeing  Recent Flowsheet Documentation  Taken 5/9/2025 1000 by Tori Molina RN  Supportive Measures: active listening utilized   Goal Outcome Evaluation:    Pt confused. Alert to self. Base line dementia. Comfort cares. Sleeping between cares. Denies pain at rest. Pain with repositioning and movement to RLE. Pain creams and tylenol given. Brace locked in place. Straps assessed, edema. CMS intact. Garcia in place, yellow clear output. Meds crushed in applesauce. Poor appetite. Food and fluids offered. Possible discharge Monday per CM note.      Spoke with daughter on phone who requested update. Daughter stated she would like the garcia to stay in to minimize discomfort from movement. She expressed concern about pts appetite and nutrition. Staff will continue to offer fluids and food and provide oral cares. Repositioned q2h. Pt calm and comfortable.

## 2025-05-10 PROCEDURE — 99231 SBSQ HOSP IP/OBS SF/LOW 25: CPT | Mod: GC

## 2025-05-10 PROCEDURE — 120N000001 HC R&B MED SURG/OB

## 2025-05-10 PROCEDURE — 250N000013 HC RX MED GY IP 250 OP 250 PS 637

## 2025-05-10 PROCEDURE — 250N000013 HC RX MED GY IP 250 OP 250 PS 637: Performed by: INTERNAL MEDICINE

## 2025-05-10 RX ADMIN — Medication 12.5 MG: at 10:55

## 2025-05-10 RX ADMIN — NYSTATIN: 100000 CREAM TOPICAL at 20:14

## 2025-05-10 RX ADMIN — DIVALPROEX SODIUM 375 MG: 125 CAPSULE, COATED PELLETS ORAL at 10:55

## 2025-05-10 RX ADMIN — ACETAMINOPHEN 975 MG: 325 TABLET, FILM COATED ORAL at 20:05

## 2025-05-10 RX ADMIN — NYSTATIN: 100000 CREAM TOPICAL at 11:08

## 2025-05-10 RX ADMIN — DICLOFENAC SODIUM 4 G: 10 GEL TOPICAL at 20:16

## 2025-05-10 RX ADMIN — ACETAMINOPHEN 975 MG: 325 TABLET, FILM COATED ORAL at 14:58

## 2025-05-10 RX ADMIN — Medication 12.5 MG: at 20:05

## 2025-05-10 RX ADMIN — ANORECTAL OINTMENT: 15.7; .44; 24; 20.6 OINTMENT TOPICAL at 10:55

## 2025-05-10 RX ADMIN — DIVALPROEX SODIUM 375 MG: 125 CAPSULE, COATED PELLETS ORAL at 20:06

## 2025-05-10 RX ADMIN — DICLOFENAC SODIUM 4 G: 10 GEL TOPICAL at 10:55

## 2025-05-10 RX ADMIN — ACETAMINOPHEN 975 MG: 325 TABLET, FILM COATED ORAL at 10:55

## 2025-05-10 ASSESSMENT — ACTIVITIES OF DAILY LIVING (ADL)
ADLS_ACUITY_SCORE: 93
ADLS_ACUITY_SCORE: 89
ADLS_ACUITY_SCORE: 89
ADLS_ACUITY_SCORE: 93
ADLS_ACUITY_SCORE: 89
ADLS_ACUITY_SCORE: 89
ADLS_ACUITY_SCORE: 93
ADLS_ACUITY_SCORE: 93
ADLS_ACUITY_SCORE: 89
ADLS_ACUITY_SCORE: 93
ADLS_ACUITY_SCORE: 89

## 2025-05-10 NOTE — PROGRESS NOTES
[facility]    Medicine Progress Note - Hospitalist Service    Date of Admission:  5/3/2025    Assessment & Plan   Peggy De Anda is a 89 year old female with history of dementia, epilepsy, ataxia, chronic lumbar compression fractures, foraminal stenoses, HTN, and gout admitted on 5/3/2025 with concern for toxic encephalopathy, UTI and tibula fracture. 5/7 Worsening displacement of R proximal tibia fracture noted and patient had surgical fixation planned; case aborted due to rapid instability during anaesthesia induction. Palliative consulted for [facility] and patient is now comfort care. Plan is for discharge to home facility ([facility]) on Monday with outpatient hospice through [facility].    Comfort care  During anesthesia induction for tibia fracture repair, patient developed severe hypotension and bradycardia, so the surgery had to be aborted.  This led to goals of care conversations.  Appreciate palliative care's assistance.  Family has elected to transition to comfort focused cares as of 5/7/2025.  Patient appears very comfortable today.  - Comfort care order set used  - As needed morphine for pain  - Reviewed home medications, only ordering medications that promote comfort.  - Plan is for discharge to home facility ([facility]) on Monday with outpatient hospice through [facility]  - Patient's daughter would like to be updated with any change in clinical status.    Metabolic encephalopathy  Severe sepsis, improved  UTI, s/p treatment  Presented from memory care w/ NVD and AMS. Initially w/o fever or leukocytosis on labs in the ED. Head CT negative. UC/BCs collected. Started on IV Ceftriaxone in the ED for dirty UA and suspected UTI. Did ultimately test positive for ETEC, norovirus, see below. Suspect primary  of pt's AMS on arrival was metabolic encephalopathy +/- volume depletion from GI illness. Remains confused.  - UC growing gram-negative bacilli and Citrobacter  - S/p IV  Ceftriaxone (5/4-5/9)  - Delirium precautions ordered    Frequent non bloody diarrhea- improved  Norovirus infection  ETEC infection  One unmeasured stool overnight, able to collect enteric panel which resulted positive for ETEC and norovirus. Certainly could explain the above symptoms. Given no leukocytosis, fever, or bloody stools will continue w/ supportive cares only. Diarrhea improving we will continue to monitor.  - Encourage po intake    Right Transverse fracture through the proximal tibial metaphysis  On admission found to have mildly displaced proximal tibial shaft fracture. Unclear what mechanism of injury was to cause this, though suspect there was a fall sometime in recent days w/ pt hx of advanced dementia. Recent imaging shows more displacement plan for ORIF 5/7. Surgery aborted due to rapid instability during anaesthesia induction. Palliative consulted for [facility] and patient is now comfort care.   - Ortho consulted, appreciate recs   > Will treat non operatively   > NWB on RLE. Well padded knee immobilizer for now, transition to hinged knee brace locked in full extension  - Palliative consulted, appreciate  - Comfort cares    Urinary retention  Gamez catheter in place plan for TOV within 24 hours if able.     Chronic conditions:  Epilepsy- PTA Depakote  Chronic venous stasis- holding PTA lasix. Lymphedema wraps ordered.   HTN- restarted PTA coreg, hold lasix for now          Diet: Regular Diet Adult    DVT Prophylaxis: Pneumatic Compression Devices  Gamez Catheter: PRESENT, indication: Acute retention or obstruction, End of life  Lines: None     Cardiac Monitoring: None  Code Status: No CPR- Do NOT Intubate      Clinically Significant Risk Factors                   # Hypertension: Noted on problem list              # Overweight: Estimated body mass index is 27.86 kg/m  as calculated from the following:    Height as of this encounter: 1.524 m (5').    Weight as of this encounter: 64.7 kg (142 lb 10.2 oz).         # Financial/Environmental Concerns: none         Social Drivers of Health    Food Insecurity: Unknown (5/7/2025)    Food Insecurity     Within the past 12 months, did you worry that your food would run out before you got money to buy more?: Patient unable to answer     Within the past 12 months, did the food you bought just not last and you didn t have money to get more?: Patient unable to answer   Housing Stability: Unknown (5/7/2025)    Housing Stability     Do you have housing? : Patient unable to answer     Are you worried about losing your housing?: Patient unable to answer   Tobacco Use: Unknown (5/7/2025)    Patient History     Smoking Tobacco Use: Never     Smokeless Tobacco Use: Unknown   Financial Resource Strain: Unknown (5/7/2025)    Financial Resource Strain     Within the past 12 months, have you or your family members you live with been unable to get utilities (heat, electricity) when it was really needed?: Patient unable to answer   Transportation Needs: Unknown (5/7/2025)    Transportation Needs     Within the past 12 months, has lack of transportation kept you from medical appointments, getting your medicines, non-medical meetings or appointments, work, or from getting things that you need?: Patient unable to answer   Interpersonal Safety: Unknown (5/7/2025)    Interpersonal Safety     Do you feel physically and emotionally safe where you currently live?: Patient unable to answer     Within the past 12 months, have you been hit, slapped, kicked or otherwise physically hurt by someone?: Patient unable to answer     Within the past 12 months, have you been humiliated or emotionally abused in other ways by your partner or ex-partner?: Patient unable to answer    [facility]    Social Connections          Disposition Plan   Medically Ready for Discharge: Anticipated in 2-4 Days         The patient's care was discussed with the Attending Physician,  Dr. Plasencia.    Heide Gregory MD  Hospitalist Service  [facility]  Securely message with [employer] (more info)  Text page via [employer] Paging/Directory   ______________________________________________________________________    Interval History   Calm, appears comfortable, remains 1:1.  Patient awakens to voice today.  States that she is comfortable, denies any pain.  Daughter at bedside, answered some questions about what hospice will look like in the outpatient setting.  Patient told daughter that she loves her this morning.    Physical Exam   Vital Signs: Temp: 97.3  F (36.3  C) Temp src: Oral BP: (!) 148/67 (RN notified) Pulse: 61   Resp: 16 SpO2: 96 % O2 Device: None (Room air)    Weight: 142 lbs 10.2 oz    General Appearance: Lying in bed, calm, NAD  RESPIRATORY: Normal work of breathing, clear breath sounds bilaterally, no wheezing  CARDIOVASCULAR: Normal S1, S2, no murmur  SKIN: RLE is in a brace, bruising, significant edema.  RUE with some mild bruising/edema; IV in this arm was removed recently.  NEUROLOGIC: sleeping, confused at baseline.    Data   ------------------------- PAST 24 HR DATA REVIEWED -----------------------------------------------        Imaging results reviewed over the past 24 hrs:   No results found for this or any previous visit (from the past 24 hours).

## 2025-05-10 NOTE — PLAN OF CARE
Problem: Orthopaedic Fracture  Goal: Optimal Functional Ability  Intervention: Optimize Functional Ability  Recent Flowsheet Documentation  Taken 5/10/2025 0423 by Gaye Raygoza RN  Positioning/Transfer Devices:   pillows   applied  Taken 5/10/2025 0207 by Gaye Raygoza RN  Positioning/Transfer Devices:   pillows   applied  Taken 5/10/2025 0047 by Gaye Raygoza RN  Activity Management: bedrest  Positioning/Transfer Devices:   pillows   applied     Problem: Adult Inpatient Plan of Care  Goal: Absence of Hospital-Acquired Illness or Injury  Intervention: Prevent Skin Injury  Recent Flowsheet Documentation  Taken 5/10/2025 0423 by Gaye Raygoza RN  Body Position:   supine, head elevated   legs elevated  Taken 5/10/2025 0207 by Gaye Raygoza RN  Body Position:   tilted   right  Taken 5/10/2025 0047 by Gaye Raygoza RN  Body Position: supine, head elevated     Problem: Pain Acute  Goal: Optimal Pain Control and Function  Intervention: Prevent or Manage Pain  Recent Flowsheet Documentation  Taken 5/10/2025 0047 by Gaye Raygoza RN  Sensory Stimulation Regulation:   care clustered   quiet environment promoted     Problem: Palliative Care  Goal: Enhanced Quality of Life  Intervention: Optimize Function  Recent Flowsheet Documentation  Taken 5/10/2025 0047 by Gaey Raygoza RN  Sensory Stimulation Regulation:   care clustered   quiet environment promoted   Goal Outcome Evaluation:  Pt is calm and comfortable. Repositioned every 2 hrs. Indwelling garcia catheter in place and draining. Offered water refused. Rt lower extremity elevated and splint intact. Pt slept most of the shift.

## 2025-05-10 NOTE — CONSULTS
Care Management Follow Up    Length of Stay (days): 7    Expected Discharge Date: 2025    Anticipated Discharge Plan:  Assisted Living, Hospice    Transportation: Confirmed Stretcher. Transportation costs discussed? Yes. Discussed with daughter Ernestine    PT Recommendations: Transitional Care Facility  OT Recommendations:  Transitional Care Facility     Barriers to Discharge: placement    Prior Living Situation: assisted living with facility resident    Discussed  Partnership in Safe Discharge Planning  document with patient/family: No     Handoff Completed: No, handoff not indicated or clinically appropriate    Patient/Spokesperson Updated: No    Additional Information:  CM consult ordered yesterday to assist family with coordinating hospice services to start right after Pt discharges back to her memory care facility.     Plan is for Pt to discharge to [facility] on Monday (). Family and facility are aware and agreeable with Monday's plan. [facility] stretcher transport pick-up window: [phone]. PCS completed.    Contacts:  [facility] [place]   Main Phone: [phone]  Du, Director of [phone]   Address: [address] [place] [address]   Kelsey, Care Transition Coordinator - [facility]: [phone]    Next Steps: CM to confirm final plans for discharge on Monday with family, hospice, and memory care facility.      AMINAH Huynh

## 2025-05-10 NOTE — PLAN OF CARE
"  Problem: Adult Inpatient Plan of Care  Goal: Plan of Care Review  Description: The Plan of Care Review/Shift note should be completed every shift.  The Outcome Evaluation is a brief statement about your assessment that the patient is improving, declining, or no change.  This information will be displayed automatically on your shiftnote.  Outcome: Progressing  Goal: Patient-Specific Goal (Individualized)  Description: You can add care plan individualizations to a care plan. Examples of Individualization might be:  \"Parent requests to be called daily at 9am for status\", \"I have a hard time hearing out of my right ear\", or \"Do not touch me to wake me up as it startlesme\".  Outcome: Progressing  Goal: Absence of Hospital-Acquired Illness or Injury  Outcome: Progressing  Intervention: Identify and Manage Fall Risk  Recent Flowsheet Documentation  Taken 5/10/2025 0930 by Tori Molina RN  Safety Promotion/Fall Prevention:   activity supervised   assistive device/personal items within reach   increase visualization of patient   increased rounding and observation  Intervention: Prevent Skin Injury  Recent Flowsheet Documentation  Taken 5/10/2025 0930 by Tori Molina RN  Body Position:   turned   heels elevated  Intervention: Prevent Infection  Recent Flowsheet Documentation  Taken 5/10/2025 0930 by Tori Molina RN  Infection Prevention: hand hygiene promoted  Goal: Optimal Comfort and Wellbeing  Outcome: Progressing  Intervention: Monitor Pain and Promote Comfort  Recent Flowsheet Documentation  Taken 5/10/2025 0900 by Tori Molina RN  Pain Management Interventions: medication (see MAR)  Intervention: Provide Person-Centered Care  Recent Flowsheet Documentation  Taken 5/10/2025 0930 by Tori Molina RN  Trust Relationship/Rapport:   care explained   choices provided   emotional support provided  Goal: Readiness for Transition of Care  Outcome: Progressing     Problem: Delirium  Goal: Optimal Coping  Intervention: Optimize " Psychosocial Adjustment to Delirium  Recent Flowsheet Documentation  Taken 5/10/2025 1115 by Tori Molina RN  Family/Support System Care:   involvement promoted   presence promoted  Taken 5/10/2025 0930 by Tori Molina RN  Supportive Measures: active listening utilized  Goal: Improved Behavioral Control  Intervention: Minimize Safety Risk  Recent Flowsheet Documentation  Taken 5/10/2025 0930 by Tori Molina RN  Enhanced Safety Measures: assistive devices when indicated  Trust Relationship/Rapport:   care explained   choices provided   emotional support provided  Goal: Improved Attention and Thought Clarity  Outcome: Progressing  Intervention: Maximize Cognitive Function  Recent Flowsheet Documentation  Taken 5/10/2025 0930 by Tori Molina RN  Reorientation Measures: clock in view  Goal: Improved Sleep  Outcome: Progressing     Problem: Orthopaedic Fracture  Goal: Absence of Bleeding  Outcome: Progressing  Goal: Bowel Elimination  Outcome: Progressing  Intervention: Promote Effective Bowel Elimination  Recent Flowsheet Documentation  Taken 5/10/2025 0930 by Tori Molina RN  Bowel Elimination Promotion: adequate fluid intake promoted  Goal: Fracture Stability  Outcome: Progressing  Goal: Optimal Functional Ability  Outcome: Progressing  Intervention: Optimize Functional Ability  Recent Flowsheet Documentation  Taken 5/10/2025 0930 by Tori Molina RN  Activity Management: activity adjusted per tolerance  Positioning/Transfer Devices:   immobilization device   pillows  Goal: Absence of Infection Signs and Symptoms  Outcome: Progressing  Intervention: Prevent or Manage Infection  Recent Flowsheet Documentation  Taken 5/10/2025 0930 by Tori Molina RN  Isolation Precautions: enteric precautions maintained  Goal: Effective Tissue Perfusion  Outcome: Progressing  Goal: Optimal Pain Control and Function  Outcome: Progressing  Intervention: Manage Acute Orthopaedic-Related Pain  Recent Flowsheet Documentation  Taken 5/10/2025  0900 by Tori Molina RN  Pain Management Interventions: medication (see MAR)  Goal: Effective Oxygenation and Ventilation  Outcome: Progressing  Intervention: Promote Airway Secretion Clearance  Recent Flowsheet Documentation  Taken 5/10/2025 0930 by Tori Molina RN  Cough And Deep Breathing: done with encouragement  Activity Management: activity adjusted per tolerance     Problem: Pain Acute  Goal: Optimal Pain Control and Function  Outcome: Progressing  Intervention: Optimize Psychosocial Wellbeing  Recent Flowsheet Documentation  Taken 5/10/2025 0930 by Tori Molina RN  Supportive Measures: active listening utilized  Intervention: Develop Pain Management Plan  Recent Flowsheet Documentation  Taken 5/10/2025 0900 by Tori Molina RN  Pain Management Interventions: medication (see MAR)  Intervention: Prevent or Manage Pain  Recent Flowsheet Documentation  Taken 5/10/2025 0930 by Tori Molina RN  Bowel Elimination Promotion: adequate fluid intake promoted  Medication Review/Management: medications reviewed     Problem: Pain Acute  Goal: Optimal Pain Control and Function  Outcome: Progressing  Intervention: Optimize Psychosocial Wellbeing  Recent Flowsheet Documentation  Taken 5/10/2025 0930 by Tori Molina RN  Supportive Measures: active listening utilized  Intervention: Develop Pain Management Plan  Recent Flowsheet Documentation  Taken 5/10/2025 0900 by Tori Molina RN  Pain Management Interventions: medication (see MAR)  Intervention: Prevent or Manage Pain  Recent Flowsheet Documentation  Taken 5/10/2025 0930 by Tori Molina RN  Bowel Elimination Promotion: adequate fluid intake promoted  Medication Review/Management: medications reviewed     Problem: Comorbidity Management  Goal: Blood Pressure in Desired Range  Outcome: Progressing  Intervention: Maintain Blood Pressure Management  Recent Flowsheet Documentation  Taken 5/10/2025 0930 by Tori Molina RN  Medication Review/Management: medications reviewed      Problem: Mobility Impairment  Goal: Optimal Mobility  Outcome: Progressing  Intervention: Optimize Mobility  Recent Flowsheet Documentation  Taken 5/10/2025 0930 by Tori Molina RN  Activity Management: activity adjusted per tolerance  Positioning/Transfer Devices:   immobilization device   pillows     Problem: Cognitive Impairment  Goal: Optimal Cognitive Function  Outcome: Progressing     Problem: Diarrhea  Goal: Effective Diarrhea Management  Outcome: Progressing  Intervention: Manage Diarrhea  Recent Flowsheet Documentation  Taken 5/10/2025 0930 by Tori Molina RN  Isolation Precautions: enteric precautions maintained  Medication Review/Management: medications reviewed     Problem: Infection  Goal: Absence of Infection Signs and Symptoms  Outcome: Progressing  Intervention: Prevent or Manage Infection  Recent Flowsheet Documentation  Taken 5/10/2025 0930 by Tori Molina RN  Isolation Precautions: enteric precautions maintained     Problem: Fall Injury Risk  Goal: Absence of Fall and Fall-Related Injury  Outcome: Progressing  Intervention: Identify and Manage Contributors  Recent Flowsheet Documentation  Taken 5/10/2025 0930 by Tori Molina RN  Medication Review/Management: medications reviewed  Intervention: Promote Injury-Free Environment  Recent Flowsheet Documentation  Taken 5/10/2025 0930 by Tori Molina RN  Safety Promotion/Fall Prevention:   activity supervised   assistive device/personal items within reach   increase visualization of patient   increased rounding and observation     Problem: Urinary Retention  Goal: Effective Urinary Elimination  Outcome: Progressing     Problem: Palliative Care  Goal: Enhanced Quality of Life  Outcome: Progressing  Intervention: Maximize Comfort  Recent Flowsheet Documentation  Taken 5/10/2025 0900 by Tori Molina RN  Pain Management Interventions: medication (see MAR)  Intervention: Optimize Psychosocial Wellbeing  Recent Flowsheet Documentation  Taken 5/10/2025 1115  by Tori Molina, RN  Family/Support System Care:   involvement promoted   presence promoted  Taken 5/10/2025 0142 by Tori Molina RN  Supportive Measures: active listening utilized   Goal Outcome Evaluation:    Pt calm, alert to self. Sleeping between cares. Denies pain at rest. Pain with repositioning and movement. RLE immobilizer in place. Straps adjusted, edema and bruising to RLE. Pulses intact. Elevated on pillows. Sacral wound, open, dime sized. Cleaned, cream mepilex. Reposition q2h. MD notified. Gamez in place draining. Redness to MD EJ aware. Poor intake. Daughter at bed site and MD update

## 2025-05-10 NOTE — PLAN OF CARE
Problem: Adult Inpatient Plan of Care  Goal: Absence of Hospital-Acquired Illness or Injury  Intervention: Identify and Manage Fall Risk  Recent Flowsheet Documentation  Taken 5/9/2025 2200 by Kady Keith RN  Safety Promotion/Fall Prevention:   activity supervised   assistive device/personal items within reach   nonskid shoes/slippers when out of bed   room near nurse's station   safety round/check completed  Intervention: Prevent Skin Injury  Recent Flowsheet Documentation  Taken 5/9/2025 2251 by Kady Keith RN  Body Position:   turned   left  Taken 5/9/2025 1945 by Kady Keith RN  Body Position: supine, head elevated  Goal: Optimal Comfort and Wellbeing  Intervention: Provide Person-Centered Care  Recent Flowsheet Documentation  Taken 5/9/2025 2200 by Kady Keith RN  Trust Relationship/Rapport: care explained     Problem: Delirium  Goal: Improved Behavioral Control  Intervention: Minimize Safety Risk  Recent Flowsheet Documentation  Taken 5/9/2025 2200 by Kady Keith RN  Enhanced Safety Measures:   assistive devices when indicated   pain management   review medications for side effects with activity   room near unit station  Trust Relationship/Rapport: care explained     Problem: Orthopaedic Fracture  Goal: Optimal Functional Ability  Intervention: Optimize Functional Ability  Recent Flowsheet Documentation  Taken 5/9/2025 2251 by Kady Keith RN  Positioning/Transfer Devices:   pillows   applied  Taken 5/9/2025 1945 by Kady Keith RN  Activity Management: activity adjusted per tolerance  Goal: Absence of Infection Signs and Symptoms  Intervention: Prevent or Manage Infection  Recent Flowsheet Documentation  Taken 5/9/2025 2200 by Kady Keith RN  Isolation Precautions: enteric precautions maintained  Goal: Effective Oxygenation and Ventilation  Intervention: Promote Airway Secretion Clearance  Recent Flowsheet Documentation  Taken 5/9/2025 1945 by Kady Keith  RN  Activity Management: activity adjusted per tolerance  Intervention: Optimize Oxygenation and Ventilation  Recent Flowsheet Documentation  Taken 5/9/2025 2251 by Kady Keith RN  Head of Bed (HOB) Positioning: HOB at 20-30 degrees  Taken 5/9/2025 1945 by Kady Keith RN  Head of Bed (HOB) Positioning: HOB at 20-30 degrees     Problem: Pain Acute  Goal: Optimal Pain Control and Function  Intervention: Prevent or Manage Pain  Recent Flowsheet Documentation  Taken 5/9/2025 2200 by Kady Keith RN  Medication Review/Management: medications reviewed     Problem: Comorbidity Management  Goal: Blood Pressure in Desired Range  Intervention: Maintain Blood Pressure Management  Recent Flowsheet Documentation  Taken 5/9/2025 2200 by Kday Keith RN  Medication Review/Management: medications reviewed     Problem: Mobility Impairment  Goal: Optimal Mobility  Intervention: Optimize Mobility  Recent Flowsheet Documentation  Taken 5/9/2025 2251 by Kady Keith RN  Positioning/Transfer Devices:   pillows   applied  Taken 5/9/2025 1945 by Kady Keith RN  Activity Management: activity adjusted per tolerance     Problem: Diarrhea  Goal: Effective Diarrhea Management  Intervention: Manage Diarrhea  Recent Flowsheet Documentation  Taken 5/9/2025 2200 by Kady Keith RN  Isolation Precautions: enteric precautions maintained  Medication Review/Management: medications reviewed     Problem: Infection  Goal: Absence of Infection Signs and Symptoms  Intervention: Prevent or Manage Infection  Recent Flowsheet Documentation  Taken 5/9/2025 2200 by Kady Keith RN  Isolation Precautions: enteric precautions maintained     Problem: Fall Injury Risk  Goal: Absence of Fall and Fall-Related Injury  Intervention: Identify and Manage Contributors  Recent Flowsheet Documentation  Taken 5/9/2025 2200 by Kady Keith RN  Medication Review/Management: medications reviewed  Intervention: Promote Injury-Free  Environment  Recent Flowsheet Documentation  Taken 5/9/2025 2200 by Kady Keith, RN  Safety Promotion/Fall Prevention:   activity supervised   assistive device/personal items within reach   nonskid shoes/slippers when out of bed   room near nurse's station   safety round/check completed   Goal Outcome Evaluation:       Writer took over pt's care for 4 hours. Aox1- self, lethargic and uncooperative. Refused scheduled meds, writer approached and attempted to give meds 3x. Also offered food and water but pt refused. Pt on comfort care, pt left alone to rest after 3rd attempt. Pain with activity, voltaren cream applied to right knee. 200 ml output on garcia.

## 2025-05-11 PROCEDURE — 250N000013 HC RX MED GY IP 250 OP 250 PS 637: Performed by: INTERNAL MEDICINE

## 2025-05-11 PROCEDURE — 250N000013 HC RX MED GY IP 250 OP 250 PS 637

## 2025-05-11 PROCEDURE — 120N000001 HC R&B MED SURG/OB

## 2025-05-11 PROCEDURE — 99231 SBSQ HOSP IP/OBS SF/LOW 25: CPT | Mod: GC

## 2025-05-11 RX ADMIN — ACETAMINOPHEN 975 MG: 325 TABLET, FILM COATED ORAL at 21:04

## 2025-05-11 RX ADMIN — DICLOFENAC SODIUM 4 G: 10 GEL TOPICAL at 21:16

## 2025-05-11 RX ADMIN — DICLOFENAC SODIUM 4 G: 10 GEL TOPICAL at 10:12

## 2025-05-11 RX ADMIN — DIVALPROEX SODIUM 375 MG: 125 CAPSULE, COATED PELLETS ORAL at 21:15

## 2025-05-11 RX ADMIN — Medication 12.5 MG: at 10:12

## 2025-05-11 RX ADMIN — ACETAMINOPHEN 975 MG: 325 TABLET, FILM COATED ORAL at 14:06

## 2025-05-11 RX ADMIN — DIVALPROEX SODIUM 375 MG: 125 CAPSULE, COATED PELLETS ORAL at 10:12

## 2025-05-11 RX ADMIN — NYSTATIN: 100000 CREAM TOPICAL at 21:23

## 2025-05-11 RX ADMIN — Medication 12.5 MG: at 21:05

## 2025-05-11 RX ADMIN — ACETAMINOPHEN 975 MG: 325 TABLET, FILM COATED ORAL at 10:13

## 2025-05-11 RX ADMIN — NYSTATIN: 100000 CREAM TOPICAL at 10:12

## 2025-05-11 ASSESSMENT — ACTIVITIES OF DAILY LIVING (ADL)
ADLS_ACUITY_SCORE: 93

## 2025-05-11 NOTE — PLAN OF CARE
Problem: Pain Acute  Goal: Optimal Pain Control and Function  Outcome: Progressing     Problem: Mobility Impairment  Goal: Optimal Mobility  Outcome: Progressing     Problem: Cognitive Impairment  Goal: Optimal Cognitive Function  Outcome: Progressing     Problem: Palliative Care  Goal: Enhanced Quality of Life  Outcome: Progressing   Goal Outcome Evaluation:

## 2025-05-11 NOTE — PLAN OF CARE
"Goal Outcome Evaluation:  Pt is pleasantly confused. Talking to herself even light were off. No complain of pain. Repositioning done. Brief changed.Slept between cares.        Problem: Adult Inpatient Plan of Care  Goal: Plan of Care Review  Description: The Plan of Care Review/Shift note should be completed every shift.  The Outcome Evaluation is a brief statement about your assessment that the patient is improving, declining, or no change.  This information will be displayed automatically on your shiftnote.  Outcome: Progressing     Problem: Adult Inpatient Plan of Care  Goal: Patient-Specific Goal (Individualized)  Description: You can add care plan individualizations to a care plan. Examples of Individualization might be:  \"Parent requests to be called daily at 9am for status\", \"I have a hard time hearing out of my right ear\", or \"Do not touch me to wake me up as it startlesme\".  Outcome: Progressing     Problem: Adult Inpatient Plan of Care  Goal: Optimal Comfort and Wellbeing  Outcome: Progressing     Problem: Pain Acute  Goal: Optimal Pain Control and Function  Intervention: Prevent or Manage Pain  Recent Flowsheet Documentation  Taken 5/11/2025 0014 by Gaye Raygoza, RN  Medication Review/Management: medications reviewed       "

## 2025-05-11 NOTE — PROGRESS NOTES
[facility]    Medicine Progress Note - Hospitalist Service    Date of Admission:  5/3/2025    Assessment & Plan   Peggy De Anda is a 89 year old female with history of dementia, epilepsy, ataxia, chronic lumbar compression fractures, foraminal stenoses, HTN, and gout admitted on 5/3/2025 with concern for toxic encephalopathy, UTI and tibula fracture. 5/7 Worsening displacement of R proximal tibia fracture noted and patient had surgical fixation planned; case aborted due to rapid instability during anaesthesia induction. Palliative consulted for [facility] and patient is now comfort care. Plan is for discharge to home facility ([facility]) on Monday with outpatient hospice through [facility].    Comfort care  During anesthesia induction for tibia fracture repair, patient developed severe hypotension and bradycardia, so the surgery had to be aborted.  This led to goals of care conversations.  Appreciate palliative care's assistance.  Family has elected to transition to comfort focused cares as of 5/7/2025.  Patient appears very comfortable today.  - Comfort care order set used  - As needed morphine for pain  - Reviewed home medications, only ordering medications that promote comfort.  - Plan is for discharge to home facility ([facility]) on Monday with outpatient hospice through [facility]  - Patient's daughter would like to be updated with any change in clinical status.    Metabolic encephalopathy  Severe sepsis, improved  UTI, s/p treatment  Presented from memory care w/ NVD and AMS. Initially w/o fever or leukocytosis on labs in the ED. Head CT negative. UC/BCs collected. Started on IV Ceftriaxone in the ED for dirty UA and suspected UTI. Did ultimately test positive for ETEC, norovirus, see below. Suspect primary  of pt's AMS on arrival was metabolic encephalopathy +/- volume depletion from GI illness. Remains confused but pleasant.   - UC growing gram-negative bacilli and  Citrobacter  - S/p IV Ceftriaxone (5/4-5/9)  - Delirium precautions ordered    Frequent non bloody diarrhea- improved  Norovirus infection  ETEC infection  One unmeasured stool overnight, able to collect enteric panel which resulted positive for ETEC and norovirus. Certainly could explain the above symptoms. Given no leukocytosis, fever, or bloody stools will continue w/ supportive cares only. Diarrhea improving we will continue to monitor.  - Encourage po intake    Right Transverse fracture through the proximal tibial metaphysis  On admission found to have mildly displaced proximal tibial shaft fracture. Unclear what mechanism of injury was to cause this, though suspect there was a fall sometime in recent days w/ pt hx of advanced dementia. Recent imaging shows more displacement plan for ORIF 5/7. Surgery aborted due to rapid instability during anaesthesia induction. Palliative consulted for [facility] and patient is now comfort care.   - Ortho consulted, appreciate recs   > Will treat non operatively   > NWB on RLE. Well padded knee immobilizer for now, transition to hinged knee brace locked in full extension  - Palliative consulted, appreciate  - Comfort cares    Urinary retention  Gamez catheter in place plan for TOV within 24 hours if able.     Chronic conditions:  Epilepsy- PTA Depakote  Chronic venous stasis- holding PTA lasix. Lymphedema wraps ordered.   HTN- restarted PTA coreg, hold lasix for now          Diet: Regular Diet Adult    DVT Prophylaxis: Pneumatic Compression Devices  Gamez Catheter: PRESENT, indication: End of life  Lines: None     Cardiac Monitoring: None  Code Status: No CPR- Do NOT Intubate      Clinically Significant Risk Factors                   # Hypertension: Noted on problem list              # Overweight: Estimated body mass index is 27.86 kg/m  as calculated from the following:    Height as of this encounter: 1.524 m (5').    Weight as of this encounter: 64.7 kg (142 lb 10.2 oz).        #  Financial/Environmental Concerns: none         Social Drivers of Health    Food Insecurity: Unknown (5/7/2025)    Food Insecurity     Within the past 12 months, did you worry that your food would run out before you got money to buy more?: Patient unable to answer     Within the past 12 months, did the food you bought just not last and you didn t have money to get more?: Patient unable to answer   Housing Stability: Unknown (5/7/2025)    Housing Stability     Do you have housing? : Patient unable to answer     Are you worried about losing your housing?: Patient unable to answer   Tobacco Use: Unknown (5/7/2025)    Patient History     Smoking Tobacco Use: Never     Smokeless Tobacco Use: Unknown   Financial Resource Strain: Unknown (5/7/2025)    Financial Resource Strain     Within the past 12 months, have you or your family members you live with been unable to get utilities (heat, electricity) when it was really needed?: Patient unable to answer   Transportation Needs: Unknown (5/7/2025)    Transportation Needs     Within the past 12 months, has lack of transportation kept you from medical appointments, getting your medicines, non-medical meetings or appointments, work, or from getting things that you need?: Patient unable to answer   Interpersonal Safety: Unknown (5/7/2025)    Interpersonal Safety     Do you feel physically and emotionally safe where you currently live?: Patient unable to answer     Within the past 12 months, have you been hit, slapped, kicked or otherwise physically hurt by someone?: Patient unable to answer     Within the past 12 months, have you been humiliated or emotionally abused in other ways by your partner or ex-partner?: Patient unable to answer    Received from [facility] & [facility]    Social Connections          Disposition Plan   Medically Ready for Discharge: Anticipated in 2-4 Days         The patient's care was discussed with the Attending Physician,   Erinn.    Ivy Mendez MD  Hospitalist Service  [facility]  Securely message with [employer] (more info)  Text page via [employer] Paging/Directory   ______________________________________________________________________    Interval History   Calm and comfortable. Does not appear in pain. Daughter at bedside, no concerns or questions.     Physical Exam   Vital Signs:                    Weight: 142 lbs 10.2 oz    General Appearance: Lying in bed, calm, NAD  RESPIRATORY: Normal work of breathing, clear breath sounds bilaterally, no wheezing  CARDIOVASCULAR: Normal S1, S2, no murmur  SKIN: RLE is in a brace, bruising, significant edema.  RUE with some mild bruising/edema; IV in this arm was removed recently.  NEUROLOGIC: sleeping, confused at baseline.    Data   ------------------------- PAST 24 HR DATA REVIEWED -----------------------------------------------        Imaging results reviewed over the past 24 hrs:   No results found for this or any previous visit (from the past 24 hours).

## 2025-05-11 NOTE — PLAN OF CARE
"  Problem: Adult Inpatient Plan of Care  Goal: Plan of Care Review  Description: The Plan of Care Review/Shift note should be completed every shift.  The Outcome Evaluation is a brief statement about your assessment that the patient is improving, declining, or no change.  This information will be displayed automatically on your shiftnote.  5/11/2025 1416 by Tori Molina RN  Outcome: Not Progressing  5/11/2025 1416 by Tori Molina RN  Outcome: Progressing  Goal: Patient-Specific Goal (Individualized)  Description: You can add care plan individualizations to a care plan. Examples of Individualization might be:  \"Parent requests to be called daily at 9am for status\", \"I have a hard time hearing out of my right ear\", or \"Do not touch me to wake me up as it startlesme\".  5/11/2025 1416 by Tori Molina RN  Outcome: Not Progressing  5/11/2025 1416 by Tori Molina RN  Outcome: Progressing  Goal: Absence of Hospital-Acquired Illness or Injury  5/11/2025 1416 by Tori Molina RN  Outcome: Not Progressing  5/11/2025 1416 by Tori Molina RN  Outcome: Progressing  Intervention: Identify and Manage Fall Risk  Recent Flowsheet Documentation  Taken 5/11/2025 0931 by Tori Molina RN  Safety Promotion/Fall Prevention:   activity supervised   assistive device/personal items within reach   increase visualization of patient   increased rounding and observation  Intervention: Prevent Skin Injury  Recent Flowsheet Documentation  Taken 5/11/2025 0931 by Tori Molina RN  Body Position:   turned   heels elevated  Intervention: Prevent Infection  Recent Flowsheet Documentation  Taken 5/11/2025 0931 by Tori Molina RN  Infection Prevention: hand hygiene promoted  Goal: Optimal Comfort and Wellbeing  5/11/2025 1416 by Tori Molina RN  Outcome: Not Progressing  5/11/2025 1416 by Tori Molina RN  Outcome: Progressing  Intervention: Provide Person-Centered Care  Recent Flowsheet Documentation  Taken 5/11/2025 0931 by Tori Molina RN  Trust " Relationship/Rapport:   care explained   choices provided   emotional support provided  Goal: Readiness for Transition of Care  5/11/2025 1416 by Tori Molina RN  Outcome: Not Progressing  5/11/2025 1416 by Tori Molina RN  Outcome: Progressing     Problem: Delirium  Goal: Optimal Coping  Intervention: Optimize Psychosocial Adjustment to Delirium  Recent Flowsheet Documentation  Taken 5/11/2025 0931 by Toir Molina RN  Supportive Measures: active listening utilized  Goal: Improved Behavioral Control  Intervention: Minimize Safety Risk  Recent Flowsheet Documentation  Taken 5/11/2025 0931 by Tori Molina RN  Enhanced Safety Measures: assistive devices when indicated  Trust Relationship/Rapport:   care explained   choices provided   emotional support provided  Goal: Improved Attention and Thought Clarity  5/11/2025 1416 by Tori Molina RN  Outcome: Not Progressing  5/11/2025 1416 by Tori Molina RN  Outcome: Progressing  Intervention: Maximize Cognitive Function  Recent Flowsheet Documentation  Taken 5/11/2025 0931 by Tori Molina RN  Reorientation Measures: clock in view  Goal: Improved Sleep  5/11/2025 1416 by Tori Molina RN  Outcome: Not Progressing  5/11/2025 1416 by Tori Molina RN  Outcome: Progressing     Problem: Orthopaedic Fracture  Goal: Absence of Bleeding  5/11/2025 1416 by Tori Molina RN  Outcome: Not Progressing  5/11/2025 1416 by Tori Molina RN  Outcome: Progressing  Goal: Bowel Elimination  5/11/2025 1416 by Tori Molina RN  Outcome: Not Progressing  5/11/2025 1416 by Tori Molina RN  Outcome: Progressing  Intervention: Promote Effective Bowel Elimination  Recent Flowsheet Documentation  Taken 5/11/2025 0931 by Tori Molnia RN  Bowel Elimination Promotion: adequate fluid intake promoted  Goal: Fracture Stability  5/11/2025 1416 by Tori Molina RN  Outcome: Not Progressing  5/11/2025 1416 by Tori Molina RN  Outcome: Progressing  Goal: Optimal Functional Ability  5/11/2025 1416 by Tori Molina  RN  Outcome: Not Progressing  5/11/2025 1416 by Tori Molina RN  Outcome: Progressing  Intervention: Optimize Functional Ability  Recent Flowsheet Documentation  Taken 5/11/2025 0931 by Tori Molina RN  Activity Management: activity adjusted per tolerance  Positioning/Transfer Devices:   immobilization device   pillows  Goal: Absence of Infection Signs and Symptoms  5/11/2025 1416 by Tori Molina RN  Outcome: Not Progressing  5/11/2025 1416 by Tori Molina RN  Outcome: Progressing  Intervention: Prevent or Manage Infection  Recent Flowsheet Documentation  Taken 5/11/2025 0931 by Tori Molina RN  Isolation Precautions: enteric precautions maintained  Goal: Effective Tissue Perfusion  5/11/2025 1416 by Tori Molina RN  Outcome: Not Progressing  5/11/2025 1416 by Tori Molina RN  Outcome: Progressing  Goal: Optimal Pain Control and Function  5/11/2025 1416 by Tori Molina RN  Outcome: Not Progressing  5/11/2025 1416 by Tori Molina RN  Outcome: Progressing  Goal: Effective Oxygenation and Ventilation  5/11/2025 1416 by Tori Molina RN  Outcome: Not Progressing  5/11/2025 1416 by Tori Molina RN  Outcome: Progressing  Intervention: Promote Airway Secretion Clearance  Recent Flowsheet Documentation  Taken 5/11/2025 0931 by Tori Molina RN  Cough And Deep Breathing: done with encouragement  Activity Management: activity adjusted per tolerance     Problem: Pain Acute  Goal: Optimal Pain Control and Function  5/11/2025 1416 by Tori Molina RN  Outcome: Not Progressing  5/11/2025 1416 by Tori Molina RN  Outcome: Progressing  Intervention: Optimize Psychosocial Wellbeing  Recent Flowsheet Documentation  Taken 5/11/2025 0931 by Tori Molina RN  Supportive Measures: active listening utilized  Intervention: Prevent or Manage Pain  Recent Flowsheet Documentation  Taken 5/11/2025 0931 by Tori Molina RN  Bowel Elimination Promotion: adequate fluid intake promoted  Medication Review/Management: medications reviewed     Problem:  Comorbidity Management  Goal: Blood Pressure in Desired Range  5/11/2025 1416 by Tori Molina RN  Outcome: Not Progressing  5/11/2025 1416 by Tori Molina RN  Outcome: Progressing  Intervention: Maintain Blood Pressure Management  Recent Flowsheet Documentation  Taken 5/11/2025 0931 by Tori Molina RN  Medication Review/Management: medications reviewed     Problem: Mobility Impairment  Goal: Optimal Mobility  5/11/2025 1416 by Tori Molina RN  Outcome: Not Progressing  5/11/2025 1416 by Tori Molina RN  Outcome: Progressing  Intervention: Optimize Mobility  Recent Flowsheet Documentation  Taken 5/11/2025 0931 by Tori Molina RN  Activity Management: activity adjusted per tolerance  Positioning/Transfer Devices:   immobilization device   pillows     Problem: Cognitive Impairment  Goal: Optimal Cognitive Function  5/11/2025 1416 by Tori Molina RN  Outcome: Not Progressing  5/11/2025 1416 by Tori Molina RN  Outcome: Progressing     Problem: Diarrhea  Goal: Effective Diarrhea Management  5/11/2025 1416 by Tori Molina RN  Outcome: Not Progressing  5/11/2025 1416 by Tori Molina RN  Outcome: Progressing  Intervention: Manage Diarrhea  Recent Flowsheet Documentation  Taken 5/11/2025 0931 by Tori Molina RN  Isolation Precautions: enteric precautions maintained  Medication Review/Management: medications reviewed     Problem: Infection  Goal: Absence of Infection Signs and Symptoms  5/11/2025 1416 by Tori Molina RN  Outcome: Not Progressing  5/11/2025 1416 by Tori Molina RN  Outcome: Progressing  Intervention: Prevent or Manage Infection  Recent Flowsheet Documentation  Taken 5/11/2025 0931 by Tori Molina RN  Isolation Precautions: enteric precautions maintained     Problem: Fall Injury Risk  Goal: Absence of Fall and Fall-Related Injury  5/11/2025 1416 by Tori Molina RN  Outcome: Not Progressing  5/11/2025 1416 by Tori Molina RN  Outcome: Progressing  Intervention: Identify and Manage Contributors  Recent Flowsheet  Documentation  Taken 5/11/2025 0931 by Tori Molina RN  Medication Review/Management: medications reviewed  Intervention: Promote Injury-Free Environment  Recent Flowsheet Documentation  Taken 5/11/2025 0931 by Tori Molina RN  Safety Promotion/Fall Prevention:   activity supervised   assistive device/personal items within reach   increase visualization of patient   increased rounding and observation     Problem: Urinary Retention  Goal: Effective Urinary Elimination  5/11/2025 1416 by Tori Molina RN  Outcome: Not Progressing  5/11/2025 1416 by Tori Molina RN  Outcome: Progressing     Problem: Palliative Care  Goal: Enhanced Quality of Life  5/11/2025 1416 by Tori Molina RN  Outcome: Not Progressing  5/11/2025 1416 by Tori Molina RN  Outcome: Progressing  Intervention: Optimize Psychosocial Wellbeing  Recent Flowsheet Documentation  Taken 5/11/2025 0931 by Tori Molina RN  Supportive Measures: active listening utilized   Goal Outcome Evaluation:    Pt oriented to self. Confused. Calm. States she is comfortable when asked. RLE brace in place. Pulses intact. Receiving scheduled tylenol crushed in apple sauce. Elevated limb and heels on pillows. Repositioned q2h. Comfort care. Sleeping in between cares today. Offering food and fluids. This morning pt took a few bites of pudding and sips of apple juice. This afternoon 3 small bite of soup and few sips of water and then asked to stop. Few sips of water this evening and declined all food. Son visiting this afternoon.

## 2025-05-12 VITALS
SYSTOLIC BLOOD PRESSURE: 141 MMHG | BODY MASS INDEX: 28 KG/M2 | WEIGHT: 142.64 LBS | HEIGHT: 60 IN | TEMPERATURE: 97.9 F | DIASTOLIC BLOOD PRESSURE: 65 MMHG | RESPIRATION RATE: 16 BRPM | OXYGEN SATURATION: 96 % | HEART RATE: 58 BPM

## 2025-05-12 PROCEDURE — 250N000013 HC RX MED GY IP 250 OP 250 PS 637

## 2025-05-12 PROCEDURE — 250N000013 HC RX MED GY IP 250 OP 250 PS 637: Performed by: INTERNAL MEDICINE

## 2025-05-12 PROCEDURE — 99207 PR NO CHARGE SIGN-OFF PS: CPT | Performed by: NURSE PRACTITIONER

## 2025-05-12 PROCEDURE — 99238 HOSP IP/OBS DSCHRG MGMT 30/<: CPT | Mod: GC

## 2025-05-12 RX ORDER — SENNOSIDES 8.6 MG
1 TABLET ORAL 2 TIMES DAILY PRN
Qty: 6 TABLET | Refills: 0 | Status: SHIPPED | OUTPATIENT
Start: 2025-05-12 | End: 2025-05-15

## 2025-05-12 RX ORDER — MORPHINE SULFATE 20 MG/ML
5 SOLUTION ORAL
Qty: 15 ML | Refills: 0 | Status: SHIPPED | OUTPATIENT
Start: 2025-05-12 | End: 2025-05-15

## 2025-05-12 RX ORDER — ONDANSETRON 4 MG/1
4 TABLET, ORALLY DISINTEGRATING ORAL EVERY 6 HOURS PRN
Qty: 15 TABLET | Refills: 0 | Status: SHIPPED | OUTPATIENT
Start: 2025-05-12

## 2025-05-12 RX ORDER — CARBOXYMETHYLCELLULOSE SODIUM 5 MG/ML
1-2 SOLUTION/ DROPS OPHTHALMIC
Qty: 30 EACH | Refills: 0 | Status: SHIPPED | OUTPATIENT
Start: 2025-05-12

## 2025-05-12 RX ORDER — OLANZAPINE 5 MG/1
5 TABLET, ORALLY DISINTEGRATING ORAL EVERY 6 HOURS PRN
Qty: 12 TABLET | Refills: 0 | Status: SHIPPED | OUTPATIENT
Start: 2025-05-12 | End: 2025-05-15

## 2025-05-12 RX ORDER — QUETIAPINE FUMARATE 25 MG/1
12.5 TABLET, FILM COATED ORAL 2 TIMES DAILY
Qty: 3 TABLET | Refills: 0 | Status: SHIPPED | OUTPATIENT
Start: 2025-05-12 | End: 2025-05-15

## 2025-05-12 RX ORDER — MORPHINE SULFATE 20 MG/ML
10 SOLUTION ORAL
Qty: 30 ML | Refills: 0 | Status: SHIPPED | OUTPATIENT
Start: 2025-05-12 | End: 2025-05-15

## 2025-05-12 RX ADMIN — NYSTATIN: 100000 CREAM TOPICAL at 08:39

## 2025-05-12 RX ADMIN — DIVALPROEX SODIUM 375 MG: 125 CAPSULE, COATED PELLETS ORAL at 08:36

## 2025-05-12 RX ADMIN — Medication 12.5 MG: at 08:36

## 2025-05-12 RX ADMIN — DICLOFENAC SODIUM 4 G: 10 GEL TOPICAL at 08:39

## 2025-05-12 RX ADMIN — ACETAMINOPHEN 975 MG: 325 TABLET, FILM COATED ORAL at 08:36

## 2025-05-12 ASSESSMENT — ACTIVITIES OF DAILY LIVING (ADL)
ADLS_ACUITY_SCORE: 93

## 2025-05-12 NOTE — PLAN OF CARE
"  Problem: Adult Inpatient Plan of Care  Goal: Plan of Care Review  Description: The Plan of Care Review/Shift note should be completed every shift.  The Outcome Evaluation is a brief statement about your assessment that the patient is improving, declining, or no change.  This information will be displayed automatically on your shiftnote.  Outcome: Progressing     Problem: Adult Inpatient Plan of Care  Goal: Patient-Specific Goal (Individualized)  Description: You can add care plan individualizations to a care plan. Examples of Individualization might be:  \"Parent requests to be called daily at 9am for status\", \"I have a hard time hearing out of my right ear\", or \"Do not touch me to wake me up as it startlesme\".  Outcome: Progressing     Problem: Delirium  Goal: Improved Behavioral Control  Intervention: Minimize Safety Risk  Recent Flowsheet Documentation  Taken 5/12/2025 0042 by Gaye Raygoza, RN  Enhanced Safety Measures: assistive devices when indicated     Problem: Orthopaedic Fracture  Goal: Absence of Bleeding  Outcome: Progressing   Goal Outcome Evaluation:         Problem: Palliative Care  Goal: Enhanced Quality of Life  Intervention: Maximize Comfort  Recent Flowsheet Documentation  Taken 5/12/2025 0042 by Gaye Raygoza, RN  Oral Care: lip/mouth moisturizer applied   Pt remains stable and comfortable in bed. Repositioned every 2 hrs. Slept well. Voiding through ind garcia cath minimal amount. On comfort cares, offered water sipping minimally.                     "

## 2025-05-12 NOTE — PLAN OF CARE
Problem: Adult Inpatient Plan of Care  Goal: Absence of Hospital-Acquired Illness or Injury  Intervention: Prevent Skin Injury  Recent Flowsheet Documentation  Taken 5/12/2025 0836 by Susan Koch, RN  Body Position: heels elevated     Problem: Adult Inpatient Plan of Care  Goal: Optimal Comfort and Wellbeing  Intervention: Provide Person-Centered Care  Recent Flowsheet Documentation  Taken 5/12/2025 0836 by Susan Koch, RN  Trust Relationship/Rapport:   care explained   emotional support provided   Goal Outcome Evaluation:      Patient alert to self, arouses to voice call. Discharged to [facility], accompanied by  transport personnel.

## 2025-05-12 NOTE — PLAN OF CARE
The patient was given Tylenol for pain. Repositioned from right to left with pillows. Brace to right leg in place. Adjusted straps to protect skin.  She is resting comfortably in bed. Bed alarm on.     Goal Outcome Evaluation:    Problem: Adult Inpatient Plan of Care  Goal: Absence of Hospital-Acquired Illness or Injury  Intervention: Identify and Manage Fall Risk  Recent Flowsheet Documentation  Taken 5/11/2025 2010 by Mary Qureshi, RN  Safety Promotion/Fall Prevention:   activity supervised   assistive device/personal items within reach   increase visualization of patient   increased rounding and observation  Intervention: Prevent Skin Injury  Recent Flowsheet Documentation  Taken 5/11/2025 2010 by Mary Qureshi, RN  Body Position:   turned   heels elevated   left  Intervention: Prevent Infection  Recent Flowsheet Documentation  Taken 5/11/2025 2010 by Mary Qureshi, RN  Infection Prevention: hand hygiene promoted  Goal: Optimal Comfort and Wellbeing  Intervention: Provide Person-Centered Care  Recent Flowsheet Documentation  Taken 5/11/2025 2010 by Mary Qureshi, RN  Trust Relationship/Rapport:   care explained   choices provided   emotional support provided

## 2025-05-12 NOTE — PROGRESS NOTES
Peggy is discharging today to [facility] with [facility] hospice.  Palliative care will sign off.

## 2025-05-12 NOTE — DISCHARGE SUMMARY
[facility]  Discharge Summary - Medicine & Pediatrics       Date of Admission:  5/3/2025  Date of Discharge:  5/12/2025  Discharging Provider: ANGEL Carrasquillo, Dr. Diaz  Discharge Service: Hospitalist Service    Discharge Diagnoses   Closed fracture of proximal end of right tibia, unspecified fracture morphology, initial encounter  Acute cystitis without hematuria  Diarrhea, Norovirus, ETEC  History of dementia      Clinically Significant Risk Factors     # Overweight: Estimated body mass index is 27.86 kg/m  as calculated from the following:    Height as of this encounter: 1.524 m (5').    Weight as of this encounter: 64.7 kg (142 lb 10.2 oz).       Follow-ups Needed After Discharge   Follow-up Appointments       Follow Up and recommended labs and tests      Follow up with Nursing home physician.  No follow up labs or test are needed.                  Discharge Disposition   Admited to hospice care.   Agency: [facility].  Discharged to nursing home  Condition at discharge: Stable    Hospital Course   Peggy De Anda was admitted on 5/3/2025 for worsening confusion, UTI, diarrhea and Rt tibial fracture.  The following problems were addressed during her hospitalization:       Comfort care  During anesthesia induction for tibia fracture repair, patient developed severe hypotension and bradycardia, so the surgery had to be aborted.  This led to goals of care conversations.  Appreciate palliative care's assistance.  Family has elected to transition to comfort focused cares as of 5/7/2025.  Patient appears very comfortable today.  - continue PRN morphine for pain  - Reviewed home medications, only ordering medications that promote comfort.  - Plan is for discharge to home facility ([facility]) on Monday with outpatient hospice through [facility]  - Hospice referral      Metabolic encephalopathy  Severe sepsis, improved  UTI, s/p treatment  Presented from memory care w/ NVD and AMS.  Initially w/o fever or leukocytosis on labs in the ED. Head CT negative. UC/BCs collected. Started on IV Ceftriaxone in the ED for dirty UA and suspected UTI. Did ultimately test positive for ETEC, norovirus, see below. Suspect primary  of pt's AMS on arrival was metabolic encephalopathy +/- volume depletion from GI illness. Remains confused but pleasant. UC growing gram-negative bacilli and Citrobacter S/p IV Ceftriaxone (5/4-5/9).      Frequent non bloody diarrhea- resolved   Norovirus infection  ETEC infection  One unmeasured stool overnight, able to collect enteric panel which resulted positive for ETEC and norovirus. Certainly could explain the above symptoms. Given no leukocytosis, fever, or bloody stools will continue w/ supportive cares only. Diarrhea resolved.        Right Transverse fracture through the proximal tibial metaphysis  On admission found to have mildly displaced proximal tibial shaft fracture. Unclear what mechanism of injury was to cause this, though suspect there was a fall sometime in recent days w/ pt hx of advanced dementia. Recent imaging shows more displacement plan for ORIF 5/7. Surgery aborted due to rapid instability during anaesthesia induction. Palliative consulted for [facility] and patient is now comfort care.   - Ortho consulted, appreciate recs              > Will treat non operatively              > NWB on RLE. Well padded knee immobilizer for now, transition to hinged knee brace locked in full extension     Urinary retention  Developed urinary retention 5/8 Gamez catheter in place.     Chronic conditions:  Epilepsy- continue PTA Depakote  Chronic venous stasis- holding PTA lasix. Lymphedema wraps ordered.   HTN- discontinued PTA coreg and lasix for now    Consultations This Hospital Stay   ORTHOPEDIC SURGERY IP CONSULT  PHYSICAL THERAPY ADULT IP CONSULT  OCCUPATIONAL THERAPY ADULT IP CONSULT  CARE MANAGEMENT / SOCIAL WORK IP CONSULT  LYMPHEDEMA THERAPY IP CONSULT  CARE  MANAGEMENT / SOCIAL WORK IP CONSULT  PHARMACY IP CONSULT  PALLIATIVE CARE ADULT IP CONSULT  SPIRITUAL HEALTH SERVICES IP CONSULT  CARE MANAGEMENT / SOCIAL WORK IP CONSULT    Code Status   No CPR- Do NOT Intubate       The patient was discussed with Dr. Emily Field MD  [place] [facility] [place] [address]  Phone: [phone]  Fax: [phone]  ______________________________________________________________________    Physical Exam   Vital Signs: Temp: 97.9  F (36.6  C) Temp src: Oral BP: (!) 141/65 Pulse: 58   Resp: 16 SpO2: 96 % O2 Device: None (Room air)    Weight: 142 lbs 10.2 oz  General Appearance: Alert, NAD, appears comfortable  Respiratory: normal work of breathing, clear breath sounds B/L  Cardiovascular: normal S1, S2 no murmur  GI: soft, non distended, non tender to palpation  Skin: normal appearance and turgor, no visible rash  MSK: RLE in brace, light bruising and edema noted      Primary Care Physician   [facility]    Discharge Orders      Hospice Referral      General info for SNF    Length of Stay Estimate: Long Term Care  Condition at Discharge: Terminal  Level of care:skilled   Rehabilitation Potential: Poor  Admission H&P remains valid and up-to-date: Yes  Recent Chemotherapy: N/A  Use Nursing Home Standing Orders: Yes     Mantoux instructions    Give two-step Mantoux (PPD) Per Facility Policy Yes     Follow Up and recommended labs and tests    Follow up with Nursing home physician.  No follow up labs or test are needed.     Tubes and Drains    Current Tubes and Drains:     Other  Duration           Brace/Orthotic/Orthosis 05/09/25 0700 3 days        Drain  Duration           Urinary Drain 05/08/25 Urethral Catheter 16 fr 3 days         To straight gravity drainage. Change catheter every 2 weeks and PRN for leaking or decreased urine output with signs of bladder distention. DO NOT change catheter  without a specific Provider order IF diagnosis of benign prostatic hypertrophy (BPH), neurogenic bladder, or other urological conditions      Reason for your hospital stay    Admitted with UTI and diarrhea found to have R tibial fracture which was slightly more displaced on repeat imaging. Initial plan was for surgical fixation but patient was unable to tolerate anesthesia due to acute decompensation after anaesthesia induction and surgery was aborted.  Patient was transitioned to comfort care and discharging with hospice.     Activity - Up with assistive device     Fall precautions     Diet    Follow this diet upon discharge: Current Diet:Orders Placed This Encounter      Regular Diet Adult       Significant Results and Procedures   Most Recent 3 CBC's:  Recent Labs   Lab Test 05/04/25  0445 05/03/25  1346 03/26/25  1032   WBC 9.7 7.8 5.3   HGB 10.0* 10.4* 11.0*   * 102* 106*    185 208     Most Recent 3 BMP's:  Recent Labs   Lab Test 05/04/25  0445 05/03/25  1315 03/26/25  1032    138 142   POTASSIUM 3.7 4.3 4.3   CHLORIDE 112* 110* 108*   CO2 16* 18* 22   BUN 24.7* 27.9* 30.9*   CR 0.79 0.82 0.96*   ANIONGAP 14 10 12   OBED 8.7* 8.7* 9.0   GLC 96 122* 112*   ,   Results for orders placed or performed during the hospital encounter of 05/03/25   Head CT w/o contrast    Narrative    EXAM: CT HEAD W/O CONTRAST  LOCATION: [facility]  DATE: 5/3/2025    INDICATION: confusion hallucination  COMPARISON: Head CT 09/07/2023. Brain MRI 09/08/2023.  TECHNIQUE: Routine CT Head without IV contrast. Multiplanar reformats. Dose reduction techniques were used.    FINDINGS:  INTRACRANIAL CONTENTS: No intracranial hemorrhage, extraaxial collection, or mass effect.  No CT evidence of acute infarct. Mild presumed chronic small vessel ischemic changes. Moderate generalized volume loss. No hydrocephalus.     VISUALIZED ORBITS/SINUSES/MASTOIDS: Prior bilateral cataract surgery. Visualized  portions of the orbits are otherwise unremarkable. No paranasal sinus mucosal disease. No middle ear or mastoid effusion.    BONES/SOFT TISSUES: No acute abnormality.      Impression    IMPRESSION:  1.  No acute intracranial findings.  2.  Generalized cerebral volume loss and presumed chronic microvascular ischemic changes of the white matter.   CT Abdomen Pelvis w Contrast    Narrative    EXAM: CT ABDOMEN PELVIS W CONTRAST  LOCATION: [facility]  DATE: 5/3/2025    INDICATION: Abdominal pain but also diarrhea evaluate for any obstruction or inflammation.  History of dementia.  Please also comment on bony pelvis patient complains of pain  COMPARISON: 9/9/2023  TECHNIQUE: CT scan of the abdomen and pelvis was performed following injection of IV contrast. Multiplanar reformats were obtained. Dose reduction techniques were used.  CONTRAST: 70ml [id number]    FINDINGS:   LOWER CHEST: Mildly elevated left hemidiaphragm. Coronary artery disease.    HEPATOBILIARY: Cholecystectomy. Bile duct dilatation may be from cholecystectomy.    PANCREAS: Normal.    SPLEEN: Normal.    ADRENAL GLANDS: Fullness of the adrenal glands without definite nodularity.    KIDNEYS/BLADDER: Simple renal cysts do not require follow-up. No hydronephrosis. There is a nonobstructing left renal calculus. Left renal cortical thinning is again seen. The urinary bladder is distended.    BOWEL: Normal stomach. Normal caliber of the small bowel. There are air-fluid levels in the colon and rectum.    LYMPH NODES: Normal.    VASCULATURE: Atherosclerotic disease. 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    PELVIC ORGANS: Normal.    MUSCULOSKELETAL: Osteopenia. Vertebroplasty at L4 and L5. There is inferior endplate compression of L2 which has not changed. There are degenerative changes in the posterior elements of the lumbar spine. Old left pubic tubercle and inferior  pubic rami   fractures are noted. An old sacral fracture is again seen. No acute fracture.      Impression    IMPRESSION:  1.  Air-fluid levels in the colon and rectum can be seen with liquid stool. No bowel obstruction.  2.  The urinary bladder is distended.  3.  Atherosclerotic disease including coronary artery disease.  4.  Osteopenia. No acute fracture.     XR Tibia and Fibula Bilateral 2 Views    Narrative    EXAM: XR TIBIA AND FIBULA BILATERAL 2 VIEWS  LOCATION: [facility]  DATE: 5/3/2025    INDICATION: pain ecchymoses  COMPARISON: None.      Impression    IMPRESSION:     Right lower extremity: There is a transverse fracture through the proximal tibial metaphysis. No significant angulation deformity. The fibular head is negative for fracture but there is severe demineralization making this difficult to evaluate.   Degenerative change at the knee joint. The ankle mortise appears intact. Plantar calcaneal spurring. Degenerative change at the subtalar joints and talonavicular joint. Vascular calcifications.    Left lower extremity: The tibiotalar negative for acute fracture. There is severe end-stage degenerative change at the tibiotalar joint. Degenerative change medial compartment of the knee joint. Again, severe and diffuse demineralization.   XR Chest Port 1 View    Narrative    EXAM: XR CHEST PORT 1 VIEW  LOCATION: [facility]  DATE: 5/3/2025    INDICATION: weakness  COMPARISON: None.      Impression    IMPRESSION: Heart is normal in size. Mild elevation of the left hemidiaphragm. Lungs otherwise clear. Degenerative changes of the shoulders.   CT Tibia Fibula Lower Leg Right wo Contrast    Narrative    EXAM: CT TIBIA FIBULA LOWER LEG RIGHT WO CONTRAST  LOCATION: [facility]  DATE: 5/3/2025    INDICATION: Tibia fracture.  COMPARISON: Same-day radiograph.  TECHNIQUE: Noncontrast. Axial, sagittal and coronal thin-section reconstruction.  "Dose reduction techniques were used.     FINDINGS:     Bones are markedly demineralized. Again seen is an acute transverse fracture through the proximal tibial shaft with up to 6 mm of medial to lateral displacement.    The fracture extends through inferior base of the tibial tubercle.    In addition, there is a nondisplaced area of curvilinear sclerosis along the tibial metadiaphysis approximately 1.5 cm from the articular surface of the knee in keeping with an age-indeterminate nondisplaced stress fracture, favor subacute to chronic.    Acute minimally displaced and impacted fracture centered at the fibular neck. Age-indeterminate nondisplaced stress fracture of the calcaneus where there is curvilinear sclerosis.    No evidence of an acute displaced distal tibia or fibular fracture.    Scattered soft tissue swelling and poorly defined blood products most pronounced near the proximal tibial fracture site. No large soft tissue hematoma.    Arthritic changes both knees with left greater than right joint effusions.    Extensive atherosclerotic vascular calcifications.      Impression    IMPRESSION:  1.  Acute mildly displaced proximal tibial shaft fracture as described.  2.  Acute minimally displaced and impacted fibular neck fracture.  3.  Age-indeterminate nondisplaced stress fracture of the proximal tibial metadiaphysis, superior to the tibial acute fracture, favor subacute to chronic.  4.  Age-indeterminate nondisplaced calcaneal stress fracture, favor subacute to chronic although correlation with tenderness is recommend.  5.  Bones are markedly demineralized.    NOTE: ABNORMAL REPORT    THE DICTATION ABOVE DESCRIBES AN ABNORMALITY FOR WHICH FOLLOW-UP IS NEEDED.        POC US Guidance Needle Placement    Narrative    Ultrasound was performed as guidance to an anesthesia procedure.  Click   \"PACS images\" hyperlink below to view any stored images.  For specific   procedure details, view procedure note authored by " anesthesia.   XR Tibia and Fibula Right 2 Views    Narrative    EXAM: XR TIBIA AND FIBULA RIGHT 2 VIEWS  LOCATION: [facility]  DATE: 5/6/2025    INDICATION: assess for fracture displacement  COMPARISON: CT 05/30/2025      Impression    IMPRESSION:   Acute displaced transverse fracture of the proximal tibial metadiaphysis is again seen. Tibial shaft is medially displaced relative to the tibial plateau. This appears to have slightly increased compared to 05/03/2025 when accounting for differences in   technique.    Acute impacted displaced fracture of the fibular neck. Displacement of these fracture fragments also appear increased compared to the prior study.    Unchanged linear sclerosis in the medial tibial plateau consistent with age-indeterminate stress fracture.    Nondisplaced age-indeterminate stress fracture of the calcaneus.    Posterior splinting material which obscures fine bony detail. Severe diffuse osseous demineralization which limits evaluation for nondisplaced fractures and subtle osseous lesions. Knee and tibiotalar joints appear grossly normal alignment.       Discharge Medications   Current Discharge Medication List        START taking these medications    Details   artificial saliva (BIOTENE MT) SOLN solution Swish and spit 1 spray in mouth every hour as needed for dry mouth.  Qty: 44.3 mL, Refills: 0    Associated Diagnoses: Hospice care patient      carboxymethylcellulose PF (REFRESH PLUS) 0.5 % ophthalmic solution Place 1-2 drops into both eyes every hour as needed for dry eyes.  Qty: 30 each, Refills: 0    Associated Diagnoses: Hospice care patient      !! morphine sulfate (ROXANOL) 20 mg/mL (HIGH CONC) soln Place 0.25 mLs (5 mg) under the tongue every hour as needed for shortness of breath.  Qty: 15 mL, Refills: 0    Associated Diagnoses: Closed fracture of proximal end of right tibia, unspecified fracture morphology, initial encounter; Hospice care patient      !!  morphine sulfate (ROXANOL) 20 mg/mL (HIGH CONC) soln Place 0.5 mLs (10 mg) under the tongue every hour as needed for severe pain or shortness of breath (or prevention of severe pain/dyspnea).  Qty: 30 mL, Refills: 0    Associated Diagnoses: Hospice care patient      OLANZapine zydis (ZYPREXA) 5 MG ODT Take 1 tablet (5 mg) by mouth every 6 hours as needed for other (delirium).  Qty: 12 tablet, Refills: 0    Associated Diagnoses: Hospice care patient      ondansetron (ZOFRAN ODT) 4 MG ODT tab Take 1 tablet (4 mg) by mouth every 6 hours as needed for nausea or vomiting.  Qty: 15 tablet, Refills: 0    Associated Diagnoses: Hospice care patient      QUEtiapine (SEROQUEL) 25 MG tablet Take 0.5 tablets (12.5 mg) by mouth 2 times daily for 3 days.  Qty: 3 tablet, Refills: 0    Associated Diagnoses: Hospice care patient      sennosides (SENOKOT) 8.6 MG tablet Take 1 tablet by mouth 2 times daily as needed for constipation.  Qty: 6 tablet, Refills: 0    Associated Diagnoses: Hospice care patient       !! - Potential duplicate medications found. Please discuss with provider.        CONTINUE these medications which have NOT CHANGED    Details   acetaminophen (TYLENOL) 500 MG tablet Take 1,000 mg by mouth 3 times daily.      diclofenac (VOLTAREN) 1 % topical gel Apply 4 g topically 2 times daily. Apply to right knee      divalproex sodium delayed-release (DEPAKOTE SPRINKLE) 125 MG DR capsule Take 3 capsules by mouth 2 times daily.      loperamide (IMODIUM) 2 MG capsule Take 4 mg by mouth as needed for diarrhea. Take with first loose stool      mirtazapine (REMERON) 15 MG tablet Take 15 mg by mouth at bedtime.      nystatin (MYCOSTATIN) 598437 UNIT/GM external cream Apply topically 2 times daily. Apply to groin area      sodium chloride (WOUND WASH SALINE) 0.9 % SOLN Externally apply topically daily as needed. Use to clean affected area and cover with dressing on Mondays, Wednesdays, and Fridays           STOP taking these  medications       carvedilol (COREG) 6.25 MG tablet Comments:   Reason for Stopping:         furosemide (LASIX) 40 MG tablet Comments:   Reason for Stopping:         losartan (COZAAR) 100 MG tablet Comments:   Reason for Stopping:         magnesium oxide (MAG-OX) 400 MG tablet Comments:   Reason for Stopping:         potassium chloride dorothy ER (KLOR-CON M20) 20 MEQ CR tablet Comments:   Reason for Stopping:             Allergies   Allergies   Allergen Reactions    Oxycodone Hallucination    Quinolones Rash    Levofloxacin Rash

## 2025-05-12 NOTE — PROGRESS NOTES
Care Management Discharge Note    Discharge Date: 2025       Discharge Disposition: Assisted Living, Hospice    Discharge Services:  (Hospice)    Discharge DME: None    Discharge Transportation: health plan transportation    Private pay costs discussed: transportation costs    Does the patient's insurance plan have a 3 day qualifying hospital stay waiver?  No    PAS Confirmation Code:    Patient/family educated on Medicare website which has current facility and service quality ratings: yes    Education Provided on the Discharge Plan: Yes  Persons Notified of Discharge Plans: Du Rn at facility and family   Patient/Family in Agreement with the Plan: yes    Handoff Referral Completed: No, handoff not indicated or clinically appropriate    Additional Information:  Chart reviewed. Discharge orders placed.  Patient will discharge to [facility] today.  confirmed plan with Du TAVAREZ at [facility].    [facility] [place]   Main Phone: [phone]  Du, Director of [phone]   Address: [address] [place] [id number]   Kelsey, Care Transition Coordinator - [facility]: [phone]    Confirmed fax: [phone] -orders sent.   Stretcher ride [id number]    Daughter Ernestine updated at bedside.   Nafisa Junior, [facility]

## (undated) DEVICE — GLOVE BIOGEL INDICATOR 7.5 LF 41675

## (undated) DEVICE — CAST PLASTER SPLINT 5X30" 7395

## (undated) DEVICE — SUTURE VICRYL+ 3-0 18IN PS-2 UND VCP497H

## (undated) DEVICE — SUCTION MANIFOLD NEPTUNE 2 SYS 1 PORT 702-025-000

## (undated) DEVICE — HOLDER LIMB VELCRO OR 0814-1533

## (undated) DEVICE — SU VICRYL+ 0 27IN CT-1 UND VCP260H

## (undated) DEVICE — KIT TURNOVER FAIRVIEW SOUTHDALE FULL SP3889

## (undated) DEVICE — SOL WATER IRRIG 1000ML BOTTLE 2F7114

## (undated) DEVICE — DRAPE C-ARMOR 5 SIDED 5523

## (undated) DEVICE — GLOVE BIOGEL PI SZ 7.0 40870

## (undated) DEVICE — SUTURE VICRYL+ 2-0 27IN CT-1 UND VCP259H

## (undated) DEVICE — SOL NACL 0.9% IRRIG 1000ML BOTTLE 2F7124

## (undated) DEVICE — MAT FLOOR WATERPROOF BACKSHEET FMBP30

## (undated) DEVICE — ESU GROUND PAD ADULT REM W/15' CORD E7507DB

## (undated) DEVICE — DRAPE C-ARM 60X42" 1013

## (undated) DEVICE — GLOVE UNDER INDICATOR PI SZ 7.0 LF 41670

## (undated) DEVICE — PREP CHLORAPREP 26ML TINTED HI-LITE ORANGE 930815

## (undated) DEVICE — CUSTOM PACK TOTAL KNEE SOP5BTKHEC

## (undated) RX ORDER — ATROPINE SULFATE 0.4 MG/ML
AMPUL (ML) INJECTION
Status: DISPENSED
Start: 2025-05-07

## (undated) RX ORDER — GLYCOPYRROLATE 0.2 MG/ML
INJECTION, SOLUTION INTRAMUSCULAR; INTRAVENOUS
Status: DISPENSED
Start: 2025-05-07

## (undated) RX ORDER — VASOPRESSIN 20 [USP'U]/ML
INJECTION, SOLUTION INTRAVENOUS
Status: DISPENSED
Start: 2025-05-07

## (undated) RX ORDER — BUPIVACAINE HYDROCHLORIDE 5 MG/ML
INJECTION, SOLUTION EPIDURAL; INTRACAUDAL; PERINEURAL
Status: DISPENSED
Start: 2025-05-07

## (undated) RX ORDER — FENTANYL CITRATE 50 UG/ML
INJECTION, SOLUTION INTRAMUSCULAR; INTRAVENOUS
Status: DISPENSED
Start: 2025-05-07